# Patient Record
Sex: MALE | Race: WHITE | HISPANIC OR LATINO | Employment: FULL TIME | URBAN - METROPOLITAN AREA
[De-identification: names, ages, dates, MRNs, and addresses within clinical notes are randomized per-mention and may not be internally consistent; named-entity substitution may affect disease eponyms.]

---

## 2021-01-01 ENCOUNTER — APPOINTMENT (OUTPATIENT)
Dept: RADIOLOGY | Facility: MEDICAL CENTER | Age: 61
DRG: 207 | End: 2021-01-01
Attending: INTERNAL MEDICINE
Payer: OTHER GOVERNMENT

## 2021-01-01 ENCOUNTER — APPOINTMENT (OUTPATIENT)
Dept: RADIOLOGY | Facility: MEDICAL CENTER | Age: 61
DRG: 207 | End: 2021-01-01
Attending: STUDENT IN AN ORGANIZED HEALTH CARE EDUCATION/TRAINING PROGRAM
Payer: OTHER GOVERNMENT

## 2021-01-01 ENCOUNTER — HOSPITAL ENCOUNTER (OUTPATIENT)
Dept: RADIOLOGY | Facility: MEDICAL CENTER | Age: 61
End: 2021-12-11
Attending: INTERNAL MEDICINE
Payer: OTHER GOVERNMENT

## 2021-01-01 ENCOUNTER — HOSPITAL ENCOUNTER (OUTPATIENT)
Dept: RADIOLOGY | Facility: MEDICAL CENTER | Age: 61
End: 2021-12-12
Attending: INTERNAL MEDICINE
Payer: OTHER GOVERNMENT

## 2021-01-01 ENCOUNTER — APPOINTMENT (OUTPATIENT)
Dept: RADIOLOGY | Facility: MEDICAL CENTER | Age: 61
DRG: 207 | End: 2021-01-01
Attending: NURSE PRACTITIONER
Payer: OTHER GOVERNMENT

## 2021-01-01 ENCOUNTER — HOSPITAL ENCOUNTER (INPATIENT)
Facility: MEDICAL CENTER | Age: 61
LOS: 41 days | DRG: 207 | End: 2021-12-16
Attending: EMERGENCY MEDICINE | Admitting: HOSPITALIST
Payer: OTHER GOVERNMENT

## 2021-01-01 ENCOUNTER — HOSPITAL ENCOUNTER (OUTPATIENT)
Dept: RADIOLOGY | Facility: MEDICAL CENTER | Age: 61
End: 2021-12-13
Attending: INTERNAL MEDICINE
Payer: OTHER GOVERNMENT

## 2021-01-01 ENCOUNTER — APPOINTMENT (OUTPATIENT)
Dept: RADIOLOGY | Facility: MEDICAL CENTER | Age: 61
DRG: 207 | End: 2021-01-01
Attending: EMERGENCY MEDICINE
Payer: OTHER GOVERNMENT

## 2021-01-01 VITALS
DIASTOLIC BLOOD PRESSURE: 41 MMHG | OXYGEN SATURATION: 45 % | SYSTOLIC BLOOD PRESSURE: 76 MMHG | TEMPERATURE: 101.8 F | HEART RATE: 45 BPM | WEIGHT: 185.41 LBS | HEIGHT: 66 IN | RESPIRATION RATE: 3 BRPM | BODY MASS INDEX: 29.8 KG/M2

## 2021-01-01 DIAGNOSIS — R65.21 SEPTIC SHOCK (HCC): ICD-10-CM

## 2021-01-01 DIAGNOSIS — U07.1 PNEUMONIA DUE TO COVID-19 VIRUS: ICD-10-CM

## 2021-01-01 DIAGNOSIS — U07.1 ACUTE HYPOXEMIC RESPIRATORY FAILURE DUE TO COVID-19 (HCC): ICD-10-CM

## 2021-01-01 DIAGNOSIS — J96.01 ACUTE HYPOXEMIC RESPIRATORY FAILURE DUE TO COVID-19 (HCC): ICD-10-CM

## 2021-01-01 DIAGNOSIS — A41.9 SEPTIC SHOCK (HCC): ICD-10-CM

## 2021-01-01 DIAGNOSIS — J12.82 PNEUMONIA DUE TO COVID-19 VIRUS: ICD-10-CM

## 2021-01-01 LAB
1 3 BETA D GLUCAN INTERP Q4483: ABNORMAL
1,3 BETA GLUCAN SER-MCNC: 64 PG/ML
ABO + RH BLD: NORMAL
ABO GROUP BLD: NORMAL
ALBUMIN SERPL BCP-MCNC: 1.7 G/DL (ref 3.2–4.9)
ALBUMIN SERPL BCP-MCNC: 1.9 G/DL (ref 3.2–4.9)
ALBUMIN SERPL BCP-MCNC: 2 G/DL (ref 3.2–4.9)
ALBUMIN SERPL BCP-MCNC: 2 G/DL (ref 3.2–4.9)
ALBUMIN SERPL BCP-MCNC: 2.1 G/DL (ref 3.2–4.9)
ALBUMIN SERPL BCP-MCNC: 2.2 G/DL (ref 3.2–4.9)
ALBUMIN SERPL BCP-MCNC: 2.8 G/DL (ref 3.2–4.9)
ALBUMIN SERPL BCP-MCNC: 2.8 G/DL (ref 3.2–4.9)
ALBUMIN SERPL BCP-MCNC: 2.9 G/DL (ref 3.2–4.9)
ALBUMIN SERPL BCP-MCNC: 3 G/DL (ref 3.2–4.9)
ALBUMIN SERPL BCP-MCNC: 3.1 G/DL (ref 3.2–4.9)
ALBUMIN SERPL BCP-MCNC: 3.2 G/DL (ref 3.2–4.9)
ALBUMIN SERPL BCP-MCNC: 3.3 G/DL (ref 3.2–4.9)
ALBUMIN SERPL BCP-MCNC: 3.4 G/DL (ref 3.2–4.9)
ALBUMIN SERPL BCP-MCNC: 3.6 G/DL (ref 3.2–4.9)
ALBUMIN SERPL BCP-MCNC: 3.7 G/DL (ref 3.2–4.9)
ALBUMIN/GLOB SERPL: 0.4 G/DL
ALBUMIN/GLOB SERPL: 0.4 G/DL
ALBUMIN/GLOB SERPL: 0.5 G/DL
ALBUMIN/GLOB SERPL: 0.6 G/DL
ALBUMIN/GLOB SERPL: 0.7 G/DL
ALBUMIN/GLOB SERPL: 0.8 G/DL
ALBUMIN/GLOB SERPL: 0.9 G/DL
ALBUMIN/GLOB SERPL: 0.9 G/DL
ALP SERPL-CCNC: 106 U/L (ref 30–99)
ALP SERPL-CCNC: 53 U/L (ref 30–99)
ALP SERPL-CCNC: 58 U/L (ref 30–99)
ALP SERPL-CCNC: 58 U/L (ref 30–99)
ALP SERPL-CCNC: 59 U/L (ref 30–99)
ALP SERPL-CCNC: 61 U/L (ref 30–99)
ALP SERPL-CCNC: 62 U/L (ref 30–99)
ALP SERPL-CCNC: 64 U/L (ref 30–99)
ALP SERPL-CCNC: 65 U/L (ref 30–99)
ALP SERPL-CCNC: 66 U/L (ref 30–99)
ALP SERPL-CCNC: 67 U/L (ref 30–99)
ALP SERPL-CCNC: 68 U/L (ref 30–99)
ALP SERPL-CCNC: 69 U/L (ref 30–99)
ALP SERPL-CCNC: 69 U/L (ref 30–99)
ALP SERPL-CCNC: 70 U/L (ref 30–99)
ALP SERPL-CCNC: 71 U/L (ref 30–99)
ALP SERPL-CCNC: 72 U/L (ref 30–99)
ALP SERPL-CCNC: 72 U/L (ref 30–99)
ALP SERPL-CCNC: 74 U/L (ref 30–99)
ALP SERPL-CCNC: 75 U/L (ref 30–99)
ALP SERPL-CCNC: 75 U/L (ref 30–99)
ALP SERPL-CCNC: 78 U/L (ref 30–99)
ALP SERPL-CCNC: 80 U/L (ref 30–99)
ALP SERPL-CCNC: 81 U/L (ref 30–99)
ALP SERPL-CCNC: 82 U/L (ref 30–99)
ALP SERPL-CCNC: 83 U/L (ref 30–99)
ALP SERPL-CCNC: 83 U/L (ref 30–99)
ALP SERPL-CCNC: 85 U/L (ref 30–99)
ALP SERPL-CCNC: 96 U/L (ref 30–99)
ALT SERPL-CCNC: 106 U/L (ref 2–50)
ALT SERPL-CCNC: 17 U/L (ref 2–50)
ALT SERPL-CCNC: 18 U/L (ref 2–50)
ALT SERPL-CCNC: 20 U/L (ref 2–50)
ALT SERPL-CCNC: 20 U/L (ref 2–50)
ALT SERPL-CCNC: 21 U/L (ref 2–50)
ALT SERPL-CCNC: 21 U/L (ref 2–50)
ALT SERPL-CCNC: 22 U/L (ref 2–50)
ALT SERPL-CCNC: 23 U/L (ref 2–50)
ALT SERPL-CCNC: 23 U/L (ref 2–50)
ALT SERPL-CCNC: 25 U/L (ref 2–50)
ALT SERPL-CCNC: 27 U/L (ref 2–50)
ALT SERPL-CCNC: 30 U/L (ref 2–50)
ALT SERPL-CCNC: 31 U/L (ref 2–50)
ALT SERPL-CCNC: 31 U/L (ref 2–50)
ALT SERPL-CCNC: 32 U/L (ref 2–50)
ALT SERPL-CCNC: 32 U/L (ref 2–50)
ALT SERPL-CCNC: 37 U/L (ref 2–50)
ALT SERPL-CCNC: 41 U/L (ref 2–50)
ALT SERPL-CCNC: 52 U/L (ref 2–50)
ALT SERPL-CCNC: 63 U/L (ref 2–50)
ALT SERPL-CCNC: 87 U/L (ref 2–50)
ALT SERPL-CCNC: 90 U/L (ref 2–50)
ANION GAP SERPL CALC-SCNC: 0 MMOL/L (ref 7–16)
ANION GAP SERPL CALC-SCNC: 0 MMOL/L (ref 7–16)
ANION GAP SERPL CALC-SCNC: 1 MMOL/L (ref 7–16)
ANION GAP SERPL CALC-SCNC: 11 MMOL/L (ref 7–16)
ANION GAP SERPL CALC-SCNC: 12 MMOL/L (ref 7–16)
ANION GAP SERPL CALC-SCNC: 13 MMOL/L (ref 7–16)
ANION GAP SERPL CALC-SCNC: 14 MMOL/L (ref 7–16)
ANION GAP SERPL CALC-SCNC: 14 MMOL/L (ref 7–16)
ANION GAP SERPL CALC-SCNC: 15 MMOL/L (ref 7–16)
ANION GAP SERPL CALC-SCNC: 2 MMOL/L (ref 7–16)
ANION GAP SERPL CALC-SCNC: 21 MMOL/L (ref 7–16)
ANION GAP SERPL CALC-SCNC: 3 MMOL/L (ref 7–16)
ANION GAP SERPL CALC-SCNC: 5 MMOL/L (ref 7–16)
ANION GAP SERPL CALC-SCNC: 6 MMOL/L (ref 7–16)
ANION GAP SERPL CALC-SCNC: 8 MMOL/L (ref 7–16)
ANION GAP SERPL CALC-SCNC: 9 MMOL/L (ref 7–16)
ANION GAP SERPL CALC-SCNC: 9 MMOL/L (ref 7–16)
ANISOCYTOSIS BLD QL SMEAR: ABNORMAL
ANISOCYTOSIS BLD QL SMEAR: ABNORMAL
APPEARANCE UR: CLEAR
AST SERPL-CCNC: 14 U/L (ref 12–45)
AST SERPL-CCNC: 16 U/L (ref 12–45)
AST SERPL-CCNC: 19 U/L (ref 12–45)
AST SERPL-CCNC: 20 U/L (ref 12–45)
AST SERPL-CCNC: 22 U/L (ref 12–45)
AST SERPL-CCNC: 23 U/L (ref 12–45)
AST SERPL-CCNC: 24 U/L (ref 12–45)
AST SERPL-CCNC: 26 U/L (ref 12–45)
AST SERPL-CCNC: 27 U/L (ref 12–45)
AST SERPL-CCNC: 28 U/L (ref 12–45)
AST SERPL-CCNC: 28 U/L (ref 12–45)
AST SERPL-CCNC: 30 U/L (ref 12–45)
AST SERPL-CCNC: 31 U/L (ref 12–45)
AST SERPL-CCNC: 35 U/L (ref 12–45)
AST SERPL-CCNC: 37 U/L (ref 12–45)
AST SERPL-CCNC: 38 U/L (ref 12–45)
AST SERPL-CCNC: 39 U/L (ref 12–45)
AST SERPL-CCNC: 43 U/L (ref 12–45)
AST SERPL-CCNC: 48 U/L (ref 12–45)
AST SERPL-CCNC: 48 U/L (ref 12–45)
AST SERPL-CCNC: 54 U/L (ref 12–45)
AST SERPL-CCNC: 56 U/L (ref 12–45)
AST SERPL-CCNC: 59 U/L (ref 12–45)
AST SERPL-CCNC: 63 U/L (ref 12–45)
AST SERPL-CCNC: 69 U/L (ref 12–45)
AST SERPL-CCNC: 72 U/L (ref 12–45)
AST SERPL-CCNC: 75 U/L (ref 12–45)
AST SERPL-CCNC: 76 U/L (ref 12–45)
AST SERPL-CCNC: 85 U/L (ref 12–45)
BACTERIA #/AREA URNS HPF: ABNORMAL /HPF
BACTERIA BLD CULT: NORMAL
BACTERIA SPEC RESP CULT: ABNORMAL
BASE EXCESS BLDA CALC-SCNC: 0 MMOL/L (ref -4–3)
BASE EXCESS BLDA CALC-SCNC: 11 MMOL/L (ref -4–3)
BASE EXCESS BLDA CALC-SCNC: 11 MMOL/L (ref -4–3)
BASE EXCESS BLDA CALC-SCNC: 12 MMOL/L (ref -4–3)
BASE EXCESS BLDA CALC-SCNC: 13 MMOL/L (ref -4–3)
BASE EXCESS BLDA CALC-SCNC: 14 MMOL/L (ref -4–3)
BASE EXCESS BLDA CALC-SCNC: 14 MMOL/L (ref -4–3)
BASE EXCESS BLDA CALC-SCNC: 15 MMOL/L (ref -4–3)
BASE EXCESS BLDA CALC-SCNC: 21 MMOL/L (ref -4–3)
BASE EXCESS BLDA CALC-SCNC: 22 MMOL/L (ref -4–3)
BASE EXCESS BLDA CALC-SCNC: 23 MMOL/L (ref -4–3)
BASE EXCESS BLDA CALC-SCNC: 26 MMOL/L (ref -4–3)
BASE EXCESS BLDA CALC-SCNC: 3 MMOL/L (ref -4–3)
BASE EXCESS BLDA CALC-SCNC: 3 MMOL/L (ref -4–3)
BASE EXCESS BLDA CALC-SCNC: 5 MMOL/L (ref -4–3)
BASE EXCESS BLDA CALC-SCNC: 6 MMOL/L (ref -4–3)
BASE EXCESS BLDA CALC-SCNC: 6 MMOL/L (ref -4–3)
BASE EXCESS BLDA CALC-SCNC: 8 MMOL/L (ref -4–3)
BASE EXCESS BLDA CALC-SCNC: 9 MMOL/L (ref -4–3)
BASE EXCESS BLDA CALC-SCNC: 9 MMOL/L (ref -4–3)
BASE EXCESS BLDA CALC-SCNC: >30 MMOL/L (ref -4–3)
BASE EXCESS BLDA CALC-SCNC: ABNORMAL MMOL/L (ref -4–3)
BASE EXCESS BLDA CALC-SCNC: ABNORMAL MMOL/L (ref -4–3)
BASO STIPL BLD QL SMEAR: NORMAL
BASOPHILS # BLD AUTO: 0 % (ref 0–1.8)
BASOPHILS # BLD AUTO: 0.1 % (ref 0–1.8)
BASOPHILS # BLD AUTO: 0.2 % (ref 0–1.8)
BASOPHILS # BLD AUTO: 0.3 % (ref 0–1.8)
BASOPHILS # BLD AUTO: 0.4 % (ref 0–1.8)
BASOPHILS # BLD AUTO: 0.5 % (ref 0–1.8)
BASOPHILS # BLD AUTO: 0.6 % (ref 0–1.8)
BASOPHILS # BLD AUTO: 0.7 % (ref 0–1.8)
BASOPHILS # BLD AUTO: 0.7 % (ref 0–1.8)
BASOPHILS # BLD AUTO: 0.8 % (ref 0–1.8)
BASOPHILS # BLD: 0 K/UL (ref 0–0.12)
BASOPHILS # BLD: 0.01 K/UL (ref 0–0.12)
BASOPHILS # BLD: 0.02 K/UL (ref 0–0.12)
BASOPHILS # BLD: 0.04 K/UL (ref 0–0.12)
BASOPHILS # BLD: 0.05 K/UL (ref 0–0.12)
BASOPHILS # BLD: 0.05 K/UL (ref 0–0.12)
BASOPHILS # BLD: 0.06 K/UL (ref 0–0.12)
BASOPHILS # BLD: 0.08 K/UL (ref 0–0.12)
BASOPHILS # BLD: 0.09 K/UL (ref 0–0.12)
BASOPHILS # BLD: 0.11 K/UL (ref 0–0.12)
BASOPHILS # BLD: 0.15 K/UL (ref 0–0.12)
BASOPHILS # BLD: 0.15 K/UL (ref 0–0.12)
BILIRUB SERPL-MCNC: 0.2 MG/DL (ref 0.1–1.5)
BILIRUB SERPL-MCNC: 0.3 MG/DL (ref 0.1–1.5)
BILIRUB SERPL-MCNC: 0.3 MG/DL (ref 0.1–1.5)
BILIRUB SERPL-MCNC: 0.4 MG/DL (ref 0.1–1.5)
BILIRUB SERPL-MCNC: 0.5 MG/DL (ref 0.1–1.5)
BILIRUB SERPL-MCNC: 0.6 MG/DL (ref 0.1–1.5)
BILIRUB SERPL-MCNC: 0.7 MG/DL (ref 0.1–1.5)
BILIRUB SERPL-MCNC: 0.8 MG/DL (ref 0.1–1.5)
BILIRUB SERPL-MCNC: 0.9 MG/DL (ref 0.1–1.5)
BILIRUB SERPL-MCNC: 0.9 MG/DL (ref 0.1–1.5)
BILIRUB SERPL-MCNC: <0.2 MG/DL (ref 0.1–1.5)
BILIRUB UR QL STRIP.AUTO: NEGATIVE
BLD GP AB SCN SERPL QL: NORMAL
BODY TEMPERATURE: ABNORMAL DEGREES
BODY TEMPERATURE: NORMAL CENTIGRADE
BREATHS SETTING VENT: 26
BREATHS SETTING VENT: 30
BREATHS SETTING VENT: 32
BREATHS SETTING VENT: 34
BREATHS SETTING VENT: 36
BREATHS SETTING VENT: 40
BUN SERPL-MCNC: 116 MG/DL (ref 8–22)
BUN SERPL-MCNC: 17 MG/DL (ref 8–22)
BUN SERPL-MCNC: 20 MG/DL (ref 8–22)
BUN SERPL-MCNC: 25 MG/DL (ref 8–22)
BUN SERPL-MCNC: 25 MG/DL (ref 8–22)
BUN SERPL-MCNC: 27 MG/DL (ref 8–22)
BUN SERPL-MCNC: 29 MG/DL (ref 8–22)
BUN SERPL-MCNC: 30 MG/DL (ref 8–22)
BUN SERPL-MCNC: 30 MG/DL (ref 8–22)
BUN SERPL-MCNC: 32 MG/DL (ref 8–22)
BUN SERPL-MCNC: 33 MG/DL (ref 8–22)
BUN SERPL-MCNC: 34 MG/DL (ref 8–22)
BUN SERPL-MCNC: 35 MG/DL (ref 8–22)
BUN SERPL-MCNC: 37 MG/DL (ref 8–22)
BUN SERPL-MCNC: 38 MG/DL (ref 8–22)
BUN SERPL-MCNC: 39 MG/DL (ref 8–22)
BUN SERPL-MCNC: 40 MG/DL (ref 8–22)
BUN SERPL-MCNC: 41 MG/DL (ref 8–22)
BUN SERPL-MCNC: 42 MG/DL (ref 8–22)
BUN SERPL-MCNC: 44 MG/DL (ref 8–22)
BUN SERPL-MCNC: 44 MG/DL (ref 8–22)
BUN SERPL-MCNC: 51 MG/DL (ref 8–22)
BUN SERPL-MCNC: 55 MG/DL (ref 8–22)
BUN SERPL-MCNC: 55 MG/DL (ref 8–22)
BUN SERPL-MCNC: 56 MG/DL (ref 8–22)
BUN SERPL-MCNC: 58 MG/DL (ref 8–22)
BUN SERPL-MCNC: 60 MG/DL (ref 8–22)
BUN SERPL-MCNC: 63 MG/DL (ref 8–22)
BUN SERPL-MCNC: 65 MG/DL (ref 8–22)
BUN SERPL-MCNC: 65 MG/DL (ref 8–22)
BUN SERPL-MCNC: 66 MG/DL (ref 8–22)
BUN SERPL-MCNC: 75 MG/DL (ref 8–22)
CALCIUM SERPL-MCNC: 10 MG/DL (ref 8.5–10.5)
CALCIUM SERPL-MCNC: 10 MG/DL (ref 8.5–10.5)
CALCIUM SERPL-MCNC: 11.1 MG/DL (ref 8.5–10.5)
CALCIUM SERPL-MCNC: 7.9 MG/DL (ref 8.5–10.5)
CALCIUM SERPL-MCNC: 8 MG/DL (ref 8.5–10.5)
CALCIUM SERPL-MCNC: 8.1 MG/DL (ref 8.5–10.5)
CALCIUM SERPL-MCNC: 8.2 MG/DL (ref 8.5–10.5)
CALCIUM SERPL-MCNC: 8.3 MG/DL (ref 8.5–10.5)
CALCIUM SERPL-MCNC: 8.6 MG/DL (ref 8.5–10.5)
CALCIUM SERPL-MCNC: 8.7 MG/DL (ref 8.5–10.5)
CALCIUM SERPL-MCNC: 8.8 MG/DL (ref 8.5–10.5)
CALCIUM SERPL-MCNC: 8.9 MG/DL (ref 8.5–10.5)
CALCIUM SERPL-MCNC: 9 MG/DL (ref 8.5–10.5)
CALCIUM SERPL-MCNC: 9.1 MG/DL (ref 8.5–10.5)
CALCIUM SERPL-MCNC: 9.3 MG/DL (ref 8.5–10.5)
CALCIUM SERPL-MCNC: 9.4 MG/DL (ref 8.5–10.5)
CALCIUM SERPL-MCNC: 9.5 MG/DL (ref 8.5–10.5)
CALCIUM SERPL-MCNC: 9.5 MG/DL (ref 8.5–10.5)
CALCIUM SERPL-MCNC: 9.6 MG/DL (ref 8.5–10.5)
CALCIUM SERPL-MCNC: 9.6 MG/DL (ref 8.5–10.5)
CALCIUM SERPL-MCNC: 9.7 MG/DL (ref 8.5–10.5)
CALCIUM SERPL-MCNC: 9.8 MG/DL (ref 8.5–10.5)
CHLORIDE SERPL-SCNC: 100 MMOL/L (ref 96–112)
CHLORIDE SERPL-SCNC: 101 MMOL/L (ref 96–112)
CHLORIDE SERPL-SCNC: 101 MMOL/L (ref 96–112)
CHLORIDE SERPL-SCNC: 102 MMOL/L (ref 96–112)
CHLORIDE SERPL-SCNC: 103 MMOL/L (ref 96–112)
CHLORIDE SERPL-SCNC: 104 MMOL/L (ref 96–112)
CHLORIDE SERPL-SCNC: 105 MMOL/L (ref 96–112)
CHLORIDE SERPL-SCNC: 106 MMOL/L (ref 96–112)
CHLORIDE SERPL-SCNC: 107 MMOL/L (ref 96–112)
CHLORIDE SERPL-SCNC: 109 MMOL/L (ref 96–112)
CHLORIDE SERPL-SCNC: 115 MMOL/L (ref 96–112)
CHLORIDE SERPL-SCNC: 115 MMOL/L (ref 96–112)
CHLORIDE SERPL-SCNC: 117 MMOL/L (ref 96–112)
CHLORIDE SERPL-SCNC: 91 MMOL/L (ref 96–112)
CHLORIDE SERPL-SCNC: 92 MMOL/L (ref 96–112)
CHLORIDE SERPL-SCNC: 94 MMOL/L (ref 96–112)
CHLORIDE SERPL-SCNC: 95 MMOL/L (ref 96–112)
CHLORIDE SERPL-SCNC: 96 MMOL/L (ref 96–112)
CHLORIDE SERPL-SCNC: 97 MMOL/L (ref 96–112)
CHLORIDE SERPL-SCNC: 97 MMOL/L (ref 96–112)
CHLORIDE SERPL-SCNC: 98 MMOL/L (ref 96–112)
CO2 BLDA-SCNC: 32 MMOL/L (ref 20–33)
CO2 BLDA-SCNC: 32 MMOL/L (ref 20–33)
CO2 BLDA-SCNC: 33 MMOL/L (ref 20–33)
CO2 BLDA-SCNC: 33 MMOL/L (ref 20–33)
CO2 BLDA-SCNC: 35 MMOL/L (ref 20–33)
CO2 BLDA-SCNC: 36 MMOL/L (ref 20–33)
CO2 BLDA-SCNC: 37 MMOL/L (ref 20–33)
CO2 BLDA-SCNC: 37 MMOL/L (ref 20–33)
CO2 BLDA-SCNC: 38 MMOL/L (ref 20–33)
CO2 BLDA-SCNC: 38 MMOL/L (ref 20–33)
CO2 BLDA-SCNC: 39 MMOL/L (ref 20–33)
CO2 BLDA-SCNC: 39 MMOL/L (ref 20–33)
CO2 BLDA-SCNC: 40 MMOL/L (ref 20–33)
CO2 BLDA-SCNC: 42 MMOL/L (ref 20–33)
CO2 BLDA-SCNC: 43 MMOL/L (ref 20–33)
CO2 BLDA-SCNC: 44 MMOL/L (ref 20–33)
CO2 BLDA-SCNC: 44 MMOL/L (ref 20–33)
CO2 BLDA-SCNC: 47 MMOL/L (ref 20–33)
CO2 BLDA-SCNC: >50 MMOL/L (ref 20–33)
CO2 BLDA-SCNC: ABNORMAL MMOL/L (ref 20–33)
CO2 BLDA-SCNC: ABNORMAL MMOL/L (ref 20–33)
CO2 SERPL-SCNC: 16 MMOL/L (ref 20–33)
CO2 SERPL-SCNC: 18 MMOL/L (ref 20–33)
CO2 SERPL-SCNC: 20 MMOL/L (ref 20–33)
CO2 SERPL-SCNC: 21 MMOL/L (ref 20–33)
CO2 SERPL-SCNC: 22 MMOL/L (ref 20–33)
CO2 SERPL-SCNC: 23 MMOL/L (ref 20–33)
CO2 SERPL-SCNC: 24 MMOL/L (ref 20–33)
CO2 SERPL-SCNC: 24 MMOL/L (ref 20–33)
CO2 SERPL-SCNC: 25 MMOL/L (ref 20–33)
CO2 SERPL-SCNC: 26 MMOL/L (ref 20–33)
CO2 SERPL-SCNC: 29 MMOL/L (ref 20–33)
CO2 SERPL-SCNC: 29 MMOL/L (ref 20–33)
CO2 SERPL-SCNC: 30 MMOL/L (ref 20–33)
CO2 SERPL-SCNC: 30 MMOL/L (ref 20–33)
CO2 SERPL-SCNC: 32 MMOL/L (ref 20–33)
CO2 SERPL-SCNC: 32 MMOL/L (ref 20–33)
CO2 SERPL-SCNC: 33 MMOL/L (ref 20–33)
CO2 SERPL-SCNC: 33 MMOL/L (ref 20–33)
CO2 SERPL-SCNC: 35 MMOL/L (ref 20–33)
CO2 SERPL-SCNC: 36 MMOL/L (ref 20–33)
CO2 SERPL-SCNC: 36 MMOL/L (ref 20–33)
CO2 SERPL-SCNC: 37 MMOL/L (ref 20–33)
CO2 SERPL-SCNC: 37 MMOL/L (ref 20–33)
CO2 SERPL-SCNC: 40 MMOL/L (ref 20–33)
CO2 SERPL-SCNC: 40 MMOL/L (ref 20–33)
CO2 SERPL-SCNC: 42 MMOL/L (ref 20–33)
CO2 SERPL-SCNC: 44 MMOL/L (ref 20–33)
CO2 SERPL-SCNC: 44 MMOL/L (ref 20–33)
CO2 SERPL-SCNC: 45 MMOL/L (ref 20–33)
CO2 SERPL-SCNC: 47 MMOL/L (ref 20–33)
CO2 SERPL-SCNC: 48 MMOL/L (ref 20–33)
CO2 SERPL-SCNC: 49 MMOL/L (ref 20–33)
CO2 SERPL-SCNC: >50 MMOL/L (ref 20–33)
COLOR UR: YELLOW
CREAT SERPL-MCNC: 0.33 MG/DL (ref 0.5–1.4)
CREAT SERPL-MCNC: 0.36 MG/DL (ref 0.5–1.4)
CREAT SERPL-MCNC: 0.36 MG/DL (ref 0.5–1.4)
CREAT SERPL-MCNC: 0.37 MG/DL (ref 0.5–1.4)
CREAT SERPL-MCNC: 0.37 MG/DL (ref 0.5–1.4)
CREAT SERPL-MCNC: 0.38 MG/DL (ref 0.5–1.4)
CREAT SERPL-MCNC: 0.4 MG/DL (ref 0.5–1.4)
CREAT SERPL-MCNC: 0.42 MG/DL (ref 0.5–1.4)
CREAT SERPL-MCNC: 0.42 MG/DL (ref 0.5–1.4)
CREAT SERPL-MCNC: 0.43 MG/DL (ref 0.5–1.4)
CREAT SERPL-MCNC: 0.46 MG/DL (ref 0.5–1.4)
CREAT SERPL-MCNC: 0.53 MG/DL (ref 0.5–1.4)
CREAT SERPL-MCNC: 0.55 MG/DL (ref 0.5–1.4)
CREAT SERPL-MCNC: 0.56 MG/DL (ref 0.5–1.4)
CREAT SERPL-MCNC: 0.63 MG/DL (ref 0.5–1.4)
CREAT SERPL-MCNC: 0.68 MG/DL (ref 0.5–1.4)
CREAT SERPL-MCNC: 0.73 MG/DL (ref 0.5–1.4)
CREAT SERPL-MCNC: 0.74 MG/DL (ref 0.5–1.4)
CREAT SERPL-MCNC: 0.76 MG/DL (ref 0.5–1.4)
CREAT SERPL-MCNC: 0.78 MG/DL (ref 0.5–1.4)
CREAT SERPL-MCNC: 0.78 MG/DL (ref 0.5–1.4)
CREAT SERPL-MCNC: 0.79 MG/DL (ref 0.5–1.4)
CREAT SERPL-MCNC: 0.8 MG/DL (ref 0.5–1.4)
CREAT SERPL-MCNC: 0.83 MG/DL (ref 0.5–1.4)
CREAT SERPL-MCNC: 0.84 MG/DL (ref 0.5–1.4)
CREAT SERPL-MCNC: 0.86 MG/DL (ref 0.5–1.4)
CREAT SERPL-MCNC: 0.87 MG/DL (ref 0.5–1.4)
CREAT SERPL-MCNC: 0.88 MG/DL (ref 0.5–1.4)
CREAT SERPL-MCNC: 0.9 MG/DL (ref 0.5–1.4)
CREAT SERPL-MCNC: 0.9 MG/DL (ref 0.5–1.4)
CREAT SERPL-MCNC: 0.93 MG/DL (ref 0.5–1.4)
CREAT SERPL-MCNC: 0.97 MG/DL (ref 0.5–1.4)
CREAT SERPL-MCNC: 0.99 MG/DL (ref 0.5–1.4)
CREAT SERPL-MCNC: 0.99 MG/DL (ref 0.5–1.4)
CREAT SERPL-MCNC: 1 MG/DL (ref 0.5–1.4)
CREAT SERPL-MCNC: 1.03 MG/DL (ref 0.5–1.4)
CREAT SERPL-MCNC: 1.05 MG/DL (ref 0.5–1.4)
CREAT SERPL-MCNC: 1.06 MG/DL (ref 0.5–1.4)
CREAT SERPL-MCNC: 1.23 MG/DL (ref 0.5–1.4)
CREAT SERPL-MCNC: 1.29 MG/DL (ref 0.5–1.4)
CREAT SERPL-MCNC: 1.39 MG/DL (ref 0.5–1.4)
CREAT SERPL-MCNC: 1.4 MG/DL (ref 0.5–1.4)
CREAT SERPL-MCNC: 1.72 MG/DL (ref 0.5–1.4)
CRP SERPL HS-MCNC: 1.56 MG/DL (ref 0–0.75)
CRP SERPL HS-MCNC: 11.18 MG/DL (ref 0–0.75)
CRP SERPL HS-MCNC: 13.39 MG/DL (ref 0–0.75)
CRP SERPL HS-MCNC: 13.71 MG/DL (ref 0–0.75)
CRP SERPL HS-MCNC: 17.4 MG/DL (ref 0–0.75)
CRP SERPL HS-MCNC: 17.78 MG/DL (ref 0–0.75)
CRP SERPL HS-MCNC: 2.31 MG/DL (ref 0–0.75)
CRP SERPL HS-MCNC: 2.45 MG/DL (ref 0–0.75)
CRP SERPL HS-MCNC: 2.88 MG/DL (ref 0–0.75)
CRP SERPL HS-MCNC: 25.5 MG/DL (ref 0–0.75)
CRP SERPL HS-MCNC: 3.23 MG/DL (ref 0–0.75)
D DIMER PPP IA.FEU-MCNC: 1.08 UG/ML (FEU) (ref 0–0.5)
D DIMER PPP IA.FEU-MCNC: 1.28 UG/ML (FEU) (ref 0–0.5)
D DIMER PPP IA.FEU-MCNC: 10.25 UG/ML (FEU) (ref 0–0.5)
D DIMER PPP IA.FEU-MCNC: 2.06 UG/ML (FEU) (ref 0–0.5)
D DIMER PPP IA.FEU-MCNC: 2.83 UG/ML (FEU) (ref 0–0.5)
D DIMER PPP IA.FEU-MCNC: 3.06 UG/ML (FEU) (ref 0–0.5)
D DIMER PPP IA.FEU-MCNC: 3.34 UG/ML (FEU) (ref 0–0.5)
D DIMER PPP IA.FEU-MCNC: 5.26 UG/ML (FEU) (ref 0–0.5)
D DIMER PPP IA.FEU-MCNC: 6.38 UG/ML (FEU) (ref 0–0.5)
D DIMER PPP IA.FEU-MCNC: 6.56 UG/ML (FEU) (ref 0–0.5)
DELSYS IDSYS: ABNORMAL
EKG IMPRESSION: NORMAL
END TIDAL CARBON DIOXIDE IECO2: 1 MMHG
END TIDAL CARBON DIOXIDE IECO2: 37 MMHG
END TIDAL CARBON DIOXIDE IECO2: 38 MMHG
END TIDAL CARBON DIOXIDE IECO2: 44 MMHG
END TIDAL CARBON DIOXIDE IECO2: 46 MMHG
END TIDAL CARBON DIOXIDE IECO2: 46 MMHG
END TIDAL CARBON DIOXIDE IECO2: 47 MMHG
END TIDAL CARBON DIOXIDE IECO2: 48 MMHG
END TIDAL CARBON DIOXIDE IECO2: 49 MMHG
END TIDAL CARBON DIOXIDE IECO2: 51 MMHG
END TIDAL CARBON DIOXIDE IECO2: 53 MMHG
END TIDAL CARBON DIOXIDE IECO2: 56 MMHG
END TIDAL CARBON DIOXIDE IECO2: 65 MMHG
END TIDAL CARBON DIOXIDE IECO2: 67 MMHG
END TIDAL CARBON DIOXIDE IECO2: 69 MMHG
END TIDAL CARBON DIOXIDE IECO2: 69 MMHG
END TIDAL CARBON DIOXIDE IECO2: 74 MMHG
END TIDAL CARBON DIOXIDE IECO2: 82 MMHG
EOSINOPHIL # BLD AUTO: 0 K/UL (ref 0–0.51)
EOSINOPHIL # BLD AUTO: 0.01 K/UL (ref 0–0.51)
EOSINOPHIL # BLD AUTO: 0.01 K/UL (ref 0–0.51)
EOSINOPHIL # BLD AUTO: 0.02 K/UL (ref 0–0.51)
EOSINOPHIL # BLD AUTO: 0.02 K/UL (ref 0–0.51)
EOSINOPHIL # BLD AUTO: 0.03 K/UL (ref 0–0.51)
EOSINOPHIL # BLD AUTO: 0.09 K/UL (ref 0–0.51)
EOSINOPHIL # BLD AUTO: 0.09 K/UL (ref 0–0.51)
EOSINOPHIL # BLD AUTO: 0.11 K/UL (ref 0–0.51)
EOSINOPHIL # BLD AUTO: 0.14 K/UL (ref 0–0.51)
EOSINOPHIL # BLD AUTO: 0.21 K/UL (ref 0–0.51)
EOSINOPHIL # BLD AUTO: 0.22 K/UL (ref 0–0.51)
EOSINOPHIL # BLD AUTO: 0.24 K/UL (ref 0–0.51)
EOSINOPHIL # BLD AUTO: 0.28 K/UL (ref 0–0.51)
EOSINOPHIL # BLD AUTO: 0.28 K/UL (ref 0–0.51)
EOSINOPHIL # BLD AUTO: 0.32 K/UL (ref 0–0.51)
EOSINOPHIL # BLD AUTO: 0.32 K/UL (ref 0–0.51)
EOSINOPHIL # BLD AUTO: 0.35 K/UL (ref 0–0.51)
EOSINOPHIL # BLD AUTO: 0.39 K/UL (ref 0–0.51)
EOSINOPHIL # BLD AUTO: 0.54 K/UL (ref 0–0.51)
EOSINOPHIL # BLD AUTO: 0.81 K/UL (ref 0–0.51)
EOSINOPHIL # BLD AUTO: 1.07 K/UL (ref 0–0.51)
EOSINOPHIL NFR BLD: 0 % (ref 0–6.9)
EOSINOPHIL NFR BLD: 0.1 % (ref 0–6.9)
EOSINOPHIL NFR BLD: 0.2 % (ref 0–6.9)
EOSINOPHIL NFR BLD: 0.5 % (ref 0–6.9)
EOSINOPHIL NFR BLD: 0.6 % (ref 0–6.9)
EOSINOPHIL NFR BLD: 0.6 % (ref 0–6.9)
EOSINOPHIL NFR BLD: 0.8 % (ref 0–6.9)
EOSINOPHIL NFR BLD: 1.1 % (ref 0–6.9)
EOSINOPHIL NFR BLD: 1.3 % (ref 0–6.9)
EOSINOPHIL NFR BLD: 1.7 % (ref 0–6.9)
EOSINOPHIL NFR BLD: 2.9 % (ref 0–6.9)
EOSINOPHIL NFR BLD: 3 % (ref 0–6.9)
EOSINOPHIL NFR BLD: 4.6 % (ref 0–6.9)
EOSINOPHIL NFR BLD: 4.6 % (ref 0–6.9)
EOSINOPHIL NFR BLD: 6.2 % (ref 0–6.9)
EPI CELLS #/AREA URNS HPF: NEGATIVE /HPF
ERYTHROCYTE [DISTWIDTH] IN BLOOD BY AUTOMATED COUNT: 39.5 FL (ref 35.9–50)
ERYTHROCYTE [DISTWIDTH] IN BLOOD BY AUTOMATED COUNT: 41 FL (ref 35.9–50)
ERYTHROCYTE [DISTWIDTH] IN BLOOD BY AUTOMATED COUNT: 42.5 FL (ref 35.9–50)
ERYTHROCYTE [DISTWIDTH] IN BLOOD BY AUTOMATED COUNT: 42.8 FL (ref 35.9–50)
ERYTHROCYTE [DISTWIDTH] IN BLOOD BY AUTOMATED COUNT: 42.8 FL (ref 35.9–50)
ERYTHROCYTE [DISTWIDTH] IN BLOOD BY AUTOMATED COUNT: 43.2 FL (ref 35.9–50)
ERYTHROCYTE [DISTWIDTH] IN BLOOD BY AUTOMATED COUNT: 43.6 FL (ref 35.9–50)
ERYTHROCYTE [DISTWIDTH] IN BLOOD BY AUTOMATED COUNT: 44.3 FL (ref 35.9–50)
ERYTHROCYTE [DISTWIDTH] IN BLOOD BY AUTOMATED COUNT: 45.1 FL (ref 35.9–50)
ERYTHROCYTE [DISTWIDTH] IN BLOOD BY AUTOMATED COUNT: 47.2 FL (ref 35.9–50)
ERYTHROCYTE [DISTWIDTH] IN BLOOD BY AUTOMATED COUNT: 47.3 FL (ref 35.9–50)
ERYTHROCYTE [DISTWIDTH] IN BLOOD BY AUTOMATED COUNT: 48 FL (ref 35.9–50)
ERYTHROCYTE [DISTWIDTH] IN BLOOD BY AUTOMATED COUNT: 48.1 FL (ref 35.9–50)
ERYTHROCYTE [DISTWIDTH] IN BLOOD BY AUTOMATED COUNT: 48.1 FL (ref 35.9–50)
ERYTHROCYTE [DISTWIDTH] IN BLOOD BY AUTOMATED COUNT: 48.7 FL (ref 35.9–50)
ERYTHROCYTE [DISTWIDTH] IN BLOOD BY AUTOMATED COUNT: 49.5 FL (ref 35.9–50)
ERYTHROCYTE [DISTWIDTH] IN BLOOD BY AUTOMATED COUNT: 49.6 FL (ref 35.9–50)
ERYTHROCYTE [DISTWIDTH] IN BLOOD BY AUTOMATED COUNT: 50 FL (ref 35.9–50)
ERYTHROCYTE [DISTWIDTH] IN BLOOD BY AUTOMATED COUNT: 51.1 FL (ref 35.9–50)
ERYTHROCYTE [DISTWIDTH] IN BLOOD BY AUTOMATED COUNT: 51.3 FL (ref 35.9–50)
ERYTHROCYTE [DISTWIDTH] IN BLOOD BY AUTOMATED COUNT: 51.5 FL (ref 35.9–50)
ERYTHROCYTE [DISTWIDTH] IN BLOOD BY AUTOMATED COUNT: 51.7 FL (ref 35.9–50)
ERYTHROCYTE [DISTWIDTH] IN BLOOD BY AUTOMATED COUNT: 52 FL (ref 35.9–50)
ERYTHROCYTE [DISTWIDTH] IN BLOOD BY AUTOMATED COUNT: 52.8 FL (ref 35.9–50)
ERYTHROCYTE [DISTWIDTH] IN BLOOD BY AUTOMATED COUNT: 54.3 FL (ref 35.9–50)
ERYTHROCYTE [DISTWIDTH] IN BLOOD BY AUTOMATED COUNT: 56.2 FL (ref 35.9–50)
ERYTHROCYTE [DISTWIDTH] IN BLOOD BY AUTOMATED COUNT: 57.3 FL (ref 35.9–50)
ERYTHROCYTE [DISTWIDTH] IN BLOOD BY AUTOMATED COUNT: 58.6 FL (ref 35.9–50)
ERYTHROCYTE [DISTWIDTH] IN BLOOD BY AUTOMATED COUNT: 59.8 FL (ref 35.9–50)
ERYTHROCYTE [DISTWIDTH] IN BLOOD BY AUTOMATED COUNT: 61.9 FL (ref 35.9–50)
ERYTHROCYTE [DISTWIDTH] IN BLOOD BY AUTOMATED COUNT: 62.1 FL (ref 35.9–50)
ERYTHROCYTE [DISTWIDTH] IN BLOOD BY AUTOMATED COUNT: 63.1 FL (ref 35.9–50)
ERYTHROCYTE [DISTWIDTH] IN BLOOD BY AUTOMATED COUNT: 63.3 FL (ref 35.9–50)
ERYTHROCYTE [DISTWIDTH] IN BLOOD BY AUTOMATED COUNT: 63.3 FL (ref 35.9–50)
ERYTHROCYTE [DISTWIDTH] IN BLOOD BY AUTOMATED COUNT: 63.7 FL (ref 35.9–50)
ERYTHROCYTE [DISTWIDTH] IN BLOOD BY AUTOMATED COUNT: 65 FL (ref 35.9–50)
ERYTHROCYTE [DISTWIDTH] IN BLOOD BY AUTOMATED COUNT: 67.7 FL (ref 35.9–50)
FLUAV RNA SPEC QL NAA+PROBE: NEGATIVE
FLUBV RNA SPEC QL NAA+PROBE: NEGATIVE
FUNGUS SPEC FUNGUS STN: NORMAL
GAMMA INTERFERON BACKGROUND BLD IA-ACNC: 0.07 IU/ML
GAMMA INTERFERON BACKGROUND BLD IA-ACNC: 0.13 IU/ML
GLOBULIN SER CALC-MCNC: 3 G/DL (ref 1.9–3.5)
GLOBULIN SER CALC-MCNC: 3.5 G/DL (ref 1.9–3.5)
GLOBULIN SER CALC-MCNC: 3.6 G/DL (ref 1.9–3.5)
GLOBULIN SER CALC-MCNC: 3.8 G/DL (ref 1.9–3.5)
GLOBULIN SER CALC-MCNC: 3.8 G/DL (ref 1.9–3.5)
GLOBULIN SER CALC-MCNC: 3.9 G/DL (ref 1.9–3.5)
GLOBULIN SER CALC-MCNC: 4 G/DL (ref 1.9–3.5)
GLOBULIN SER CALC-MCNC: 4.1 G/DL (ref 1.9–3.5)
GLOBULIN SER CALC-MCNC: 4.2 G/DL (ref 1.9–3.5)
GLOBULIN SER CALC-MCNC: 4.3 G/DL (ref 1.9–3.5)
GLOBULIN SER CALC-MCNC: 4.5 G/DL (ref 1.9–3.5)
GLOBULIN SER CALC-MCNC: 4.6 G/DL (ref 1.9–3.5)
GLOBULIN SER CALC-MCNC: 4.7 G/DL (ref 1.9–3.5)
GLOBULIN SER CALC-MCNC: 4.8 G/DL (ref 1.9–3.5)
GLOBULIN SER CALC-MCNC: 4.9 G/DL (ref 1.9–3.5)
GLOBULIN SER CALC-MCNC: 5.3 G/DL (ref 1.9–3.5)
GLOBULIN SER CALC-MCNC: 5.3 G/DL (ref 1.9–3.5)
GLUCOSE BLD-MCNC: 106 MG/DL (ref 65–99)
GLUCOSE BLD-MCNC: 107 MG/DL (ref 65–99)
GLUCOSE BLD-MCNC: 109 MG/DL (ref 65–99)
GLUCOSE BLD-MCNC: 113 MG/DL (ref 65–99)
GLUCOSE BLD-MCNC: 115 MG/DL (ref 65–99)
GLUCOSE BLD-MCNC: 116 MG/DL (ref 65–99)
GLUCOSE BLD-MCNC: 118 MG/DL (ref 65–99)
GLUCOSE BLD-MCNC: 118 MG/DL (ref 65–99)
GLUCOSE BLD-MCNC: 120 MG/DL (ref 65–99)
GLUCOSE BLD-MCNC: 120 MG/DL (ref 65–99)
GLUCOSE BLD-MCNC: 121 MG/DL (ref 65–99)
GLUCOSE BLD-MCNC: 122 MG/DL (ref 65–99)
GLUCOSE BLD-MCNC: 123 MG/DL (ref 65–99)
GLUCOSE BLD-MCNC: 124 MG/DL (ref 65–99)
GLUCOSE BLD-MCNC: 124 MG/DL (ref 65–99)
GLUCOSE BLD-MCNC: 125 MG/DL (ref 65–99)
GLUCOSE BLD-MCNC: 126 MG/DL (ref 65–99)
GLUCOSE BLD-MCNC: 128 MG/DL (ref 65–99)
GLUCOSE BLD-MCNC: 129 MG/DL (ref 65–99)
GLUCOSE BLD-MCNC: 131 MG/DL (ref 65–99)
GLUCOSE BLD-MCNC: 134 MG/DL (ref 65–99)
GLUCOSE BLD-MCNC: 134 MG/DL (ref 65–99)
GLUCOSE BLD-MCNC: 135 MG/DL (ref 65–99)
GLUCOSE BLD-MCNC: 136 MG/DL (ref 65–99)
GLUCOSE BLD-MCNC: 138 MG/DL (ref 65–99)
GLUCOSE BLD-MCNC: 138 MG/DL (ref 65–99)
GLUCOSE BLD-MCNC: 141 MG/DL (ref 65–99)
GLUCOSE BLD-MCNC: 142 MG/DL (ref 65–99)
GLUCOSE BLD-MCNC: 143 MG/DL (ref 65–99)
GLUCOSE BLD-MCNC: 144 MG/DL (ref 65–99)
GLUCOSE BLD-MCNC: 145 MG/DL (ref 65–99)
GLUCOSE BLD-MCNC: 146 MG/DL (ref 65–99)
GLUCOSE BLD-MCNC: 146 MG/DL (ref 65–99)
GLUCOSE BLD-MCNC: 147 MG/DL (ref 65–99)
GLUCOSE BLD-MCNC: 148 MG/DL (ref 65–99)
GLUCOSE BLD-MCNC: 148 MG/DL (ref 65–99)
GLUCOSE BLD-MCNC: 151 MG/DL (ref 65–99)
GLUCOSE BLD-MCNC: 151 MG/DL (ref 65–99)
GLUCOSE BLD-MCNC: 153 MG/DL (ref 65–99)
GLUCOSE BLD-MCNC: 154 MG/DL (ref 65–99)
GLUCOSE BLD-MCNC: 155 MG/DL (ref 65–99)
GLUCOSE BLD-MCNC: 157 MG/DL (ref 65–99)
GLUCOSE BLD-MCNC: 159 MG/DL (ref 65–99)
GLUCOSE BLD-MCNC: 160 MG/DL (ref 65–99)
GLUCOSE BLD-MCNC: 160 MG/DL (ref 65–99)
GLUCOSE BLD-MCNC: 161 MG/DL (ref 65–99)
GLUCOSE BLD-MCNC: 162 MG/DL (ref 65–99)
GLUCOSE BLD-MCNC: 163 MG/DL (ref 65–99)
GLUCOSE BLD-MCNC: 164 MG/DL (ref 65–99)
GLUCOSE BLD-MCNC: 165 MG/DL (ref 65–99)
GLUCOSE BLD-MCNC: 165 MG/DL (ref 65–99)
GLUCOSE BLD-MCNC: 166 MG/DL (ref 65–99)
GLUCOSE BLD-MCNC: 167 MG/DL (ref 65–99)
GLUCOSE BLD-MCNC: 168 MG/DL (ref 65–99)
GLUCOSE BLD-MCNC: 170 MG/DL (ref 65–99)
GLUCOSE BLD-MCNC: 170 MG/DL (ref 65–99)
GLUCOSE BLD-MCNC: 173 MG/DL (ref 65–99)
GLUCOSE BLD-MCNC: 176 MG/DL (ref 65–99)
GLUCOSE BLD-MCNC: 176 MG/DL (ref 65–99)
GLUCOSE BLD-MCNC: 177 MG/DL (ref 65–99)
GLUCOSE BLD-MCNC: 178 MG/DL (ref 65–99)
GLUCOSE BLD-MCNC: 178 MG/DL (ref 65–99)
GLUCOSE BLD-MCNC: 185 MG/DL (ref 65–99)
GLUCOSE BLD-MCNC: 186 MG/DL (ref 65–99)
GLUCOSE BLD-MCNC: 189 MG/DL (ref 65–99)
GLUCOSE BLD-MCNC: 194 MG/DL (ref 65–99)
GLUCOSE BLD-MCNC: 195 MG/DL (ref 65–99)
GLUCOSE BLD-MCNC: 198 MG/DL (ref 65–99)
GLUCOSE BLD-MCNC: 201 MG/DL (ref 65–99)
GLUCOSE BLD-MCNC: 206 MG/DL (ref 65–99)
GLUCOSE BLD-MCNC: 219 MG/DL (ref 65–99)
GLUCOSE BLD-MCNC: 228 MG/DL (ref 65–99)
GLUCOSE BLD-MCNC: 303 MG/DL (ref 65–99)
GLUCOSE BLD-MCNC: 67 MG/DL (ref 65–99)
GLUCOSE BLD-MCNC: 85 MG/DL (ref 65–99)
GLUCOSE BLD-MCNC: 87 MG/DL (ref 65–99)
GLUCOSE BLD-MCNC: 90 MG/DL (ref 65–99)
GLUCOSE BLD-MCNC: 92 MG/DL (ref 65–99)
GLUCOSE BLD-MCNC: 97 MG/DL (ref 65–99)
GLUCOSE SERPL-MCNC: 101 MG/DL (ref 65–99)
GLUCOSE SERPL-MCNC: 104 MG/DL (ref 65–99)
GLUCOSE SERPL-MCNC: 105 MG/DL (ref 65–99)
GLUCOSE SERPL-MCNC: 106 MG/DL (ref 65–99)
GLUCOSE SERPL-MCNC: 106 MG/DL (ref 65–99)
GLUCOSE SERPL-MCNC: 111 MG/DL (ref 65–99)
GLUCOSE SERPL-MCNC: 112 MG/DL (ref 65–99)
GLUCOSE SERPL-MCNC: 112 MG/DL (ref 65–99)
GLUCOSE SERPL-MCNC: 115 MG/DL (ref 65–99)
GLUCOSE SERPL-MCNC: 117 MG/DL (ref 65–99)
GLUCOSE SERPL-MCNC: 120 MG/DL (ref 65–99)
GLUCOSE SERPL-MCNC: 122 MG/DL (ref 65–99)
GLUCOSE SERPL-MCNC: 123 MG/DL (ref 65–99)
GLUCOSE SERPL-MCNC: 124 MG/DL (ref 65–99)
GLUCOSE SERPL-MCNC: 127 MG/DL (ref 65–99)
GLUCOSE SERPL-MCNC: 130 MG/DL (ref 65–99)
GLUCOSE SERPL-MCNC: 131 MG/DL (ref 65–99)
GLUCOSE SERPL-MCNC: 134 MG/DL (ref 65–99)
GLUCOSE SERPL-MCNC: 142 MG/DL (ref 65–99)
GLUCOSE SERPL-MCNC: 147 MG/DL (ref 65–99)
GLUCOSE SERPL-MCNC: 152 MG/DL (ref 65–99)
GLUCOSE SERPL-MCNC: 152 MG/DL (ref 65–99)
GLUCOSE SERPL-MCNC: 154 MG/DL (ref 65–99)
GLUCOSE SERPL-MCNC: 154 MG/DL (ref 65–99)
GLUCOSE SERPL-MCNC: 156 MG/DL (ref 65–99)
GLUCOSE SERPL-MCNC: 157 MG/DL (ref 65–99)
GLUCOSE SERPL-MCNC: 161 MG/DL (ref 65–99)
GLUCOSE SERPL-MCNC: 162 MG/DL (ref 65–99)
GLUCOSE SERPL-MCNC: 166 MG/DL (ref 65–99)
GLUCOSE SERPL-MCNC: 169 MG/DL (ref 65–99)
GLUCOSE SERPL-MCNC: 171 MG/DL (ref 65–99)
GLUCOSE SERPL-MCNC: 178 MG/DL (ref 65–99)
GLUCOSE SERPL-MCNC: 178 MG/DL (ref 65–99)
GLUCOSE SERPL-MCNC: 179 MG/DL (ref 65–99)
GLUCOSE SERPL-MCNC: 179 MG/DL (ref 65–99)
GLUCOSE SERPL-MCNC: 180 MG/DL (ref 65–99)
GLUCOSE SERPL-MCNC: 181 MG/DL (ref 65–99)
GLUCOSE SERPL-MCNC: 185 MG/DL (ref 65–99)
GLUCOSE SERPL-MCNC: 199 MG/DL (ref 65–99)
GLUCOSE SERPL-MCNC: 83 MG/DL (ref 65–99)
GLUCOSE SERPL-MCNC: 93 MG/DL (ref 65–99)
GLUCOSE UR STRIP.AUTO-MCNC: NEGATIVE MG/DL
GRAM STN SPEC: ABNORMAL
GRAM STN SPEC: ABNORMAL
GRAM STN SPEC: NORMAL
GRAM STN SPEC: NORMAL
HAV IGM SERPL QL IA: NORMAL
HBV CORE IGM SER QL: NORMAL
HBV SURFACE AG SER QL: NORMAL
HCO3 BLDA-SCNC: 22 MMOL/L (ref 17–25)
HCO3 BLDA-SCNC: 30.5 MMOL/L (ref 17–25)
HCO3 BLDA-SCNC: 30.6 MMOL/L (ref 17–25)
HCO3 BLDA-SCNC: 31 MMOL/L (ref 17–25)
HCO3 BLDA-SCNC: 31.6 MMOL/L (ref 17–25)
HCO3 BLDA-SCNC: 32.8 MMOL/L (ref 17–25)
HCO3 BLDA-SCNC: 33.2 MMOL/L (ref 17–25)
HCO3 BLDA-SCNC: 33.7 MMOL/L (ref 17–25)
HCO3 BLDA-SCNC: 34.3 MMOL/L (ref 17–25)
HCO3 BLDA-SCNC: 35.1 MMOL/L (ref 17–25)
HCO3 BLDA-SCNC: 35.2 MMOL/L (ref 17–25)
HCO3 BLDA-SCNC: 36 MMOL/L (ref 17–25)
HCO3 BLDA-SCNC: 36.7 MMOL/L (ref 17–25)
HCO3 BLDA-SCNC: 37.1 MMOL/L (ref 17–25)
HCO3 BLDA-SCNC: 37.2 MMOL/L (ref 17–25)
HCO3 BLDA-SCNC: 37.8 MMOL/L (ref 17–25)
HCO3 BLDA-SCNC: 40.5 MMOL/L (ref 17–25)
HCO3 BLDA-SCNC: 40.5 MMOL/L (ref 17–25)
HCO3 BLDA-SCNC: 41.6 MMOL/L (ref 17–25)
HCO3 BLDA-SCNC: 42.5 MMOL/L (ref 17–25)
HCO3 BLDA-SCNC: 44 MMOL/L (ref 17–25)
HCO3 BLDA-SCNC: 50.9 MMOL/L (ref 17–25)
HCO3 BLDA-SCNC: 52.3 MMOL/L (ref 17–25)
HCO3 BLDA-SCNC: 52.5 MMOL/L (ref 17–25)
HCO3 BLDA-SCNC: 53.9 MMOL/L (ref 17–25)
HCO3 BLDA-SCNC: 54.1 MMOL/L (ref 17–25)
HCO3 BLDA-SCNC: 55.4 MMOL/L (ref 17–25)
HCO3 BLDA-SCNC: 59 MMOL/L (ref 17–25)
HCO3 BLDA-SCNC: 60.3 MMOL/L (ref 17–25)
HCO3 BLDA-SCNC: 62.1 MMOL/L (ref 17–25)
HCO3 BLDA-SCNC: ABNORMAL MMOL/L (ref 17–25)
HCO3 BLDA-SCNC: ABNORMAL MMOL/L (ref 17–25)
HCT VFR BLD AUTO: 23.1 % (ref 42–52)
HCT VFR BLD AUTO: 26.6 % (ref 42–52)
HCT VFR BLD AUTO: 27.4 % (ref 42–52)
HCT VFR BLD AUTO: 27.7 % (ref 42–52)
HCT VFR BLD AUTO: 29.5 % (ref 42–52)
HCT VFR BLD AUTO: 31.7 % (ref 42–52)
HCT VFR BLD AUTO: 33.6 % (ref 42–52)
HCT VFR BLD AUTO: 34.2 % (ref 42–52)
HCT VFR BLD AUTO: 34.2 % (ref 42–52)
HCT VFR BLD AUTO: 34.6 % (ref 42–52)
HCT VFR BLD AUTO: 35.5 % (ref 42–52)
HCT VFR BLD AUTO: 35.6 % (ref 42–52)
HCT VFR BLD AUTO: 35.8 % (ref 42–52)
HCT VFR BLD AUTO: 36 % (ref 42–52)
HCT VFR BLD AUTO: 36.3 % (ref 42–52)
HCT VFR BLD AUTO: 36.4 % (ref 42–52)
HCT VFR BLD AUTO: 37.7 % (ref 42–52)
HCT VFR BLD AUTO: 38.9 % (ref 42–52)
HCT VFR BLD AUTO: 39.4 % (ref 42–52)
HCT VFR BLD AUTO: 40.4 % (ref 42–52)
HCT VFR BLD AUTO: 41.3 % (ref 42–52)
HCT VFR BLD AUTO: 41.6 % (ref 42–52)
HCT VFR BLD AUTO: 43.1 % (ref 42–52)
HCT VFR BLD AUTO: 43.3 % (ref 42–52)
HCT VFR BLD AUTO: 43.3 % (ref 42–52)
HCT VFR BLD AUTO: 44 % (ref 42–52)
HCT VFR BLD AUTO: 45.2 % (ref 42–52)
HCT VFR BLD AUTO: 46.6 % (ref 42–52)
HCT VFR BLD AUTO: 47.3 % (ref 42–52)
HCT VFR BLD AUTO: 47.4 % (ref 42–52)
HCT VFR BLD AUTO: 47.7 % (ref 42–52)
HCT VFR BLD AUTO: 48.3 % (ref 42–52)
HCT VFR BLD AUTO: 48.3 % (ref 42–52)
HCT VFR BLD AUTO: 48.5 % (ref 42–52)
HCT VFR BLD AUTO: 52.2 % (ref 42–52)
HCT VFR BLD AUTO: 53.2 % (ref 42–52)
HCT VFR BLD AUTO: 53.2 % (ref 42–52)
HCV AB SER QL: NORMAL
HGB BLD-MCNC: 10.2 G/DL (ref 14–18)
HGB BLD-MCNC: 10.4 G/DL (ref 14–18)
HGB BLD-MCNC: 10.7 G/DL (ref 14–18)
HGB BLD-MCNC: 11.1 G/DL (ref 14–18)
HGB BLD-MCNC: 11.6 G/DL (ref 14–18)
HGB BLD-MCNC: 11.8 G/DL (ref 14–18)
HGB BLD-MCNC: 12 G/DL (ref 14–18)
HGB BLD-MCNC: 12.2 G/DL (ref 14–18)
HGB BLD-MCNC: 12.4 G/DL (ref 14–18)
HGB BLD-MCNC: 12.7 G/DL (ref 14–18)
HGB BLD-MCNC: 13.1 G/DL (ref 14–18)
HGB BLD-MCNC: 13.6 G/DL (ref 14–18)
HGB BLD-MCNC: 14.3 G/DL (ref 14–18)
HGB BLD-MCNC: 14.4 G/DL (ref 14–18)
HGB BLD-MCNC: 14.6 G/DL (ref 14–18)
HGB BLD-MCNC: 15.7 G/DL (ref 14–18)
HGB BLD-MCNC: 15.9 G/DL (ref 14–18)
HGB BLD-MCNC: 16 G/DL (ref 14–18)
HGB BLD-MCNC: 16 G/DL (ref 14–18)
HGB BLD-MCNC: 16.2 G/DL (ref 14–18)
HGB BLD-MCNC: 16.4 G/DL (ref 14–18)
HGB BLD-MCNC: 17.4 G/DL (ref 14–18)
HGB BLD-MCNC: 17.9 G/DL (ref 14–18)
HGB BLD-MCNC: 18 G/DL (ref 14–18)
HGB BLD-MCNC: 6.4 G/DL (ref 14–18)
HGB BLD-MCNC: 7.5 G/DL (ref 14–18)
HGB BLD-MCNC: 7.7 G/DL (ref 14–18)
HGB BLD-MCNC: 7.9 G/DL (ref 14–18)
HGB BLD-MCNC: 8.6 G/DL (ref 14–18)
HGB BLD-MCNC: 9.3 G/DL (ref 14–18)
HGB BLD-MCNC: 9.3 G/DL (ref 14–18)
HGB BLD-MCNC: 9.7 G/DL (ref 14–18)
HGB BLD-MCNC: 9.9 G/DL (ref 14–18)
HIV 1+2 AB+HIV1 P24 AG SERPL QL IA: NORMAL
HOROWITZ INDEX BLDA+IHG-RTO: 102 MM[HG]
HOROWITZ INDEX BLDA+IHG-RTO: 115 MM[HG]
HOROWITZ INDEX BLDA+IHG-RTO: 118 MM[HG]
HOROWITZ INDEX BLDA+IHG-RTO: 118 MM[HG]
HOROWITZ INDEX BLDA+IHG-RTO: 122 MM[HG]
HOROWITZ INDEX BLDA+IHG-RTO: 122 MM[HG]
HOROWITZ INDEX BLDA+IHG-RTO: 138 MM[HG]
HOROWITZ INDEX BLDA+IHG-RTO: 166 MM[HG]
HOROWITZ INDEX BLDA+IHG-RTO: 201 MM[HG]
HOROWITZ INDEX BLDA+IHG-RTO: 214 MM[HG]
HOROWITZ INDEX BLDA+IHG-RTO: 38 MM[HG]
HOROWITZ INDEX BLDA+IHG-RTO: 44 MM[HG]
HOROWITZ INDEX BLDA+IHG-RTO: 50 MM[HG]
HOROWITZ INDEX BLDA+IHG-RTO: 52 MM[HG]
HOROWITZ INDEX BLDA+IHG-RTO: 58 MM[HG]
HOROWITZ INDEX BLDA+IHG-RTO: 67 MM[HG]
HOROWITZ INDEX BLDA+IHG-RTO: 67 MM[HG]
HOROWITZ INDEX BLDA+IHG-RTO: 68 MM[HG]
HOROWITZ INDEX BLDA+IHG-RTO: 69 MM[HG]
HOROWITZ INDEX BLDA+IHG-RTO: 70 MM[HG]
HOROWITZ INDEX BLDA+IHG-RTO: 75 MM[HG]
HOROWITZ INDEX BLDA+IHG-RTO: 75 MM[HG]
HOROWITZ INDEX BLDA+IHG-RTO: 76 MM[HG]
HOROWITZ INDEX BLDA+IHG-RTO: 77 MM[HG]
HOROWITZ INDEX BLDA+IHG-RTO: 79 MM[HG]
HOROWITZ INDEX BLDA+IHG-RTO: 80 MM[HG]
HOROWITZ INDEX BLDA+IHG-RTO: 82 MM[HG]
HOROWITZ INDEX BLDA+IHG-RTO: 84 MM[HG]
HOROWITZ INDEX BLDA+IHG-RTO: 93 MM[HG]
HOROWITZ INDEX BLDA+IHG-RTO: 99 MM[HG]
HYALINE CASTS #/AREA URNS LPF: ABNORMAL /LPF
IMM GRANULOCYTES # BLD AUTO: 0.08 K/UL (ref 0–0.11)
IMM GRANULOCYTES # BLD AUTO: 0.08 K/UL (ref 0–0.11)
IMM GRANULOCYTES # BLD AUTO: 0.15 K/UL (ref 0–0.11)
IMM GRANULOCYTES # BLD AUTO: 0.31 K/UL (ref 0–0.11)
IMM GRANULOCYTES # BLD AUTO: 0.35 K/UL (ref 0–0.11)
IMM GRANULOCYTES # BLD AUTO: 0.36 K/UL (ref 0–0.11)
IMM GRANULOCYTES # BLD AUTO: 0.37 K/UL (ref 0–0.11)
IMM GRANULOCYTES # BLD AUTO: 0.37 K/UL (ref 0–0.11)
IMM GRANULOCYTES # BLD AUTO: 0.4 K/UL (ref 0–0.11)
IMM GRANULOCYTES # BLD AUTO: 0.41 K/UL (ref 0–0.11)
IMM GRANULOCYTES # BLD AUTO: 0.44 K/UL (ref 0–0.11)
IMM GRANULOCYTES # BLD AUTO: 0.48 K/UL (ref 0–0.11)
IMM GRANULOCYTES # BLD AUTO: 0.52 K/UL (ref 0–0.11)
IMM GRANULOCYTES # BLD AUTO: 0.55 K/UL (ref 0–0.11)
IMM GRANULOCYTES # BLD AUTO: 0.57 K/UL (ref 0–0.11)
IMM GRANULOCYTES # BLD AUTO: 0.6 K/UL (ref 0–0.11)
IMM GRANULOCYTES # BLD AUTO: 0.71 K/UL (ref 0–0.11)
IMM GRANULOCYTES # BLD AUTO: 0.71 K/UL (ref 0–0.11)
IMM GRANULOCYTES # BLD AUTO: 0.74 K/UL (ref 0–0.11)
IMM GRANULOCYTES # BLD AUTO: 0.75 K/UL (ref 0–0.11)
IMM GRANULOCYTES # BLD AUTO: 0.86 K/UL (ref 0–0.11)
IMM GRANULOCYTES # BLD AUTO: 0.98 K/UL (ref 0–0.11)
IMM GRANULOCYTES # BLD AUTO: 1.01 K/UL (ref 0–0.11)
IMM GRANULOCYTES NFR BLD AUTO: 0.7 % (ref 0–0.9)
IMM GRANULOCYTES NFR BLD AUTO: 1 % (ref 0–0.9)
IMM GRANULOCYTES NFR BLD AUTO: 1.4 % (ref 0–0.9)
IMM GRANULOCYTES NFR BLD AUTO: 1.9 % (ref 0–0.9)
IMM GRANULOCYTES NFR BLD AUTO: 2 % (ref 0–0.9)
IMM GRANULOCYTES NFR BLD AUTO: 2.1 % (ref 0–0.9)
IMM GRANULOCYTES NFR BLD AUTO: 2.2 % (ref 0–0.9)
IMM GRANULOCYTES NFR BLD AUTO: 2.3 % (ref 0–0.9)
IMM GRANULOCYTES NFR BLD AUTO: 2.5 % (ref 0–0.9)
IMM GRANULOCYTES NFR BLD AUTO: 2.7 % (ref 0–0.9)
IMM GRANULOCYTES NFR BLD AUTO: 2.8 % (ref 0–0.9)
IMM GRANULOCYTES NFR BLD AUTO: 2.9 % (ref 0–0.9)
IMM GRANULOCYTES NFR BLD AUTO: 3.3 % (ref 0–0.9)
IMM GRANULOCYTES NFR BLD AUTO: 3.3 % (ref 0–0.9)
IMM GRANULOCYTES NFR BLD AUTO: 3.8 % (ref 0–0.9)
IMM GRANULOCYTES NFR BLD AUTO: 4 % (ref 0–0.9)
IMM GRANULOCYTES NFR BLD AUTO: 4.3 % (ref 0–0.9)
IMM GRANULOCYTES NFR BLD AUTO: 4.4 % (ref 0–0.9)
IMM GRANULOCYTES NFR BLD AUTO: 4.5 % (ref 0–0.9)
IMM GRANULOCYTES NFR BLD AUTO: 4.7 % (ref 0–0.9)
IMM GRANULOCYTES NFR BLD AUTO: 4.9 % (ref 0–0.9)
KETONES UR STRIP.AUTO-MCNC: NEGATIVE MG/DL
LACTATE BLD-SCNC: 0.8 MMOL/L (ref 0.5–2)
LACTATE BLD-SCNC: 0.9 MMOL/L (ref 0.5–2)
LACTATE BLD-SCNC: 1 MMOL/L (ref 0.5–2)
LACTATE BLD-SCNC: 2.5 MMOL/L (ref 0.5–2)
LACTATE BLD-SCNC: 3 MMOL/L (ref 0.5–2)
LACTATE BLD-SCNC: 3.7 MMOL/L (ref 0.5–2)
LACTATE BLD-SCNC: 5.3 MMOL/L (ref 0.5–2)
LEUKOCYTE ESTERASE UR QL STRIP.AUTO: ABNORMAL
LYMPHOCYTES # BLD AUTO: 0.47 K/UL (ref 1–4.8)
LYMPHOCYTES # BLD AUTO: 0.84 K/UL (ref 1–4.8)
LYMPHOCYTES # BLD AUTO: 0.87 K/UL (ref 1–4.8)
LYMPHOCYTES # BLD AUTO: 0.94 K/UL (ref 1–4.8)
LYMPHOCYTES # BLD AUTO: 1.05 K/UL (ref 1–4.8)
LYMPHOCYTES # BLD AUTO: 1.05 K/UL (ref 1–4.8)
LYMPHOCYTES # BLD AUTO: 1.07 K/UL (ref 1–4.8)
LYMPHOCYTES # BLD AUTO: 1.08 K/UL (ref 1–4.8)
LYMPHOCYTES # BLD AUTO: 1.1 K/UL (ref 1–4.8)
LYMPHOCYTES # BLD AUTO: 1.12 K/UL (ref 1–4.8)
LYMPHOCYTES # BLD AUTO: 1.16 K/UL (ref 1–4.8)
LYMPHOCYTES # BLD AUTO: 1.16 K/UL (ref 1–4.8)
LYMPHOCYTES # BLD AUTO: 1.19 K/UL (ref 1–4.8)
LYMPHOCYTES # BLD AUTO: 1.19 K/UL (ref 1–4.8)
LYMPHOCYTES # BLD AUTO: 1.25 K/UL (ref 1–4.8)
LYMPHOCYTES # BLD AUTO: 1.27 K/UL (ref 1–4.8)
LYMPHOCYTES # BLD AUTO: 1.47 K/UL (ref 1–4.8)
LYMPHOCYTES # BLD AUTO: 1.5 K/UL (ref 1–4.8)
LYMPHOCYTES # BLD AUTO: 1.6 K/UL (ref 1–4.8)
LYMPHOCYTES # BLD AUTO: 1.67 K/UL (ref 1–4.8)
LYMPHOCYTES # BLD AUTO: 1.71 K/UL (ref 1–4.8)
LYMPHOCYTES # BLD AUTO: 1.75 K/UL (ref 1–4.8)
LYMPHOCYTES # BLD AUTO: 1.77 K/UL (ref 1–4.8)
LYMPHOCYTES # BLD AUTO: 1.86 K/UL (ref 1–4.8)
LYMPHOCYTES # BLD AUTO: 1.91 K/UL (ref 1–4.8)
LYMPHOCYTES # BLD AUTO: 2.07 K/UL (ref 1–4.8)
LYMPHOCYTES # BLD AUTO: 2.11 K/UL (ref 1–4.8)
LYMPHOCYTES # BLD AUTO: 2.12 K/UL (ref 1–4.8)
LYMPHOCYTES # BLD AUTO: 2.22 K/UL (ref 1–4.8)
LYMPHOCYTES # BLD AUTO: 2.84 K/UL (ref 1–4.8)
LYMPHOCYTES # BLD AUTO: 3.59 K/UL (ref 1–4.8)
LYMPHOCYTES NFR BLD: 10 % (ref 22–41)
LYMPHOCYTES NFR BLD: 10.3 % (ref 22–41)
LYMPHOCYTES NFR BLD: 10.5 % (ref 22–41)
LYMPHOCYTES NFR BLD: 11.2 % (ref 22–41)
LYMPHOCYTES NFR BLD: 11.7 % (ref 22–41)
LYMPHOCYTES NFR BLD: 14 % (ref 22–41)
LYMPHOCYTES NFR BLD: 14.5 % (ref 22–41)
LYMPHOCYTES NFR BLD: 14.9 % (ref 22–41)
LYMPHOCYTES NFR BLD: 15.9 % (ref 22–41)
LYMPHOCYTES NFR BLD: 17.3 % (ref 22–41)
LYMPHOCYTES NFR BLD: 18.8 % (ref 22–41)
LYMPHOCYTES NFR BLD: 19.4 % (ref 22–41)
LYMPHOCYTES NFR BLD: 2.7 % (ref 22–41)
LYMPHOCYTES NFR BLD: 3.5 % (ref 22–41)
LYMPHOCYTES NFR BLD: 4.4 % (ref 22–41)
LYMPHOCYTES NFR BLD: 5.2 % (ref 22–41)
LYMPHOCYTES NFR BLD: 5.3 % (ref 22–41)
LYMPHOCYTES NFR BLD: 5.3 % (ref 22–41)
LYMPHOCYTES NFR BLD: 5.4 % (ref 22–41)
LYMPHOCYTES NFR BLD: 5.4 % (ref 22–41)
LYMPHOCYTES NFR BLD: 5.9 % (ref 22–41)
LYMPHOCYTES NFR BLD: 6.9 % (ref 22–41)
LYMPHOCYTES NFR BLD: 7.5 % (ref 22–41)
LYMPHOCYTES NFR BLD: 7.7 % (ref 22–41)
LYMPHOCYTES NFR BLD: 8.4 % (ref 22–41)
LYMPHOCYTES NFR BLD: 8.6 % (ref 22–41)
LYMPHOCYTES NFR BLD: 8.8 % (ref 22–41)
LYMPHOCYTES NFR BLD: 9.2 % (ref 22–41)
LYMPHOCYTES NFR BLD: 9.3 % (ref 22–41)
LYMPHOCYTES NFR BLD: 9.4 % (ref 22–41)
LYMPHOCYTES NFR BLD: 9.7 % (ref 22–41)
M TB IFN-G BLD-IMP: NEGATIVE
M TB IFN-G BLD-IMP: NEGATIVE
M TB IFN-G CD4+ BCKGRND COR BLD-ACNC: -0.01 IU/ML
M TB IFN-G CD4+ BCKGRND COR BLD-ACNC: 0.02 IU/ML
MACROCYTES BLD QL SMEAR: ABNORMAL
MAGNESIUM SERPL-MCNC: 1.9 MG/DL (ref 1.5–2.5)
MAGNESIUM SERPL-MCNC: 1.9 MG/DL (ref 1.5–2.5)
MAGNESIUM SERPL-MCNC: 2 MG/DL (ref 1.5–2.5)
MAGNESIUM SERPL-MCNC: 2.1 MG/DL (ref 1.5–2.5)
MAGNESIUM SERPL-MCNC: 2.2 MG/DL (ref 1.5–2.5)
MAGNESIUM SERPL-MCNC: 2.2 MG/DL (ref 1.5–2.5)
MAGNESIUM SERPL-MCNC: 2.3 MG/DL (ref 1.5–2.5)
MAGNESIUM SERPL-MCNC: 2.4 MG/DL (ref 1.5–2.5)
MAGNESIUM SERPL-MCNC: 2.6 MG/DL (ref 1.5–2.5)
MAGNESIUM SERPL-MCNC: 2.7 MG/DL (ref 1.5–2.5)
MANUAL DIFF BLD: NORMAL
MCH RBC QN AUTO: 29.9 PG (ref 27–33)
MCH RBC QN AUTO: 30.1 PG (ref 27–33)
MCH RBC QN AUTO: 30.2 PG (ref 27–33)
MCH RBC QN AUTO: 30.3 PG (ref 27–33)
MCH RBC QN AUTO: 30.4 PG (ref 27–33)
MCH RBC QN AUTO: 30.5 PG (ref 27–33)
MCH RBC QN AUTO: 30.6 PG (ref 27–33)
MCH RBC QN AUTO: 30.6 PG (ref 27–33)
MCH RBC QN AUTO: 30.7 PG (ref 27–33)
MCH RBC QN AUTO: 30.8 PG (ref 27–33)
MCH RBC QN AUTO: 30.9 PG (ref 27–33)
MCH RBC QN AUTO: 31 PG (ref 27–33)
MCH RBC QN AUTO: 31.1 PG (ref 27–33)
MCH RBC QN AUTO: 31.2 PG (ref 27–33)
MCH RBC QN AUTO: 31.3 PG (ref 27–33)
MCH RBC QN AUTO: 31.4 PG (ref 27–33)
MCH RBC QN AUTO: 31.4 PG (ref 27–33)
MCH RBC QN AUTO: 31.6 PG (ref 27–33)
MCHC RBC AUTO-ENTMCNC: 26.9 G/DL (ref 33.7–35.3)
MCHC RBC AUTO-ENTMCNC: 27.7 G/DL (ref 33.7–35.3)
MCHC RBC AUTO-ENTMCNC: 27.8 G/DL (ref 33.7–35.3)
MCHC RBC AUTO-ENTMCNC: 28.2 G/DL (ref 33.7–35.3)
MCHC RBC AUTO-ENTMCNC: 28.4 G/DL (ref 33.7–35.3)
MCHC RBC AUTO-ENTMCNC: 28.7 G/DL (ref 33.7–35.3)
MCHC RBC AUTO-ENTMCNC: 28.8 G/DL (ref 33.7–35.3)
MCHC RBC AUTO-ENTMCNC: 28.9 G/DL (ref 33.7–35.3)
MCHC RBC AUTO-ENTMCNC: 29.1 G/DL (ref 33.7–35.3)
MCHC RBC AUTO-ENTMCNC: 29.2 G/DL (ref 33.7–35.3)
MCHC RBC AUTO-ENTMCNC: 29.3 G/DL (ref 33.7–35.3)
MCHC RBC AUTO-ENTMCNC: 29.4 G/DL (ref 33.7–35.3)
MCHC RBC AUTO-ENTMCNC: 29.5 G/DL (ref 33.7–35.3)
MCHC RBC AUTO-ENTMCNC: 30.2 G/DL (ref 33.7–35.3)
MCHC RBC AUTO-ENTMCNC: 30.4 G/DL (ref 33.7–35.3)
MCHC RBC AUTO-ENTMCNC: 30.8 G/DL (ref 33.7–35.3)
MCHC RBC AUTO-ENTMCNC: 30.8 G/DL (ref 33.7–35.3)
MCHC RBC AUTO-ENTMCNC: 31.3 G/DL (ref 33.7–35.3)
MCHC RBC AUTO-ENTMCNC: 31.3 G/DL (ref 33.7–35.3)
MCHC RBC AUTO-ENTMCNC: 31.4 G/DL (ref 33.7–35.3)
MCHC RBC AUTO-ENTMCNC: 31.5 G/DL (ref 33.7–35.3)
MCHC RBC AUTO-ENTMCNC: 31.5 G/DL (ref 33.7–35.3)
MCHC RBC AUTO-ENTMCNC: 31.6 G/DL (ref 33.7–35.3)
MCHC RBC AUTO-ENTMCNC: 32 G/DL (ref 33.7–35.3)
MCHC RBC AUTO-ENTMCNC: 33.1 G/DL (ref 33.7–35.3)
MCHC RBC AUTO-ENTMCNC: 33.3 G/DL (ref 33.7–35.3)
MCHC RBC AUTO-ENTMCNC: 33.3 G/DL (ref 33.7–35.3)
MCHC RBC AUTO-ENTMCNC: 33.4 G/DL (ref 33.7–35.3)
MCHC RBC AUTO-ENTMCNC: 33.5 G/DL (ref 33.7–35.3)
MCHC RBC AUTO-ENTMCNC: 33.5 G/DL (ref 33.7–35.3)
MCHC RBC AUTO-ENTMCNC: 33.6 G/DL (ref 33.7–35.3)
MCHC RBC AUTO-ENTMCNC: 33.8 G/DL (ref 33.7–35.3)
MCHC RBC AUTO-ENTMCNC: 33.9 G/DL (ref 33.7–35.3)
MCHC RBC AUTO-ENTMCNC: 34.1 G/DL (ref 33.7–35.3)
MCHC RBC AUTO-ENTMCNC: 34.2 G/DL (ref 33.7–35.3)
MCHC RBC AUTO-ENTMCNC: 34.7 G/DL (ref 33.7–35.3)
MCHC RBC AUTO-ENTMCNC: 35.7 G/DL (ref 33.7–35.3)
MCV RBC AUTO: 100.5 FL (ref 81.4–97.8)
MCV RBC AUTO: 101.5 FL (ref 81.4–97.8)
MCV RBC AUTO: 102.9 FL (ref 81.4–97.8)
MCV RBC AUTO: 104.3 FL (ref 81.4–97.8)
MCV RBC AUTO: 105.3 FL (ref 81.4–97.8)
MCV RBC AUTO: 106.4 FL (ref 81.4–97.8)
MCV RBC AUTO: 107.9 FL (ref 81.4–97.8)
MCV RBC AUTO: 108.2 FL (ref 81.4–97.8)
MCV RBC AUTO: 108.5 FL (ref 81.4–97.8)
MCV RBC AUTO: 109.5 FL (ref 81.4–97.8)
MCV RBC AUTO: 110 FL (ref 81.4–97.8)
MCV RBC AUTO: 111.1 FL (ref 81.4–97.8)
MCV RBC AUTO: 112.3 FL (ref 81.4–97.8)
MCV RBC AUTO: 87.3 FL (ref 81.4–97.8)
MCV RBC AUTO: 88.4 FL (ref 81.4–97.8)
MCV RBC AUTO: 89.2 FL (ref 81.4–97.8)
MCV RBC AUTO: 90 FL (ref 81.4–97.8)
MCV RBC AUTO: 90.1 FL (ref 81.4–97.8)
MCV RBC AUTO: 90.3 FL (ref 81.4–97.8)
MCV RBC AUTO: 90.8 FL (ref 81.4–97.8)
MCV RBC AUTO: 90.9 FL (ref 81.4–97.8)
MCV RBC AUTO: 91.3 FL (ref 81.4–97.8)
MCV RBC AUTO: 91.5 FL (ref 81.4–97.8)
MCV RBC AUTO: 91.5 FL (ref 81.4–97.8)
MCV RBC AUTO: 92.7 FL (ref 81.4–97.8)
MCV RBC AUTO: 93.5 FL (ref 81.4–97.8)
MCV RBC AUTO: 96 FL (ref 81.4–97.8)
MCV RBC AUTO: 96.3 FL (ref 81.4–97.8)
MCV RBC AUTO: 96.8 FL (ref 81.4–97.8)
MCV RBC AUTO: 98 FL (ref 81.4–97.8)
MCV RBC AUTO: 98.6 FL (ref 81.4–97.8)
MCV RBC AUTO: 98.7 FL (ref 81.4–97.8)
MCV RBC AUTO: 99 FL (ref 81.4–97.8)
MCV RBC AUTO: 99.7 FL (ref 81.4–97.8)
MCV RBC AUTO: 99.8 FL (ref 81.4–97.8)
METAMYELOCYTES NFR BLD MANUAL: 0.8 %
METAMYELOCYTES NFR BLD MANUAL: 0.9 %
METAMYELOCYTES NFR BLD MANUAL: 2.6 %
MICRO URNS: ABNORMAL
MITOGEN IGNF BCKGRD COR BLD-ACNC: >10 IU/ML
MITOGEN IGNF BCKGRD COR BLD-ACNC: >10 IU/ML
MODE IMODE: ABNORMAL
MONOCYTES # BLD AUTO: 0 K/UL (ref 0–0.85)
MONOCYTES # BLD AUTO: 0.15 K/UL (ref 0–0.85)
MONOCYTES # BLD AUTO: 0.2 K/UL (ref 0–0.85)
MONOCYTES # BLD AUTO: 0.2 K/UL (ref 0–0.85)
MONOCYTES # BLD AUTO: 0.22 K/UL (ref 0–0.85)
MONOCYTES # BLD AUTO: 0.22 K/UL (ref 0–0.85)
MONOCYTES # BLD AUTO: 0.23 K/UL (ref 0–0.85)
MONOCYTES # BLD AUTO: 0.31 K/UL (ref 0–0.85)
MONOCYTES # BLD AUTO: 0.35 K/UL (ref 0–0.85)
MONOCYTES # BLD AUTO: 0.36 K/UL (ref 0–0.85)
MONOCYTES # BLD AUTO: 0.38 K/UL (ref 0–0.85)
MONOCYTES # BLD AUTO: 0.41 K/UL (ref 0–0.85)
MONOCYTES # BLD AUTO: 0.46 K/UL (ref 0–0.85)
MONOCYTES # BLD AUTO: 0.46 K/UL (ref 0–0.85)
MONOCYTES # BLD AUTO: 0.47 K/UL (ref 0–0.85)
MONOCYTES # BLD AUTO: 0.47 K/UL (ref 0–0.85)
MONOCYTES # BLD AUTO: 0.48 K/UL (ref 0–0.85)
MONOCYTES # BLD AUTO: 0.48 K/UL (ref 0–0.85)
MONOCYTES # BLD AUTO: 0.54 K/UL (ref 0–0.85)
MONOCYTES # BLD AUTO: 0.58 K/UL (ref 0–0.85)
MONOCYTES # BLD AUTO: 0.63 K/UL (ref 0–0.85)
MONOCYTES # BLD AUTO: 0.64 K/UL (ref 0–0.85)
MONOCYTES # BLD AUTO: 0.64 K/UL (ref 0–0.85)
MONOCYTES # BLD AUTO: 0.69 K/UL (ref 0–0.85)
MONOCYTES # BLD AUTO: 0.72 K/UL (ref 0–0.85)
MONOCYTES # BLD AUTO: 0.9 K/UL (ref 0–0.85)
MONOCYTES # BLD AUTO: 0.93 K/UL (ref 0–0.85)
MONOCYTES # BLD AUTO: 0.99 K/UL (ref 0–0.85)
MONOCYTES # BLD AUTO: 1.2 K/UL (ref 0–0.85)
MONOCYTES # BLD AUTO: 1.41 K/UL (ref 0–0.85)
MONOCYTES # BLD AUTO: 1.58 K/UL (ref 0–0.85)
MONOCYTES NFR BLD AUTO: 0 % (ref 0–13.4)
MONOCYTES NFR BLD AUTO: 0.9 % (ref 0–13.4)
MONOCYTES NFR BLD AUTO: 0.9 % (ref 0–13.4)
MONOCYTES NFR BLD AUTO: 1.7 % (ref 0–13.4)
MONOCYTES NFR BLD AUTO: 1.8 % (ref 0–13.4)
MONOCYTES NFR BLD AUTO: 1.9 % (ref 0–13.4)
MONOCYTES NFR BLD AUTO: 2 % (ref 0–13.4)
MONOCYTES NFR BLD AUTO: 2 % (ref 0–13.4)
MONOCYTES NFR BLD AUTO: 2.3 % (ref 0–13.4)
MONOCYTES NFR BLD AUTO: 2.4 % (ref 0–13.4)
MONOCYTES NFR BLD AUTO: 2.5 % (ref 0–13.4)
MONOCYTES NFR BLD AUTO: 2.6 % (ref 0–13.4)
MONOCYTES NFR BLD AUTO: 2.7 % (ref 0–13.4)
MONOCYTES NFR BLD AUTO: 2.9 % (ref 0–13.4)
MONOCYTES NFR BLD AUTO: 3.5 % (ref 0–13.4)
MONOCYTES NFR BLD AUTO: 3.5 % (ref 0–13.4)
MONOCYTES NFR BLD AUTO: 3.6 % (ref 0–13.4)
MONOCYTES NFR BLD AUTO: 3.9 % (ref 0–13.4)
MONOCYTES NFR BLD AUTO: 4.1 % (ref 0–13.4)
MONOCYTES NFR BLD AUTO: 4.3 % (ref 0–13.4)
MONOCYTES NFR BLD AUTO: 5.2 % (ref 0–13.4)
MONOCYTES NFR BLD AUTO: 5.3 % (ref 0–13.4)
MONOCYTES NFR BLD AUTO: 5.5 % (ref 0–13.4)
MONOCYTES NFR BLD AUTO: 5.8 % (ref 0–13.4)
MONOCYTES NFR BLD AUTO: 6.4 % (ref 0–13.4)
MONOCYTES NFR BLD AUTO: 6.5 % (ref 0–13.4)
MONOCYTES NFR BLD AUTO: 7.7 % (ref 0–13.4)
MORPHOLOGY BLD-IMP: NORMAL
MYELOCYTES NFR BLD MANUAL: 0.9 %
MYELOCYTES NFR BLD MANUAL: 1.7 %
MYELOCYTES NFR BLD MANUAL: 3.5 %
NEUTROPHILS # BLD AUTO: 10.11 K/UL (ref 1.82–7.42)
NEUTROPHILS # BLD AUTO: 10.89 K/UL (ref 1.82–7.42)
NEUTROPHILS # BLD AUTO: 11.62 K/UL (ref 1.82–7.42)
NEUTROPHILS # BLD AUTO: 12.62 K/UL (ref 1.82–7.42)
NEUTROPHILS # BLD AUTO: 12.84 K/UL (ref 1.82–7.42)
NEUTROPHILS # BLD AUTO: 13.01 K/UL (ref 1.82–7.42)
NEUTROPHILS # BLD AUTO: 13.55 K/UL (ref 1.82–7.42)
NEUTROPHILS # BLD AUTO: 14.31 K/UL (ref 1.82–7.42)
NEUTROPHILS # BLD AUTO: 14.48 K/UL (ref 1.82–7.42)
NEUTROPHILS # BLD AUTO: 15.3 K/UL (ref 1.82–7.42)
NEUTROPHILS # BLD AUTO: 15.31 K/UL (ref 1.82–7.42)
NEUTROPHILS # BLD AUTO: 15.9 K/UL (ref 1.82–7.42)
NEUTROPHILS # BLD AUTO: 15.95 K/UL (ref 1.82–7.42)
NEUTROPHILS # BLD AUTO: 16.19 K/UL (ref 1.82–7.42)
NEUTROPHILS # BLD AUTO: 16.33 K/UL (ref 1.82–7.42)
NEUTROPHILS # BLD AUTO: 16.77 K/UL (ref 1.82–7.42)
NEUTROPHILS # BLD AUTO: 17.45 K/UL (ref 1.82–7.42)
NEUTROPHILS # BLD AUTO: 17.78 K/UL (ref 1.82–7.42)
NEUTROPHILS # BLD AUTO: 17.85 K/UL (ref 1.82–7.42)
NEUTROPHILS # BLD AUTO: 18.15 K/UL (ref 1.82–7.42)
NEUTROPHILS # BLD AUTO: 18.16 K/UL (ref 1.82–7.42)
NEUTROPHILS # BLD AUTO: 19.22 K/UL (ref 1.82–7.42)
NEUTROPHILS # BLD AUTO: 19.48 K/UL (ref 1.82–7.42)
NEUTROPHILS # BLD AUTO: 23.04 K/UL (ref 1.82–7.42)
NEUTROPHILS # BLD AUTO: 7.17 K/UL (ref 1.82–7.42)
NEUTROPHILS # BLD AUTO: 8.12 K/UL (ref 1.82–7.42)
NEUTROPHILS # BLD AUTO: 8.27 K/UL (ref 1.82–7.42)
NEUTROPHILS # BLD AUTO: 8.72 K/UL (ref 1.82–7.42)
NEUTROPHILS # BLD AUTO: 9.39 K/UL (ref 1.82–7.42)
NEUTROPHILS # BLD AUTO: 9.43 K/UL (ref 1.82–7.42)
NEUTROPHILS # BLD AUTO: 9.98 K/UL (ref 1.82–7.42)
NEUTROPHILS NFR BLD: 69.3 % (ref 44–72)
NEUTROPHILS NFR BLD: 70.7 % (ref 44–72)
NEUTROPHILS NFR BLD: 74 % (ref 44–72)
NEUTROPHILS NFR BLD: 75 % (ref 44–72)
NEUTROPHILS NFR BLD: 75.2 % (ref 44–72)
NEUTROPHILS NFR BLD: 77.2 % (ref 44–72)
NEUTROPHILS NFR BLD: 77.2 % (ref 44–72)
NEUTROPHILS NFR BLD: 77.8 % (ref 44–72)
NEUTROPHILS NFR BLD: 78.8 % (ref 44–72)
NEUTROPHILS NFR BLD: 78.9 % (ref 44–72)
NEUTROPHILS NFR BLD: 79.9 % (ref 44–72)
NEUTROPHILS NFR BLD: 80.5 % (ref 44–72)
NEUTROPHILS NFR BLD: 80.6 % (ref 44–72)
NEUTROPHILS NFR BLD: 81 % (ref 44–72)
NEUTROPHILS NFR BLD: 82.1 % (ref 44–72)
NEUTROPHILS NFR BLD: 83.2 % (ref 44–72)
NEUTROPHILS NFR BLD: 84.1 % (ref 44–72)
NEUTROPHILS NFR BLD: 85.3 % (ref 44–72)
NEUTROPHILS NFR BLD: 85.5 % (ref 44–72)
NEUTROPHILS NFR BLD: 85.6 % (ref 44–72)
NEUTROPHILS NFR BLD: 85.6 % (ref 44–72)
NEUTROPHILS NFR BLD: 86.8 % (ref 44–72)
NEUTROPHILS NFR BLD: 86.8 % (ref 44–72)
NEUTROPHILS NFR BLD: 87.5 % (ref 44–72)
NEUTROPHILS NFR BLD: 88.7 % (ref 44–72)
NEUTROPHILS NFR BLD: 88.9 % (ref 44–72)
NEUTROPHILS NFR BLD: 89.6 % (ref 44–72)
NEUTROPHILS NFR BLD: 89.8 % (ref 44–72)
NEUTROPHILS NFR BLD: 90.8 % (ref 44–72)
NEUTROPHILS NFR BLD: 95.6 % (ref 44–72)
NEUTROPHILS NFR BLD: 96.4 % (ref 44–72)
NEUTS BAND NFR BLD MANUAL: 5.3 % (ref 0–10)
NITRITE UR QL STRIP.AUTO: POSITIVE
NRBC # BLD AUTO: 0 K/UL
NRBC # BLD AUTO: 0.02 K/UL
NRBC # BLD AUTO: 0.03 K/UL
NRBC # BLD AUTO: 0.04 K/UL
NRBC # BLD AUTO: 0.05 K/UL
NRBC # BLD AUTO: 0.06 K/UL
NRBC # BLD AUTO: 0.07 K/UL
NRBC # BLD AUTO: 0.07 K/UL
NRBC # BLD AUTO: 0.12 K/UL
NRBC BLD-RTO: 0 /100 WBC
NRBC BLD-RTO: 0.1 /100 WBC
NRBC BLD-RTO: 0.2 /100 WBC
NRBC BLD-RTO: 0.3 /100 WBC
NRBC BLD-RTO: 0.4 /100 WBC
NRBC BLD-RTO: 0.5 /100 WBC
NRBC BLD-RTO: 0.5 /100 WBC
NRBC BLD-RTO: 0.7 /100 WBC
NT-PROBNP SERPL IA-MCNC: 525 PG/ML (ref 0–125)
O2/TOTAL GAS SETTING VFR VENT: 100 %
O2/TOTAL GAS SETTING VFR VENT: 45 %
O2/TOTAL GAS SETTING VFR VENT: 50 %
O2/TOTAL GAS SETTING VFR VENT: 50 %
O2/TOTAL GAS SETTING VFR VENT: 55 %
O2/TOTAL GAS SETTING VFR VENT: 60 %
O2/TOTAL GAS SETTING VFR VENT: 70 %
O2/TOTAL GAS SETTING VFR VENT: 80 %
O2/TOTAL GAS SETTING VFR VENT: 90 %
O2/TOTAL GAS SETTING VFR VENT: 90 %
P JIROVECII AG SPEC QL IF: NEGATIVE
P JIROVECII AG SPEC QL IF: NEGATIVE
P JIROVECII DNA SPEC QL NAA+PROBE: NOT DETECTED
P JIROVECII DNA SPEC QL NAA+PROBE: NOT DETECTED
PCO2 BLDA: 32.1 MMHG (ref 26–37)
PCO2 BLDA: 46.2 MMHG (ref 26–37)
PCO2 BLDA: 48.8 MMHG (ref 26–37)
PCO2 BLDA: 49 MMHG (ref 26–37)
PCO2 BLDA: 49.7 MMHG (ref 26–37)
PCO2 BLDA: 51.8 MMHG (ref 26–37)
PCO2 BLDA: 53.2 MMHG (ref 26–37)
PCO2 BLDA: 55 MMHG (ref 26–37)
PCO2 BLDA: 57.2 MMHG (ref 26–37)
PCO2 BLDA: 57.3 MMHG (ref 26–37)
PCO2 BLDA: 58 MMHG (ref 26–37)
PCO2 BLDA: 59.2 MMHG (ref 26–37)
PCO2 BLDA: 62.4 MMHG (ref 26–37)
PCO2 BLDA: 63 MMHG (ref 26–37)
PCO2 BLDA: 64.1 MMHG (ref 26–37)
PCO2 BLDA: 65 MMHG (ref 26–37)
PCO2 BLDA: 65.6 MMHG (ref 26–37)
PCO2 BLDA: 72.1 MMHG (ref 26–37)
PCO2 BLDA: 73.7 MMHG (ref 26–37)
PCO2 BLDA: 74.4 MMHG (ref 26–37)
PCO2 BLDA: 81 MMHG (ref 26–37)
PCO2 BLDA: 87.5 MMHG (ref 26–37)
PCO2 BLDA: 90.4 MMHG (ref 26–37)
PCO2 BLDA: 93.9 MMHG (ref 26–37)
PCO2 BLDA: >100 MMHG (ref 26–37)
PCO2 TEMP ADJ BLDA: 46.6 MMHG (ref 26–37)
PCO2 TEMP ADJ BLDA: 49.2 MMHG (ref 26–37)
PCO2 TEMP ADJ BLDA: 50.4 MMHG (ref 26–37)
PCO2 TEMP ADJ BLDA: 50.8 MMHG (ref 26–37)
PCO2 TEMP ADJ BLDA: 51.8 MMHG (ref 26–37)
PCO2 TEMP ADJ BLDA: 53.7 MMHG (ref 26–37)
PCO2 TEMP ADJ BLDA: 55.3 MMHG (ref 26–37)
PCO2 TEMP ADJ BLDA: 58.2 MMHG (ref 26–37)
PCO2 TEMP ADJ BLDA: 59.1 MMHG (ref 26–37)
PCO2 TEMP ADJ BLDA: 59.5 MMHG (ref 26–37)
PCO2 TEMP ADJ BLDA: 59.8 MMHG (ref 26–37)
PCO2 TEMP ADJ BLDA: 62.6 MMHG (ref 26–37)
PCO2 TEMP ADJ BLDA: 63.8 MMHG (ref 26–37)
PCO2 TEMP ADJ BLDA: 63.9 MMHG (ref 26–37)
PCO2 TEMP ADJ BLDA: 65.5 MMHG (ref 26–37)
PCO2 TEMP ADJ BLDA: 65.6 MMHG (ref 26–37)
PCO2 TEMP ADJ BLDA: 71.2 MMHG (ref 26–37)
PCO2 TEMP ADJ BLDA: 73 MMHG (ref 26–37)
PCO2 TEMP ADJ BLDA: 82.4 MMHG (ref 26–37)
PCO2 TEMP ADJ BLDA: 88.7 MMHG (ref 26–37)
PCO2 TEMP ADJ BLDA: 93.6 MMHG (ref 26–37)
PCO2 TEMP ADJ BLDA: >100 MMHG (ref 26–37)
PCO2 TEMP ADJ BLDA: ABNORMAL MMHG (ref 26–37)
PCO2 TEMP ADJ BLDA: ABNORMAL MMHG (ref 26–37)
PEEP END EXPIRATORY PRESSURE IPEEP: 10 CMH20
PEEP END EXPIRATORY PRESSURE IPEEP: 12 CMH20
PEEP END EXPIRATORY PRESSURE IPEEP: 14 CMH20
PEEP END EXPIRATORY PRESSURE IPEEP: 8 CMH20
PERCENT MINUTE VOLUME IPMV: 170
PERCENT MINUTE VOLUME IPMV: 180
PH BLDA: 7.24 [PH] (ref 7.4–7.5)
PH BLDA: 7.25 [PH] (ref 7.4–7.5)
PH BLDA: 7.26 [PH] (ref 7.4–7.5)
PH BLDA: 7.26 [PH] (ref 7.4–7.5)
PH BLDA: 7.27 [PH] (ref 7.4–7.5)
PH BLDA: 7.28 [PH] (ref 7.4–7.5)
PH BLDA: 7.28 [PH] (ref 7.4–7.5)
PH BLDA: 7.29 [PH] (ref 7.4–7.5)
PH BLDA: 7.29 [PH] (ref 7.4–7.5)
PH BLDA: 7.3 [PH] (ref 7.4–7.5)
PH BLDA: 7.31 [PH] (ref 7.4–7.5)
PH BLDA: 7.36 [PH] (ref 7.4–7.5)
PH BLDA: 7.37 [PH] (ref 7.4–7.5)
PH BLDA: 7.39 [PH] (ref 7.4–7.5)
PH BLDA: 7.4 [PH] (ref 7.4–7.5)
PH BLDA: 7.41 [PH] (ref 7.4–7.5)
PH BLDA: 7.41 [PH] (ref 7.4–7.5)
PH BLDA: 7.43 [PH] (ref 7.4–7.5)
PH BLDA: 7.43 [PH] (ref 7.4–7.5)
PH BLDA: 7.44 [PH] (ref 7.4–7.5)
PH BLDA: 7.45 [PH] (ref 7.4–7.5)
PH BLDA: 7.45 [PH] (ref 7.4–7.5)
PH BLDA: 7.46 [PH] (ref 7.4–7.5)
PH BLDA: 7.51 [PH] (ref 7.4–7.5)
PH BLDA: 7.69 [PH] (ref 7.4–7.5)
PH TEMP ADJ BLDA: 7.24 [PH] (ref 7.4–7.5)
PH TEMP ADJ BLDA: 7.24 [PH] (ref 7.4–7.5)
PH TEMP ADJ BLDA: 7.25 [PH] (ref 7.4–7.5)
PH TEMP ADJ BLDA: 7.25 [PH] (ref 7.4–7.5)
PH TEMP ADJ BLDA: 7.26 [PH] (ref 7.4–7.5)
PH TEMP ADJ BLDA: 7.29 [PH] (ref 7.4–7.5)
PH TEMP ADJ BLDA: 7.29 [PH] (ref 7.4–7.5)
PH TEMP ADJ BLDA: 7.3 [PH] (ref 7.4–7.5)
PH TEMP ADJ BLDA: 7.3 [PH] (ref 7.4–7.5)
PH TEMP ADJ BLDA: 7.35 [PH] (ref 7.4–7.5)
PH TEMP ADJ BLDA: 7.35 [PH] (ref 7.4–7.5)
PH TEMP ADJ BLDA: 7.36 [PH] (ref 7.4–7.5)
PH TEMP ADJ BLDA: 7.36 [PH] (ref 7.4–7.5)
PH TEMP ADJ BLDA: 7.37 [PH] (ref 7.4–7.5)
PH TEMP ADJ BLDA: 7.39 [PH] (ref 7.4–7.5)
PH TEMP ADJ BLDA: 7.4 [PH] (ref 7.4–7.5)
PH TEMP ADJ BLDA: 7.41 [PH] (ref 7.4–7.5)
PH TEMP ADJ BLDA: 7.42 [PH] (ref 7.4–7.5)
PH TEMP ADJ BLDA: 7.42 [PH] (ref 7.4–7.5)
PH TEMP ADJ BLDA: 7.43 [PH] (ref 7.4–7.5)
PH TEMP ADJ BLDA: 7.43 [PH] (ref 7.4–7.5)
PH TEMP ADJ BLDA: 7.44 [PH] (ref 7.4–7.5)
PH TEMP ADJ BLDA: 7.46 [PH] (ref 7.4–7.5)
PH TEMP ADJ BLDA: 7.5 [PH] (ref 7.4–7.5)
PH TEMP ADJ BLDA: 7.67 [PH] (ref 7.4–7.5)
PH UR STRIP.AUTO: 6.5 [PH] (ref 5–8)
PHOSPHATE SERPL-MCNC: 1.3 MG/DL (ref 2.5–4.5)
PHOSPHATE SERPL-MCNC: 1.7 MG/DL (ref 2.5–4.5)
PHOSPHATE SERPL-MCNC: 2.3 MG/DL (ref 2.5–4.5)
PHOSPHATE SERPL-MCNC: 2.4 MG/DL (ref 2.5–4.5)
PHOSPHATE SERPL-MCNC: 2.7 MG/DL (ref 2.5–4.5)
PHOSPHATE SERPL-MCNC: 2.8 MG/DL (ref 2.5–4.5)
PHOSPHATE SERPL-MCNC: 3 MG/DL (ref 2.5–4.5)
PHOSPHATE SERPL-MCNC: 3.5 MG/DL (ref 2.5–4.5)
PHOSPHATE SERPL-MCNC: 3.6 MG/DL (ref 2.5–4.5)
PHOSPHATE SERPL-MCNC: 4.2 MG/DL (ref 2.5–4.5)
PHOSPHATE SERPL-MCNC: 4.7 MG/DL (ref 2.5–4.5)
PHOSPHATE SERPL-MCNC: 5.1 MG/DL (ref 2.5–4.5)
PLATELET # BLD AUTO: 234 K/UL (ref 164–446)
PLATELET # BLD AUTO: 243 K/UL (ref 164–446)
PLATELET # BLD AUTO: 249 K/UL (ref 164–446)
PLATELET # BLD AUTO: 253 K/UL (ref 164–446)
PLATELET # BLD AUTO: 258 K/UL (ref 164–446)
PLATELET # BLD AUTO: 259 K/UL (ref 164–446)
PLATELET # BLD AUTO: 263 K/UL (ref 164–446)
PLATELET # BLD AUTO: 266 K/UL (ref 164–446)
PLATELET # BLD AUTO: 270 K/UL (ref 164–446)
PLATELET # BLD AUTO: 282 K/UL (ref 164–446)
PLATELET # BLD AUTO: 284 K/UL (ref 164–446)
PLATELET # BLD AUTO: 305 K/UL (ref 164–446)
PLATELET # BLD AUTO: 319 K/UL (ref 164–446)
PLATELET # BLD AUTO: 337 K/UL (ref 164–446)
PLATELET # BLD AUTO: 356 K/UL (ref 164–446)
PLATELET # BLD AUTO: 370 K/UL (ref 164–446)
PLATELET # BLD AUTO: 373 K/UL (ref 164–446)
PLATELET # BLD AUTO: 383 K/UL (ref 164–446)
PLATELET # BLD AUTO: 384 K/UL (ref 164–446)
PLATELET # BLD AUTO: 391 K/UL (ref 164–446)
PLATELET # BLD AUTO: 407 K/UL (ref 164–446)
PLATELET # BLD AUTO: 413 K/UL (ref 164–446)
PLATELET # BLD AUTO: 422 K/UL (ref 164–446)
PLATELET # BLD AUTO: 424 K/UL (ref 164–446)
PLATELET # BLD AUTO: 445 K/UL (ref 164–446)
PLATELET # BLD AUTO: 455 K/UL (ref 164–446)
PLATELET # BLD AUTO: 462 K/UL (ref 164–446)
PLATELET # BLD AUTO: 482 K/UL (ref 164–446)
PLATELET # BLD AUTO: 499 K/UL (ref 164–446)
PLATELET # BLD AUTO: 501 K/UL (ref 164–446)
PLATELET # BLD AUTO: 514 K/UL (ref 164–446)
PLATELET # BLD AUTO: 517 K/UL (ref 164–446)
PLATELET # BLD AUTO: 729 K/UL (ref 164–446)
PLATELET # BLD AUTO: 769 K/UL (ref 164–446)
PLATELET # BLD AUTO: 826 K/UL (ref 164–446)
PLATELET # BLD AUTO: 842 K/UL (ref 164–446)
PLATELET # BLD AUTO: 897 K/UL (ref 164–446)
PLATELET BLD QL SMEAR: NORMAL
PMV BLD AUTO: 10 FL (ref 9–12.9)
PMV BLD AUTO: 10.1 FL (ref 9–12.9)
PMV BLD AUTO: 10.1 FL (ref 9–12.9)
PMV BLD AUTO: 10.2 FL (ref 9–12.9)
PMV BLD AUTO: 10.3 FL (ref 9–12.9)
PMV BLD AUTO: 10.4 FL (ref 9–12.9)
PMV BLD AUTO: 10.5 FL (ref 9–12.9)
PMV BLD AUTO: 10.6 FL (ref 9–12.9)
PMV BLD AUTO: 10.7 FL (ref 9–12.9)
PMV BLD AUTO: 10.8 FL (ref 9–12.9)
PMV BLD AUTO: 10.9 FL (ref 9–12.9)
PMV BLD AUTO: 11 FL (ref 9–12.9)
PMV BLD AUTO: 11.2 FL (ref 9–12.9)
PMV BLD AUTO: 11.3 FL (ref 9–12.9)
PMV BLD AUTO: 11.5 FL (ref 9–12.9)
PMV BLD AUTO: 12.6 FL (ref 9–12.9)
PMV BLD AUTO: 9.7 FL (ref 9–12.9)
PMV BLD AUTO: 9.8 FL (ref 9–12.9)
PO2 BLDA: 166 MMHG (ref 64–87)
PO2 BLDA: 181 MMHG (ref 64–87)
PO2 BLDA: 214 MMHG (ref 64–87)
PO2 BLDA: 38 MMHG (ref 64–87)
PO2 BLDA: 44 MMHG (ref 64–87)
PO2 BLDA: 48 MMHG (ref 64–87)
PO2 BLDA: 49 MMHG (ref 64–87)
PO2 BLDA: 49 MMHG (ref 64–87)
PO2 BLDA: 50 MMHG (ref 64–87)
PO2 BLDA: 51 MMHG (ref 64–87)
PO2 BLDA: 52 MMHG (ref 64–87)
PO2 BLDA: 53 MMHG (ref 64–87)
PO2 BLDA: 55 MMHG (ref 64–87)
PO2 BLDA: 58 MMHG (ref 64–87)
PO2 BLDA: 59 MMHG (ref 64–87)
PO2 BLDA: 60 MMHG (ref 64–87)
PO2 BLDA: 60 MMHG (ref 64–87)
PO2 BLDA: 61 MMHG (ref 64–87)
PO2 BLDA: 63 MMHG (ref 64–87)
PO2 BLDA: 65 MMHG (ref 64–87)
PO2 BLDA: 65 MMHG (ref 64–87)
PO2 BLDA: 67 MMHG (ref 64–87)
PO2 BLDA: 67 MMHG (ref 64–87)
PO2 BLDA: 68 MMHG (ref 64–87)
PO2 BLDA: 69 MMHG (ref 64–87)
PO2 BLDA: 69 MMHG (ref 64–87)
PO2 BLDA: 71 MMHG (ref 64–87)
PO2 BLDA: 71.5 MMHG (ref 64–87)
PO2 BLDA: 72 MMHG (ref 64–87)
PO2 BLDA: 79 MMHG (ref 64–87)
PO2 BLDA: 80 MMHG (ref 64–87)
PO2 BLDA: 83 MMHG (ref 64–87)
PO2 TEMP ADJ BLDA: 182 MMHG (ref 64–87)
PO2 TEMP ADJ BLDA: 215 MMHG (ref 64–87)
PO2 TEMP ADJ BLDA: 43 MMHG (ref 64–87)
PO2 TEMP ADJ BLDA: 44 MMHG (ref 64–87)
PO2 TEMP ADJ BLDA: 47 MMHG (ref 64–87)
PO2 TEMP ADJ BLDA: 50 MMHG (ref 64–87)
PO2 TEMP ADJ BLDA: 51 MMHG (ref 64–87)
PO2 TEMP ADJ BLDA: 52 MMHG (ref 64–87)
PO2 TEMP ADJ BLDA: 53 MMHG (ref 64–87)
PO2 TEMP ADJ BLDA: 54 MMHG (ref 64–87)
PO2 TEMP ADJ BLDA: 54 MMHG (ref 64–87)
PO2 TEMP ADJ BLDA: 56 MMHG (ref 64–87)
PO2 TEMP ADJ BLDA: 60 MMHG (ref 64–87)
PO2 TEMP ADJ BLDA: 60 MMHG (ref 64–87)
PO2 TEMP ADJ BLDA: 63 MMHG (ref 64–87)
PO2 TEMP ADJ BLDA: 63 MMHG (ref 64–87)
PO2 TEMP ADJ BLDA: 65 MMHG (ref 64–87)
PO2 TEMP ADJ BLDA: 65 MMHG (ref 64–87)
PO2 TEMP ADJ BLDA: 66 MMHG (ref 64–87)
PO2 TEMP ADJ BLDA: 70 MMHG (ref 64–87)
PO2 TEMP ADJ BLDA: 73 MMHG (ref 64–87)
PO2 TEMP ADJ BLDA: 73 MMHG (ref 64–87)
PO2 TEMP ADJ BLDA: 74 MMHG (ref 64–87)
PO2 TEMP ADJ BLDA: 75 MMHG (ref 64–87)
PO2 TEMP ADJ BLDA: 81 MMHG (ref 64–87)
PO2 TEMP ADJ BLDA: 81 MMHG (ref 64–87)
PO2 TEMP ADJ BLDA: 86 MMHG (ref 64–87)
POLYCHROMASIA BLD QL SMEAR: NORMAL
POLYCHROMASIA BLD QL SMEAR: NORMAL
POTASSIUM SERPL-SCNC: 3 MMOL/L (ref 3.6–5.5)
POTASSIUM SERPL-SCNC: 3.5 MMOL/L (ref 3.6–5.5)
POTASSIUM SERPL-SCNC: 3.5 MMOL/L (ref 3.6–5.5)
POTASSIUM SERPL-SCNC: 3.6 MMOL/L (ref 3.6–5.5)
POTASSIUM SERPL-SCNC: 3.7 MMOL/L (ref 3.6–5.5)
POTASSIUM SERPL-SCNC: 3.8 MMOL/L (ref 3.6–5.5)
POTASSIUM SERPL-SCNC: 3.9 MMOL/L (ref 3.6–5.5)
POTASSIUM SERPL-SCNC: 4.1 MMOL/L (ref 3.6–5.5)
POTASSIUM SERPL-SCNC: 4.2 MMOL/L (ref 3.6–5.5)
POTASSIUM SERPL-SCNC: 4.4 MMOL/L (ref 3.6–5.5)
POTASSIUM SERPL-SCNC: 4.5 MMOL/L (ref 3.6–5.5)
POTASSIUM SERPL-SCNC: 4.5 MMOL/L (ref 3.6–5.5)
POTASSIUM SERPL-SCNC: 4.6 MMOL/L (ref 3.6–5.5)
POTASSIUM SERPL-SCNC: 4.7 MMOL/L (ref 3.6–5.5)
POTASSIUM SERPL-SCNC: 4.9 MMOL/L (ref 3.6–5.5)
POTASSIUM SERPL-SCNC: 5 MMOL/L (ref 3.6–5.5)
POTASSIUM SERPL-SCNC: 5 MMOL/L (ref 3.6–5.5)
POTASSIUM SERPL-SCNC: 5.3 MMOL/L (ref 3.6–5.5)
POTASSIUM SERPL-SCNC: 5.4 MMOL/L (ref 3.6–5.5)
POTASSIUM SERPL-SCNC: 5.6 MMOL/L (ref 3.6–5.5)
POTASSIUM SERPL-SCNC: 6.2 MMOL/L (ref 3.6–5.5)
PREALB SERPL-MCNC: 10.1 MG/DL (ref 18–38)
PREALB SERPL-MCNC: 11.1 MG/DL (ref 18–38)
PREALB SERPL-MCNC: 23.2 MG/DL (ref 18–38)
PREALB SERPL-MCNC: 23.5 MG/DL (ref 18–38)
PREALB SERPL-MCNC: 8.1 MG/DL (ref 18–38)
PROCALCITONIN SERPL-MCNC: 0.1 NG/ML
PROCALCITONIN SERPL-MCNC: 0.14 NG/ML
PROCALCITONIN SERPL-MCNC: 0.19 NG/ML
PROCALCITONIN SERPL-MCNC: 0.46 NG/ML
PROCALCITONIN SERPL-MCNC: 0.68 NG/ML
PROCALCITONIN SERPL-MCNC: 0.95 NG/ML
PROCALCITONIN SERPL-MCNC: <0.05 NG/ML
PROCALCITONIN SERPL-MCNC: <0.05 NG/ML
PROT SERPL-MCNC: 5.2 G/DL (ref 6–8.2)
PROT SERPL-MCNC: 5.5 G/DL (ref 6–8.2)
PROT SERPL-MCNC: 5.6 G/DL (ref 6–8.2)
PROT SERPL-MCNC: 5.8 G/DL (ref 6–8.2)
PROT SERPL-MCNC: 5.9 G/DL (ref 6–8.2)
PROT SERPL-MCNC: 6.2 G/DL (ref 6–8.2)
PROT SERPL-MCNC: 6.4 G/DL (ref 6–8.2)
PROT SERPL-MCNC: 6.7 G/DL (ref 6–8.2)
PROT SERPL-MCNC: 7.1 G/DL (ref 6–8.2)
PROT SERPL-MCNC: 7.2 G/DL (ref 6–8.2)
PROT SERPL-MCNC: 7.2 G/DL (ref 6–8.2)
PROT SERPL-MCNC: 7.3 G/DL (ref 6–8.2)
PROT SERPL-MCNC: 7.4 G/DL (ref 6–8.2)
PROT SERPL-MCNC: 7.4 G/DL (ref 6–8.2)
PROT SERPL-MCNC: 7.6 G/DL (ref 6–8.2)
PROT SERPL-MCNC: 7.6 G/DL (ref 6–8.2)
PROT SERPL-MCNC: 7.7 G/DL (ref 6–8.2)
PROT SERPL-MCNC: 7.7 G/DL (ref 6–8.2)
PROT SERPL-MCNC: 7.8 G/DL (ref 6–8.2)
PROT SERPL-MCNC: 7.9 G/DL (ref 6–8.2)
PROT SERPL-MCNC: 8.1 G/DL (ref 6–8.2)
PROT SERPL-MCNC: 8.2 G/DL (ref 6–8.2)
PROT SERPL-MCNC: 8.4 G/DL (ref 6–8.2)
PROT SERPL-MCNC: 8.5 G/DL (ref 6–8.2)
PROT UR QL STRIP: 30 MG/DL
QFT TB2 - NIL TBQ2: 0 IU/ML
QFT TB2 - NIL TBQ2: 0.03 IU/ML
RBC # BLD AUTO: 2.08 M/UL (ref 4.7–6.1)
RBC # BLD AUTO: 2.43 M/UL (ref 4.7–6.1)
RBC # BLD AUTO: 2.54 M/UL (ref 4.7–6.1)
RBC # BLD AUTO: 2.56 M/UL (ref 4.7–6.1)
RBC # BLD AUTO: 2.72 M/UL (ref 4.7–6.1)
RBC # BLD AUTO: 3.08 M/UL (ref 4.7–6.1)
RBC # BLD AUTO: 3.08 M/UL (ref 4.7–6.1)
RBC # BLD AUTO: 3.11 M/UL (ref 4.7–6.1)
RBC # BLD AUTO: 3.22 M/UL (ref 4.7–6.1)
RBC # BLD AUTO: 3.29 M/UL (ref 4.7–6.1)
RBC # BLD AUTO: 3.31 M/UL (ref 4.7–6.1)
RBC # BLD AUTO: 3.37 M/UL (ref 4.7–6.1)
RBC # BLD AUTO: 3.42 M/UL (ref 4.7–6.1)
RBC # BLD AUTO: 3.42 M/UL (ref 4.7–6.1)
RBC # BLD AUTO: 3.56 M/UL (ref 4.7–6.1)
RBC # BLD AUTO: 3.78 M/UL (ref 4.7–6.1)
RBC # BLD AUTO: 3.82 M/UL (ref 4.7–6.1)
RBC # BLD AUTO: 3.97 M/UL (ref 4.7–6.1)
RBC # BLD AUTO: 4.02 M/UL (ref 4.7–6.1)
RBC # BLD AUTO: 4.09 M/UL (ref 4.7–6.1)
RBC # BLD AUTO: 4.17 M/UL (ref 4.7–6.1)
RBC # BLD AUTO: 4.17 M/UL (ref 4.7–6.1)
RBC # BLD AUTO: 4.39 M/UL (ref 4.7–6.1)
RBC # BLD AUTO: 4.67 M/UL (ref 4.7–6.1)
RBC # BLD AUTO: 4.67 M/UL (ref 4.7–6.1)
RBC # BLD AUTO: 4.71 M/UL (ref 4.7–6.1)
RBC # BLD AUTO: 5.04 M/UL (ref 4.7–6.1)
RBC # BLD AUTO: 5.18 M/UL (ref 4.7–6.1)
RBC # BLD AUTO: 5.26 M/UL (ref 4.7–6.1)
RBC # BLD AUTO: 5.28 M/UL (ref 4.7–6.1)
RBC # BLD AUTO: 5.34 M/UL (ref 4.7–6.1)
RBC # BLD AUTO: 5.35 M/UL (ref 4.7–6.1)
RBC # BLD AUTO: 5.58 M/UL (ref 4.7–6.1)
RBC # BLD AUTO: 5.83 M/UL (ref 4.7–6.1)
RBC # BLD AUTO: 5.85 M/UL (ref 4.7–6.1)
RBC # URNS HPF: ABNORMAL /HPF
RBC BLD AUTO: NORMAL
RBC BLD AUTO: PRESENT
RBC UR QL AUTO: ABNORMAL
RH BLD: NORMAL
RSV RNA SPEC QL NAA+PROBE: NEGATIVE
SAO2 % BLDA: 100 % (ref 93–99)
SAO2 % BLDA: 100 % (ref 93–99)
SAO2 % BLDA: 57 % (ref 93–99)
SAO2 % BLDA: 65 % (ref 93–99)
SAO2 % BLDA: 79 % (ref 93–99)
SAO2 % BLDA: 80 % (ref 93–99)
SAO2 % BLDA: 83 % (ref 93–99)
SAO2 % BLDA: 83 % (ref 93–99)
SAO2 % BLDA: 84 % (ref 93–99)
SAO2 % BLDA: 84 % (ref 93–99)
SAO2 % BLDA: 85 % (ref 93–99)
SAO2 % BLDA: 85 % (ref 93–99)
SAO2 % BLDA: 87 % (ref 93–99)
SAO2 % BLDA: 88 % (ref 93–99)
SAO2 % BLDA: 89 % (ref 93–99)
SAO2 % BLDA: 90 % (ref 93–99)
SAO2 % BLDA: 90 % (ref 93–99)
SAO2 % BLDA: 91 % (ref 93–99)
SAO2 % BLDA: 92 % (ref 93–99)
SAO2 % BLDA: 93 % (ref 93–99)
SAO2 % BLDA: 94 % (ref 93–99)
SAO2 % BLDA: 94.3 % (ref 93–99)
SAO2 % BLDA: 96 % (ref 93–99)
SAO2 % BLDA: 99 % (ref 93–99)
SAO2 % BLDA: ABNORMAL % (ref 93–99)
SAO2 % BLDA: ABNORMAL % (ref 93–99)
SARS-COV-2 RNA RESP QL NAA+PROBE: DETECTED
SIGNIFICANT IND 70042: ABNORMAL
SIGNIFICANT IND 70042: ABNORMAL
SIGNIFICANT IND 70042: NORMAL
SITE SITE: ABNORMAL
SITE SITE: ABNORMAL
SITE SITE: NORMAL
SODIUM SERPL-SCNC: 129 MMOL/L (ref 135–145)
SODIUM SERPL-SCNC: 132 MMOL/L (ref 135–145)
SODIUM SERPL-SCNC: 133 MMOL/L (ref 135–145)
SODIUM SERPL-SCNC: 134 MMOL/L (ref 135–145)
SODIUM SERPL-SCNC: 134 MMOL/L (ref 135–145)
SODIUM SERPL-SCNC: 135 MMOL/L (ref 135–145)
SODIUM SERPL-SCNC: 136 MMOL/L (ref 135–145)
SODIUM SERPL-SCNC: 138 MMOL/L (ref 135–145)
SODIUM SERPL-SCNC: 139 MMOL/L (ref 135–145)
SODIUM SERPL-SCNC: 140 MMOL/L (ref 135–145)
SODIUM SERPL-SCNC: 140 MMOL/L (ref 135–145)
SODIUM SERPL-SCNC: 141 MMOL/L (ref 135–145)
SODIUM SERPL-SCNC: 142 MMOL/L (ref 135–145)
SODIUM SERPL-SCNC: 143 MMOL/L (ref 135–145)
SODIUM SERPL-SCNC: 145 MMOL/L (ref 135–145)
SODIUM SERPL-SCNC: 146 MMOL/L (ref 135–145)
SODIUM SERPL-SCNC: 147 MMOL/L (ref 135–145)
SODIUM SERPL-SCNC: 149 MMOL/L (ref 135–145)
SODIUM SERPL-SCNC: 150 MMOL/L (ref 135–145)
SODIUM SERPL-SCNC: 151 MMOL/L (ref 135–145)
SODIUM SERPL-SCNC: 151 MMOL/L (ref 135–145)
SODIUM SERPL-SCNC: 152 MMOL/L (ref 135–145)
SODIUM SERPL-SCNC: 154 MMOL/L (ref 135–145)
SODIUM SERPL-SCNC: 155 MMOL/L (ref 135–145)
SODIUM SERPL-SCNC: 159 MMOL/L (ref 135–145)
SODIUM SERPL-SCNC: 159 MMOL/L (ref 135–145)
SOURCE SOURCE: ABNORMAL
SOURCE SOURCE: ABNORMAL
SOURCE SOURCE: NORMAL
SP GR UR STRIP.AUTO: 1.02
SPECIMEN DRAWN FROM PATIENT: ABNORMAL
SPECIMEN SOURCE: ABNORMAL
SPECIMEN SOURCE: NORMAL
TIDAL VOLUME IVT: 290 ML
TIDAL VOLUME IVT: 350 ML
TIDAL VOLUME IVT: 380 ML
TIDAL VOLUME IVT: 380 ML
TRIGL SERPL-MCNC: 165 MG/DL (ref 0–149)
TRIGL SERPL-MCNC: 190 MG/DL (ref 0–149)
TROPONIN T SERPL-MCNC: 30 NG/L (ref 6–19)
TROPONIN T SERPL-MCNC: 55 NG/L (ref 6–19)
UROBILINOGEN UR STRIP.AUTO-MCNC: 2 MG/DL
WBC # BLD AUTO: 10.5 K/UL (ref 4.8–10.8)
WBC # BLD AUTO: 11 K/UL (ref 4.8–10.8)
WBC # BLD AUTO: 11 K/UL (ref 4.8–10.8)
WBC # BLD AUTO: 11.8 K/UL (ref 4.8–10.8)
WBC # BLD AUTO: 11.9 K/UL (ref 4.8–10.8)
WBC # BLD AUTO: 12 K/UL (ref 4.8–10.8)
WBC # BLD AUTO: 13.2 K/UL (ref 4.8–10.8)
WBC # BLD AUTO: 13.3 K/UL (ref 4.8–10.8)
WBC # BLD AUTO: 13.6 K/UL (ref 4.8–10.8)
WBC # BLD AUTO: 13.7 K/UL (ref 4.8–10.8)
WBC # BLD AUTO: 14.2 K/UL (ref 4.8–10.8)
WBC # BLD AUTO: 14.5 K/UL (ref 4.8–10.8)
WBC # BLD AUTO: 16.3 K/UL (ref 4.8–10.8)
WBC # BLD AUTO: 16.4 K/UL (ref 4.8–10.8)
WBC # BLD AUTO: 16.6 K/UL (ref 4.8–10.8)
WBC # BLD AUTO: 16.7 K/UL (ref 4.8–10.8)
WBC # BLD AUTO: 17.2 K/UL (ref 4.8–10.8)
WBC # BLD AUTO: 17.4 K/UL (ref 4.8–10.8)
WBC # BLD AUTO: 17.5 K/UL (ref 4.8–10.8)
WBC # BLD AUTO: 18.4 K/UL (ref 4.8–10.8)
WBC # BLD AUTO: 18.4 K/UL (ref 4.8–10.8)
WBC # BLD AUTO: 18.5 K/UL (ref 4.8–10.8)
WBC # BLD AUTO: 18.6 K/UL (ref 4.8–10.8)
WBC # BLD AUTO: 19 K/UL (ref 4.8–10.8)
WBC # BLD AUTO: 19.3 K/UL (ref 4.8–10.8)
WBC # BLD AUTO: 19.6 K/UL (ref 4.8–10.8)
WBC # BLD AUTO: 19.9 K/UL (ref 4.8–10.8)
WBC # BLD AUTO: 20 K/UL (ref 4.8–10.8)
WBC # BLD AUTO: 20 K/UL (ref 4.8–10.8)
WBC # BLD AUTO: 20.2 K/UL (ref 4.8–10.8)
WBC # BLD AUTO: 20.5 K/UL (ref 4.8–10.8)
WBC # BLD AUTO: 21.4 K/UL (ref 4.8–10.8)
WBC # BLD AUTO: 22.2 K/UL (ref 4.8–10.8)
WBC # BLD AUTO: 22.3 K/UL (ref 4.8–10.8)
WBC # BLD AUTO: 22.5 K/UL (ref 4.8–10.8)
WBC # BLD AUTO: 24.1 K/UL (ref 4.8–10.8)
WBC # BLD AUTO: 8.4 K/UL (ref 4.8–10.8)
WBC #/AREA URNS HPF: ABNORMAL /HPF

## 2021-01-01 PROCEDURE — 94799 UNLISTED PULMONARY SVC/PX: CPT

## 2021-01-01 PROCEDURE — 85379 FIBRIN DEGRADATION QUANT: CPT

## 2021-01-01 PROCEDURE — 82962 GLUCOSE BLOOD TEST: CPT | Mod: 91

## 2021-01-01 PROCEDURE — 99291 CRITICAL CARE FIRST HOUR: CPT | Mod: 25 | Performed by: STUDENT IN AN ORGANIZED HEALTH CARE EDUCATION/TRAINING PROGRAM

## 2021-01-01 PROCEDURE — 700105 HCHG RX REV CODE 258: Performed by: NURSE PRACTITIONER

## 2021-01-01 PROCEDURE — 700111 HCHG RX REV CODE 636 W/ 250 OVERRIDE (IP): Performed by: INTERNAL MEDICINE

## 2021-01-01 PROCEDURE — 700105 HCHG RX REV CODE 258: Performed by: INTERNAL MEDICINE

## 2021-01-01 PROCEDURE — 31645 BRNCHSC W/THER ASPIR 1ST: CPT | Performed by: STUDENT IN AN ORGANIZED HEALTH CARE EDUCATION/TRAINING PROGRAM

## 2021-01-01 PROCEDURE — A9270 NON-COVERED ITEM OR SERVICE: HCPCS | Performed by: INTERNAL MEDICINE

## 2021-01-01 PROCEDURE — 80053 COMPREHEN METABOLIC PANEL: CPT

## 2021-01-01 PROCEDURE — 700111 HCHG RX REV CODE 636 W/ 250 OVERRIDE (IP): Performed by: HOSPITALIST

## 2021-01-01 PROCEDURE — 36415 COLL VENOUS BLD VENIPUNCTURE: CPT

## 2021-01-01 PROCEDURE — A9270 NON-COVERED ITEM OR SERVICE: HCPCS | Performed by: STUDENT IN AN ORGANIZED HEALTH CARE EDUCATION/TRAINING PROGRAM

## 2021-01-01 PROCEDURE — 770022 HCHG ROOM/CARE - ICU (200)

## 2021-01-01 PROCEDURE — 83735 ASSAY OF MAGNESIUM: CPT

## 2021-01-01 PROCEDURE — 700102 HCHG RX REV CODE 250 W/ 637 OVERRIDE(OP): Performed by: STUDENT IN AN ORGANIZED HEALTH CARE EDUCATION/TRAINING PROGRAM

## 2021-01-01 PROCEDURE — 71045 X-RAY EXAM CHEST 1 VIEW: CPT

## 2021-01-01 PROCEDURE — 700102 HCHG RX REV CODE 250 W/ 637 OVERRIDE(OP): Performed by: INTERNAL MEDICINE

## 2021-01-01 PROCEDURE — 36600 WITHDRAWAL OF ARTERIAL BLOOD: CPT

## 2021-01-01 PROCEDURE — 94760 N-INVAS EAR/PLS OXIMETRY 1: CPT

## 2021-01-01 PROCEDURE — 93005 ELECTROCARDIOGRAM TRACING: CPT | Performed by: NURSE PRACTITIONER

## 2021-01-01 PROCEDURE — 0B9M8ZZ DRAINAGE OF BILATERAL LUNGS, VIA NATURAL OR ARTIFICIAL OPENING ENDOSCOPIC: ICD-10-PCS | Performed by: STUDENT IN AN ORGANIZED HEALTH CARE EDUCATION/TRAINING PROGRAM

## 2021-01-01 PROCEDURE — 700111 HCHG RX REV CODE 636 W/ 250 OVERRIDE (IP): Performed by: EMERGENCY MEDICINE

## 2021-01-01 PROCEDURE — 87070 CULTURE OTHR SPECIMN AEROBIC: CPT

## 2021-01-01 PROCEDURE — 82803 BLOOD GASES ANY COMBINATION: CPT | Mod: 91

## 2021-01-01 PROCEDURE — A9270 NON-COVERED ITEM OR SERVICE: HCPCS | Performed by: HOSPITALIST

## 2021-01-01 PROCEDURE — 86140 C-REACTIVE PROTEIN: CPT

## 2021-01-01 PROCEDURE — 700101 HCHG RX REV CODE 250: Performed by: INTERNAL MEDICINE

## 2021-01-01 PROCEDURE — 82803 BLOOD GASES ANY COMBINATION: CPT

## 2021-01-01 PROCEDURE — 700101 HCHG RX REV CODE 250: Performed by: STUDENT IN AN ORGANIZED HEALTH CARE EDUCATION/TRAINING PROGRAM

## 2021-01-01 PROCEDURE — 85007 BL SMEAR W/DIFF WBC COUNT: CPT

## 2021-01-01 PROCEDURE — A9270 NON-COVERED ITEM OR SERVICE: HCPCS | Performed by: NURSE PRACTITIONER

## 2021-01-01 PROCEDURE — 700102 HCHG RX REV CODE 250 W/ 637 OVERRIDE(OP): Performed by: NURSE PRACTITIONER

## 2021-01-01 PROCEDURE — 99291 CRITICAL CARE FIRST HOUR: CPT | Performed by: INTERNAL MEDICINE

## 2021-01-01 PROCEDURE — 84145 PROCALCITONIN (PCT): CPT

## 2021-01-01 PROCEDURE — 94002 VENT MGMT INPAT INIT DAY: CPT

## 2021-01-01 PROCEDURE — 0B968ZZ DRAINAGE OF RIGHT LOWER LOBE BRONCHUS, VIA NATURAL OR ARTIFICIAL OPENING ENDOSCOPIC: ICD-10-PCS | Performed by: INTERNAL MEDICINE

## 2021-01-01 PROCEDURE — 94003 VENT MGMT INPAT SUBQ DAY: CPT

## 2021-01-01 PROCEDURE — 0BH18EZ INSERTION OF ENDOTRACHEAL AIRWAY INTO TRACHEA, VIA NATURAL OR ARTIFICIAL OPENING ENDOSCOPIC: ICD-10-PCS | Performed by: STUDENT IN AN ORGANIZED HEALTH CARE EDUCATION/TRAINING PROGRAM

## 2021-01-01 PROCEDURE — 86480 TB TEST CELL IMMUN MEASURE: CPT

## 2021-01-01 PROCEDURE — 85025 COMPLETE CBC W/AUTO DIFF WBC: CPT

## 2021-01-01 PROCEDURE — 84100 ASSAY OF PHOSPHORUS: CPT

## 2021-01-01 PROCEDURE — 99291 CRITICAL CARE FIRST HOUR: CPT | Mod: 25 | Performed by: INTERNAL MEDICINE

## 2021-01-01 PROCEDURE — 700111 HCHG RX REV CODE 636 W/ 250 OVERRIDE (IP): Performed by: STUDENT IN AN ORGANIZED HEALTH CARE EDUCATION/TRAINING PROGRAM

## 2021-01-01 PROCEDURE — 302977 HCHG BRONCHOSCOPY PROC-THERAPEUTIC

## 2021-01-01 PROCEDURE — 87281 PNEUMOCYSTIS CARINII AG IF: CPT

## 2021-01-01 PROCEDURE — 80048 BASIC METABOLIC PNL TOTAL CA: CPT

## 2021-01-01 PROCEDURE — 02HV33Z INSERTION OF INFUSION DEVICE INTO SUPERIOR VENA CAVA, PERCUTANEOUS APPROACH: ICD-10-PCS | Performed by: INTERNAL MEDICINE

## 2021-01-01 PROCEDURE — 770001 HCHG ROOM/CARE - MED/SURG/GYN PRIV*

## 2021-01-01 PROCEDURE — 31624 DX BRONCHOSCOPE/LAVAGE: CPT | Performed by: INTERNAL MEDICINE

## 2021-01-01 PROCEDURE — 700111 HCHG RX REV CODE 636 W/ 250 OVERRIDE (IP): Performed by: NURSE PRACTITIONER

## 2021-01-01 PROCEDURE — 03HY32Z INSERTION OF MONITORING DEVICE INTO UPPER ARTERY, PERCUTANEOUS APPROACH: ICD-10-PCS | Performed by: STUDENT IN AN ORGANIZED HEALTH CARE EDUCATION/TRAINING PROGRAM

## 2021-01-01 PROCEDURE — 99291 CRITICAL CARE FIRST HOUR: CPT | Performed by: HOSPITALIST

## 2021-01-01 PROCEDURE — 700111 HCHG RX REV CODE 636 W/ 250 OVERRIDE (IP)

## 2021-01-01 PROCEDURE — 93005 ELECTROCARDIOGRAM TRACING: CPT | Performed by: INTERNAL MEDICINE

## 2021-01-01 PROCEDURE — 85027 COMPLETE CBC AUTOMATED: CPT

## 2021-01-01 PROCEDURE — 700102 HCHG RX REV CODE 250 W/ 637 OVERRIDE(OP): Performed by: EMERGENCY MEDICINE

## 2021-01-01 PROCEDURE — 83605 ASSAY OF LACTIC ACID: CPT

## 2021-01-01 PROCEDURE — 700102 HCHG RX REV CODE 250 W/ 637 OVERRIDE(OP): Performed by: HOSPITALIST

## 2021-01-01 PROCEDURE — 700101 HCHG RX REV CODE 250: Performed by: EMERGENCY MEDICINE

## 2021-01-01 PROCEDURE — 86901 BLOOD TYPING SEROLOGIC RH(D): CPT

## 2021-01-01 PROCEDURE — 31500 INSERT EMERGENCY AIRWAY: CPT

## 2021-01-01 PROCEDURE — 93010 ELECTROCARDIOGRAM REPORT: CPT | Performed by: INTERNAL MEDICINE

## 2021-01-01 PROCEDURE — 87015 SPECIMEN INFECT AGNT CONCNTJ: CPT

## 2021-01-01 PROCEDURE — 99291 CRITICAL CARE FIRST HOUR: CPT | Performed by: STUDENT IN AN ORGANIZED HEALTH CARE EDUCATION/TRAINING PROGRAM

## 2021-01-01 PROCEDURE — 86900 BLOOD TYPING SEROLOGIC ABO: CPT

## 2021-01-01 PROCEDURE — 36556 INSERT NON-TUNNEL CV CATH: CPT

## 2021-01-01 PROCEDURE — A9270 NON-COVERED ITEM OR SERVICE: HCPCS | Performed by: EMERGENCY MEDICINE

## 2021-01-01 PROCEDURE — 99152 MOD SED SAME PHYS/QHP 5/>YRS: CPT

## 2021-01-01 PROCEDURE — 94640 AIRWAY INHALATION TREATMENT: CPT

## 2021-01-01 PROCEDURE — 96372 THER/PROPH/DIAG INJ SC/IM: CPT

## 2021-01-01 PROCEDURE — 31645 BRNCHSC W/THER ASPIR 1ST: CPT | Performed by: INTERNAL MEDICINE

## 2021-01-01 PROCEDURE — C9803 HOPD COVID-19 SPEC COLLECT: HCPCS | Performed by: EMERGENCY MEDICINE

## 2021-01-01 PROCEDURE — C1751 CATH, INF, PER/CENT/MIDLINE: HCPCS

## 2021-01-01 PROCEDURE — 87186 SC STD MICRODIL/AGAR DIL: CPT

## 2021-01-01 PROCEDURE — 99233 SBSQ HOSP IP/OBS HIGH 50: CPT | Performed by: INTERNAL MEDICINE

## 2021-01-01 PROCEDURE — 0B9B8ZZ DRAINAGE OF LEFT LOWER LOBE BRONCHUS, VIA NATURAL OR ARTIFICIAL OPENING ENDOSCOPIC: ICD-10-PCS | Performed by: INTERNAL MEDICINE

## 2021-01-01 PROCEDURE — 84134 ASSAY OF PREALBUMIN: CPT | Mod: 91

## 2021-01-01 PROCEDURE — 8E0ZXY6 ISOLATION: ICD-10-PCS | Performed by: EMERGENCY MEDICINE

## 2021-01-01 PROCEDURE — 99291 CRITICAL CARE FIRST HOUR: CPT | Mod: 25 | Performed by: EMERGENCY MEDICINE

## 2021-01-01 PROCEDURE — 87798 DETECT AGENT NOS DNA AMP: CPT

## 2021-01-01 PROCEDURE — 99291 CRITICAL CARE FIRST HOUR: CPT | Performed by: EMERGENCY MEDICINE

## 2021-01-01 PROCEDURE — 700105 HCHG RX REV CODE 258: Performed by: EMERGENCY MEDICINE

## 2021-01-01 PROCEDURE — 94618 PULMONARY STRESS TESTING: CPT

## 2021-01-01 PROCEDURE — 84484 ASSAY OF TROPONIN QUANT: CPT

## 2021-01-01 PROCEDURE — 80074 ACUTE HEPATITIS PANEL: CPT

## 2021-01-01 PROCEDURE — 99233 SBSQ HOSP IP/OBS HIGH 50: CPT | Performed by: STUDENT IN AN ORGANIZED HEALTH CARE EDUCATION/TRAINING PROGRAM

## 2021-01-01 PROCEDURE — 84134 ASSAY OF PREALBUMIN: CPT

## 2021-01-01 PROCEDURE — 37799 UNLISTED PX VASCULAR SURGERY: CPT

## 2021-01-01 PROCEDURE — 84478 ASSAY OF TRIGLYCERIDES: CPT

## 2021-01-01 PROCEDURE — 99292 CRITICAL CARE ADDL 30 MIN: CPT | Performed by: INTERNAL MEDICINE

## 2021-01-01 PROCEDURE — 87449 NOS EACH ORGANISM AG IA: CPT

## 2021-01-01 PROCEDURE — 87077 CULTURE AEROBIC IDENTIFY: CPT

## 2021-01-01 PROCEDURE — 700105 HCHG RX REV CODE 258: Performed by: STUDENT IN AN ORGANIZED HEALTH CARE EDUCATION/TRAINING PROGRAM

## 2021-01-01 PROCEDURE — 05H433Z INSERTION OF INFUSION DEVICE INTO LEFT INNOMINATE VEIN, PERCUTANEOUS APPROACH: ICD-10-PCS | Performed by: STUDENT IN AN ORGANIZED HEALTH CARE EDUCATION/TRAINING PROGRAM

## 2021-01-01 PROCEDURE — 82962 GLUCOSE BLOOD TEST: CPT

## 2021-01-01 PROCEDURE — 36620 INSERTION CATHETER ARTERY: CPT

## 2021-01-01 PROCEDURE — 94660 CPAP INITIATION&MGMT: CPT

## 2021-01-01 PROCEDURE — 81001 URINALYSIS AUTO W/SCOPE: CPT

## 2021-01-01 PROCEDURE — 700101 HCHG RX REV CODE 250

## 2021-01-01 PROCEDURE — 99292 CRITICAL CARE ADDL 30 MIN: CPT | Performed by: EMERGENCY MEDICINE

## 2021-01-01 PROCEDURE — 0241U HCHG SARS-COV-2 COVID-19 NFCT DS RESP RNA 4 TRGT MIC: CPT

## 2021-01-01 PROCEDURE — 74176 CT ABD & PELVIS W/O CONTRAST: CPT

## 2021-01-01 PROCEDURE — 86140 C-REACTIVE PROTEIN: CPT | Mod: 91

## 2021-01-01 PROCEDURE — 87040 BLOOD CULTURE FOR BACTERIA: CPT | Mod: 91

## 2021-01-01 PROCEDURE — 93970 EXTREMITY STUDY: CPT

## 2021-01-01 PROCEDURE — 87205 SMEAR GRAM STAIN: CPT

## 2021-01-01 PROCEDURE — 84132 ASSAY OF SERUM POTASSIUM: CPT

## 2021-01-01 PROCEDURE — 31500 INSERT EMERGENCY AIRWAY: CPT | Performed by: STUDENT IN AN ORGANIZED HEALTH CARE EDUCATION/TRAINING PROGRAM

## 2021-01-01 PROCEDURE — 87040 BLOOD CULTURE FOR BACTERIA: CPT

## 2021-01-01 PROCEDURE — 36620 INSERTION CATHETER ARTERY: CPT | Performed by: STUDENT IN AN ORGANIZED HEALTH CARE EDUCATION/TRAINING PROGRAM

## 2021-01-01 PROCEDURE — 99285 EMERGENCY DEPT VISIT HI MDM: CPT

## 2021-01-01 PROCEDURE — 5A1955Z RESPIRATORY VENTILATION, GREATER THAN 96 CONSECUTIVE HOURS: ICD-10-PCS | Performed by: STUDENT IN AN ORGANIZED HEALTH CARE EDUCATION/TRAINING PROGRAM

## 2021-01-01 PROCEDURE — 36556 INSERT NON-TUNNEL CV CATH: CPT | Mod: LT | Performed by: STUDENT IN AN ORGANIZED HEALTH CARE EDUCATION/TRAINING PROGRAM

## 2021-01-01 PROCEDURE — 92950 HEART/LUNG RESUSCITATION CPR: CPT

## 2021-01-01 PROCEDURE — 84295 ASSAY OF SERUM SODIUM: CPT

## 2021-01-01 PROCEDURE — 86850 RBC ANTIBODY SCREEN: CPT

## 2021-01-01 PROCEDURE — 307059 PAD,EAR PROTECTOR: Performed by: INTERNAL MEDICINE

## 2021-01-01 PROCEDURE — 302151 K-PAD 14X20: Performed by: NURSE PRACTITIONER

## 2021-01-01 PROCEDURE — 36556 INSERT NON-TUNNEL CV CATH: CPT | Mod: RT | Performed by: NURSE PRACTITIONER

## 2021-01-01 PROCEDURE — 87389 HIV-1 AG W/HIV-1&-2 AB AG IA: CPT

## 2021-01-01 PROCEDURE — 93005 ELECTROCARDIOGRAM TRACING: CPT | Performed by: STUDENT IN AN ORGANIZED HEALTH CARE EDUCATION/TRAINING PROGRAM

## 2021-01-01 PROCEDURE — 0B9J8ZX DRAINAGE OF LEFT LOWER LUNG LOBE, VIA NATURAL OR ARTIFICIAL OPENING ENDOSCOPIC, DIAGNOSTIC: ICD-10-PCS | Performed by: INTERNAL MEDICINE

## 2021-01-01 PROCEDURE — 97602 WOUND(S) CARE NON-SELECTIVE: CPT

## 2021-01-01 PROCEDURE — 99292 CRITICAL CARE ADDL 30 MIN: CPT | Mod: 25 | Performed by: INTERNAL MEDICINE

## 2021-01-01 PROCEDURE — 87102 FUNGUS ISOLATION CULTURE: CPT

## 2021-01-01 PROCEDURE — 83880 ASSAY OF NATRIURETIC PEPTIDE: CPT

## 2021-01-01 RX ORDER — HYDROXYZINE HYDROCHLORIDE 25 MG/1
25 TABLET, FILM COATED ORAL 3 TIMES DAILY PRN
Status: DISCONTINUED | OUTPATIENT
Start: 2021-01-01 | End: 2021-01-01

## 2021-01-01 RX ORDER — OXYCODONE HYDROCHLORIDE 5 MG/1
5-10 TABLET ORAL EVERY 4 HOURS PRN
Status: DISCONTINUED | OUTPATIENT
Start: 2021-01-01 | End: 2021-01-01

## 2021-01-01 RX ORDER — ACETAMINOPHEN 500 MG
1000 TABLET ORAL 3 TIMES DAILY
Status: COMPLETED | OUTPATIENT
Start: 2021-01-01 | End: 2021-01-01

## 2021-01-01 RX ORDER — FUROSEMIDE 10 MG/ML
40 INJECTION INTRAMUSCULAR; INTRAVENOUS
Status: DISCONTINUED | OUTPATIENT
Start: 2021-01-01 | End: 2021-01-01

## 2021-01-01 RX ORDER — FUROSEMIDE 10 MG/ML
20 INJECTION INTRAMUSCULAR; INTRAVENOUS
Status: DISCONTINUED | OUTPATIENT
Start: 2021-01-01 | End: 2021-01-01

## 2021-01-01 RX ORDER — DEXTROSE MONOHYDRATE 25 G/50ML
50 INJECTION, SOLUTION INTRAVENOUS
Status: DISCONTINUED | OUTPATIENT
Start: 2021-01-01 | End: 2021-01-01

## 2021-01-01 RX ORDER — HYDROMORPHONE HYDROCHLORIDE 1 MG/ML
0.5 INJECTION, SOLUTION INTRAMUSCULAR; INTRAVENOUS; SUBCUTANEOUS
Status: DISCONTINUED | OUTPATIENT
Start: 2021-01-01 | End: 2021-01-01 | Stop reason: HOSPADM

## 2021-01-01 RX ORDER — SODIUM CHLORIDE, SODIUM LACTATE, POTASSIUM CHLORIDE, AND CALCIUM CHLORIDE .6; .31; .03; .02 G/100ML; G/100ML; G/100ML; G/100ML
500 INJECTION, SOLUTION INTRAVENOUS ONCE
Status: COMPLETED | OUTPATIENT
Start: 2021-01-01 | End: 2021-01-01

## 2021-01-01 RX ORDER — CHOLECALCIFEROL (VITAMIN D3) 125 MCG
5 CAPSULE ORAL NIGHTLY
Status: DISCONTINUED | OUTPATIENT
Start: 2021-01-01 | End: 2021-01-01

## 2021-01-01 RX ORDER — MAGNESIUM SULFATE HEPTAHYDRATE 40 MG/ML
2 INJECTION, SOLUTION INTRAVENOUS ONCE
Status: COMPLETED | OUTPATIENT
Start: 2021-01-01 | End: 2021-01-01

## 2021-01-01 RX ORDER — AZITHROMYCIN 250 MG/1
500 TABLET, FILM COATED ORAL DAILY
Status: COMPLETED | OUTPATIENT
Start: 2021-01-01 | End: 2021-01-01

## 2021-01-01 RX ORDER — DEXTROSE MONOHYDRATE 25 G/50ML
50 INJECTION, SOLUTION INTRAVENOUS ONCE
Status: COMPLETED | OUTPATIENT
Start: 2021-01-01 | End: 2021-01-01

## 2021-01-01 RX ORDER — LORAZEPAM 0.5 MG/1
0.5 TABLET ORAL EVERY 8 HOURS PRN
Status: DISCONTINUED | OUTPATIENT
Start: 2021-01-01 | End: 2021-01-01

## 2021-01-01 RX ORDER — MIDAZOLAM HYDROCHLORIDE 1 MG/ML
INJECTION INTRAMUSCULAR; INTRAVENOUS
Status: COMPLETED
Start: 2021-01-01 | End: 2021-01-01

## 2021-01-01 RX ORDER — MIDAZOLAM HYDROCHLORIDE 1 MG/ML
INJECTION INTRAMUSCULAR; INTRAVENOUS
Status: ACTIVE
Start: 2021-01-01 | End: 2021-01-01

## 2021-01-01 RX ORDER — ONDANSETRON 4 MG/1
4 TABLET, ORALLY DISINTEGRATING ORAL EVERY 6 HOURS PRN
Status: DISCONTINUED | OUTPATIENT
Start: 2021-01-01 | End: 2021-01-01

## 2021-01-01 RX ORDER — ONDANSETRON 2 MG/ML
4 INJECTION INTRAMUSCULAR; INTRAVENOUS EVERY 6 HOURS PRN
Status: DISCONTINUED | OUTPATIENT
Start: 2021-01-01 | End: 2021-01-01

## 2021-01-01 RX ORDER — ROCURONIUM BROMIDE 10 MG/ML
0.6 INJECTION, SOLUTION INTRAVENOUS ONCE
Status: COMPLETED | OUTPATIENT
Start: 2021-01-01 | End: 2021-01-01

## 2021-01-01 RX ORDER — ACETAMINOPHEN 325 MG/1
650 TABLET ORAL EVERY 6 HOURS PRN
Status: DISCONTINUED | OUTPATIENT
Start: 2021-01-01 | End: 2021-01-01

## 2021-01-01 RX ORDER — NOREPINEPHRINE BITARTRATE 0.03 MG/ML
0-30 INJECTION, SOLUTION INTRAVENOUS CONTINUOUS
Status: DISCONTINUED | OUTPATIENT
Start: 2021-01-01 | End: 2021-01-01 | Stop reason: HOSPADM

## 2021-01-01 RX ORDER — CALCIUM GLUCONATE 20 MG/ML
2 INJECTION, SOLUTION INTRAVENOUS ONCE
Status: COMPLETED | OUTPATIENT
Start: 2021-01-01 | End: 2021-01-01

## 2021-01-01 RX ORDER — AMOXICILLIN AND CLAVULANATE POTASSIUM 875; 125 MG/1; MG/1
1 TABLET, FILM COATED ORAL EVERY 12 HOURS
Status: DISCONTINUED | OUTPATIENT
Start: 2021-01-01 | End: 2021-01-01

## 2021-01-01 RX ORDER — VORICONAZOLE 40 MG/ML
400 POWDER, FOR SUSPENSION ORAL EVERY 12 HOURS
Status: COMPLETED | OUTPATIENT
Start: 2021-01-01 | End: 2021-01-01

## 2021-01-01 RX ORDER — NALOXONE HYDROCHLORIDE 1 MG/ML
4 INJECTION PARENTERAL EVERY 8 HOURS
Status: DISCONTINUED | OUTPATIENT
Start: 2021-01-01 | End: 2021-01-01

## 2021-01-01 RX ORDER — MIDAZOLAM HYDROCHLORIDE 1 MG/ML
5 INJECTION INTRAMUSCULAR; INTRAVENOUS
Status: COMPLETED | OUTPATIENT
Start: 2021-01-01 | End: 2021-01-01

## 2021-01-01 RX ORDER — PROMETHAZINE HYDROCHLORIDE 25 MG/1
12.5-25 TABLET ORAL EVERY 4 HOURS PRN
Status: DISCONTINUED | OUTPATIENT
Start: 2021-01-01 | End: 2021-01-01

## 2021-01-01 RX ORDER — MIDAZOLAM HYDROCHLORIDE 1 MG/ML
5 INJECTION INTRAMUSCULAR; INTRAVENOUS
Status: DISCONTINUED | OUTPATIENT
Start: 2021-01-01 | End: 2021-01-01

## 2021-01-01 RX ORDER — ONDANSETRON 4 MG/1
4 TABLET, ORALLY DISINTEGRATING ORAL EVERY 4 HOURS PRN
Status: DISCONTINUED | OUTPATIENT
Start: 2021-01-01 | End: 2021-01-01

## 2021-01-01 RX ORDER — FAMOTIDINE 20 MG/1
20 TABLET, FILM COATED ORAL 2 TIMES DAILY
Status: DISCONTINUED | OUTPATIENT
Start: 2021-01-01 | End: 2021-01-01 | Stop reason: HOSPADM

## 2021-01-01 RX ORDER — ROCURONIUM BROMIDE 10 MG/ML
50 INJECTION, SOLUTION INTRAVENOUS
Status: DISCONTINUED | OUTPATIENT
Start: 2021-01-01 | End: 2021-01-01

## 2021-01-01 RX ORDER — DEXAMETHASONE 6 MG/1
6 TABLET ORAL DAILY
Status: COMPLETED | OUTPATIENT
Start: 2021-01-01 | End: 2021-01-01

## 2021-01-01 RX ORDER — ONDANSETRON 2 MG/ML
4 INJECTION INTRAMUSCULAR; INTRAVENOUS EVERY 4 HOURS PRN
Status: DISCONTINUED | OUTPATIENT
Start: 2021-01-01 | End: 2021-01-01

## 2021-01-01 RX ORDER — PROMETHAZINE HYDROCHLORIDE 25 MG/1
12.5-25 SUPPOSITORY RECTAL EVERY 4 HOURS PRN
Status: DISCONTINUED | OUTPATIENT
Start: 2021-01-01 | End: 2021-01-01

## 2021-01-01 RX ORDER — DEXMEDETOMIDINE HYDROCHLORIDE 4 UG/ML
.1-1.5 INJECTION, SOLUTION INTRAVENOUS CONTINUOUS
Status: DISCONTINUED | OUTPATIENT
Start: 2021-01-01 | End: 2021-01-01

## 2021-01-01 RX ORDER — AMOXICILLIN 250 MG
2 CAPSULE ORAL 2 TIMES DAILY
Status: DISCONTINUED | OUTPATIENT
Start: 2021-01-01 | End: 2021-01-01

## 2021-01-01 RX ORDER — MIDAZOLAM HYDROCHLORIDE 1 MG/ML
5 INJECTION INTRAMUSCULAR; INTRAVENOUS ONCE
Status: COMPLETED | OUTPATIENT
Start: 2021-01-01 | End: 2021-01-01

## 2021-01-01 RX ORDER — HYDROMORPHONE HYDROCHLORIDE 1 MG/ML
1-2 INJECTION, SOLUTION INTRAMUSCULAR; INTRAVENOUS; SUBCUTANEOUS
Status: DISCONTINUED | OUTPATIENT
Start: 2021-01-01 | End: 2021-01-01

## 2021-01-01 RX ORDER — ETOMIDATE 2 MG/ML
20 INJECTION INTRAVENOUS ONCE
Status: COMPLETED | OUTPATIENT
Start: 2021-01-01 | End: 2021-01-01

## 2021-01-01 RX ORDER — MIDAZOLAM HYDROCHLORIDE 1 MG/ML
1 INJECTION INTRAMUSCULAR; INTRAVENOUS ONCE
Status: ACTIVE | OUTPATIENT
Start: 2021-01-01 | End: 2021-01-01

## 2021-01-01 RX ORDER — MIDAZOLAM HYDROCHLORIDE 1 MG/ML
3 INJECTION INTRAMUSCULAR; INTRAVENOUS ONCE
Status: COMPLETED | OUTPATIENT
Start: 2021-01-01 | End: 2021-01-01

## 2021-01-01 RX ORDER — VITAMIN B COMPLEX
2000 TABLET ORAL DAILY
Status: DISCONTINUED | OUTPATIENT
Start: 2021-01-01 | End: 2021-01-01

## 2021-01-01 RX ORDER — POLYETHYLENE GLYCOL 3350 17 G/17G
1 POWDER, FOR SOLUTION ORAL
Status: DISCONTINUED | OUTPATIENT
Start: 2021-01-01 | End: 2021-01-01

## 2021-01-01 RX ORDER — VECURONIUM BROMIDE 1 MG/ML
10 INJECTION, POWDER, LYOPHILIZED, FOR SOLUTION INTRAVENOUS ONCE
Status: COMPLETED | OUTPATIENT
Start: 2021-01-01 | End: 2021-01-01

## 2021-01-01 RX ORDER — BISACODYL 10 MG
10 SUPPOSITORY, RECTAL RECTAL
Status: DISCONTINUED | OUTPATIENT
Start: 2021-01-01 | End: 2021-01-01

## 2021-01-01 RX ORDER — FAMOTIDINE 20 MG/1
20 TABLET, FILM COATED ORAL 2 TIMES DAILY
Status: DISCONTINUED | OUTPATIENT
Start: 2021-01-01 | End: 2021-01-01

## 2021-01-01 RX ORDER — ROCURONIUM BROMIDE 10 MG/ML
0.6 INJECTION, SOLUTION INTRAVENOUS
Status: DISCONTINUED | OUTPATIENT
Start: 2021-01-01 | End: 2021-01-01

## 2021-01-01 RX ORDER — MIDAZOLAM HYDROCHLORIDE 1 MG/ML
1-5 INJECTION INTRAMUSCULAR; INTRAVENOUS
Status: DISCONTINUED | OUTPATIENT
Start: 2021-01-01 | End: 2021-01-01

## 2021-01-01 RX ORDER — AMOXICILLIN 250 MG
2 CAPSULE ORAL 2 TIMES DAILY
Status: DISCONTINUED | OUTPATIENT
Start: 2021-01-01 | End: 2021-01-01 | Stop reason: HOSPADM

## 2021-01-01 RX ORDER — POTASSIUM CHLORIDE 20 MEQ/1
40 TABLET, EXTENDED RELEASE ORAL ONCE
Status: COMPLETED | OUTPATIENT
Start: 2021-01-01 | End: 2021-01-01

## 2021-01-01 RX ORDER — LORAZEPAM 2 MG/ML
.5-1 INJECTION INTRAMUSCULAR
Status: DISCONTINUED | OUTPATIENT
Start: 2021-01-01 | End: 2021-01-01

## 2021-01-01 RX ORDER — NOREPINEPHRINE BITARTRATE 0.03 MG/ML
0-30 INJECTION, SOLUTION INTRAVENOUS CONTINUOUS
Status: DISCONTINUED | OUTPATIENT
Start: 2021-01-01 | End: 2021-01-01

## 2021-01-01 RX ORDER — ASPIRIN 325 MG
650 TABLET ORAL EVERY 6 HOURS PRN
COMMUNITY

## 2021-01-01 RX ORDER — ACETAMINOPHEN 500 MG
750 TABLET ORAL EVERY 6 HOURS PRN
COMMUNITY

## 2021-01-01 RX ORDER — ASCORBIC ACID 500 MG
500 TABLET ORAL 2 TIMES DAILY
Status: DISCONTINUED | OUTPATIENT
Start: 2021-01-01 | End: 2021-01-01

## 2021-01-01 RX ORDER — POTASSIUM CHLORIDE 29.8 MG/ML
40 INJECTION INTRAVENOUS ONCE
Status: COMPLETED | OUTPATIENT
Start: 2021-01-01 | End: 2021-01-01

## 2021-01-01 RX ORDER — GUAIFENESIN 600 MG/1
600 TABLET, EXTENDED RELEASE ORAL EVERY 12 HOURS
Status: DISCONTINUED | OUTPATIENT
Start: 2021-01-01 | End: 2021-01-01

## 2021-01-01 RX ORDER — SODIUM CHLORIDE, SODIUM LACTATE, POTASSIUM CHLORIDE, AND CALCIUM CHLORIDE .6; .31; .03; .02 G/100ML; G/100ML; G/100ML; G/100ML
250 INJECTION, SOLUTION INTRAVENOUS ONCE
Status: COMPLETED | OUTPATIENT
Start: 2021-01-01 | End: 2021-01-01

## 2021-01-01 RX ORDER — ROCURONIUM BROMIDE 10 MG/ML
50 INJECTION, SOLUTION INTRAVENOUS ONCE
Status: COMPLETED | OUTPATIENT
Start: 2021-01-01 | End: 2021-01-01

## 2021-01-01 RX ORDER — BISACODYL 10 MG
10 SUPPOSITORY, RECTAL RECTAL
Status: DISCONTINUED | OUTPATIENT
Start: 2021-01-01 | End: 2021-01-01 | Stop reason: HOSPADM

## 2021-01-01 RX ORDER — PROCHLORPERAZINE EDISYLATE 5 MG/ML
5-10 INJECTION INTRAMUSCULAR; INTRAVENOUS EVERY 4 HOURS PRN
Status: DISCONTINUED | OUTPATIENT
Start: 2021-01-01 | End: 2021-01-01

## 2021-01-01 RX ORDER — MIDAZOLAM HYDROCHLORIDE 1 MG/ML
1 INJECTION INTRAMUSCULAR; INTRAVENOUS ONCE
Status: COMPLETED | OUTPATIENT
Start: 2021-01-01 | End: 2021-01-01

## 2021-01-01 RX ORDER — POLYETHYLENE GLYCOL 3350 17 G/17G
1 POWDER, FOR SOLUTION ORAL 2 TIMES DAILY
Status: DISCONTINUED | OUTPATIENT
Start: 2021-01-01 | End: 2021-01-01 | Stop reason: HOSPADM

## 2021-01-01 RX ORDER — BUDESONIDE AND FORMOTEROL FUMARATE DIHYDRATE 160; 4.5 UG/1; UG/1
2 AEROSOL RESPIRATORY (INHALATION)
Status: DISCONTINUED | OUTPATIENT
Start: 2021-01-01 | End: 2021-01-01

## 2021-01-01 RX ORDER — ASCORBIC ACID 500 MG
1000 TABLET ORAL EVERY MORNING
COMMUNITY

## 2021-01-01 RX ORDER — VECURONIUM BROMIDE 1 MG/ML
INJECTION, POWDER, LYOPHILIZED, FOR SOLUTION INTRAVENOUS
Status: COMPLETED
Start: 2021-01-01 | End: 2021-01-01

## 2021-01-01 RX ORDER — SODIUM CHLORIDE 450 MG/100ML
INJECTION, SOLUTION INTRAVENOUS ONCE
Status: COMPLETED | OUTPATIENT
Start: 2021-01-01 | End: 2021-01-01

## 2021-01-01 RX ORDER — MIDAZOLAM HYDROCHLORIDE 1 MG/ML
4 INJECTION INTRAMUSCULAR; INTRAVENOUS ONCE
Status: COMPLETED | OUTPATIENT
Start: 2021-01-01 | End: 2021-01-01

## 2021-01-01 RX ORDER — ROCURONIUM BROMIDE 10 MG/ML
100 INJECTION, SOLUTION INTRAVENOUS ONCE
Status: COMPLETED | OUTPATIENT
Start: 2021-01-01 | End: 2021-01-01

## 2021-01-01 RX ORDER — FAMOTIDINE 20 MG/1
20 TABLET, FILM COATED ORAL EVERY 12 HOURS
Status: DISCONTINUED | OUTPATIENT
Start: 2021-01-01 | End: 2021-01-01

## 2021-01-01 RX ORDER — SULFAMETHOXAZOLE AND TRIMETHOPRIM 800; 160 MG/1; MG/1
2 TABLET ORAL EVERY 6 HOURS
Status: DISCONTINUED | OUTPATIENT
Start: 2021-01-01 | End: 2021-01-01

## 2021-01-01 RX ORDER — DEXTROSE MONOHYDRATE 50 MG/ML
INJECTION, SOLUTION INTRAVENOUS CONTINUOUS
Status: DISCONTINUED | OUTPATIENT
Start: 2021-01-01 | End: 2021-01-01

## 2021-01-01 RX ORDER — VORICONAZOLE 40 MG/ML
200 POWDER, FOR SUSPENSION ORAL EVERY 12 HOURS
Status: DISCONTINUED | OUTPATIENT
Start: 2021-01-01 | End: 2021-01-01 | Stop reason: HOSPADM

## 2021-01-01 RX ORDER — POLYETHYLENE GLYCOL 3350 17 G/17G
1 POWDER, FOR SOLUTION ORAL
Status: DISCONTINUED | OUTPATIENT
Start: 2021-01-01 | End: 2021-01-01 | Stop reason: HOSPADM

## 2021-01-01 RX ORDER — DEXTROSE MONOHYDRATE 25 G/50ML
50 INJECTION, SOLUTION INTRAVENOUS
Status: DISCONTINUED | OUTPATIENT
Start: 2021-01-01 | End: 2021-01-01 | Stop reason: HOSPADM

## 2021-01-01 RX ORDER — FUROSEMIDE 10 MG/ML
40 INJECTION INTRAMUSCULAR; INTRAVENOUS ONCE
Status: COMPLETED | OUTPATIENT
Start: 2021-01-01 | End: 2021-01-01

## 2021-01-01 RX ORDER — POTASSIUM CHLORIDE 20 MEQ/1
40 TABLET, EXTENDED RELEASE ORAL 2 TIMES DAILY
Status: DISCONTINUED | OUTPATIENT
Start: 2021-01-01 | End: 2021-01-01

## 2021-01-01 RX ORDER — ROCURONIUM BROMIDE 10 MG/ML
INJECTION, SOLUTION INTRAVENOUS
Status: COMPLETED
Start: 2021-01-01 | End: 2021-01-01

## 2021-01-01 RX ORDER — MIDAZOLAM HYDROCHLORIDE 1 MG/ML
INJECTION INTRAMUSCULAR; INTRAVENOUS
Status: DISCONTINUED
Start: 2021-01-01 | End: 2021-01-01 | Stop reason: HOSPADM

## 2021-01-01 RX ORDER — ACETAMINOPHEN 325 MG/1
650 TABLET ORAL EVERY 6 HOURS PRN
Status: DISCONTINUED | OUTPATIENT
Start: 2021-01-01 | End: 2021-01-01 | Stop reason: HOSPADM

## 2021-01-01 RX ADMIN — FUROSEMIDE 40 MG: 10 INJECTION INTRAMUSCULAR; INTRAVENOUS at 16:19

## 2021-01-01 RX ADMIN — FAMOTIDINE 20 MG: 20 TABLET ORAL at 18:30

## 2021-01-01 RX ADMIN — OXYCODONE HYDROCHLORIDE AND ACETAMINOPHEN 500 MG: 500 TABLET ORAL at 05:03

## 2021-01-01 RX ADMIN — Medication 2000 UNITS: at 05:03

## 2021-01-01 RX ADMIN — Medication 2000 UNITS: at 05:19

## 2021-01-01 RX ADMIN — MINERAL OIL, PETROLATUM 1 APPLICATION: 425; 573 OINTMENT OPHTHALMIC at 13:08

## 2021-01-01 RX ADMIN — MIDAZOLAM HYDROCHLORIDE 3 MG: 1 INJECTION, SOLUTION INTRAMUSCULAR; INTRAVENOUS at 08:53

## 2021-01-01 RX ADMIN — ROCURONIUM BROMIDE 50 MG: 10 INJECTION, SOLUTION INTRAVENOUS at 23:04

## 2021-01-01 RX ADMIN — MINERAL OIL, PETROLATUM 1 APPLICATION: 425; 573 OINTMENT OPHTHALMIC at 14:05

## 2021-01-01 RX ADMIN — FAMOTIDINE 20 MG: 20 TABLET ORAL at 05:26

## 2021-01-01 RX ADMIN — ENOXAPARIN SODIUM 40 MG: 40 INJECTION SUBCUTANEOUS at 05:42

## 2021-01-01 RX ADMIN — BARICITINIB 4 MG: 2 TABLET, FILM COATED ORAL at 16:18

## 2021-01-01 RX ADMIN — POLYETHYLENE GLYCOL 3350 1 PACKET: 17 POWDER, FOR SOLUTION ORAL at 17:04

## 2021-01-01 RX ADMIN — FUROSEMIDE 40 MG: 10 INJECTION INTRAMUSCULAR; INTRAVENOUS at 05:42

## 2021-01-01 RX ADMIN — Medication 2000 UNITS: at 05:30

## 2021-01-01 RX ADMIN — SULFAMETHOXAZOLE AND TRIMETHOPRIM 2 TABLET: 800; 160 TABLET ORAL at 23:39

## 2021-01-01 RX ADMIN — Medication 2000 UNITS: at 05:11

## 2021-01-01 RX ADMIN — BUDESONIDE AND FORMOTEROL FUMARATE DIHYDRATE 2 PUFF: 160; 4.5 AEROSOL RESPIRATORY (INHALATION) at 20:26

## 2021-01-01 RX ADMIN — ROCURONIUM BROMIDE 50 MG: 10 INJECTION, SOLUTION INTRAVENOUS at 23:59

## 2021-01-01 RX ADMIN — POLYETHYLENE GLYCOL 3350 1 PACKET: 17 POWDER, FOR SOLUTION ORAL at 05:14

## 2021-01-01 RX ADMIN — Medication 2000 UNITS: at 04:59

## 2021-01-01 RX ADMIN — HYDROXYZINE HYDROCHLORIDE 25 MG: 25 TABLET, FILM COATED ORAL at 10:25

## 2021-01-01 RX ADMIN — FAMOTIDINE 20 MG: 20 TABLET ORAL at 05:33

## 2021-01-01 RX ADMIN — Medication 1.5 MG: at 11:10

## 2021-01-01 RX ADMIN — Medication 2000 UNITS: at 05:08

## 2021-01-01 RX ADMIN — FENTANYL CITRATE 100 MCG: 50 INJECTION INTRAMUSCULAR; INTRAVENOUS at 04:58

## 2021-01-01 RX ADMIN — ACETAMINOPHEN 650 MG: 325 TABLET, FILM COATED ORAL at 17:53

## 2021-01-01 RX ADMIN — GUAIFENESIN 600 MG: 600 TABLET, EXTENDED RELEASE ORAL at 17:53

## 2021-01-01 RX ADMIN — PROPOFOL 35 MCG/KG/MIN: 10 INJECTION, EMULSION INTRAVENOUS at 10:18

## 2021-01-01 RX ADMIN — DOCUSATE SODIUM 50 MG AND SENNOSIDES 8.6 MG 2 TABLET: 8.6; 5 TABLET, FILM COATED ORAL at 05:33

## 2021-01-01 RX ADMIN — MINERAL OIL, PETROLATUM 1 APPLICATION: 425; 573 OINTMENT OPHTHALMIC at 23:23

## 2021-01-01 RX ADMIN — Medication 2000 UNITS: at 05:42

## 2021-01-01 RX ADMIN — DEXAMETHASONE 6 MG: 6 TABLET ORAL at 05:11

## 2021-01-01 RX ADMIN — ACETAMINOPHEN 650 MG: 325 TABLET, FILM COATED ORAL at 03:48

## 2021-01-01 RX ADMIN — OXYCODONE HYDROCHLORIDE AND ACETAMINOPHEN 500 MG: 500 TABLET ORAL at 17:19

## 2021-01-01 RX ADMIN — INSULIN HUMAN 3 UNITS: 100 INJECTION, SOLUTION PARENTERAL at 12:55

## 2021-01-01 RX ADMIN — ENOXAPARIN SODIUM 40 MG: 40 INJECTION SUBCUTANEOUS at 05:20

## 2021-01-01 RX ADMIN — DOCUSATE SODIUM 50 MG AND SENNOSIDES 8.6 MG 2 TABLET: 8.6; 5 TABLET, FILM COATED ORAL at 06:33

## 2021-01-01 RX ADMIN — NOREPINEPHRINE BITARTRATE 10 MCG/MIN: 1 INJECTION, SOLUTION, CONCENTRATE INTRAVENOUS at 03:36

## 2021-01-01 RX ADMIN — FAMOTIDINE 20 MG: 20 TABLET ORAL at 17:49

## 2021-01-01 RX ADMIN — MINERAL OIL, PETROLATUM 1 APPLICATION: 425; 573 OINTMENT OPHTHALMIC at 22:28

## 2021-01-01 RX ADMIN — PROPOFOL 50 MCG/KG/MIN: 10 INJECTION, EMULSION INTRAVENOUS at 15:56

## 2021-01-01 RX ADMIN — HYDROMORPHONE HYDROCHLORIDE 4 MG/HR: 10 INJECTION INTRAMUSCULAR; INTRAVENOUS; SUBCUTANEOUS at 04:07

## 2021-01-01 RX ADMIN — METHYLNALTREXONE BROMIDE 12 MG: 12 INJECTION, SOLUTION SUBCUTANEOUS at 10:23

## 2021-01-01 RX ADMIN — FENTANYL CITRATE 200 MCG/HR: 50 INJECTION, SOLUTION INTRAMUSCULAR; INTRAVENOUS at 04:57

## 2021-01-01 RX ADMIN — INSULIN HUMAN 3 UNITS: 100 INJECTION, SOLUTION PARENTERAL at 12:39

## 2021-01-01 RX ADMIN — GUAIFENESIN 600 MG: 600 TABLET, EXTENDED RELEASE ORAL at 16:18

## 2021-01-01 RX ADMIN — ENOXAPARIN SODIUM 40 MG: 40 INJECTION SUBCUTANEOUS at 05:00

## 2021-01-01 RX ADMIN — DOCUSATE SODIUM 50 MG AND SENNOSIDES 8.6 MG 2 TABLET: 8.6; 5 TABLET, FILM COATED ORAL at 17:08

## 2021-01-01 RX ADMIN — INSULIN HUMAN 3 UNITS: 100 INJECTION, SOLUTION PARENTERAL at 23:58

## 2021-01-01 RX ADMIN — MIDAZOLAM 15 MG/HR: 5 INJECTION INTRAMUSCULAR; INTRAVENOUS at 11:54

## 2021-01-01 RX ADMIN — Medication 2000 UNITS: at 04:52

## 2021-01-01 RX ADMIN — OXYCODONE 10 MG: 5 TABLET ORAL at 17:54

## 2021-01-01 RX ADMIN — FAMOTIDINE 20 MG: 20 TABLET ORAL at 05:06

## 2021-01-01 RX ADMIN — DOCUSATE SODIUM 50 MG AND SENNOSIDES 8.6 MG 2 TABLET: 8.6; 5 TABLET, FILM COATED ORAL at 05:14

## 2021-01-01 RX ADMIN — FENTANYL CITRATE 100 MCG: 50 INJECTION, SOLUTION INTRAMUSCULAR; INTRAVENOUS at 16:48

## 2021-01-01 RX ADMIN — ENOXAPARIN SODIUM 40 MG: 40 INJECTION SUBCUTANEOUS at 16:14

## 2021-01-01 RX ADMIN — ACETAMINOPHEN 650 MG: 325 TABLET, FILM COATED ORAL at 05:34

## 2021-01-01 RX ADMIN — ACETAMINOPHEN 1000 MG: 500 TABLET ORAL at 05:20

## 2021-01-01 RX ADMIN — NALOXONE HYDROCHLORIDE 4 MG: 1 INJECTION PARENTERAL at 14:05

## 2021-01-01 RX ADMIN — FENTANYL CITRATE 100 MCG: 50 INJECTION INTRAMUSCULAR; INTRAVENOUS at 02:23

## 2021-01-01 RX ADMIN — OXYCODONE HYDROCHLORIDE AND ACETAMINOPHEN 500 MG: 500 TABLET ORAL at 17:06

## 2021-01-01 RX ADMIN — FENTANYL CITRATE 100 MCG: 50 INJECTION INTRAMUSCULAR; INTRAVENOUS at 19:37

## 2021-01-01 RX ADMIN — POTASSIUM CHLORIDE 40 MEQ: 29.8 INJECTION, SOLUTION INTRAVENOUS at 05:56

## 2021-01-01 RX ADMIN — FAMOTIDINE 20 MG: 20 TABLET ORAL at 04:24

## 2021-01-01 RX ADMIN — GUAIFENESIN 600 MG: 600 TABLET, EXTENDED RELEASE ORAL at 05:42

## 2021-01-01 RX ADMIN — MINERAL OIL, PETROLATUM 1 APPLICATION: 425; 573 OINTMENT OPHTHALMIC at 05:12

## 2021-01-01 RX ADMIN — DOCUSATE SODIUM 50 MG AND SENNOSIDES 8.6 MG 2 TABLET: 8.6; 5 TABLET, FILM COATED ORAL at 17:54

## 2021-01-01 RX ADMIN — INSULIN HUMAN 3 UNITS: 100 INJECTION, SOLUTION PARENTERAL at 06:00

## 2021-01-01 RX ADMIN — MAGNESIUM HYDROXIDE 30 ML: 400 SUSPENSION ORAL at 06:05

## 2021-01-01 RX ADMIN — MIDAZOLAM HYDROCHLORIDE 5 MG: 1 INJECTION, SOLUTION INTRAMUSCULAR; INTRAVENOUS at 00:38

## 2021-01-01 RX ADMIN — POLYETHYLENE GLYCOL 3350 1 PACKET: 17 POWDER, FOR SOLUTION ORAL at 07:38

## 2021-01-01 RX ADMIN — BARICITINIB 4 MG: 2 TABLET, FILM COATED ORAL at 17:11

## 2021-01-01 RX ADMIN — Medication 250 MCG: at 19:22

## 2021-01-01 RX ADMIN — PROPOFOL 40 MCG/KG/MIN: 10 INJECTION, EMULSION INTRAVENOUS at 12:25

## 2021-01-01 RX ADMIN — MINERAL OIL, PETROLATUM 1 APPLICATION: 425; 573 OINTMENT OPHTHALMIC at 23:05

## 2021-01-01 RX ADMIN — MIDAZOLAM HYDROCHLORIDE 2 MG: 1 INJECTION, SOLUTION INTRAMUSCULAR; INTRAVENOUS at 03:29

## 2021-01-01 RX ADMIN — GUAIFENESIN 600 MG: 600 TABLET, EXTENDED RELEASE ORAL at 18:26

## 2021-01-01 RX ADMIN — CEFEPIME 2 G: 2 INJECTION, POWDER, FOR SOLUTION INTRAVENOUS at 14:05

## 2021-01-01 RX ADMIN — MINERAL OIL, PETROLATUM 1 APPLICATION: 425; 573 OINTMENT OPHTHALMIC at 14:33

## 2021-01-01 RX ADMIN — HYDROXYZINE HYDROCHLORIDE 25 MG: 25 TABLET, FILM COATED ORAL at 20:40

## 2021-01-01 RX ADMIN — DIBASIC SODIUM PHOSPHATE, MONOBASIC POTASSIUM PHOSPHATE AND MONOBASIC SODIUM PHOSPHATE 500 MG: 852; 155; 130 TABLET ORAL at 12:07

## 2021-01-01 RX ADMIN — ACETAMINOPHEN 1000 MG: 500 TABLET ORAL at 17:19

## 2021-01-01 RX ADMIN — ETOMIDATE 20 MG: 2 INJECTION INTRAVENOUS at 19:15

## 2021-01-01 RX ADMIN — FENTANYL CITRATE 100 MCG: 50 INJECTION, SOLUTION INTRAMUSCULAR; INTRAVENOUS at 07:22

## 2021-01-01 RX ADMIN — BARICITINIB 4 MG: 2 TABLET, FILM COATED ORAL at 16:42

## 2021-01-01 RX ADMIN — MIDAZOLAM HYDROCHLORIDE 5 MG: 1 INJECTION, SOLUTION INTRAMUSCULAR; INTRAVENOUS at 23:18

## 2021-01-01 RX ADMIN — FENTANYL CITRATE 100 MCG: 50 INJECTION, SOLUTION INTRAMUSCULAR; INTRAVENOUS at 13:25

## 2021-01-01 RX ADMIN — Medication 2000 UNITS: at 05:15

## 2021-01-01 RX ADMIN — DOCUSATE SODIUM 50 MG AND SENNOSIDES 8.6 MG 2 TABLET: 8.6; 5 TABLET, FILM COATED ORAL at 04:32

## 2021-01-01 RX ADMIN — DOCUSATE SODIUM 50 MG AND SENNOSIDES 8.6 MG 2 TABLET: 8.6; 5 TABLET, FILM COATED ORAL at 17:34

## 2021-01-01 RX ADMIN — FAMOTIDINE 20 MG: 10 INJECTION INTRAVENOUS at 05:42

## 2021-01-01 RX ADMIN — MIDAZOLAM HYDROCHLORIDE 5 MG: 1 INJECTION, SOLUTION INTRAMUSCULAR; INTRAVENOUS at 07:37

## 2021-01-01 RX ADMIN — ROCURONIUM BROMIDE 50 MG: 10 INJECTION, SOLUTION INTRAVENOUS at 21:52

## 2021-01-01 RX ADMIN — ROCURONIUM BROMIDE 48.7 MG: 10 INJECTION, SOLUTION INTRAVENOUS at 15:36

## 2021-01-01 RX ADMIN — Medication 5 MG: at 21:54

## 2021-01-01 RX ADMIN — BUDESONIDE AND FORMOTEROL FUMARATE DIHYDRATE 2 PUFF: 160; 4.5 AEROSOL RESPIRATORY (INHALATION) at 22:29

## 2021-01-01 RX ADMIN — ROCURONIUM BROMIDE 4 MCG/KG/MIN: 10 INJECTION, SOLUTION INTRAVENOUS at 23:53

## 2021-01-01 RX ADMIN — HYDROCORTISONE SODIUM SUCCINATE 50 MG: 100 INJECTION, POWDER, FOR SOLUTION INTRAMUSCULAR; INTRAVENOUS at 22:57

## 2021-01-01 RX ADMIN — ENOXAPARIN SODIUM 40 MG: 40 INJECTION SUBCUTANEOUS at 05:04

## 2021-01-01 RX ADMIN — ACETAMINOPHEN 650 MG: 325 TABLET, FILM COATED ORAL at 18:41

## 2021-01-01 RX ADMIN — SODIUM CHLORIDE 3 G: 900 INJECTION INTRAVENOUS at 14:45

## 2021-01-01 RX ADMIN — ACETAMINOPHEN 650 MG: 325 TABLET, FILM COATED ORAL at 17:33

## 2021-01-01 RX ADMIN — DEXMEDETOMIDINE 1.4 MCG/KG/HR: 200 INJECTION, SOLUTION INTRAVENOUS at 20:43

## 2021-01-01 RX ADMIN — VORICONAZOLE 200 MG: 40 POWDER, FOR SUSPENSION ORAL at 05:29

## 2021-01-01 RX ADMIN — INSULIN HUMAN 4 UNITS: 100 INJECTION, SOLUTION PARENTERAL at 13:16

## 2021-01-01 RX ADMIN — AMIODARONE HYDROCHLORIDE 0.5 MG/MIN: 1.8 INJECTION, SOLUTION INTRAVENOUS at 06:01

## 2021-01-01 RX ADMIN — ROCURONIUM BROMIDE 50 MG: 10 INJECTION, SOLUTION INTRAVENOUS at 04:29

## 2021-01-01 RX ADMIN — DOCUSATE SODIUM 50 MG AND SENNOSIDES 8.6 MG 2 TABLET: 8.6; 5 TABLET, FILM COATED ORAL at 17:06

## 2021-01-01 RX ADMIN — FENTANYL CITRATE 200 MCG/HR: 50 INJECTION, SOLUTION INTRAMUSCULAR; INTRAVENOUS at 09:34

## 2021-01-01 RX ADMIN — INSULIN HUMAN 3 UNITS: 100 INJECTION, SOLUTION PARENTERAL at 18:07

## 2021-01-01 RX ADMIN — Medication 2000 UNITS: at 05:20

## 2021-01-01 RX ADMIN — DEXTROSE MONOHYDRATE 50 ML: 25 INJECTION, SOLUTION INTRAVENOUS at 04:43

## 2021-01-01 RX ADMIN — GUAIFENESIN 600 MG: 600 TABLET, EXTENDED RELEASE ORAL at 17:11

## 2021-01-01 RX ADMIN — Medication 2000 UNITS: at 05:18

## 2021-01-01 RX ADMIN — FENTANYL CITRATE 100 MCG: 50 INJECTION INTRAMUSCULAR; INTRAVENOUS at 08:19

## 2021-01-01 RX ADMIN — MINERAL OIL, PETROLATUM 1 APPLICATION: 425; 573 OINTMENT OPHTHALMIC at 07:38

## 2021-01-01 RX ADMIN — DEXMEDETOMIDINE 0.5 MCG/KG/HR: 200 INJECTION, SOLUTION INTRAVENOUS at 23:13

## 2021-01-01 RX ADMIN — GUAIFENESIN 600 MG: 600 TABLET, EXTENDED RELEASE ORAL at 04:59

## 2021-01-01 RX ADMIN — SODIUM CHLORIDE, POTASSIUM CHLORIDE, SODIUM LACTATE AND CALCIUM CHLORIDE 500 ML: 600; 310; 30; 20 INJECTION, SOLUTION INTRAVENOUS at 08:47

## 2021-01-01 RX ADMIN — CEFEPIME 2 G: 2 INJECTION, POWDER, FOR SOLUTION INTRAVENOUS at 15:28

## 2021-01-01 RX ADMIN — INSULIN HUMAN 3 UNITS: 100 INJECTION, SOLUTION PARENTERAL at 05:04

## 2021-01-01 RX ADMIN — ROCURONIUM BROMIDE 50 MG: 10 INJECTION, SOLUTION INTRAVENOUS at 03:16

## 2021-01-01 RX ADMIN — HYDROCORTISONE SODIUM SUCCINATE 50 MG: 100 INJECTION, POWDER, FOR SOLUTION INTRAMUSCULAR; INTRAVENOUS at 18:31

## 2021-01-01 RX ADMIN — MINERAL OIL, PETROLATUM 1 APPLICATION: 425; 573 OINTMENT OPHTHALMIC at 04:36

## 2021-01-01 RX ADMIN — FENTANYL CITRATE 100 MCG: 50 INJECTION, SOLUTION INTRAMUSCULAR; INTRAVENOUS at 14:17

## 2021-01-01 RX ADMIN — NALOXONE HYDROCHLORIDE 4 MG: 1 INJECTION PARENTERAL at 06:00

## 2021-01-01 RX ADMIN — GUAIFENESIN 600 MG: 600 TABLET, EXTENDED RELEASE ORAL at 05:19

## 2021-01-01 RX ADMIN — MIDAZOLAM 8 MG/HR: 5 INJECTION INTRAMUSCULAR; INTRAVENOUS at 05:33

## 2021-01-01 RX ADMIN — POLYETHYLENE GLYCOL 3350 1 PACKET: 17 POWDER, FOR SOLUTION ORAL at 04:44

## 2021-01-01 RX ADMIN — BUDESONIDE AND FORMOTEROL FUMARATE DIHYDRATE 2 PUFF: 160; 4.5 AEROSOL RESPIRATORY (INHALATION) at 07:19

## 2021-01-01 RX ADMIN — FAMOTIDINE 20 MG: 20 TABLET ORAL at 05:56

## 2021-01-01 RX ADMIN — Medication 250 MCG/HR: at 11:05

## 2021-01-01 RX ADMIN — FAMOTIDINE 20 MG: 20 TABLET ORAL at 17:53

## 2021-01-01 RX ADMIN — ACETAMINOPHEN 1000 MG: 500 TABLET ORAL at 11:10

## 2021-01-01 RX ADMIN — FAMOTIDINE 20 MG: 20 TABLET ORAL at 17:00

## 2021-01-01 RX ADMIN — SODIUM CHLORIDE, POTASSIUM CHLORIDE, SODIUM LACTATE AND CALCIUM CHLORIDE 500 ML: 600; 310; 30; 20 INJECTION, SOLUTION INTRAVENOUS at 10:30

## 2021-01-01 RX ADMIN — DIBASIC SODIUM PHOSPHATE, MONOBASIC POTASSIUM PHOSPHATE AND MONOBASIC SODIUM PHOSPHATE 500 MG: 852; 155; 130 TABLET ORAL at 10:22

## 2021-01-01 RX ADMIN — MIDAZOLAM HYDROCHLORIDE 5 MG: 1 INJECTION, SOLUTION INTRAMUSCULAR; INTRAVENOUS at 22:39

## 2021-01-01 RX ADMIN — FAMOTIDINE 20 MG: 10 INJECTION INTRAVENOUS at 05:04

## 2021-01-01 RX ADMIN — MINERAL OIL, PETROLATUM 1 APPLICATION: 425; 573 OINTMENT OPHTHALMIC at 13:50

## 2021-01-01 RX ADMIN — DOCUSATE SODIUM 50 MG AND SENNOSIDES 8.6 MG 2 TABLET: 8.6; 5 TABLET, FILM COATED ORAL at 17:03

## 2021-01-01 RX ADMIN — ENOXAPARIN SODIUM 40 MG: 40 INJECTION SUBCUTANEOUS at 05:12

## 2021-01-01 RX ADMIN — MIDAZOLAM HYDROCHLORIDE 5 MG: 1 INJECTION, SOLUTION INTRAMUSCULAR; INTRAVENOUS at 03:13

## 2021-01-01 RX ADMIN — MIDAZOLAM 10 MG/HR: 5 INJECTION INTRAMUSCULAR; INTRAVENOUS at 04:00

## 2021-01-01 RX ADMIN — GUAIFENESIN 600 MG: 600 TABLET, EXTENDED RELEASE ORAL at 17:40

## 2021-01-01 RX ADMIN — DOCUSATE SODIUM 50 MG AND SENNOSIDES 8.6 MG 2 TABLET: 8.6; 5 TABLET, FILM COATED ORAL at 16:19

## 2021-01-01 RX ADMIN — HYDROCORTISONE SODIUM SUCCINATE 50 MG: 100 INJECTION, POWDER, FOR SOLUTION INTRAMUSCULAR; INTRAVENOUS at 23:00

## 2021-01-01 RX ADMIN — ROCURONIUM BROMIDE 50 MG: 10 INJECTION, SOLUTION INTRAVENOUS at 19:55

## 2021-01-01 RX ADMIN — CEFEPIME 2 G: 2 INJECTION, POWDER, FOR SOLUTION INTRAVENOUS at 12:33

## 2021-01-01 RX ADMIN — OXYCODONE HYDROCHLORIDE AND ACETAMINOPHEN 500 MG: 500 TABLET ORAL at 06:04

## 2021-01-01 RX ADMIN — SODIUM CHLORIDE 3 G: 900 INJECTION INTRAVENOUS at 00:27

## 2021-01-01 RX ADMIN — MINERAL OIL, PETROLATUM 1 APPLICATION: 425; 573 OINTMENT OPHTHALMIC at 05:57

## 2021-01-01 RX ADMIN — CEFEPIME 2 G: 2 INJECTION, POWDER, FOR SOLUTION INTRAVENOUS at 23:05

## 2021-01-01 RX ADMIN — HYDROCORTISONE SODIUM SUCCINATE 50 MG: 100 INJECTION, POWDER, FOR SOLUTION INTRAMUSCULAR; INTRAVENOUS at 13:34

## 2021-01-01 RX ADMIN — SULFAMETHOXAZOLE AND TRIMETHOPRIM 2 TABLET: 800; 160 TABLET ORAL at 05:07

## 2021-01-01 RX ADMIN — INSULIN HUMAN 3 UNITS: 100 INJECTION, SOLUTION PARENTERAL at 11:49

## 2021-01-01 RX ADMIN — SODIUM BICARBONATE 50 MEQ: 84 INJECTION, SOLUTION INTRAVENOUS at 04:43

## 2021-01-01 RX ADMIN — HYDROCORTISONE SODIUM SUCCINATE 50 MG: 100 INJECTION, POWDER, FOR SOLUTION INTRAMUSCULAR; INTRAVENOUS at 05:06

## 2021-01-01 RX ADMIN — DEXAMETHASONE 6 MG: 4 TABLET ORAL at 05:16

## 2021-01-01 RX ADMIN — Medication 2000 UNITS: at 06:01

## 2021-01-01 RX ADMIN — Medication 250 MCG/HR: at 21:41

## 2021-01-01 RX ADMIN — LORAZEPAM 0.5 MG: 1 TABLET ORAL at 22:55

## 2021-01-01 RX ADMIN — Medication 250 MCG: at 06:22

## 2021-01-01 RX ADMIN — FENTANYL CITRATE 100 MCG: 50 INJECTION, SOLUTION INTRAMUSCULAR; INTRAVENOUS at 16:05

## 2021-01-01 RX ADMIN — ACETAMINOPHEN 1000 MG: 500 TABLET ORAL at 05:10

## 2021-01-01 RX ADMIN — FENTANYL CITRATE 100 MCG: 50 INJECTION, SOLUTION INTRAMUSCULAR; INTRAVENOUS at 10:47

## 2021-01-01 RX ADMIN — MIDAZOLAM 15 MG/HR: 5 INJECTION INTRAMUSCULAR; INTRAVENOUS at 19:58

## 2021-01-01 RX ADMIN — SODIUM CHLORIDE, POTASSIUM CHLORIDE, SODIUM LACTATE AND CALCIUM CHLORIDE 250 ML: 600; 310; 30; 20 INJECTION, SOLUTION INTRAVENOUS at 05:24

## 2021-01-01 RX ADMIN — Medication 1.5 MG/HR: at 21:09

## 2021-01-01 RX ADMIN — SULFAMETHOXAZOLE AND TRIMETHOPRIM 2 TABLET: 800; 160 TABLET ORAL at 11:19

## 2021-01-01 RX ADMIN — MIDAZOLAM HYDROCHLORIDE 5 MG: 1 INJECTION, SOLUTION INTRAMUSCULAR; INTRAVENOUS at 03:47

## 2021-01-01 RX ADMIN — HYDROMORPHONE HYDROCHLORIDE 0.5 MG: 1 INJECTION, SOLUTION INTRAMUSCULAR; INTRAVENOUS; SUBCUTANEOUS at 22:28

## 2021-01-01 RX ADMIN — DOCUSATE SODIUM 50 MG AND SENNOSIDES 8.6 MG 2 TABLET: 8.6; 5 TABLET, FILM COATED ORAL at 17:29

## 2021-01-01 RX ADMIN — Medication 250 MCG/HR: at 12:14

## 2021-01-01 RX ADMIN — MINERAL OIL, PETROLATUM 1 APPLICATION: 425; 573 OINTMENT OPHTHALMIC at 13:22

## 2021-01-01 RX ADMIN — MINERAL OIL, PETROLATUM 1 APPLICATION: 425; 573 OINTMENT OPHTHALMIC at 05:43

## 2021-01-01 RX ADMIN — ACETAMINOPHEN 650 MG: 325 TABLET, FILM COATED ORAL at 18:02

## 2021-01-01 RX ADMIN — BENZOCAINE AND MENTHOL 1 LOZENGE: 15; 3.6 LOZENGE ORAL at 16:19

## 2021-01-01 RX ADMIN — HYDROCORTISONE SODIUM SUCCINATE 50 MG: 100 INJECTION, POWDER, FOR SOLUTION INTRAMUSCULAR; INTRAVENOUS at 14:04

## 2021-01-01 RX ADMIN — INSULIN HUMAN 3 UNITS: 100 INJECTION, SOLUTION PARENTERAL at 12:21

## 2021-01-01 RX ADMIN — Medication 1.5 MG/HR: at 00:49

## 2021-01-01 RX ADMIN — DOCUSATE SODIUM 50 MG AND SENNOSIDES 8.6 MG 2 TABLET: 8.6; 5 TABLET, FILM COATED ORAL at 05:04

## 2021-01-01 RX ADMIN — PROPOFOL 50 MCG/KG/MIN: 10 INJECTION, EMULSION INTRAVENOUS at 07:14

## 2021-01-01 RX ADMIN — CEFEPIME 2 G: 2 INJECTION, POWDER, FOR SOLUTION INTRAVENOUS at 05:13

## 2021-01-01 RX ADMIN — MINERAL OIL, PETROLATUM 1 APPLICATION: 425; 573 OINTMENT OPHTHALMIC at 22:18

## 2021-01-01 RX ADMIN — ACETAMINOPHEN 650 MG: 325 TABLET, FILM COATED ORAL at 18:30

## 2021-01-01 RX ADMIN — HYDROCORTISONE SODIUM SUCCINATE 100 MG: 100 INJECTION, POWDER, FOR SOLUTION INTRAMUSCULAR; INTRAVENOUS at 14:57

## 2021-01-01 RX ADMIN — ENOXAPARIN SODIUM 40 MG: 40 INJECTION SUBCUTANEOUS at 05:15

## 2021-01-01 RX ADMIN — ENOXAPARIN SODIUM 40 MG: 40 INJECTION SUBCUTANEOUS at 04:41

## 2021-01-01 RX ADMIN — ROCURONIUM BROMIDE 6 MCG/KG/MIN: 10 INJECTION, SOLUTION INTRAVENOUS at 08:33

## 2021-01-01 RX ADMIN — HYDROCORTISONE SODIUM SUCCINATE 50 MG: 100 INJECTION, POWDER, FOR SOLUTION INTRAMUSCULAR; INTRAVENOUS at 05:13

## 2021-01-01 RX ADMIN — BISACODYL 10 MG: 10 SUPPOSITORY RECTAL at 11:43

## 2021-01-01 RX ADMIN — ENOXAPARIN SODIUM 40 MG: 40 INJECTION SUBCUTANEOUS at 04:33

## 2021-01-01 RX ADMIN — CEFTRIAXONE SODIUM 1 G: 1 INJECTION, POWDER, FOR SOLUTION INTRAMUSCULAR; INTRAVENOUS at 13:54

## 2021-01-01 RX ADMIN — DIBASIC SODIUM PHOSPHATE, MONOBASIC POTASSIUM PHOSPHATE AND MONOBASIC SODIUM PHOSPHATE 500 MG: 852; 155; 130 TABLET ORAL at 08:41

## 2021-01-01 RX ADMIN — MIDAZOLAM HYDROCHLORIDE 5 MG: 1 INJECTION, SOLUTION INTRAMUSCULAR; INTRAVENOUS at 10:11

## 2021-01-01 RX ADMIN — FUROSEMIDE 40 MG: 10 INJECTION INTRAMUSCULAR; INTRAVENOUS at 23:01

## 2021-01-01 RX ADMIN — HYDROCORTISONE SODIUM SUCCINATE 50 MG: 100 INJECTION, POWDER, FOR SOLUTION INTRAMUSCULAR; INTRAVENOUS at 14:05

## 2021-01-01 RX ADMIN — BISACODYL 10 MG: 10 SUPPOSITORY RECTAL at 13:54

## 2021-01-01 RX ADMIN — Medication 5 MG: at 20:48

## 2021-01-01 RX ADMIN — FAMOTIDINE 20 MG: 20 TABLET ORAL at 05:11

## 2021-01-01 RX ADMIN — Medication 2000 UNITS: at 06:04

## 2021-01-01 RX ADMIN — MIDAZOLAM 15 MG/HR: 5 INJECTION INTRAMUSCULAR; INTRAVENOUS at 17:03

## 2021-01-01 RX ADMIN — MINERAL OIL, PETROLATUM 1 APPLICATION: 425; 573 OINTMENT OPHTHALMIC at 16:14

## 2021-01-01 RX ADMIN — NALOXONE HYDROCHLORIDE 4 MG: 1 INJECTION PARENTERAL at 05:36

## 2021-01-01 RX ADMIN — FAMOTIDINE 20 MG: 20 TABLET ORAL at 17:17

## 2021-01-01 RX ADMIN — ROCURONIUM BROMIDE 50 MG: 10 INJECTION, SOLUTION INTRAVENOUS at 01:24

## 2021-01-01 RX ADMIN — NALOXONE HYDROCHLORIDE 4 MG: 1 INJECTION PARENTERAL at 21:05

## 2021-01-01 RX ADMIN — MIDAZOLAM HYDROCHLORIDE 5 MG: 1 INJECTION, SOLUTION INTRAMUSCULAR; INTRAVENOUS at 19:06

## 2021-01-01 RX ADMIN — Medication 10 MG: at 18:11

## 2021-01-01 RX ADMIN — FAMOTIDINE 20 MG: 20 TABLET ORAL at 17:23

## 2021-01-01 RX ADMIN — AZITHROMYCIN MONOHYDRATE 500 MG: 250 TABLET ORAL at 06:33

## 2021-01-01 RX ADMIN — ACETAMINOPHEN 650 MG: 325 TABLET, FILM COATED ORAL at 16:18

## 2021-01-01 RX ADMIN — AZITHROMYCIN MONOHYDRATE 500 MG: 250 TABLET ORAL at 14:45

## 2021-01-01 RX ADMIN — HYDROCORTISONE SODIUM SUCCINATE 50 MG: 100 INJECTION, POWDER, FOR SOLUTION INTRAMUSCULAR; INTRAVENOUS at 04:32

## 2021-01-01 RX ADMIN — HYDROCORTISONE SODIUM SUCCINATE 100 MG: 100 INJECTION, POWDER, FOR SOLUTION INTRAMUSCULAR; INTRAVENOUS at 11:17

## 2021-01-01 RX ADMIN — FENTANYL CITRATE 200 MCG/HR: 50 INJECTION, SOLUTION INTRAMUSCULAR; INTRAVENOUS at 10:35

## 2021-01-01 RX ADMIN — GUAIFENESIN 600 MG: 600 TABLET, EXTENDED RELEASE ORAL at 04:57

## 2021-01-01 RX ADMIN — SULFAMETHOXAZOLE AND TRIMETHOPRIM 2 TABLET: 800; 160 TABLET ORAL at 15:30

## 2021-01-01 RX ADMIN — DOCUSATE SODIUM 50 MG AND SENNOSIDES 8.6 MG 2 TABLET: 8.6; 5 TABLET, FILM COATED ORAL at 04:41

## 2021-01-01 RX ADMIN — MAGNESIUM SULFATE HEPTAHYDRATE 2 G: 2 INJECTION, SOLUTION INTRAVENOUS at 10:40

## 2021-01-01 RX ADMIN — DOCUSATE SODIUM 50 MG AND SENNOSIDES 8.6 MG 2 TABLET: 8.6; 5 TABLET, FILM COATED ORAL at 04:56

## 2021-01-01 RX ADMIN — ACETAMINOPHEN 650 MG: 325 TABLET, FILM COATED ORAL at 03:43

## 2021-01-01 RX ADMIN — MIDAZOLAM 15 MG/HR: 5 INJECTION INTRAMUSCULAR; INTRAVENOUS at 05:07

## 2021-01-01 RX ADMIN — DOCUSATE SODIUM 50 MG AND SENNOSIDES 8.6 MG 2 TABLET: 8.6; 5 TABLET, FILM COATED ORAL at 04:25

## 2021-01-01 RX ADMIN — FENTANYL CITRATE 100 MCG: 50 INJECTION, SOLUTION INTRAMUSCULAR; INTRAVENOUS at 13:37

## 2021-01-01 RX ADMIN — SULFAMETHOXAZOLE AND TRIMETHOPRIM 2 TABLET: 800; 160 TABLET ORAL at 05:03

## 2021-01-01 RX ADMIN — HYDROXYZINE HYDROCHLORIDE 25 MG: 25 TABLET, FILM COATED ORAL at 02:47

## 2021-01-01 RX ADMIN — CEFEPIME 2 G: 2 INJECTION, POWDER, FOR SOLUTION INTRAVENOUS at 05:07

## 2021-01-01 RX ADMIN — FUROSEMIDE 40 MG: 10 INJECTION INTRAMUSCULAR; INTRAVENOUS at 06:00

## 2021-01-01 RX ADMIN — CEFTRIAXONE SODIUM 1 G: 1 INJECTION, POWDER, FOR SOLUTION INTRAMUSCULAR; INTRAVENOUS at 12:12

## 2021-01-01 RX ADMIN — AMIODARONE HYDROCHLORIDE 0.5 MG/MIN: 1.8 INJECTION, SOLUTION INTRAVENOUS at 17:13

## 2021-01-01 RX ADMIN — FUROSEMIDE 40 MG: 10 INJECTION INTRAMUSCULAR; INTRAVENOUS at 16:24

## 2021-01-01 RX ADMIN — MINERAL OIL, PETROLATUM 1 APPLICATION: 425; 573 OINTMENT OPHTHALMIC at 13:45

## 2021-01-01 RX ADMIN — MINERAL OIL, PETROLATUM 1 APPLICATION: 425; 573 OINTMENT OPHTHALMIC at 04:43

## 2021-01-01 RX ADMIN — ROCURONIUM BROMIDE 48.7 MG: 10 INJECTION, SOLUTION INTRAVENOUS at 23:01

## 2021-01-01 RX ADMIN — HYDROCORTISONE SODIUM SUCCINATE 50 MG: 100 INJECTION, POWDER, FOR SOLUTION INTRAMUSCULAR; INTRAVENOUS at 05:25

## 2021-01-01 RX ADMIN — DIBASIC SODIUM PHOSPHATE, MONOBASIC POTASSIUM PHOSPHATE AND MONOBASIC SODIUM PHOSPHATE 500 MG: 852; 155; 130 TABLET ORAL at 17:05

## 2021-01-01 RX ADMIN — DOCUSATE SODIUM 50 MG AND SENNOSIDES 8.6 MG 2 TABLET: 8.6; 5 TABLET, FILM COATED ORAL at 05:30

## 2021-01-01 RX ADMIN — BARICITINIB 4 MG: 2 TABLET, FILM COATED ORAL at 17:06

## 2021-01-01 RX ADMIN — MIDAZOLAM 10 MG/HR: 5 INJECTION INTRAMUSCULAR; INTRAVENOUS at 03:09

## 2021-01-01 RX ADMIN — MIDAZOLAM 3 MG/HR: 5 INJECTION INTRAMUSCULAR; INTRAVENOUS at 11:51

## 2021-01-01 RX ADMIN — FENTANYL CITRATE 100 MCG: 50 INJECTION INTRAMUSCULAR; INTRAVENOUS at 21:41

## 2021-01-01 RX ADMIN — INSULIN HUMAN 4 UNITS: 100 INJECTION, SOLUTION PARENTERAL at 09:04

## 2021-01-01 RX ADMIN — BUDESONIDE AND FORMOTEROL FUMARATE DIHYDRATE 2 PUFF: 160; 4.5 AEROSOL RESPIRATORY (INHALATION) at 21:05

## 2021-01-01 RX ADMIN — LORAZEPAM 0.5 MG: 1 TABLET ORAL at 03:22

## 2021-01-01 RX ADMIN — FAMOTIDINE 20 MG: 20 TABLET ORAL at 04:32

## 2021-01-01 RX ADMIN — POTASSIUM CHLORIDE 40 MEQ: 1500 TABLET, EXTENDED RELEASE ORAL at 05:52

## 2021-01-01 RX ADMIN — MIDAZOLAM HYDROCHLORIDE 5 MG: 1 INJECTION, SOLUTION INTRAMUSCULAR; INTRAVENOUS at 20:06

## 2021-01-01 RX ADMIN — Medication 10 MG: at 04:38

## 2021-01-01 RX ADMIN — CEFEPIME 2 G: 2 INJECTION, POWDER, FOR SOLUTION INTRAVENOUS at 05:05

## 2021-01-01 RX ADMIN — ACETAMINOPHEN 650 MG: 325 TABLET, FILM COATED ORAL at 17:39

## 2021-01-01 RX ADMIN — GUAIFENESIN 600 MG: 600 TABLET, EXTENDED RELEASE ORAL at 05:30

## 2021-01-01 RX ADMIN — ENOXAPARIN SODIUM 40 MG: 40 INJECTION SUBCUTANEOUS at 05:11

## 2021-01-01 RX ADMIN — FAMOTIDINE 20 MG: 20 TABLET ORAL at 05:40

## 2021-01-01 RX ADMIN — POTASSIUM CHLORIDE 40 MEQ: 1500 TABLET, EXTENDED RELEASE ORAL at 10:19

## 2021-01-01 RX ADMIN — MIDAZOLAM HYDROCHLORIDE 3 MG: 1 INJECTION, SOLUTION INTRAMUSCULAR; INTRAVENOUS at 22:55

## 2021-01-01 RX ADMIN — CALCIUM GLUCONATE 2 G: 20 INJECTION, SOLUTION INTRAVENOUS at 04:43

## 2021-01-01 RX ADMIN — HYDROMORPHONE HYDROCHLORIDE 2 MG: 1 INJECTION, SOLUTION INTRAMUSCULAR; INTRAVENOUS; SUBCUTANEOUS at 00:47

## 2021-01-01 RX ADMIN — GUAIFENESIN 600 MG: 600 TABLET, EXTENDED RELEASE ORAL at 17:06

## 2021-01-01 RX ADMIN — FENTANYL CITRATE 100 MCG: 50 INJECTION, SOLUTION INTRAMUSCULAR; INTRAVENOUS at 01:33

## 2021-01-01 RX ADMIN — NOREPINEPHRINE BITARTRATE 9 MCG/MIN: 1 INJECTION, SOLUTION, CONCENTRATE INTRAVENOUS at 17:30

## 2021-01-01 RX ADMIN — Medication 250 MCG/HR: at 00:59

## 2021-01-01 RX ADMIN — DOCUSATE SODIUM 50 MG AND SENNOSIDES 8.6 MG 2 TABLET: 8.6; 5 TABLET, FILM COATED ORAL at 17:40

## 2021-01-01 RX ADMIN — DEXAMETHASONE 6 MG: 4 TABLET ORAL at 05:10

## 2021-01-01 RX ADMIN — MINERAL OIL, PETROLATUM 1 APPLICATION: 425; 573 OINTMENT OPHTHALMIC at 04:24

## 2021-01-01 RX ADMIN — FAMOTIDINE 20 MG: 20 TABLET ORAL at 17:04

## 2021-01-01 RX ADMIN — HYDROCORTISONE SODIUM SUCCINATE 50 MG: 100 INJECTION, POWDER, FOR SOLUTION INTRAMUSCULAR; INTRAVENOUS at 12:32

## 2021-01-01 RX ADMIN — MINERAL OIL, PETROLATUM 1 APPLICATION: 425; 573 OINTMENT OPHTHALMIC at 22:58

## 2021-01-01 RX ADMIN — BUDESONIDE AND FORMOTEROL FUMARATE DIHYDRATE 2 PUFF: 160; 4.5 AEROSOL RESPIRATORY (INHALATION) at 07:53

## 2021-01-01 RX ADMIN — GUAIFENESIN 600 MG: 600 TABLET, EXTENDED RELEASE ORAL at 17:05

## 2021-01-01 RX ADMIN — Medication 5 MG: at 20:11

## 2021-01-01 RX ADMIN — FAMOTIDINE 20 MG: 20 TABLET ORAL at 17:05

## 2021-01-01 RX ADMIN — GUAIFENESIN 600 MG: 600 TABLET, EXTENDED RELEASE ORAL at 05:20

## 2021-01-01 RX ADMIN — FUROSEMIDE 40 MG: 10 INJECTION INTRAMUSCULAR; INTRAVENOUS at 13:23

## 2021-01-01 RX ADMIN — DOCUSATE SODIUM 50 MG AND SENNOSIDES 8.6 MG 2 TABLET: 8.6; 5 TABLET, FILM COATED ORAL at 17:19

## 2021-01-01 RX ADMIN — HYDROMORPHONE HYDROCHLORIDE: 10 INJECTION INTRAMUSCULAR; INTRAVENOUS; SUBCUTANEOUS at 20:36

## 2021-01-01 RX ADMIN — BUDESONIDE AND FORMOTEROL FUMARATE DIHYDRATE 2 PUFF: 160; 4.5 AEROSOL RESPIRATORY (INHALATION) at 22:34

## 2021-01-01 RX ADMIN — DIBASIC SODIUM PHOSPHATE, MONOBASIC POTASSIUM PHOSPHATE AND MONOBASIC SODIUM PHOSPHATE 500 MG: 852; 155; 130 TABLET ORAL at 22:18

## 2021-01-01 RX ADMIN — HYDROCORTISONE SODIUM SUCCINATE 100 MG: 100 INJECTION, POWDER, FOR SOLUTION INTRAMUSCULAR; INTRAVENOUS at 05:15

## 2021-01-01 RX ADMIN — DOCUSATE SODIUM 50 MG AND SENNOSIDES 8.6 MG 2 TABLET: 8.6; 5 TABLET, FILM COATED ORAL at 18:02

## 2021-01-01 RX ADMIN — MIDAZOLAM HYDROCHLORIDE 5 MG: 1 INJECTION, SOLUTION INTRAMUSCULAR; INTRAVENOUS at 04:50

## 2021-01-01 RX ADMIN — NOREPINEPHRINE BITARTRATE 16 MCG/MIN: 1 INJECTION, SOLUTION, CONCENTRATE INTRAVENOUS at 20:52

## 2021-01-01 RX ADMIN — MIDAZOLAM HYDROCHLORIDE 2 MG/HR: 5 INJECTION, SOLUTION INTRAMUSCULAR; INTRAVENOUS at 14:20

## 2021-01-01 RX ADMIN — ENOXAPARIN SODIUM 40 MG: 40 INJECTION SUBCUTANEOUS at 06:34

## 2021-01-01 RX ADMIN — HYDROCORTISONE SODIUM SUCCINATE 100 MG: 100 INJECTION, POWDER, FOR SOLUTION INTRAMUSCULAR; INTRAVENOUS at 22:54

## 2021-01-01 RX ADMIN — Medication 250 MCG/HR: at 03:52

## 2021-01-01 RX ADMIN — Medication 1.5 MG: at 00:59

## 2021-01-01 RX ADMIN — ENOXAPARIN SODIUM 40 MG: 40 INJECTION SUBCUTANEOUS at 05:19

## 2021-01-01 RX ADMIN — DOCUSATE SODIUM 50 MG AND SENNOSIDES 8.6 MG 2 TABLET: 8.6; 5 TABLET, FILM COATED ORAL at 05:03

## 2021-01-01 RX ADMIN — MIDAZOLAM HYDROCHLORIDE 5 MG: 1 INJECTION, SOLUTION INTRAMUSCULAR; INTRAVENOUS at 17:47

## 2021-01-01 RX ADMIN — NOREPINEPHRINE BITARTRATE 30 MCG/MIN: 1 INJECTION, SOLUTION, CONCENTRATE INTRAVENOUS at 02:37

## 2021-01-01 RX ADMIN — AMOXICILLIN AND CLAVULANATE POTASSIUM 1 TABLET: 875; 125 TABLET, FILM COATED ORAL at 13:18

## 2021-01-01 RX ADMIN — GUAIFENESIN 600 MG: 600 TABLET, EXTENDED RELEASE ORAL at 16:24

## 2021-01-01 RX ADMIN — MINERAL OIL, PETROLATUM 1 APPLICATION: 425; 573 OINTMENT OPHTHALMIC at 15:55

## 2021-01-01 RX ADMIN — ACETAMINOPHEN 650 MG: 325 TABLET, FILM COATED ORAL at 02:31

## 2021-01-01 RX ADMIN — MAGNESIUM SULFATE HEPTAHYDRATE 2 G: 2 INJECTION, SOLUTION INTRAVENOUS at 16:26

## 2021-01-01 RX ADMIN — HYDROCORTISONE SODIUM SUCCINATE 50 MG: 100 INJECTION, POWDER, FOR SOLUTION INTRAMUSCULAR; INTRAVENOUS at 11:22

## 2021-01-01 RX ADMIN — FUROSEMIDE 40 MG: 10 INJECTION INTRAMUSCULAR; INTRAVENOUS at 17:00

## 2021-01-01 RX ADMIN — FENTANYL CITRATE 100 MCG: 50 INJECTION, SOLUTION INTRAMUSCULAR; INTRAVENOUS at 19:38

## 2021-01-01 RX ADMIN — DEXAMETHASONE 6 MG: 4 TABLET ORAL at 06:33

## 2021-01-01 RX ADMIN — SODIUM CHLORIDE 3 G: 900 INJECTION INTRAVENOUS at 17:28

## 2021-01-01 RX ADMIN — MINERAL OIL, PETROLATUM 1 APPLICATION: 425; 573 OINTMENT OPHTHALMIC at 13:40

## 2021-01-01 RX ADMIN — PROPOFOL 45 MCG/KG/MIN: 10 INJECTION, EMULSION INTRAVENOUS at 04:51

## 2021-01-01 RX ADMIN — FENTANYL CITRATE 2500 MCG: 50 INJECTION, SOLUTION INTRAMUSCULAR; INTRAVENOUS at 05:53

## 2021-01-01 RX ADMIN — PROPOFOL 45 MCG/KG/MIN: 10 INJECTION, EMULSION INTRAVENOUS at 20:47

## 2021-01-01 RX ADMIN — ROCURONIUM BROMIDE 45.7 MG: 10 INJECTION, SOLUTION INTRAVENOUS at 18:30

## 2021-01-01 RX ADMIN — NALOXONE HYDROCHLORIDE 4 MG: 1 INJECTION PARENTERAL at 21:41

## 2021-01-01 RX ADMIN — MINERAL OIL, PETROLATUM 1 APPLICATION: 425; 573 OINTMENT OPHTHALMIC at 22:59

## 2021-01-01 RX ADMIN — Medication 2000 UNITS: at 04:33

## 2021-01-01 RX ADMIN — ACETAMINOPHEN 650 MG: 325 TABLET, FILM COATED ORAL at 06:06

## 2021-01-01 RX ADMIN — NOREPINEPHRINE BITARTRATE 16 MCG/MIN: 1 INJECTION, SOLUTION, CONCENTRATE INTRAVENOUS at 12:51

## 2021-01-01 RX ADMIN — NALOXONE HYDROCHLORIDE 4 MG: 1 INJECTION PARENTERAL at 22:22

## 2021-01-01 RX ADMIN — Medication 2000 UNITS: at 04:42

## 2021-01-01 RX ADMIN — FENTANYL CITRATE 200 MCG/HR: 50 INJECTION, SOLUTION INTRAMUSCULAR; INTRAVENOUS at 23:04

## 2021-01-01 RX ADMIN — HYDROMORPHONE HYDROCHLORIDE 4 MG/HR: 10 INJECTION INTRAMUSCULAR; INTRAVENOUS; SUBCUTANEOUS at 19:06

## 2021-01-01 RX ADMIN — VORICONAZOLE 200 MG: 40 POWDER, FOR SUSPENSION ORAL at 07:34

## 2021-01-01 RX ADMIN — HYDROXYZINE HYDROCHLORIDE 25 MG: 25 TABLET, FILM COATED ORAL at 05:56

## 2021-01-01 RX ADMIN — NOREPINEPHRINE BITARTRATE 2 MCG/MIN: 1 INJECTION, SOLUTION, CONCENTRATE INTRAVENOUS at 05:21

## 2021-01-01 RX ADMIN — MIDAZOLAM 10 MG/HR: 5 INJECTION INTRAMUSCULAR; INTRAVENOUS at 03:28

## 2021-01-01 RX ADMIN — DOCUSATE SODIUM 50 MG AND SENNOSIDES 8.6 MG 2 TABLET: 8.6; 5 TABLET, FILM COATED ORAL at 17:17

## 2021-01-01 RX ADMIN — HYDROCORTISONE SODIUM SUCCINATE 50 MG: 100 INJECTION, POWDER, FOR SOLUTION INTRAMUSCULAR; INTRAVENOUS at 11:54

## 2021-01-01 RX ADMIN — ROCURONIUM BROMIDE 50 MG: 10 INJECTION, SOLUTION INTRAVENOUS at 04:50

## 2021-01-01 RX ADMIN — AMIODARONE HYDROCHLORIDE 1 MG/MIN: 1.8 INJECTION, SOLUTION INTRAVENOUS at 16:07

## 2021-01-01 RX ADMIN — BUDESONIDE AND FORMOTEROL FUMARATE DIHYDRATE 2 PUFF: 160; 4.5 AEROSOL RESPIRATORY (INHALATION) at 20:59

## 2021-01-01 RX ADMIN — MINERAL OIL, PETROLATUM 1 APPLICATION: 425; 573 OINTMENT OPHTHALMIC at 13:23

## 2021-01-01 RX ADMIN — POLYETHYLENE GLYCOL 3350 1 PACKET: 17 POWDER, FOR SOLUTION ORAL at 10:19

## 2021-01-01 RX ADMIN — HYDROCORTISONE SODIUM SUCCINATE 50 MG: 100 INJECTION, POWDER, FOR SOLUTION INTRAMUSCULAR; INTRAVENOUS at 17:29

## 2021-01-01 RX ADMIN — HYDROCORTISONE SODIUM SUCCINATE 50 MG: 100 INJECTION, POWDER, FOR SOLUTION INTRAMUSCULAR; INTRAVENOUS at 00:18

## 2021-01-01 RX ADMIN — NALOXONE HYDROCHLORIDE 4 MG: 1 INJECTION PARENTERAL at 13:23

## 2021-01-01 RX ADMIN — FUROSEMIDE 40 MG: 10 INJECTION INTRAMUSCULAR; INTRAVENOUS at 05:08

## 2021-01-01 RX ADMIN — MINERAL OIL, PETROLATUM 1 APPLICATION: 425; 573 OINTMENT OPHTHALMIC at 06:00

## 2021-01-01 RX ADMIN — HYDROMORPHONE HYDROCHLORIDE: 10 INJECTION INTRAMUSCULAR; INTRAVENOUS; SUBCUTANEOUS at 18:23

## 2021-01-01 RX ADMIN — ENOXAPARIN SODIUM 40 MG: 40 INJECTION SUBCUTANEOUS at 05:17

## 2021-01-01 RX ADMIN — DEXAMETHASONE 6 MG: 4 TABLET ORAL at 05:20

## 2021-01-01 RX ADMIN — HYDROCORTISONE SODIUM SUCCINATE 50 MG: 100 INJECTION, POWDER, FOR SOLUTION INTRAMUSCULAR; INTRAVENOUS at 06:01

## 2021-01-01 RX ADMIN — ACETAMINOPHEN 650 MG: 325 TABLET, FILM COATED ORAL at 00:51

## 2021-01-01 RX ADMIN — AMIODARONE HYDROCHLORIDE 1 MG/MIN: 1.8 INJECTION, SOLUTION INTRAVENOUS at 21:43

## 2021-01-01 RX ADMIN — FENTANYL CITRATE 100 MCG: 50 INJECTION, SOLUTION INTRAMUSCULAR; INTRAVENOUS at 09:47

## 2021-01-01 RX ADMIN — ENOXAPARIN SODIUM 40 MG: 40 INJECTION SUBCUTANEOUS at 05:29

## 2021-01-01 RX ADMIN — POTASSIUM CHLORIDE 40 MEQ: 1500 TABLET, EXTENDED RELEASE ORAL at 05:29

## 2021-01-01 RX ADMIN — DEXMEDETOMIDINE 1.1 MCG/KG/HR: 200 INJECTION, SOLUTION INTRAVENOUS at 11:02

## 2021-01-01 RX ADMIN — MIDAZOLAM 13 MG/HR: 5 INJECTION INTRAMUSCULAR; INTRAVENOUS at 19:02

## 2021-01-01 RX ADMIN — VECURONIUM BROMIDE 10 MG: 1 INJECTION, POWDER, LYOPHILIZED, FOR SOLUTION INTRAVENOUS at 22:56

## 2021-01-01 RX ADMIN — FAMOTIDINE 20 MG: 20 TABLET ORAL at 17:29

## 2021-01-01 RX ADMIN — HYDROMORPHONE HYDROCHLORIDE 4 MG/HR: 10 INJECTION INTRAMUSCULAR; INTRAVENOUS; SUBCUTANEOUS at 10:25

## 2021-01-01 RX ADMIN — INSULIN HUMAN 3 UNITS: 100 INJECTION, SOLUTION PARENTERAL at 00:17

## 2021-01-01 RX ADMIN — FUROSEMIDE 20 MG: 10 INJECTION, SOLUTION INTRAVENOUS at 06:00

## 2021-01-01 RX ADMIN — MIDAZOLAM 10 MG/HR: 5 INJECTION INTRAMUSCULAR; INTRAVENOUS at 02:53

## 2021-01-01 RX ADMIN — NALOXONE HYDROCHLORIDE 4 MG: 1 INJECTION PARENTERAL at 04:33

## 2021-01-01 RX ADMIN — GUAIFENESIN 600 MG: 600 TABLET, EXTENDED RELEASE ORAL at 16:41

## 2021-01-01 RX ADMIN — HYDROMORPHONE HYDROCHLORIDE 2 MG: 1 INJECTION, SOLUTION INTRAMUSCULAR; INTRAVENOUS; SUBCUTANEOUS at 03:59

## 2021-01-01 RX ADMIN — MIDAZOLAM HYDROCHLORIDE 5 MG: 1 INJECTION, SOLUTION INTRAMUSCULAR; INTRAVENOUS at 13:43

## 2021-01-01 RX ADMIN — DOCUSATE SODIUM 50 MG AND SENNOSIDES 8.6 MG 2 TABLET: 8.6; 5 TABLET, FILM COATED ORAL at 05:13

## 2021-01-01 RX ADMIN — FAMOTIDINE 20 MG: 20 TABLET ORAL at 18:16

## 2021-01-01 RX ADMIN — LORAZEPAM 0.5 MG: 1 TABLET ORAL at 21:01

## 2021-01-01 RX ADMIN — SODIUM CHLORIDE 3 G: 900 INJECTION INTRAVENOUS at 06:32

## 2021-01-01 RX ADMIN — SULFAMETHOXAZOLE AND TRIMETHOPRIM 2 TABLET: 800; 160 TABLET ORAL at 23:05

## 2021-01-01 RX ADMIN — DOCUSATE SODIUM 50 MG AND SENNOSIDES 8.6 MG 2 TABLET: 8.6; 5 TABLET, FILM COATED ORAL at 17:11

## 2021-01-01 RX ADMIN — VORICONAZOLE 200 MG: 40 POWDER, FOR SUSPENSION ORAL at 17:06

## 2021-01-01 RX ADMIN — GUAIFENESIN 600 MG: 600 TABLET, EXTENDED RELEASE ORAL at 06:00

## 2021-01-01 RX ADMIN — HYDROMORPHONE HYDROCHLORIDE 0.5 MG: 1 INJECTION, SOLUTION INTRAMUSCULAR; INTRAVENOUS; SUBCUTANEOUS at 04:13

## 2021-01-01 RX ADMIN — DOCUSATE SODIUM 50 MG AND SENNOSIDES 8.6 MG 2 TABLET: 8.6; 5 TABLET, FILM COATED ORAL at 18:00

## 2021-01-01 RX ADMIN — VORICONAZOLE 200 MG: 40 POWDER, FOR SUSPENSION ORAL at 17:18

## 2021-01-01 RX ADMIN — MINERAL OIL, PETROLATUM 1 APPLICATION: 425; 573 OINTMENT OPHTHALMIC at 22:16

## 2021-01-01 RX ADMIN — MIDAZOLAM 10 MG/HR: 5 INJECTION INTRAMUSCULAR; INTRAVENOUS at 13:50

## 2021-01-01 RX ADMIN — MIDAZOLAM 8 MG/HR: 5 INJECTION INTRAMUSCULAR; INTRAVENOUS at 19:14

## 2021-01-01 RX ADMIN — MIDAZOLAM 10 MG/HR: 5 INJECTION INTRAMUSCULAR; INTRAVENOUS at 16:25

## 2021-01-01 RX ADMIN — MINERAL OIL, PETROLATUM 1 APPLICATION: 425; 573 OINTMENT OPHTHALMIC at 05:15

## 2021-01-01 RX ADMIN — PIPERACILLIN AND TAZOBACTAM 4.5 G: 4; .5 INJECTION, POWDER, LYOPHILIZED, FOR SOLUTION INTRAVENOUS; PARENTERAL at 12:54

## 2021-01-01 RX ADMIN — HYDROCORTISONE SODIUM SUCCINATE 50 MG: 100 INJECTION, POWDER, FOR SOLUTION INTRAMUSCULAR; INTRAVENOUS at 12:54

## 2021-01-01 RX ADMIN — INSULIN HUMAN 3 UNITS: 100 INJECTION, SOLUTION PARENTERAL at 17:03

## 2021-01-01 RX ADMIN — BARICITINIB 4 MG: 2 TABLET, FILM COATED ORAL at 16:24

## 2021-01-01 RX ADMIN — FUROSEMIDE 40 MG: 10 INJECTION INTRAMUSCULAR; INTRAVENOUS at 17:07

## 2021-01-01 RX ADMIN — MINERAL OIL, PETROLATUM 1 APPLICATION: 425; 573 OINTMENT OPHTHALMIC at 22:00

## 2021-01-01 RX ADMIN — ENOXAPARIN SODIUM 40 MG: 40 INJECTION SUBCUTANEOUS at 05:25

## 2021-01-01 RX ADMIN — FENTANYL CITRATE 100 MCG: 50 INJECTION INTRAMUSCULAR; INTRAVENOUS at 23:08

## 2021-01-01 RX ADMIN — PROPOFOL 50 MCG/KG/MIN: 10 INJECTION, EMULSION INTRAVENOUS at 21:41

## 2021-01-01 RX ADMIN — HYDROCORTISONE SODIUM SUCCINATE 50 MG: 100 INJECTION, POWDER, FOR SOLUTION INTRAMUSCULAR; INTRAVENOUS at 23:35

## 2021-01-01 RX ADMIN — MINERAL OIL, PETROLATUM 1 APPLICATION: 425; 573 OINTMENT OPHTHALMIC at 14:45

## 2021-01-01 RX ADMIN — FENTANYL CITRATE 200 MCG/HR: 50 INJECTION, SOLUTION INTRAMUSCULAR; INTRAVENOUS at 18:56

## 2021-01-01 RX ADMIN — ENOXAPARIN SODIUM 40 MG: 40 INJECTION SUBCUTANEOUS at 04:57

## 2021-01-01 RX ADMIN — ROCURONIUM BROMIDE 6 MCG/KG/MIN: 10 INJECTION, SOLUTION INTRAVENOUS at 10:50

## 2021-01-01 RX ADMIN — FAMOTIDINE 20 MG: 20 TABLET ORAL at 17:19

## 2021-01-01 RX ADMIN — DIBASIC SODIUM PHOSPHATE, MONOBASIC POTASSIUM PHOSPHATE AND MONOBASIC SODIUM PHOSPHATE 500 MG: 852; 155; 130 TABLET ORAL at 13:21

## 2021-01-01 RX ADMIN — ACETAMINOPHEN 1000 MG: 500 TABLET ORAL at 05:04

## 2021-01-01 RX ADMIN — DOCUSATE SODIUM 50 MG AND SENNOSIDES 8.6 MG 2 TABLET: 8.6; 5 TABLET, FILM COATED ORAL at 04:52

## 2021-01-01 RX ADMIN — FENTANYL CITRATE 200 MCG/HR: 50 INJECTION, SOLUTION INTRAMUSCULAR; INTRAVENOUS at 23:31

## 2021-01-01 RX ADMIN — INSULIN HUMAN 4 UNITS: 100 INJECTION, SOLUTION PARENTERAL at 04:50

## 2021-01-01 RX ADMIN — VASOPRESSIN 0.03 UNITS/MIN: 20 INJECTION INTRAVENOUS at 22:50

## 2021-01-01 RX ADMIN — FAMOTIDINE 20 MG: 20 TABLET ORAL at 17:34

## 2021-01-01 RX ADMIN — FUROSEMIDE 40 MG: 10 INJECTION INTRAMUSCULAR; INTRAVENOUS at 05:21

## 2021-01-01 RX ADMIN — MIDAZOLAM HYDROCHLORIDE 2 MG: 1 INJECTION INTRAMUSCULAR; INTRAVENOUS at 03:29

## 2021-01-01 RX ADMIN — MINERAL OIL, PETROLATUM 1 APPLICATION: 425; 573 OINTMENT OPHTHALMIC at 05:40

## 2021-01-01 RX ADMIN — PROPOFOL 45 MCG/KG/MIN: 10 INJECTION, EMULSION INTRAVENOUS at 16:19

## 2021-01-01 RX ADMIN — Medication 1.5 MG/HR: at 06:31

## 2021-01-01 RX ADMIN — NALOXONE HYDROCHLORIDE 4 MG: 1 INJECTION PARENTERAL at 13:44

## 2021-01-01 RX ADMIN — PIPERACILLIN AND TAZOBACTAM 4.5 G: 4; .5 INJECTION, POWDER, LYOPHILIZED, FOR SOLUTION INTRAVENOUS; PARENTERAL at 13:05

## 2021-01-01 RX ADMIN — HYDROXYZINE HYDROCHLORIDE 25 MG: 25 TABLET, FILM COATED ORAL at 12:55

## 2021-01-01 RX ADMIN — HYDROCORTISONE SODIUM SUCCINATE 50 MG: 100 INJECTION, POWDER, FOR SOLUTION INTRAMUSCULAR; INTRAVENOUS at 17:53

## 2021-01-01 RX ADMIN — POLYETHYLENE GLYCOL 3350 1 PACKET: 17 POWDER, FOR SOLUTION ORAL at 17:05

## 2021-01-01 RX ADMIN — POTASSIUM BICARBONATE 25 MEQ: 978 TABLET, EFFERVESCENT ORAL at 08:15

## 2021-01-01 RX ADMIN — Medication 2000 UNITS: at 05:07

## 2021-01-01 RX ADMIN — DOCUSATE SODIUM 50 MG AND SENNOSIDES 8.6 MG 2 TABLET: 8.6; 5 TABLET, FILM COATED ORAL at 05:56

## 2021-01-01 RX ADMIN — HYDROMORPHONE HYDROCHLORIDE 2 MG: 1 INJECTION, SOLUTION INTRAMUSCULAR; INTRAVENOUS; SUBCUTANEOUS at 17:10

## 2021-01-01 RX ADMIN — ROCURONIUM BROMIDE 2 MCG/KG/MIN: 10 INJECTION, SOLUTION INTRAVENOUS at 12:46

## 2021-01-01 RX ADMIN — MINERAL OIL, PETROLATUM 1 APPLICATION: 425; 573 OINTMENT OPHTHALMIC at 22:54

## 2021-01-01 RX ADMIN — Medication 1.5 MG: at 13:34

## 2021-01-01 RX ADMIN — DEXAMETHASONE 6 MG: 6 TABLET ORAL at 05:08

## 2021-01-01 RX ADMIN — MINERAL OIL, PETROLATUM 1 APPLICATION: 425; 573 OINTMENT OPHTHALMIC at 05:06

## 2021-01-01 RX ADMIN — DEXMEDETOMIDINE 0.2 MCG/KG/HR: 200 INJECTION, SOLUTION INTRAVENOUS at 10:36

## 2021-01-01 RX ADMIN — FENTANYL CITRATE 100 MCG: 50 INJECTION, SOLUTION INTRAMUSCULAR; INTRAVENOUS at 06:33

## 2021-01-01 RX ADMIN — GUAIFENESIN 600 MG: 600 TABLET, EXTENDED RELEASE ORAL at 17:19

## 2021-01-01 RX ADMIN — ENOXAPARIN SODIUM 40 MG: 40 INJECTION SUBCUTANEOUS at 05:08

## 2021-01-01 RX ADMIN — FENTANYL CITRATE 100 MCG: 50 INJECTION, SOLUTION INTRAMUSCULAR; INTRAVENOUS at 12:11

## 2021-01-01 RX ADMIN — FENTANYL CITRATE 100 MCG: 50 INJECTION, SOLUTION INTRAMUSCULAR; INTRAVENOUS at 13:12

## 2021-01-01 RX ADMIN — MAGNESIUM HYDROXIDE 30 ML: 400 SUSPENSION ORAL at 10:48

## 2021-01-01 RX ADMIN — ACETAMINOPHEN 1000 MG: 500 TABLET ORAL at 16:41

## 2021-01-01 RX ADMIN — POLYETHYLENE GLYCOL 3350 1 PACKET: 17 POWDER, FOR SOLUTION ORAL at 17:53

## 2021-01-01 RX ADMIN — MINERAL OIL, PETROLATUM 1 APPLICATION: 425; 573 OINTMENT OPHTHALMIC at 23:34

## 2021-01-01 RX ADMIN — LORAZEPAM 0.5 MG: 1 TABLET ORAL at 20:11

## 2021-01-01 RX ADMIN — DEXAMETHASONE 6 MG: 6 TABLET ORAL at 04:59

## 2021-01-01 RX ADMIN — FAMOTIDINE 20 MG: 20 TABLET ORAL at 18:00

## 2021-01-01 RX ADMIN — MIDAZOLAM HYDROCHLORIDE 3 MG: 1 INJECTION, SOLUTION INTRAMUSCULAR; INTRAVENOUS at 14:21

## 2021-01-01 RX ADMIN — HYDROMORPHONE HYDROCHLORIDE 3 MG/HR: 10 INJECTION INTRAMUSCULAR; INTRAVENOUS; SUBCUTANEOUS at 23:58

## 2021-01-01 RX ADMIN — GUAIFENESIN 600 MG: 600 TABLET, EXTENDED RELEASE ORAL at 06:33

## 2021-01-01 RX ADMIN — OXYCODONE HYDROCHLORIDE AND ACETAMINOPHEN 500 MG: 500 TABLET ORAL at 16:42

## 2021-01-01 RX ADMIN — NOREPINEPHRINE BITARTRATE 8 MCG/MIN: 1 INJECTION, SOLUTION, CONCENTRATE INTRAVENOUS at 11:18

## 2021-01-01 RX ADMIN — ROCURONIUM BROMIDE 50 MG: 10 INJECTION, SOLUTION INTRAVENOUS at 04:00

## 2021-01-01 RX ADMIN — ROCURONIUM BROMIDE 50 MG: 10 INJECTION, SOLUTION INTRAVENOUS at 03:39

## 2021-01-01 RX ADMIN — ROCURONIUM BROMIDE 50 MG: 10 INJECTION, SOLUTION INTRAVENOUS at 15:02

## 2021-01-01 RX ADMIN — MIDAZOLAM HYDROCHLORIDE 5 MG: 1 INJECTION, SOLUTION INTRAMUSCULAR; INTRAVENOUS at 16:20

## 2021-01-01 RX ADMIN — DOCUSATE SODIUM 50 MG AND SENNOSIDES 8.6 MG 2 TABLET: 8.6; 5 TABLET, FILM COATED ORAL at 17:15

## 2021-01-01 RX ADMIN — FENTANYL CITRATE 400 MCG/HR: 50 INJECTION, SOLUTION INTRAMUSCULAR; INTRAVENOUS at 02:42

## 2021-01-01 RX ADMIN — CEFTRIAXONE SODIUM 1 G: 1 INJECTION, POWDER, FOR SOLUTION INTRAMUSCULAR; INTRAVENOUS at 14:04

## 2021-01-01 RX ADMIN — AMIODARONE HYDROCHLORIDE 0.5 MG/MIN: 1.8 INJECTION, SOLUTION INTRAVENOUS at 07:43

## 2021-01-01 RX ADMIN — FUROSEMIDE 20 MG: 10 INJECTION, SOLUTION INTRAVENOUS at 12:30

## 2021-01-01 RX ADMIN — MINERAL OIL, PETROLATUM 1 APPLICATION: 425; 573 OINTMENT OPHTHALMIC at 13:57

## 2021-01-01 RX ADMIN — DOCUSATE SODIUM 50 MG AND SENNOSIDES 8.6 MG 2 TABLET: 8.6; 5 TABLET, FILM COATED ORAL at 05:42

## 2021-01-01 RX ADMIN — OXYCODONE HYDROCHLORIDE AND ACETAMINOPHEN 500 MG: 500 TABLET ORAL at 04:59

## 2021-01-01 RX ADMIN — FAMOTIDINE 20 MG: 20 TABLET ORAL at 04:33

## 2021-01-01 RX ADMIN — Medication 1.5 MG: at 16:12

## 2021-01-01 RX ADMIN — AMIODARONE HYDROCHLORIDE 0.5 MG/MIN: 1.8 INJECTION, SOLUTION INTRAVENOUS at 18:24

## 2021-01-01 RX ADMIN — ACETAMINOPHEN 650 MG: 325 TABLET, FILM COATED ORAL at 23:39

## 2021-01-01 RX ADMIN — FUROSEMIDE 40 MG: 10 INJECTION INTRAMUSCULAR; INTRAVENOUS at 17:05

## 2021-01-01 RX ADMIN — FAMOTIDINE 20 MG: 10 INJECTION INTRAVENOUS at 05:20

## 2021-01-01 RX ADMIN — BENZOCAINE AND MENTHOL 1 LOZENGE: 15; 3.6 LOZENGE ORAL at 08:10

## 2021-01-01 RX ADMIN — Medication 1.5 MG: at 07:53

## 2021-01-01 RX ADMIN — VORICONAZOLE 200 MG: 40 POWDER, FOR SUSPENSION ORAL at 18:13

## 2021-01-01 RX ADMIN — MINERAL OIL, PETROLATUM 1 APPLICATION: 425; 573 OINTMENT OPHTHALMIC at 22:52

## 2021-01-01 RX ADMIN — MIDAZOLAM 15 MG/HR: 5 INJECTION INTRAMUSCULAR; INTRAVENOUS at 23:31

## 2021-01-01 RX ADMIN — MAGNESIUM HYDROXIDE 30 ML: 400 SUSPENSION ORAL at 09:09

## 2021-01-01 RX ADMIN — ENOXAPARIN SODIUM 40 MG: 40 INJECTION SUBCUTANEOUS at 06:00

## 2021-01-01 RX ADMIN — VORICONAZOLE 200 MG: 40 POWDER, FOR SUSPENSION ORAL at 05:42

## 2021-01-01 RX ADMIN — FUROSEMIDE 40 MG: 10 INJECTION INTRAMUSCULAR; INTRAVENOUS at 06:33

## 2021-01-01 RX ADMIN — POTASSIUM BICARBONATE 25 MEQ: 978 TABLET, EFFERVESCENT ORAL at 12:36

## 2021-01-01 RX ADMIN — FENTANYL CITRATE 200 MCG/HR: 50 INJECTION, SOLUTION INTRAMUSCULAR; INTRAVENOUS at 03:07

## 2021-01-01 RX ADMIN — CEFEPIME 2 G: 2 INJECTION, POWDER, FOR SOLUTION INTRAVENOUS at 13:23

## 2021-01-01 RX ADMIN — DOCUSATE SODIUM 50 MG AND SENNOSIDES 8.6 MG 2 TABLET: 8.6; 5 TABLET, FILM COATED ORAL at 05:08

## 2021-01-01 RX ADMIN — AMIODARONE HYDROCHLORIDE 0.5 MG/MIN: 1.8 INJECTION, SOLUTION INTRAVENOUS at 06:22

## 2021-01-01 RX ADMIN — SODIUM CHLORIDE 3 G: 900 INJECTION INTRAVENOUS at 17:15

## 2021-01-01 RX ADMIN — PROPOFOL 50 MCG/KG/MIN: 10 INJECTION, EMULSION INTRAVENOUS at 23:35

## 2021-01-01 RX ADMIN — FAMOTIDINE 20 MG: 20 TABLET ORAL at 05:58

## 2021-01-01 RX ADMIN — ROCURONIUM BROMIDE 50 MG: 10 INJECTION, SOLUTION INTRAVENOUS at 13:50

## 2021-01-01 RX ADMIN — AMIODARONE HYDROCHLORIDE 0.5 MG/MIN: 1.8 INJECTION, SOLUTION INTRAVENOUS at 05:35

## 2021-01-01 RX ADMIN — ENOXAPARIN SODIUM 40 MG: 40 INJECTION SUBCUTANEOUS at 05:50

## 2021-01-01 RX ADMIN — ROCURONIUM BROMIDE 50 MG: 10 INJECTION, SOLUTION INTRAVENOUS at 15:26

## 2021-01-01 RX ADMIN — ROCURONIUM BROMIDE 10 MCG/KG/MIN: 10 INJECTION, SOLUTION INTRAVENOUS at 18:33

## 2021-01-01 RX ADMIN — FAMOTIDINE 20 MG: 20 TABLET ORAL at 17:11

## 2021-01-01 RX ADMIN — PROPOFOL 45 MCG/KG/MIN: 10 INJECTION, EMULSION INTRAVENOUS at 00:58

## 2021-01-01 RX ADMIN — LORAZEPAM 1 MG: 2 INJECTION INTRAMUSCULAR; INTRAVENOUS at 22:22

## 2021-01-01 RX ADMIN — GUAIFENESIN 600 MG: 600 TABLET, EXTENDED RELEASE ORAL at 17:15

## 2021-01-01 RX ADMIN — NOREPINEPHRINE BITARTRATE 4 MCG/MIN: 1 INJECTION, SOLUTION, CONCENTRATE INTRAVENOUS at 04:51

## 2021-01-01 RX ADMIN — VORICONAZOLE 200 MG: 40 POWDER, FOR SUSPENSION ORAL at 17:49

## 2021-01-01 RX ADMIN — DOCUSATE SODIUM 50 MG AND SENNOSIDES 8.6 MG 2 TABLET: 8.6; 5 TABLET, FILM COATED ORAL at 17:05

## 2021-01-01 RX ADMIN — PIPERACILLIN AND TAZOBACTAM 4.5 G: 4; .5 INJECTION, POWDER, LYOPHILIZED, FOR SOLUTION INTRAVENOUS; PARENTERAL at 04:31

## 2021-01-01 RX ADMIN — MINERAL OIL, PETROLATUM 1 APPLICATION: 425; 573 OINTMENT OPHTHALMIC at 05:20

## 2021-01-01 RX ADMIN — MINERAL OIL, PETROLATUM 1 APPLICATION: 425; 573 OINTMENT OPHTHALMIC at 13:16

## 2021-01-01 RX ADMIN — MIDAZOLAM 10 MG/HR: 5 INJECTION INTRAMUSCULAR; INTRAVENOUS at 04:04

## 2021-01-01 RX ADMIN — FENTANYL CITRATE 200 MCG/HR: 50 INJECTION, SOLUTION INTRAMUSCULAR; INTRAVENOUS at 22:50

## 2021-01-01 RX ADMIN — FAMOTIDINE 20 MG: 20 TABLET ORAL at 17:08

## 2021-01-01 RX ADMIN — VORICONAZOLE 200 MG: 40 POWDER, FOR SUSPENSION ORAL at 05:59

## 2021-01-01 RX ADMIN — DOCUSATE SODIUM 50 MG AND SENNOSIDES 8.6 MG 2 TABLET: 8.6; 5 TABLET, FILM COATED ORAL at 17:49

## 2021-01-01 RX ADMIN — Medication 10 MG: at 18:43

## 2021-01-01 RX ADMIN — DOCUSATE SODIUM 50 MG AND SENNOSIDES 8.6 MG 2 TABLET: 8.6; 5 TABLET, FILM COATED ORAL at 17:00

## 2021-01-01 RX ADMIN — ACETAMINOPHEN 650 MG: 325 TABLET, FILM COATED ORAL at 17:29

## 2021-01-01 RX ADMIN — DOCUSATE SODIUM 50 MG AND SENNOSIDES 8.6 MG 2 TABLET: 8.6; 5 TABLET, FILM COATED ORAL at 18:30

## 2021-01-01 RX ADMIN — POLYETHYLENE GLYCOL 3350 1 PACKET: 17 POWDER, FOR SOLUTION ORAL at 17:17

## 2021-01-01 RX ADMIN — MINERAL OIL, PETROLATUM 1 APPLICATION: 425; 573 OINTMENT OPHTHALMIC at 18:17

## 2021-01-01 RX ADMIN — MINERAL OIL, PETROLATUM 1 APPLICATION: 425; 573 OINTMENT OPHTHALMIC at 23:46

## 2021-01-01 RX ADMIN — DOCUSATE SODIUM 50 MG AND SENNOSIDES 8.6 MG 2 TABLET: 8.6; 5 TABLET, FILM COATED ORAL at 18:13

## 2021-01-01 RX ADMIN — POLYETHYLENE GLYCOL 3350 1 PACKET: 17 POWDER, FOR SOLUTION ORAL at 10:34

## 2021-01-01 RX ADMIN — VORICONAZOLE 400 MG: 40 POWDER, FOR SUSPENSION ORAL at 17:06

## 2021-01-01 RX ADMIN — GUAIFENESIN 600 MG: 600 TABLET, EXTENDED RELEASE ORAL at 05:16

## 2021-01-01 RX ADMIN — MINERAL OIL 1 EACH: 1000 LIQUID ORAL at 12:33

## 2021-01-01 RX ADMIN — ACETAMINOPHEN 650 MG: 325 TABLET, FILM COATED ORAL at 14:05

## 2021-01-01 RX ADMIN — BARICITINIB 4 MG: 2 TABLET, FILM COATED ORAL at 17:34

## 2021-01-01 RX ADMIN — Medication 1.5 MG: at 04:05

## 2021-01-01 RX ADMIN — ACETAMINOPHEN 650 MG: 325 TABLET, FILM COATED ORAL at 12:17

## 2021-01-01 RX ADMIN — FAMOTIDINE 20 MG: 20 TABLET ORAL at 04:52

## 2021-01-01 RX ADMIN — MINERAL OIL, PETROLATUM 1 APPLICATION: 425; 573 OINTMENT OPHTHALMIC at 05:26

## 2021-01-01 RX ADMIN — DEXTROSE MONOHYDRATE: 50 INJECTION, SOLUTION INTRAVENOUS at 10:04

## 2021-01-01 RX ADMIN — MINERAL OIL, PETROLATUM 1 APPLICATION: 425; 573 OINTMENT OPHTHALMIC at 15:28

## 2021-01-01 RX ADMIN — DEXTROSE MONOHYDRATE 50 ML: 25 INJECTION, SOLUTION INTRAVENOUS at 08:48

## 2021-01-01 RX ADMIN — GUAIFENESIN 600 MG: 600 TABLET, EXTENDED RELEASE ORAL at 17:07

## 2021-01-01 RX ADMIN — DOCUSATE SODIUM 50 MG AND SENNOSIDES 8.6 MG 2 TABLET: 8.6; 5 TABLET, FILM COATED ORAL at 17:53

## 2021-01-01 RX ADMIN — FAMOTIDINE 20 MG: 20 TABLET ORAL at 17:06

## 2021-01-01 RX ADMIN — ROCURONIUM BROMIDE 6 MCG/KG/MIN: 10 INJECTION, SOLUTION INTRAVENOUS at 04:04

## 2021-01-01 RX ADMIN — OXYCODONE 10 MG: 5 TABLET ORAL at 13:47

## 2021-01-01 RX ADMIN — MINERAL OIL, PETROLATUM 1 APPLICATION: 425; 573 OINTMENT OPHTHALMIC at 13:54

## 2021-01-01 RX ADMIN — DIBASIC SODIUM PHOSPHATE, MONOBASIC POTASSIUM PHOSPHATE AND MONOBASIC SODIUM PHOSPHATE 500 MG: 852; 155; 130 TABLET ORAL at 17:22

## 2021-01-01 RX ADMIN — MINERAL OIL, PETROLATUM 1 APPLICATION: 425; 573 OINTMENT OPHTHALMIC at 22:56

## 2021-01-01 RX ADMIN — DEXMEDETOMIDINE 0.2 MCG/KG/HR: 200 INJECTION, SOLUTION INTRAVENOUS at 11:17

## 2021-01-01 RX ADMIN — AMIODARONE HYDROCHLORIDE 150 MG: 1.5 INJECTION, SOLUTION INTRAVENOUS at 15:55

## 2021-01-01 RX ADMIN — HYDROMORPHONE HYDROCHLORIDE 4 MG/HR: 10 INJECTION INTRAMUSCULAR; INTRAVENOUS; SUBCUTANEOUS at 12:34

## 2021-01-01 RX ADMIN — DIBASIC SODIUM PHOSPHATE, MONOBASIC POTASSIUM PHOSPHATE AND MONOBASIC SODIUM PHOSPHATE 500 MG: 852; 155; 130 TABLET ORAL at 17:32

## 2021-01-01 RX ADMIN — FAMOTIDINE 20 MG: 20 TABLET ORAL at 17:15

## 2021-01-01 RX ADMIN — MIDAZOLAM HYDROCHLORIDE 5 MG: 1 INJECTION, SOLUTION INTRAMUSCULAR; INTRAVENOUS at 18:26

## 2021-01-01 RX ADMIN — BARICITINIB 4 MG: 2 TABLET, FILM COATED ORAL at 17:07

## 2021-01-01 RX ADMIN — FUROSEMIDE 40 MG: 10 INJECTION INTRAMUSCULAR; INTRAVENOUS at 05:00

## 2021-01-01 RX ADMIN — ROCURONIUM BROMIDE 48.7 MG: 10 INJECTION, SOLUTION INTRAVENOUS at 01:57

## 2021-01-01 RX ADMIN — ENOXAPARIN SODIUM 40 MG: 40 INJECTION SUBCUTANEOUS at 05:40

## 2021-01-01 RX ADMIN — POTASSIUM CHLORIDE 40 MEQ: 1500 TABLET, EXTENDED RELEASE ORAL at 17:53

## 2021-01-01 RX ADMIN — HYDROMORPHONE HYDROCHLORIDE 2 MG: 1 INJECTION, SOLUTION INTRAMUSCULAR; INTRAVENOUS; SUBCUTANEOUS at 13:43

## 2021-01-01 RX ADMIN — INSULIN HUMAN 4 UNITS: 100 INJECTION, SOLUTION PARENTERAL at 11:37

## 2021-01-01 RX ADMIN — FENTANYL CITRATE 100 MCG: 50 INJECTION, SOLUTION INTRAMUSCULAR; INTRAVENOUS at 17:16

## 2021-01-01 RX ADMIN — FUROSEMIDE 40 MG: 10 INJECTION INTRAMUSCULAR; INTRAVENOUS at 17:11

## 2021-01-01 RX ADMIN — DOCUSATE SODIUM 50 MG AND SENNOSIDES 8.6 MG 2 TABLET: 8.6; 5 TABLET, FILM COATED ORAL at 06:01

## 2021-01-01 RX ADMIN — Medication 2000 UNITS: at 05:04

## 2021-01-01 RX ADMIN — POTASSIUM CHLORIDE 40 MEQ: 1500 TABLET, EXTENDED RELEASE ORAL at 04:56

## 2021-01-01 RX ADMIN — POTASSIUM CHLORIDE 40 MEQ: 1500 TABLET, EXTENDED RELEASE ORAL at 14:31

## 2021-01-01 RX ADMIN — FAMOTIDINE 20 MG: 20 TABLET ORAL at 05:07

## 2021-01-01 RX ADMIN — MINERAL OIL, PETROLATUM 1 APPLICATION: 425; 573 OINTMENT OPHTHALMIC at 05:58

## 2021-01-01 RX ADMIN — ACETAMINOPHEN 650 MG: 325 TABLET, FILM COATED ORAL at 08:10

## 2021-01-01 RX ADMIN — SODIUM CHLORIDE 3 G: 900 INJECTION INTRAVENOUS at 00:00

## 2021-01-01 RX ADMIN — NOREPINEPHRINE BITARTRATE 12 MCG/MIN: 1 INJECTION, SOLUTION, CONCENTRATE INTRAVENOUS at 04:32

## 2021-01-01 RX ADMIN — FAMOTIDINE 20 MG: 20 TABLET ORAL at 05:19

## 2021-01-01 RX ADMIN — FUROSEMIDE 40 MG: 10 INJECTION INTRAMUSCULAR; INTRAVENOUS at 05:16

## 2021-01-01 RX ADMIN — OXYCODONE HYDROCHLORIDE AND ACETAMINOPHEN 500 MG: 500 TABLET ORAL at 17:00

## 2021-01-01 RX ADMIN — LORAZEPAM 1 MG: 2 INJECTION INTRAMUSCULAR; INTRAVENOUS at 11:18

## 2021-01-01 RX ADMIN — GUAIFENESIN 600 MG: 600 TABLET, EXTENDED RELEASE ORAL at 17:00

## 2021-01-01 RX ADMIN — MINERAL OIL, PETROLATUM 1 APPLICATION: 425; 573 OINTMENT OPHTHALMIC at 21:10

## 2021-01-01 RX ADMIN — FENTANYL CITRATE 200 MCG/HR: 50 INJECTION, SOLUTION INTRAMUSCULAR; INTRAVENOUS at 15:59

## 2021-01-01 RX ADMIN — Medication 5 MG: at 20:26

## 2021-01-01 RX ADMIN — SULFAMETHOXAZOLE AND TRIMETHOPRIM 2 TABLET: 800; 160 TABLET ORAL at 17:06

## 2021-01-01 RX ADMIN — VORICONAZOLE 200 MG: 40 POWDER, FOR SUSPENSION ORAL at 17:53

## 2021-01-01 RX ADMIN — CEFEPIME 2 G: 2 INJECTION, POWDER, FOR SOLUTION INTRAVENOUS at 22:59

## 2021-01-01 RX ADMIN — POTASSIUM PHOSPHATE, MONOBASIC AND POTASSIUM PHOSPHATE, DIBASIC 15 MMOL: 224; 236 INJECTION, SOLUTION, CONCENTRATE INTRAVENOUS at 08:18

## 2021-01-01 RX ADMIN — FENTANYL CITRATE 100 MCG: 50 INJECTION, SOLUTION INTRAMUSCULAR; INTRAVENOUS at 17:53

## 2021-01-01 RX ADMIN — DEXMEDETOMIDINE 1.1 MCG/KG/HR: 200 INJECTION, SOLUTION INTRAVENOUS at 15:34

## 2021-01-01 RX ADMIN — POLYETHYLENE GLYCOL 3350 1 PACKET: 17 POWDER, FOR SOLUTION ORAL at 05:07

## 2021-01-01 RX ADMIN — DOCUSATE SODIUM 50 MG AND SENNOSIDES 8.6 MG 2 TABLET: 8.6; 5 TABLET, FILM COATED ORAL at 05:11

## 2021-01-01 RX ADMIN — FENTANYL CITRATE 100 MCG: 50 INJECTION, SOLUTION INTRAMUSCULAR; INTRAVENOUS at 22:46

## 2021-01-01 RX ADMIN — ACETAMINOPHEN 650 MG: 325 TABLET, FILM COATED ORAL at 11:54

## 2021-01-01 RX ADMIN — MINERAL OIL, PETROLATUM 1 APPLICATION: 425; 573 OINTMENT OPHTHALMIC at 23:40

## 2021-01-01 RX ADMIN — VORICONAZOLE 200 MG: 40 POWDER, FOR SUSPENSION ORAL at 05:57

## 2021-01-01 RX ADMIN — MIDAZOLAM HYDROCHLORIDE 3 MG: 1 INJECTION, SOLUTION INTRAMUSCULAR; INTRAVENOUS at 14:30

## 2021-01-01 RX ADMIN — POLYETHYLENE GLYCOL 3350 1 PACKET: 17 POWDER, FOR SOLUTION ORAL at 04:42

## 2021-01-01 RX ADMIN — PIPERACILLIN AND TAZOBACTAM 4.5 G: 4; .5 INJECTION, POWDER, LYOPHILIZED, FOR SOLUTION INTRAVENOUS; PARENTERAL at 10:47

## 2021-01-01 RX ADMIN — GUAIFENESIN 600 MG: 600 TABLET, EXTENDED RELEASE ORAL at 05:10

## 2021-01-01 RX ADMIN — BUDESONIDE AND FORMOTEROL FUMARATE DIHYDRATE 2 PUFF: 160; 4.5 AEROSOL RESPIRATORY (INHALATION) at 07:05

## 2021-01-01 RX ADMIN — BARICITINIB 4 MG: 2 TABLET, FILM COATED ORAL at 17:19

## 2021-01-01 RX ADMIN — INSULIN HUMAN 3 UNITS: 100 INJECTION, SOLUTION PARENTERAL at 05:37

## 2021-01-01 RX ADMIN — HYDROCORTISONE SODIUM SUCCINATE 50 MG: 100 INJECTION, POWDER, FOR SOLUTION INTRAMUSCULAR; INTRAVENOUS at 23:58

## 2021-01-01 RX ADMIN — Medication 1 MG/HR: at 01:32

## 2021-01-01 RX ADMIN — SULFAMETHOXAZOLE AND TRIMETHOPRIM 2 TABLET: 800; 160 TABLET ORAL at 11:45

## 2021-01-01 RX ADMIN — DEXMEDETOMIDINE HYDROCHLORIDE 0.2 MCG/KG/HR: 100 INJECTION, SOLUTION INTRAVENOUS at 01:28

## 2021-01-01 RX ADMIN — NALOXONE HYDROCHLORIDE 4 MG: 1 INJECTION PARENTERAL at 05:12

## 2021-01-01 RX ADMIN — PIPERACILLIN AND TAZOBACTAM 4.5 G: 4; .5 INJECTION, POWDER, LYOPHILIZED, FOR SOLUTION INTRAVENOUS; PARENTERAL at 21:52

## 2021-01-01 RX ADMIN — HYDROCORTISONE SODIUM SUCCINATE 50 MG: 100 INJECTION, POWDER, FOR SOLUTION INTRAMUSCULAR; INTRAVENOUS at 17:49

## 2021-01-01 RX ADMIN — ACETAMINOPHEN 650 MG: 325 TABLET, FILM COATED ORAL at 15:54

## 2021-01-01 RX ADMIN — ENOXAPARIN SODIUM 40 MG: 40 INJECTION SUBCUTANEOUS at 05:03

## 2021-01-01 RX ADMIN — FAMOTIDINE 20 MG: 20 TABLET ORAL at 06:00

## 2021-01-01 RX ADMIN — MIDAZOLAM HYDROCHLORIDE 5 MG: 1 INJECTION, SOLUTION INTRAMUSCULAR; INTRAVENOUS at 17:10

## 2021-01-01 RX ADMIN — BARICITINIB 4 MG: 2 TABLET, FILM COATED ORAL at 17:05

## 2021-01-01 RX ADMIN — CEFEPIME 2 G: 2 INJECTION, POWDER, FOR SOLUTION INTRAVENOUS at 22:58

## 2021-01-01 RX ADMIN — MIDAZOLAM 15 MG/HR: 5 INJECTION INTRAMUSCULAR; INTRAVENOUS at 07:39

## 2021-01-01 RX ADMIN — BISACODYL 10 MG: 10 SUPPOSITORY RECTAL at 14:37

## 2021-01-01 RX ADMIN — GUAIFENESIN 600 MG: 600 TABLET, EXTENDED RELEASE ORAL at 05:03

## 2021-01-01 RX ADMIN — MINERAL OIL, PETROLATUM 1 APPLICATION: 425; 573 OINTMENT OPHTHALMIC at 05:03

## 2021-01-01 RX ADMIN — FUROSEMIDE 40 MG: 10 INJECTION INTRAMUSCULAR; INTRAVENOUS at 17:54

## 2021-01-01 RX ADMIN — SODIUM BICARBONATE 50 MEQ: 84 INJECTION, SOLUTION INTRAVENOUS at 08:48

## 2021-01-01 RX ADMIN — SULFAMETHOXAZOLE AND TRIMETHOPRIM 2 TABLET: 800; 160 TABLET ORAL at 22:58

## 2021-01-01 RX ADMIN — NALOXONE HYDROCHLORIDE 4 MG: 1 INJECTION PARENTERAL at 22:59

## 2021-01-01 RX ADMIN — MAGNESIUM HYDROXIDE 30 ML: 400 SUSPENSION ORAL at 08:36

## 2021-01-01 RX ADMIN — OXYCODONE HYDROCHLORIDE AND ACETAMINOPHEN 500 MG: 500 TABLET ORAL at 05:10

## 2021-01-01 RX ADMIN — NOREPINEPHRINE BITARTRATE 5 MCG/MIN: 1 INJECTION, SOLUTION, CONCENTRATE INTRAVENOUS at 18:23

## 2021-01-01 RX ADMIN — PROPOFOL 60 MCG/KG/MIN: 10 INJECTION, EMULSION INTRAVENOUS at 03:39

## 2021-01-01 RX ADMIN — DEXMEDETOMIDINE 0.4 MCG/KG/HR: 200 INJECTION, SOLUTION INTRAVENOUS at 12:04

## 2021-01-01 RX ADMIN — ENOXAPARIN SODIUM 40 MG: 40 INJECTION SUBCUTANEOUS at 04:59

## 2021-01-01 RX ADMIN — DEXMEDETOMIDINE 1.5 MCG/KG/HR: 200 INJECTION, SOLUTION INTRAVENOUS at 04:25

## 2021-01-01 RX ADMIN — Medication 5 MG: at 20:59

## 2021-01-01 RX ADMIN — BARICITINIB 4 MG: 2 TABLET, FILM COATED ORAL at 17:40

## 2021-01-01 RX ADMIN — DEXAMETHASONE 6 MG: 4 TABLET ORAL at 06:00

## 2021-01-01 RX ADMIN — OXYCODONE HYDROCHLORIDE AND ACETAMINOPHEN 500 MG: 500 TABLET ORAL at 05:42

## 2021-01-01 RX ADMIN — HYDROMORPHONE HYDROCHLORIDE 0.5 MG: 1 INJECTION, SOLUTION INTRAMUSCULAR; INTRAVENOUS; SUBCUTANEOUS at 01:24

## 2021-01-01 RX ADMIN — FAMOTIDINE 20 MG: 20 TABLET ORAL at 05:31

## 2021-01-01 RX ADMIN — BUDESONIDE AND FORMOTEROL FUMARATE DIHYDRATE 2 PUFF: 160; 4.5 AEROSOL RESPIRATORY (INHALATION) at 07:37

## 2021-01-01 RX ADMIN — HYDROMORPHONE HYDROCHLORIDE 5 MG/HR: 10 INJECTION INTRAMUSCULAR; INTRAVENOUS; SUBCUTANEOUS at 10:44

## 2021-01-01 RX ADMIN — POTASSIUM BICARBONATE 25 MEQ: 978 TABLET, EFFERVESCENT ORAL at 08:25

## 2021-01-01 RX ADMIN — Medication 250 MCG/HR: at 20:58

## 2021-01-01 RX ADMIN — SODIUM CHLORIDE 3 G: 900 INJECTION INTRAVENOUS at 12:54

## 2021-01-01 RX ADMIN — FUROSEMIDE 40 MG: 10 INJECTION INTRAMUSCULAR; INTRAVENOUS at 05:29

## 2021-01-01 RX ADMIN — DOCUSATE SODIUM 50 MG AND SENNOSIDES 8.6 MG 2 TABLET: 8.6; 5 TABLET, FILM COATED ORAL at 17:04

## 2021-01-01 RX ADMIN — HYDROCORTISONE SODIUM SUCCINATE 50 MG: 100 INJECTION, POWDER, FOR SOLUTION INTRAMUSCULAR; INTRAVENOUS at 21:14

## 2021-01-01 RX ADMIN — ROCURONIUM BROMIDE 100 MG: 10 INJECTION, SOLUTION INTRAVENOUS at 13:33

## 2021-01-01 RX ADMIN — Medication 2000 UNITS: at 05:13

## 2021-01-01 RX ADMIN — BARICITINIB 4 MG: 2 TABLET, FILM COATED ORAL at 17:54

## 2021-01-01 RX ADMIN — MINERAL OIL, PETROLATUM 1 APPLICATION: 425; 573 OINTMENT OPHTHALMIC at 05:31

## 2021-01-01 RX ADMIN — CEFEPIME 2 G: 2 INJECTION, POWDER, FOR SOLUTION INTRAVENOUS at 05:35

## 2021-01-01 RX ADMIN — DOCUSATE SODIUM 50 MG AND SENNOSIDES 8.6 MG 2 TABLET: 8.6; 5 TABLET, FILM COATED ORAL at 05:40

## 2021-01-01 RX ADMIN — NALOXONE HYDROCHLORIDE 4 MG: 1 INJECTION PARENTERAL at 05:05

## 2021-01-01 RX ADMIN — ROCURONIUM BROMIDE 6 MCG/KG/MIN: 10 INJECTION, SOLUTION INTRAVENOUS at 18:44

## 2021-01-01 RX ADMIN — HYDROCORTISONE SODIUM SUCCINATE 50 MG: 100 INJECTION, POWDER, FOR SOLUTION INTRAMUSCULAR; INTRAVENOUS at 05:07

## 2021-01-01 RX ADMIN — GUAIFENESIN 600 MG: 600 TABLET, EXTENDED RELEASE ORAL at 17:28

## 2021-01-01 RX ADMIN — ENOXAPARIN SODIUM 40 MG: 40 INJECTION SUBCUTANEOUS at 04:25

## 2021-01-01 RX ADMIN — OXYCODONE 10 MG: 5 TABLET ORAL at 07:57

## 2021-01-01 RX ADMIN — POTASSIUM CHLORIDE 40 MEQ: 1500 TABLET, EXTENDED RELEASE ORAL at 08:24

## 2021-01-01 RX ADMIN — FUROSEMIDE 40 MG: 10 INJECTION INTRAMUSCULAR; INTRAVENOUS at 04:57

## 2021-01-01 RX ADMIN — Medication 2000 UNITS: at 05:06

## 2021-01-01 RX ADMIN — HYDROXYZINE HYDROCHLORIDE 25 MG: 25 TABLET, FILM COATED ORAL at 19:55

## 2021-01-01 RX ADMIN — Medication 5 MG: at 20:40

## 2021-01-01 RX ADMIN — ROCURONIUM BROMIDE 50 MG: 10 INJECTION, SOLUTION INTRAVENOUS at 13:52

## 2021-01-01 RX ADMIN — FAMOTIDINE 20 MG: 20 TABLET ORAL at 05:15

## 2021-01-01 RX ADMIN — MIDAZOLAM HYDROCHLORIDE 2 MG: 1 INJECTION INTRAMUSCULAR; INTRAVENOUS at 03:21

## 2021-01-01 RX ADMIN — HYDROCORTISONE SODIUM SUCCINATE 50 MG: 100 INJECTION, POWDER, FOR SOLUTION INTRAMUSCULAR; INTRAVENOUS at 05:04

## 2021-01-01 RX ADMIN — VORICONAZOLE 400 MG: 40 POWDER, FOR SUSPENSION ORAL at 05:07

## 2021-01-01 RX ADMIN — Medication 10 MG: at 12:56

## 2021-01-01 RX ADMIN — MIDAZOLAM HYDROCHLORIDE 2 MG: 1 INJECTION, SOLUTION INTRAMUSCULAR; INTRAVENOUS at 03:21

## 2021-01-01 RX ADMIN — BARICITINIB 4 MG: 2 TABLET, FILM COATED ORAL at 18:26

## 2021-01-01 RX ADMIN — MINERAL OIL, PETROLATUM 1 APPLICATION: 425; 573 OINTMENT OPHTHALMIC at 22:55

## 2021-01-01 RX ADMIN — AMIODARONE HYDROCHLORIDE 0.5 MG/MIN: 1.8 INJECTION, SOLUTION INTRAVENOUS at 17:32

## 2021-01-01 RX ADMIN — FENTANYL CITRATE 100 MCG: 50 INJECTION, SOLUTION INTRAMUSCULAR; INTRAVENOUS at 15:48

## 2021-01-01 RX ADMIN — FUROSEMIDE 40 MG: 10 INJECTION INTRAMUSCULAR; INTRAVENOUS at 05:49

## 2021-01-01 RX ADMIN — POLYETHYLENE GLYCOL 3350 1 PACKET: 17 POWDER, FOR SOLUTION ORAL at 17:03

## 2021-01-01 RX ADMIN — Medication 2000 UNITS: at 05:34

## 2021-01-01 RX ADMIN — CEFEPIME 2 G: 2 INJECTION, POWDER, FOR SOLUTION INTRAVENOUS at 22:52

## 2021-01-01 RX ADMIN — BARICITINIB 4 MG: 2 TABLET, FILM COATED ORAL at 17:00

## 2021-01-01 RX ADMIN — POTASSIUM CHLORIDE 40 MEQ: 1500 TABLET, EXTENDED RELEASE ORAL at 17:05

## 2021-01-01 RX ADMIN — DOCUSATE SODIUM 50 MG AND SENNOSIDES 8.6 MG 2 TABLET: 8.6; 5 TABLET, FILM COATED ORAL at 04:59

## 2021-01-01 RX ADMIN — MIDAZOLAM 8 MG/HR: 5 INJECTION INTRAMUSCULAR; INTRAVENOUS at 20:11

## 2021-01-01 RX ADMIN — FUROSEMIDE 40 MG: 10 INJECTION INTRAMUSCULAR; INTRAVENOUS at 16:29

## 2021-01-01 RX ADMIN — DOCUSATE SODIUM 50 MG AND SENNOSIDES 8.6 MG 2 TABLET: 8.6; 5 TABLET, FILM COATED ORAL at 17:55

## 2021-01-01 RX ADMIN — FUROSEMIDE 40 MG: 10 INJECTION INTRAMUSCULAR; INTRAVENOUS at 17:19

## 2021-01-01 RX ADMIN — BUDESONIDE AND FORMOTEROL FUMARATE DIHYDRATE 2 PUFF: 160; 4.5 AEROSOL RESPIRATORY (INHALATION) at 20:11

## 2021-01-01 RX ADMIN — HYDROCORTISONE SODIUM SUCCINATE 50 MG: 100 INJECTION, POWDER, FOR SOLUTION INTRAMUSCULAR; INTRAVENOUS at 17:56

## 2021-01-01 RX ADMIN — FUROSEMIDE 40 MG: 10 INJECTION INTRAMUSCULAR; INTRAVENOUS at 14:46

## 2021-01-01 RX ADMIN — MINERAL OIL, PETROLATUM 1 APPLICATION: 425; 573 OINTMENT OPHTHALMIC at 05:14

## 2021-01-01 RX ADMIN — PIPERACILLIN AND TAZOBACTAM 4.5 G: 4; .5 INJECTION, POWDER, LYOPHILIZED, FOR SOLUTION INTRAVENOUS; PARENTERAL at 05:12

## 2021-01-01 RX ADMIN — MIDAZOLAM 2 MG/HR: 5 INJECTION INTRAMUSCULAR; INTRAVENOUS at 18:42

## 2021-01-01 RX ADMIN — NALOXONE HYDROCHLORIDE 4 MG: 1 INJECTION PARENTERAL at 13:05

## 2021-01-01 RX ADMIN — BUDESONIDE AND FORMOTEROL FUMARATE DIHYDRATE 2 PUFF: 160; 4.5 AEROSOL RESPIRATORY (INHALATION) at 20:17

## 2021-01-01 RX ADMIN — OXYCODONE 10 MG: 5 TABLET ORAL at 20:22

## 2021-01-01 RX ADMIN — HYDROCORTISONE SODIUM SUCCINATE 50 MG: 100 INJECTION, POWDER, FOR SOLUTION INTRAMUSCULAR; INTRAVENOUS at 11:40

## 2021-01-01 RX ADMIN — MINERAL OIL, PETROLATUM 1 APPLICATION: 425; 573 OINTMENT OPHTHALMIC at 14:00

## 2021-01-01 RX ADMIN — AMIODARONE HYDROCHLORIDE 0.5 MG/MIN: 1.8 INJECTION, SOLUTION INTRAVENOUS at 19:30

## 2021-01-01 RX ADMIN — FUROSEMIDE 40 MG: 10 INJECTION INTRAMUSCULAR; INTRAVENOUS at 05:19

## 2021-01-01 RX ADMIN — DEXAMETHASONE 6 MG: 6 TABLET ORAL at 04:32

## 2021-01-01 RX ADMIN — NALOXONE HYDROCHLORIDE 4 MG: 1 INJECTION PARENTERAL at 13:54

## 2021-01-01 RX ADMIN — MIDAZOLAM 5 MG/HR: 5 INJECTION INTRAMUSCULAR; INTRAVENOUS at 06:03

## 2021-01-01 RX ADMIN — HYDROXYZINE HYDROCHLORIDE 25 MG: 25 TABLET, FILM COATED ORAL at 05:55

## 2021-01-01 RX ADMIN — HYDROXYZINE HYDROCHLORIDE 25 MG: 25 TABLET, FILM COATED ORAL at 21:54

## 2021-01-01 RX ADMIN — ACETAMINOPHEN 650 MG: 325 TABLET, FILM COATED ORAL at 21:10

## 2021-01-01 RX ADMIN — DEXAMETHASONE 6 MG: 6 TABLET ORAL at 05:04

## 2021-01-01 RX ADMIN — HYDROCORTISONE SODIUM SUCCINATE 50 MG: 100 INJECTION, POWDER, FOR SOLUTION INTRAMUSCULAR; INTRAVENOUS at 05:36

## 2021-01-01 RX ADMIN — VORICONAZOLE 200 MG: 40 POWDER, FOR SUSPENSION ORAL at 19:03

## 2021-01-01 RX ADMIN — NOREPINEPHRINE BITARTRATE 2 MCG/MIN: 1 INJECTION, SOLUTION, CONCENTRATE INTRAVENOUS at 11:30

## 2021-01-01 RX ADMIN — DEXAMETHASONE 6 MG: 4 TABLET ORAL at 16:14

## 2021-01-01 RX ADMIN — MINERAL OIL, PETROLATUM 1 APPLICATION: 425; 573 OINTMENT OPHTHALMIC at 15:13

## 2021-01-01 RX ADMIN — SULFAMETHOXAZOLE AND TRIMETHOPRIM 2 TABLET: 800; 160 TABLET ORAL at 04:42

## 2021-01-01 RX ADMIN — ENOXAPARIN SODIUM 40 MG: 40 INJECTION SUBCUTANEOUS at 05:56

## 2021-01-01 RX ADMIN — HYDROMORPHONE HYDROCHLORIDE 2 MG: 1 INJECTION, SOLUTION INTRAMUSCULAR; INTRAVENOUS; SUBCUTANEOUS at 03:44

## 2021-01-01 RX ADMIN — CEFEPIME 2 G: 2 INJECTION, POWDER, FOR SOLUTION INTRAVENOUS at 13:49

## 2021-01-01 RX ADMIN — MINERAL OIL, PETROLATUM 1 APPLICATION: 425; 573 OINTMENT OPHTHALMIC at 05:05

## 2021-01-01 RX ADMIN — BUDESONIDE AND FORMOTEROL FUMARATE DIHYDRATE 2 PUFF: 160; 4.5 AEROSOL RESPIRATORY (INHALATION) at 07:58

## 2021-01-01 RX ADMIN — DEXAMETHASONE 6 MG: 6 TABLET ORAL at 06:00

## 2021-01-01 RX ADMIN — DEXAMETHASONE 6 MG: 4 TABLET ORAL at 04:59

## 2021-01-01 RX ADMIN — ROCURONIUM BROMIDE 50 MG: 10 INJECTION, SOLUTION INTRAVENOUS at 09:11

## 2021-01-01 RX ADMIN — METHYLNALTREXONE BROMIDE 12 MG: 12 INJECTION, SOLUTION SUBCUTANEOUS at 09:48

## 2021-01-01 RX ADMIN — INSULIN HUMAN 3 UNITS: 100 INJECTION, SOLUTION PARENTERAL at 17:59

## 2021-01-01 RX ADMIN — FUROSEMIDE 40 MG: 10 INJECTION INTRAMUSCULAR; INTRAVENOUS at 17:06

## 2021-01-01 RX ADMIN — DOCUSATE SODIUM 50 MG AND SENNOSIDES 8.6 MG 2 TABLET: 8.6; 5 TABLET, FILM COATED ORAL at 17:27

## 2021-01-01 RX ADMIN — ENOXAPARIN SODIUM 40 MG: 40 INJECTION SUBCUTANEOUS at 05:05

## 2021-01-01 RX ADMIN — DEXMEDETOMIDINE 1.4 MCG/KG/HR: 200 INJECTION, SOLUTION INTRAVENOUS at 00:40

## 2021-01-01 RX ADMIN — CALCIUM GLUCONATE 2 G: 20 INJECTION, SOLUTION INTRAVENOUS at 08:48

## 2021-01-01 RX ADMIN — ROCURONIUM BROMIDE 8 MCG/KG/MIN: 10 INJECTION, SOLUTION INTRAVENOUS at 00:59

## 2021-01-01 RX ADMIN — Medication 250 MCG/HR: at 10:00

## 2021-01-01 RX ADMIN — NALOXONE HYDROCHLORIDE 4 MG: 1 INJECTION PARENTERAL at 22:52

## 2021-01-01 RX ADMIN — DEXAMETHASONE 6 MG: 6 TABLET ORAL at 04:24

## 2021-01-01 RX ADMIN — DOCUSATE SODIUM 50 MG AND SENNOSIDES 8.6 MG 2 TABLET: 8.6; 5 TABLET, FILM COATED ORAL at 06:00

## 2021-01-01 RX ADMIN — DEXAMETHASONE 6 MG: 6 TABLET ORAL at 05:18

## 2021-01-01 RX ADMIN — ACETAMINOPHEN 650 MG: 325 TABLET, FILM COATED ORAL at 17:48

## 2021-01-01 RX ADMIN — HYDROCORTISONE SODIUM SUCCINATE 100 MG: 100 INJECTION, POWDER, FOR SOLUTION INTRAMUSCULAR; INTRAVENOUS at 22:08

## 2021-01-01 RX ADMIN — ROCURONIUM BROMIDE 50 MG: 10 INJECTION, SOLUTION INTRAVENOUS at 17:14

## 2021-01-01 RX ADMIN — DOCUSATE SODIUM 50 MG AND SENNOSIDES 8.6 MG 2 TABLET: 8.6; 5 TABLET, FILM COATED ORAL at 18:26

## 2021-01-01 RX ADMIN — DEXAMETHASONE 6 MG: 4 TABLET ORAL at 05:04

## 2021-01-01 RX ADMIN — PROPOFOL 40 MCG/KG/MIN: 10 INJECTION, EMULSION INTRAVENOUS at 02:21

## 2021-01-01 RX ADMIN — FENTANYL CITRATE 100 MCG/HR: 50 INJECTION, SOLUTION INTRAMUSCULAR; INTRAVENOUS at 13:09

## 2021-01-01 RX ADMIN — CEFTRIAXONE SODIUM 1 G: 1 INJECTION, POWDER, FOR SOLUTION INTRAMUSCULAR; INTRAVENOUS at 13:39

## 2021-01-01 RX ADMIN — HYDROMORPHONE HYDROCHLORIDE 0.5 MG: 1 INJECTION, SOLUTION INTRAMUSCULAR; INTRAVENOUS; SUBCUTANEOUS at 02:27

## 2021-01-01 RX ADMIN — FAMOTIDINE 20 MG: 20 TABLET ORAL at 05:13

## 2021-01-01 RX ADMIN — ENOXAPARIN SODIUM 40 MG: 40 INJECTION SUBCUTANEOUS at 05:58

## 2021-01-01 RX ADMIN — ENOXAPARIN SODIUM 40 MG: 40 INJECTION SUBCUTANEOUS at 05:35

## 2021-01-01 RX ADMIN — ENOXAPARIN SODIUM 40 MG: 40 INJECTION SUBCUTANEOUS at 05:10

## 2021-01-01 RX ADMIN — DOCUSATE SODIUM 50 MG AND SENNOSIDES 8.6 MG 2 TABLET: 8.6; 5 TABLET, FILM COATED ORAL at 05:19

## 2021-01-01 RX ADMIN — ROCURONIUM BROMIDE 4 MCG/KG/MIN: 10 INJECTION, SOLUTION INTRAVENOUS at 18:17

## 2021-01-01 RX ADMIN — FENTANYL CITRATE 200 MCG/HR: 50 INJECTION, SOLUTION INTRAMUSCULAR; INTRAVENOUS at 15:39

## 2021-01-01 RX ADMIN — AZITHROMYCIN MONOHYDRATE 500 MG: 250 TABLET ORAL at 05:16

## 2021-01-01 RX ADMIN — OXYCODONE HYDROCHLORIDE AND ACETAMINOPHEN 500 MG: 500 TABLET ORAL at 05:08

## 2021-01-01 RX ADMIN — AMOXICILLIN AND CLAVULANATE POTASSIUM 1 TABLET: 875; 125 TABLET, FILM COATED ORAL at 06:00

## 2021-01-01 RX ADMIN — PROPOFOL 60 MCG/KG/MIN: 10 INJECTION, EMULSION INTRAVENOUS at 06:22

## 2021-01-01 RX ADMIN — DOCUSATE SODIUM 50 MG AND SENNOSIDES 8.6 MG 2 TABLET: 8.6; 5 TABLET, FILM COATED ORAL at 05:07

## 2021-01-01 RX ADMIN — VORICONAZOLE 200 MG: 40 POWDER, FOR SUSPENSION ORAL at 04:45

## 2021-01-01 RX ADMIN — POTASSIUM BICARBONATE 25 MEQ: 978 TABLET, EFFERVESCENT ORAL at 10:40

## 2021-01-01 RX ADMIN — VORICONAZOLE 200 MG: 40 POWDER, FOR SUSPENSION ORAL at 05:04

## 2021-01-01 RX ADMIN — INSULIN HUMAN 3 UNITS: 100 INJECTION, SOLUTION PARENTERAL at 05:20

## 2021-01-01 RX ADMIN — OXYCODONE HYDROCHLORIDE AND ACETAMINOPHEN 500 MG: 500 TABLET ORAL at 05:19

## 2021-01-01 RX ADMIN — ENOXAPARIN SODIUM 40 MG: 40 INJECTION SUBCUTANEOUS at 05:31

## 2021-01-01 RX ADMIN — DEXAMETHASONE 6 MG: 6 TABLET ORAL at 05:42

## 2021-01-01 RX ADMIN — ACETAMINOPHEN 650 MG: 325 TABLET, FILM COATED ORAL at 00:08

## 2021-01-01 RX ADMIN — AMIODARONE HYDROCHLORIDE 0.5 MG/MIN: 1.8 INJECTION, SOLUTION INTRAVENOUS at 04:04

## 2021-01-01 RX ADMIN — FENTANYL CITRATE 100 MCG/HR: 50 INJECTION, SOLUTION INTRAMUSCULAR; INTRAVENOUS at 14:19

## 2021-01-01 RX ADMIN — HYDROCORTISONE SODIUM SUCCINATE 50 MG: 100 INJECTION, POWDER, FOR SOLUTION INTRAMUSCULAR; INTRAVENOUS at 12:34

## 2021-01-01 RX ADMIN — PIPERACILLIN AND TAZOBACTAM 4.5 G: 4; .5 INJECTION, POWDER, LYOPHILIZED, FOR SOLUTION INTRAVENOUS; PARENTERAL at 21:05

## 2021-01-01 RX ADMIN — FENTANYL CITRATE 200 MCG: 50 INJECTION, SOLUTION INTRAMUSCULAR; INTRAVENOUS at 13:54

## 2021-01-01 RX ADMIN — MIDAZOLAM 10 MG/HR: 5 INJECTION INTRAMUSCULAR; INTRAVENOUS at 22:14

## 2021-01-01 RX ADMIN — VORICONAZOLE 200 MG: 40 POWDER, FOR SUSPENSION ORAL at 17:23

## 2021-01-01 RX ADMIN — FENTANYL CITRATE: 50 INJECTION, SOLUTION INTRAMUSCULAR; INTRAVENOUS at 18:40

## 2021-01-01 RX ADMIN — MIDAZOLAM 8 MG/HR: 5 INJECTION INTRAMUSCULAR; INTRAVENOUS at 08:32

## 2021-01-01 RX ADMIN — DEXMEDETOMIDINE 1.4 MCG/KG/HR: 200 INJECTION, SOLUTION INTRAVENOUS at 17:09

## 2021-01-01 RX ADMIN — MIDAZOLAM 15 MG/HR: 5 INJECTION INTRAMUSCULAR; INTRAVENOUS at 03:17

## 2021-01-01 RX ADMIN — GUAIFENESIN 600 MG: 600 TABLET, EXTENDED RELEASE ORAL at 05:51

## 2021-01-01 RX ADMIN — FENTANYL CITRATE 100 MCG: 50 INJECTION, SOLUTION INTRAMUSCULAR; INTRAVENOUS at 09:49

## 2021-01-01 RX ADMIN — MINERAL OIL, PETROLATUM 1 APPLICATION: 425; 573 OINTMENT OPHTHALMIC at 23:12

## 2021-01-01 RX ADMIN — INSULIN HUMAN 3 UNITS: 100 INJECTION, SOLUTION PARENTERAL at 12:28

## 2021-01-01 RX ADMIN — ROCURONIUM BROMIDE 50 MG: 10 INJECTION, SOLUTION INTRAVENOUS at 19:06

## 2021-01-01 RX ADMIN — HYDROMORPHONE HYDROCHLORIDE 0.5 MG: 1 INJECTION, SOLUTION INTRAMUSCULAR; INTRAVENOUS; SUBCUTANEOUS at 21:07

## 2021-01-01 RX ADMIN — CEFTRIAXONE SODIUM 1 G: 1 INJECTION, POWDER, FOR SOLUTION INTRAMUSCULAR; INTRAVENOUS at 12:23

## 2021-01-01 RX ADMIN — HYDROCORTISONE SODIUM SUCCINATE 50 MG: 100 INJECTION, POWDER, FOR SOLUTION INTRAMUSCULAR; INTRAVENOUS at 23:23

## 2021-01-01 RX ADMIN — MINERAL OIL, PETROLATUM 1 APPLICATION: 425; 573 OINTMENT OPHTHALMIC at 05:22

## 2021-01-01 RX ADMIN — FUROSEMIDE 40 MG: 10 INJECTION INTRAMUSCULAR; INTRAVENOUS at 16:41

## 2021-01-01 RX ADMIN — ACETAMINOPHEN 1000 MG: 500 TABLET ORAL at 17:06

## 2021-01-01 RX ADMIN — HYDROCORTISONE SODIUM SUCCINATE 50 MG: 100 INJECTION, POWDER, FOR SOLUTION INTRAMUSCULAR; INTRAVENOUS at 01:00

## 2021-01-01 RX ADMIN — INSULIN HUMAN 3 UNITS: 100 INJECTION, SOLUTION PARENTERAL at 23:35

## 2021-01-01 RX ADMIN — MIDAZOLAM 15 MG/HR: 5 INJECTION INTRAMUSCULAR; INTRAVENOUS at 14:04

## 2021-01-01 RX ADMIN — INSULIN HUMAN 3 UNITS: 100 INJECTION, SOLUTION PARENTERAL at 17:38

## 2021-01-01 RX ADMIN — SULFAMETHOXAZOLE AND TRIMETHOPRIM 2 TABLET: 800; 160 TABLET ORAL at 17:17

## 2021-01-01 RX ADMIN — DOCUSATE SODIUM 50 MG AND SENNOSIDES 8.6 MG 2 TABLET: 8.6; 5 TABLET, FILM COATED ORAL at 05:00

## 2021-01-01 RX ADMIN — HYDROCORTISONE SODIUM SUCCINATE 50 MG: 100 INJECTION, POWDER, FOR SOLUTION INTRAMUSCULAR; INTRAVENOUS at 17:04

## 2021-01-01 RX ADMIN — Medication 2000 UNITS: at 05:52

## 2021-01-01 RX ADMIN — ENOXAPARIN SODIUM 40 MG: 40 INJECTION SUBCUTANEOUS at 05:16

## 2021-01-01 RX ADMIN — POLYETHYLENE GLYCOL 3350 1 PACKET: 17 POWDER, FOR SOLUTION ORAL at 05:03

## 2021-01-01 RX ADMIN — ACETAMINOPHEN 1000 MG: 500 TABLET ORAL at 14:31

## 2021-01-01 RX ADMIN — DIBASIC SODIUM PHOSPHATE, MONOBASIC POTASSIUM PHOSPHATE AND MONOBASIC SODIUM PHOSPHATE 500 MG: 852; 155; 130 TABLET ORAL at 22:27

## 2021-01-01 RX ADMIN — FAMOTIDINE 20 MG: 20 TABLET ORAL at 17:55

## 2021-01-01 RX ADMIN — ACETAMINOPHEN 1000 MG: 500 TABLET ORAL at 11:32

## 2021-01-01 RX ADMIN — MINERAL OIL, PETROLATUM 1 APPLICATION: 425; 573 OINTMENT OPHTHALMIC at 21:50

## 2021-01-01 RX ADMIN — FUROSEMIDE 40 MG: 10 INJECTION INTRAMUSCULAR; INTRAVENOUS at 05:03

## 2021-01-01 RX ADMIN — GUAIFENESIN 600 MG: 600 TABLET, EXTENDED RELEASE ORAL at 05:08

## 2021-01-01 RX ADMIN — DOCUSATE SODIUM 50 MG AND SENNOSIDES 8.6 MG 2 TABLET: 8.6; 5 TABLET, FILM COATED ORAL at 18:16

## 2021-01-01 RX ADMIN — ROCURONIUM BROMIDE 1 MCG/KG/MIN: 10 INJECTION, SOLUTION INTRAVENOUS at 21:28

## 2021-01-01 RX ADMIN — MINERAL OIL, PETROLATUM 1 APPLICATION: 425; 573 OINTMENT OPHTHALMIC at 13:34

## 2021-01-01 RX ADMIN — NOREPINEPHRINE BITARTRATE 15 MCG/MIN: 1 INJECTION, SOLUTION, CONCENTRATE INTRAVENOUS at 02:25

## 2021-01-01 RX ADMIN — ETOMIDATE 20 MG: 2 INJECTION INTRAVENOUS at 13:33

## 2021-01-01 RX ADMIN — MIDAZOLAM HYDROCHLORIDE 1 MG: 1 INJECTION, SOLUTION INTRAMUSCULAR; INTRAVENOUS at 23:01

## 2021-01-01 RX ADMIN — SODIUM CHLORIDE 3 G: 900 INJECTION INTRAVENOUS at 05:17

## 2021-01-01 RX ADMIN — FUROSEMIDE 40 MG: 10 INJECTION INTRAMUSCULAR; INTRAVENOUS at 05:11

## 2021-01-01 RX ADMIN — MIDAZOLAM HYDROCHLORIDE 5 MG: 1 INJECTION, SOLUTION INTRAMUSCULAR; INTRAVENOUS at 07:34

## 2021-01-01 RX ADMIN — FAMOTIDINE 20 MG: 20 TABLET ORAL at 05:03

## 2021-01-01 RX ADMIN — DEXAMETHASONE 6 MG: 6 TABLET ORAL at 19:48

## 2021-01-01 RX ADMIN — SODIUM CHLORIDE, POTASSIUM CHLORIDE, SODIUM LACTATE AND CALCIUM CHLORIDE 500 ML: 600; 310; 30; 20 INJECTION, SOLUTION INTRAVENOUS at 11:30

## 2021-01-01 RX ADMIN — BUDESONIDE AND FORMOTEROL FUMARATE DIHYDRATE 2 PUFF: 160; 4.5 AEROSOL RESPIRATORY (INHALATION) at 08:06

## 2021-01-01 RX ADMIN — FUROSEMIDE 20 MG: 10 INJECTION, SOLUTION INTRAVENOUS at 16:42

## 2021-01-01 RX ADMIN — ROCURONIUM BROMIDE 50 MG: 10 INJECTION, SOLUTION INTRAVENOUS at 16:55

## 2021-01-01 RX ADMIN — MIDAZOLAM HYDROCHLORIDE 5 MG: 1 INJECTION, SOLUTION INTRAMUSCULAR; INTRAVENOUS at 11:22

## 2021-01-01 RX ADMIN — FAMOTIDINE 20 MG: 20 TABLET ORAL at 04:46

## 2021-01-01 RX ADMIN — HYDROXYZINE HYDROCHLORIDE 25 MG: 25 TABLET, FILM COATED ORAL at 10:06

## 2021-01-01 RX ADMIN — OXYCODONE HYDROCHLORIDE AND ACETAMINOPHEN 500 MG: 500 TABLET ORAL at 17:11

## 2021-01-01 RX ADMIN — Medication 2000 UNITS: at 04:25

## 2021-01-01 RX ADMIN — MIDAZOLAM HYDROCHLORIDE 5 MG: 1 INJECTION, SOLUTION INTRAMUSCULAR; INTRAVENOUS at 05:41

## 2021-01-01 RX ADMIN — Medication 2000 UNITS: at 04:57

## 2021-01-01 RX ADMIN — FAMOTIDINE 20 MG: 10 INJECTION INTRAVENOUS at 06:01

## 2021-01-01 RX ADMIN — HYDROCORTISONE SODIUM SUCCINATE 50 MG: 100 INJECTION, POWDER, FOR SOLUTION INTRAMUSCULAR; INTRAVENOUS at 17:19

## 2021-01-01 RX ADMIN — BUDESONIDE AND FORMOTEROL FUMARATE DIHYDRATE 2 PUFF: 160; 4.5 AEROSOL RESPIRATORY (INHALATION) at 10:25

## 2021-01-01 RX ADMIN — PROPOFOL 50 MCG/KG/MIN: 10 INJECTION, EMULSION INTRAVENOUS at 20:08

## 2021-01-01 RX ADMIN — MIDAZOLAM HYDROCHLORIDE 5 MG: 1 INJECTION, SOLUTION INTRAMUSCULAR; INTRAVENOUS at 06:55

## 2021-01-01 RX ADMIN — MIDAZOLAM 10 MG/HR: 5 INJECTION INTRAMUSCULAR; INTRAVENOUS at 10:33

## 2021-01-01 RX ADMIN — SODIUM CHLORIDE 500 ML: 4.5 INJECTION, SOLUTION INTRAVENOUS at 18:36

## 2021-01-01 RX ADMIN — HYDROCORTISONE SODIUM SUCCINATE 50 MG: 100 INJECTION, POWDER, FOR SOLUTION INTRAMUSCULAR; INTRAVENOUS at 04:46

## 2021-01-01 RX ADMIN — MIDAZOLAM 8 MG/HR: 5 INJECTION INTRAMUSCULAR; INTRAVENOUS at 13:43

## 2021-01-01 RX ADMIN — HYDROMORPHONE HYDROCHLORIDE 0.5 MG: 1 INJECTION, SOLUTION INTRAMUSCULAR; INTRAVENOUS; SUBCUTANEOUS at 00:47

## 2021-01-01 RX ADMIN — FENTANYL CITRATE 100 MCG: 50 INJECTION, SOLUTION INTRAMUSCULAR; INTRAVENOUS at 15:33

## 2021-01-01 RX ADMIN — ACETAMINOPHEN 650 MG: 325 TABLET, FILM COATED ORAL at 21:05

## 2021-01-01 RX ADMIN — DIBASIC SODIUM PHOSPHATE, MONOBASIC POTASSIUM PHOSPHATE AND MONOBASIC SODIUM PHOSPHATE 500 MG: 852; 155; 130 TABLET ORAL at 12:26

## 2021-01-01 RX ADMIN — FAMOTIDINE 20 MG: 20 TABLET ORAL at 17:03

## 2021-01-01 RX ADMIN — PROPOFOL 5 MCG/KG/MIN: 10 INJECTION, EMULSION INTRAVENOUS at 17:49

## 2021-01-01 RX ADMIN — MIDAZOLAM HYDROCHLORIDE 5 MG: 1 INJECTION, SOLUTION INTRAMUSCULAR; INTRAVENOUS at 15:02

## 2021-01-01 RX ADMIN — POTASSIUM BICARBONATE 25 MEQ: 978 TABLET, EFFERVESCENT ORAL at 08:57

## 2021-01-01 RX ADMIN — ROCURONIUM BROMIDE 50 MG: 10 INJECTION, SOLUTION INTRAVENOUS at 01:33

## 2021-01-01 RX ADMIN — ACETAMINOPHEN 650 MG: 325 TABLET, FILM COATED ORAL at 09:24

## 2021-01-01 RX ADMIN — HYDROCORTISONE SODIUM SUCCINATE 50 MG: 100 INJECTION, POWDER, FOR SOLUTION INTRAMUSCULAR; INTRAVENOUS at 05:11

## 2021-01-01 RX ADMIN — INSULIN HUMAN 3 UNITS: 100 INJECTION, SOLUTION PARENTERAL at 17:10

## 2021-01-01 RX ADMIN — HYDROCORTISONE SODIUM SUCCINATE 100 MG: 100 INJECTION, POWDER, FOR SOLUTION INTRAMUSCULAR; INTRAVENOUS at 05:16

## 2021-01-01 RX ADMIN — ENOXAPARIN SODIUM 40 MG: 40 INJECTION SUBCUTANEOUS at 04:51

## 2021-01-01 RX ADMIN — NALOXONE HYDROCHLORIDE 4 MG: 1 INJECTION PARENTERAL at 15:29

## 2021-01-01 RX ADMIN — ACETAMINOPHEN 650 MG: 325 TABLET, FILM COATED ORAL at 12:51

## 2021-01-01 RX ADMIN — ENOXAPARIN SODIUM 40 MG: 40 INJECTION SUBCUTANEOUS at 05:13

## 2021-01-01 RX ADMIN — ROCURONIUM BROMIDE 50 MG: 10 INJECTION, SOLUTION INTRAVENOUS at 19:15

## 2021-01-01 RX ADMIN — MIDAZOLAM 8 MG/HR: 5 INJECTION INTRAMUSCULAR; INTRAVENOUS at 14:17

## 2021-01-01 RX ADMIN — ACETAMINOPHEN 650 MG: 325 TABLET, FILM COATED ORAL at 08:42

## 2021-01-01 RX ADMIN — Medication 5 MG: at 21:05

## 2021-01-01 RX ADMIN — MIDAZOLAM HYDROCHLORIDE 5 MG: 1 INJECTION, SOLUTION INTRAMUSCULAR; INTRAVENOUS at 23:12

## 2021-01-01 RX ADMIN — Medication 1.5 MG: at 23:02

## 2021-01-01 RX ADMIN — HYDROCORTISONE SODIUM SUCCINATE 50 MG: 100 INJECTION, POWDER, FOR SOLUTION INTRAMUSCULAR; INTRAVENOUS at 23:07

## 2021-01-01 RX ADMIN — DOCUSATE SODIUM 50 MG AND SENNOSIDES 8.6 MG 2 TABLET: 8.6; 5 TABLET, FILM COATED ORAL at 04:33

## 2021-01-01 RX ADMIN — DEXTROSE MONOHYDRATE: 50 INJECTION, SOLUTION INTRAVENOUS at 00:03

## 2021-01-01 RX ADMIN — CEFEPIME 2 G: 2 INJECTION, POWDER, FOR SOLUTION INTRAVENOUS at 21:50

## 2021-01-01 RX ADMIN — ENOXAPARIN SODIUM 40 MG: 40 INJECTION SUBCUTANEOUS at 06:01

## 2021-01-01 RX ADMIN — FENTANYL CITRATE 100 MCG: 50 INJECTION, SOLUTION INTRAMUSCULAR; INTRAVENOUS at 00:11

## 2021-01-01 RX ADMIN — MAGNESIUM SULFATE 2 G: 2 INJECTION INTRAVENOUS at 06:05

## 2021-01-01 ASSESSMENT — PAIN DESCRIPTION - PAIN TYPE
TYPE: ACUTE PAIN
TYPE: ACUTE PAIN;CHRONIC PAIN
TYPE: ACUTE PAIN
TYPE: CHRONIC PAIN;ACUTE PAIN
TYPE: ACUTE PAIN
TYPE: CHRONIC PAIN
TYPE: ACUTE PAIN
TYPE: ACUTE PAIN;CHRONIC PAIN
TYPE: ACUTE PAIN

## 2021-01-01 ASSESSMENT — ENCOUNTER SYMPTOMS
VOMITING: 0
SHORTNESS OF BREATH: 1
ABDOMINAL PAIN: 0
SHORTNESS OF BREATH: 1
COUGH: 1
HEMOPTYSIS: 0
NAUSEA: 0
VOMITING: 0
LOSS OF CONSCIOUSNESS: 0
BACK PAIN: 0
LOSS OF CONSCIOUSNESS: 0
FEVER: 0
COUGH: 1
FALLS: 0
SPUTUM PRODUCTION: 1
VOMITING: 0
ABDOMINAL PAIN: 0
LOSS OF CONSCIOUSNESS: 0
LOSS OF CONSCIOUSNESS: 0
ABDOMINAL PAIN: 0
CHILLS: 0
FOCAL WEAKNESS: 0
CLAUDICATION: 0
WEAKNESS: 1
VOMITING: 0
SPUTUM PRODUCTION: 1
HEADACHES: 0
SPUTUM PRODUCTION: 1
SPUTUM PRODUCTION: 1
DEPRESSION: 0
SHORTNESS OF BREATH: 1
WEAKNESS: 1
SPUTUM PRODUCTION: 1
SHORTNESS OF BREATH: 1
LOSS OF CONSCIOUSNESS: 0
SPUTUM PRODUCTION: 1
WEAKNESS: 1
COUGH: 1
VOMITING: 0
SHORTNESS OF BREATH: 0
FALLS: 0
COUGH: 1
DOUBLE VISION: 0
SPUTUM PRODUCTION: 0
COUGH: 1
SHORTNESS OF BREATH: 1
SHORTNESS OF BREATH: 1
COUGH: 1
SPUTUM PRODUCTION: 1
SHORTNESS OF BREATH: 1
SHORTNESS OF BREATH: 1
SPUTUM PRODUCTION: 1
MYALGIAS: 0
SHORTNESS OF BREATH: 1
ABDOMINAL PAIN: 0
EYE DISCHARGE: 0
SHORTNESS OF BREATH: 1
NAUSEA: 0
SORE THROAT: 0
ABDOMINAL PAIN: 0
COUGH: 1
COUGH: 1
NAUSEA: 0
ABDOMINAL PAIN: 0
COUGH: 1
COUGH: 1
SHORTNESS OF BREATH: 1
NAUSEA: 0
WEAKNESS: 0
SPUTUM PRODUCTION: 1
WEAKNESS: 1
SEIZURES: 0
SORE THROAT: 0
COUGH: 1
FALLS: 0
COUGH: 1
FEVER: 0
SORE THROAT: 0
COUGH: 1
SPUTUM PRODUCTION: 1
NAUSEA: 0
SORE THROAT: 0
SHORTNESS OF BREATH: 1
VOMITING: 0
LOSS OF CONSCIOUSNESS: 0
CHILLS: 1
COUGH: 1
SPUTUM PRODUCTION: 1
SORE THROAT: 0
VOMITING: 0
WEIGHT LOSS: 0
SHORTNESS OF BREATH: 1
ABDOMINAL PAIN: 0
SORE THROAT: 0
WEAKNESS: 1
SHORTNESS OF BREATH: 1
DIARRHEA: 0
SHORTNESS OF BREATH: 1
WEAKNESS: 1
FALLS: 0
FALLS: 0
SORE THROAT: 0
NAUSEA: 0
COUGH: 1
COUGH: 1
COUGH: 0
COUGH: 1
NAUSEA: 0
WEAKNESS: 1
FALLS: 0
WEAKNESS: 1

## 2021-01-01 ASSESSMENT — PULMONARY FUNCTION TESTS
EPAP_CMH2O: 10

## 2021-01-01 ASSESSMENT — PATIENT HEALTH QUESTIONNAIRE - PHQ9
SUM OF ALL RESPONSES TO PHQ9 QUESTIONS 1 AND 2: 0
1. LITTLE INTEREST OR PLEASURE IN DOING THINGS: NOT AT ALL
1. LITTLE INTEREST OR PLEASURE IN DOING THINGS: NOT AT ALL
2. FEELING DOWN, DEPRESSED, IRRITABLE, OR HOPELESS: NOT AT ALL
SUM OF ALL RESPONSES TO PHQ9 QUESTIONS 1 AND 2: 0
2. FEELING DOWN, DEPRESSED, IRRITABLE, OR HOPELESS: NOT AT ALL

## 2021-01-01 ASSESSMENT — COGNITIVE AND FUNCTIONAL STATUS - GENERAL
STANDING UP FROM CHAIR USING ARMS: TOTAL
MOVING FROM LYING ON BACK TO SITTING ON SIDE OF FLAT BED: UNABLE
MOVING FROM LYING ON BACK TO SITTING ON SIDE OF FLAT BED: A LITTLE
SUGGESTED CMS G CODE MODIFIER MOBILITY: CK
TURNING FROM BACK TO SIDE WHILE IN FLAT BAD: A LITTLE
MOVING TO AND FROM BED TO CHAIR: A LITTLE
DAILY ACTIVITIY SCORE: 6
CLIMB 3 TO 5 STEPS WITH RAILING: A LITTLE
PERSONAL GROOMING: A LITTLE
SUGGESTED CMS G CODE MODIFIER DAILY ACTIVITY: CK
DAILY ACTIVITIY SCORE: 18
STANDING UP FROM CHAIR USING ARMS: A LITTLE
WALKING IN HOSPITAL ROOM: TOTAL
DRESSING REGULAR UPPER BODY CLOTHING: TOTAL
MOBILITY SCORE: 18
PERSONAL GROOMING: TOTAL
DRESSING REGULAR LOWER BODY CLOTHING: A LITTLE
MOBILITY SCORE: 6
TOILETING: A LITTLE
TOILETING: TOTAL
DRESSING REGULAR LOWER BODY CLOTHING: TOTAL
HELP NEEDED FOR BATHING: TOTAL
MOVING TO AND FROM BED TO CHAIR: UNABLE
SUGGESTED CMS G CODE MODIFIER MOBILITY: CN
DRESSING REGULAR UPPER BODY CLOTHING: A LITTLE
HELP NEEDED FOR BATHING: A LITTLE
EATING MEALS: A LITTLE
EATING MEALS: TOTAL
WALKING IN HOSPITAL ROOM: A LITTLE
SUGGESTED CMS G CODE MODIFIER DAILY ACTIVITY: CN
TURNING FROM BACK TO SIDE WHILE IN FLAT BAD: UNABLE
CLIMB 3 TO 5 STEPS WITH RAILING: TOTAL

## 2021-01-01 ASSESSMENT — FIBROSIS 4 INDEX
FIB4 SCORE: 0.82
FIB4 SCORE: 0.55
FIB4 SCORE: 1.27
FIB4 SCORE: 0.83
FIB4 SCORE: 0.96
FIB4 SCORE: 1.16
FIB4 SCORE: 4.07
FIB4 SCORE: 0.94
FIB4 SCORE: 0.85
FIB4 SCORE: 1.28
FIB4 SCORE: 0.74
FIB4 SCORE: 1.16
FIB4 SCORE: 1.25
FIB4 SCORE: 0.89
FIB4 SCORE: 0.96
FIB4 SCORE: 1.38
FIB4 SCORE: 0.85
FIB4 SCORE: 0.85
FIB4 SCORE: 1.25
FIB4 SCORE: 0.77

## 2021-01-01 ASSESSMENT — LIFESTYLE VARIABLES
HOW MANY TIMES IN THE PAST YEAR HAVE YOU HAD 5 OR MORE DRINKS IN A DAY: 0
TOTAL SCORE: 0
EVER FELT BAD OR GUILTY ABOUT YOUR DRINKING: NO
EVER HAD A DRINK FIRST THING IN THE MORNING TO STEADY YOUR NERVES TO GET RID OF A HANGOVER: NO
SUBSTANCE_ABUSE: 0
ON A TYPICAL DAY WHEN YOU DRINK ALCOHOL HOW MANY DRINKS DO YOU HAVE: 0
HAVE PEOPLE ANNOYED YOU BY CRITICIZING YOUR DRINKING: NO
HAVE YOU EVER FELT YOU SHOULD CUT DOWN ON YOUR DRINKING: NO
AVERAGE NUMBER OF DAYS PER WEEK YOU HAVE A DRINK CONTAINING ALCOHOL: 0
ALCOHOL_USE: NO
TOTAL SCORE: 0
DOES PATIENT WANT TO STOP DRINKING: NO
CONSUMPTION TOTAL: NEGATIVE
TOTAL SCORE: 0

## 2021-11-05 PROBLEM — U07.1 PNEUMONIA DUE TO COVID-19 VIRUS: Status: ACTIVE | Noted: 2021-01-01

## 2021-11-05 PROBLEM — J96.01 ACUTE RESPIRATORY FAILURE WITH HYPOXIA (HCC): Status: ACTIVE | Noted: 2021-01-01

## 2021-11-05 PROBLEM — J12.82 PNEUMONIA DUE TO COVID-19 VIRUS: Status: ACTIVE | Noted: 2021-01-01

## 2021-11-05 PROBLEM — E87.20 METABOLIC ACIDOSIS: Status: ACTIVE | Noted: 2021-01-01

## 2021-11-05 PROBLEM — E87.20 LACTIC ACIDOSIS: Status: ACTIVE | Noted: 2021-01-01

## 2021-11-05 NOTE — ASSESSMENT & PLAN NOTE
Secondary to COVID infection  On high flow 60 L 100% FiO2, w/15L NRB, O2 sats 90-95%  Procal 0.46, CRP 17.4, D-dimer 1.08, lactic acid elevated likely sec to hypoxia, proBNP 525  Completed 3 days of antibiotics, will hold further antibiotic therapy and monitor given normal proca;  HFNC with rescue nonrebreather  Decadron completed 10 day course  Baricitinib day 14/14   Lasix BID  Encourage proning  Increased work of breathing with RR 20-30 using accessory muscles. ICU contacted for transfer.

## 2021-11-05 NOTE — ED NOTES
Oxygen saturation found to be low to mid 80's on 50L high flow. RT and admitting Dr. Martinez notified of findings. Pt oxygen increased to 60L.

## 2021-11-05 NOTE — H&P
Hospital Medicine History & Physical Note    Date of Service  11/5/2021    Primary Care Physician  None    Consultants  Dr. Cisneros, infectious disease has reviewed his case and authorized Baricitinib.    Code Status  Full Code    Chief Complaint  Chief Complaint   Patient presents with   • Shortness of Breath     x2d, COVID +       History of Presenting Illness  Blue Wu is a 61 y.o. male who presented 11/5/2021 with shortness of breath.  Mr. Wu is an unvaccinated 61-year-old male that has no previously known medical conditions.  10 days ago he developed a cough with chills, headache, and sore throat.  He had a home over-the-counter COVID-19 test which was +9 days ago.  Since then he has had a progression of his shortness of breath and cough.  His son checked his oxygen saturations yesterday, 11/4/2021 and found them 62% on room air.  Today patient elected to come to the emergency room where his oxygen saturations have been in the 60s percent range on room air.  He was placed on a nonrebreather and now high flow oxygen.  He has been deemed to be a candidate for baricitinib by infectious disease.  Will be placed on steroids and admitted in guarded condition.  I had a long discussion with patient and his son about his guarded condition and apparently his wife does not want him to be on a ventilator but patient does not want make that decision without her here and this will be readdressed when she gets here.      Review of Systems  Review of Systems   Constitutional: Positive for chills and malaise/fatigue. Negative for fever.   Respiratory: Positive for cough, sputum production and shortness of breath. Negative for hemoptysis.    Cardiovascular: Negative for chest pain and leg swelling.   All other systems reviewed and are negative.      Past Medical History  No medical problems to his knowledge     Surgical History  Hernia      Family History  No family history of blood clots    Social History   lives with  his wife, son, and daughter--none are vaccinated  He does not drink nor smoke    Allergies  No Known Allergies    Medications  Prior to Admission Medications   Prescriptions Last Dose Informant Patient Reported? Taking?   acetaminophen (TYLENOL) 500 MG Tab 11/5/2021 at 0800 Family Member Yes Yes   Sig: Take 750 mg by mouth every 6 hours as needed for Mild Pain or Fever. 1.5 tablets = 750 mg.   ascorbic acid (VITAMIN C) 500 MG tablet 11/5/2021 at 0800 Family Member Yes Yes   Sig: Take 1,000 mg by mouth every morning. 2 tablets = 1,000 mg.   aspirin (ASA) 325 MG Tab 11/5/2021 at 0800 Family Member Yes Yes   Sig: Take 650 mg by mouth every 6 hours as needed for Mild Pain or Fever. 2 tablets = 650 mg.      Facility-Administered Medications: None       Physical Exam  Temp:  [36.1 °C (96.9 °F)] 36.1 °C (96.9 °F)  Pulse:  [] 89  Resp:  [20-42] 34  BP: (113-144)/(79-87) 113/79  SpO2:  [62 %-99 %] 92 %  Blood Pressure: 113/79   Temperature: 36.1 °C (96.9 °F)   Pulse: 89   Respiration: (!) 34   Pulse Oximetry: 92 %       Physical Exam  Vitals and nursing note reviewed.   Constitutional:       General: He is in acute distress.      Appearance: He is ill-appearing and toxic-appearing.   HENT:      Head: Normocephalic and atraumatic.      Mouth/Throat:      Mouth: Mucous membranes are dry.      Pharynx: Oropharynx is clear.   Eyes:      General: No scleral icterus.     Conjunctiva/sclera: Conjunctivae normal.   Cardiovascular:      Rate and Rhythm: Normal rate and regular rhythm.      Comments: Distant heart sounds  Pulmonary:      Breath sounds: Rales present.      Comments: Increased work of breathing  Tachypnea  High flow oxygen  Abdominal:      General: There is no distension.      Tenderness: There is no abdominal tenderness.   Musculoskeletal:      Cervical back: Normal range of motion and neck supple.      Right lower leg: No edema.      Left lower leg: No edema.   Skin:     General: Skin is warm and dry.    Neurological:      General: No focal deficit present.      Mental Status: He is alert and oriented to person, place, and time.   Psychiatric:         Mood and Affect: Mood normal.         Behavior: Behavior normal.         Thought Content: Thought content normal.         Judgment: Judgment normal.      Comments: Voice is hoarse         Laboratory:  Recent Labs     11/05/21  1036   WBC 11.0*   RBC 5.83   HEMOGLOBIN 18.0   HEMATOCRIT 53.2*   MCV 91.3   MCH 30.9   MCHC 33.8   RDW 48.1   PLATELETCT 270   MPV 10.4     Recent Labs     11/05/21  1036   SODIUM 139   POTASSIUM 4.4   CHLORIDE 102   CO2 16*   GLUCOSE 130*   BUN 30*   CREATININE 1.39   CALCIUM 8.8     Recent Labs     11/05/21  1036   ALTSGPT 23   ASTSGOT 85*   ALKPHOSPHAT 81   TBILIRUBIN 0.6   GLUCOSE 130*         Recent Labs     11/05/21  1036   NTPROBNP 525*         Recent Labs     11/05/21  1036   TROPONINT 55*       Imaging:  DX-CHEST-PORTABLE (1 VIEW)   Final Result      Ill-defined, predominantly interstitial bilateral pulmonary opacities. Edema is favored, although pneumonia can be considered in the appropriate clinical settings.                 Assessment/Plan:  I anticipate this patient will require at least two midnights for appropriate medical management, necessitating inpatient admission.    * Pneumonia due to COVID-19 virus- (present on admission)  Assessment & Plan  Unvaccinated  With respiratory failure requiring high flow oxygen  Decadron 6 mg daily for 10 days  ID has determined that he is a candidate for Baricitinib. Daily CBCs and CMPs ordered while on Baricitinib.   D dimer and CRP ordered  Lovenox for DVT prophylaxis    Acute respiratory failure with hypoxia (HCC)- (present on admission)  Assessment & Plan  Secondary to COVID infection  62% on room air and now requiring high flow oxygen  He has elected for full code status for now and wants to readdress this status with his wife when she comes in.    Lactic acidosis- (present on  admission)  Assessment & Plan  Lactic acid over 5 on admit and trended down to 3 without fluids      Metabolic acidosis- (present on admission)  Assessment & Plan  If this persists, consider small doses of 250-500 ml IV boluses  Refrain from maintenance fluids      Mr. Wu is critically ill, 35 minutes critical care time were spent admission the patient and in specific management of acute respiratory failure requiring titration of high flow oxygen and initiation of Decadron and baricitinib.  VTE prophylaxis: enoxaparin ppx

## 2021-11-05 NOTE — ED PROVIDER NOTES
"ED Provider Note    CHIEF COMPLAINT  Chief Complaint   Patient presents with   • Shortness of Breath     x2d, COVID +       HPI  Blue Wu is a 61 y.o. male who presents for evaluation of progressive shortness of breath for the past 3 days, had a home test that was positive for COVID-19.  He is unvaccinated.  A home pulse oximeter revealed oxygen saturations in the 50s.  The patient states that he has had some diarrhea.  No chest pain, no vomiting, no fever.    REVIEW OF SYSTEMS  Negative for fever, rash, chest pain, abdominal pain, back pain. All other systems are negative.     PAST MEDICAL HISTORY       SOCIAL HISTORY  Social History     Tobacco Use   • Smoking status: Not on file   Substance and Sexual Activity   • Alcohol use: Not on file   • Drug use: Not on file   • Sexual activity: Not on file       SURGICAL HISTORY  patient denies any surgical history    CURRENT MEDICATIONS  I personally reviewed the medication list in the charting documentation.     ALLERGIES  Not on File    PHYSICAL EXAM  VITAL SIGNS: /87   Pulse (!) 112   Temp 36.1 °C (96.9 °F) (Temporal)   Resp 20   Ht 1.676 m (5' 6\")   SpO2 (!) 62% Comment: 60% room air  Constitutional: Ill-appearing, nonrebreather, respiratory distress   HENT: Normocephalic, no obvious evidence of acute trauma.   Neck: Comfortable movement without any obvious restriction in the range of motion.  Eyes: Conjunctiva normal, Non-icteric.   Chest: Labored respirations with increased respiratory rate, nonrebreather in place  Skin: The exposed portions of skin reveal no obvious rash or other abnormalities.  Musculoskeletal: No obvious restriction in the range of motion in all major joints.   Neurologic: Alert, No obvious focal deficits noted.   Psychiatric: Affect normal for clinical presentation    This patient was cared for during the COVID-19 pandemic.  History and physical exam may be limited/truncated by the inherent challenges of the virus and the need to " decrease personal and staff exposure to novel coronavirus.  Some aspects of disease management may be different to protect staff and help slow the spread of disease. I verified that, if possible, the patient was wearing a mask and I was wearing a mask every time I encountered the patient.       DIAGNOSTIC STUDIES / PROCEDURES    LABS/EKGs  Results for orders placed or performed during the hospital encounter of 11/05/21   CBC WITH DIFFERENTIAL   Result Value Ref Range    WBC 11.0 (H) 4.8 - 10.8 K/uL    RBC 5.83 4.70 - 6.10 M/uL    Hemoglobin 18.0 14.0 - 18.0 g/dL    Hematocrit 53.2 (H) 42.0 - 52.0 %    MCV 91.3 81.4 - 97.8 fL    MCH 30.9 27.0 - 33.0 pg    MCHC 33.8 33.7 - 35.3 g/dL    RDW 48.1 35.9 - 50.0 fL    Platelet Count 270 164 - 446 K/uL    MPV 10.4 9.0 - 12.9 fL    Neutrophils-Polys 85.60 (H) 44.00 - 72.00 %    Lymphocytes 10.50 (L) 22.00 - 41.00 %    Monocytes 2.00 0.00 - 13.40 %    Eosinophils 0.10 0.00 - 6.90 %    Basophils 0.40 0.00 - 1.80 %    Immature Granulocytes 1.40 (H) 0.00 - 0.90 %    Nucleated RBC 0.30 /100 WBC    Neutrophils (Absolute) 9.43 (H) 1.82 - 7.42 K/uL    Lymphs (Absolute) 1.16 1.00 - 4.80 K/uL    Monos (Absolute) 0.22 0.00 - 0.85 K/uL    Eos (Absolute) 0.01 0.00 - 0.51 K/uL    Baso (Absolute) 0.04 0.00 - 0.12 K/uL    Immature Granulocytes (abs) 0.15 (H) 0.00 - 0.11 K/uL    NRBC (Absolute) 0.03 K/uL   COMP METABOLIC PANEL   Result Value Ref Range    Sodium 139 135 - 145 mmol/L    Potassium 4.4 3.6 - 5.5 mmol/L    Chloride 102 96 - 112 mmol/L    Co2 16 (L) 20 - 33 mmol/L    Anion Gap 21.0 (H) 7.0 - 16.0    Glucose 130 (H) 65 - 99 mg/dL    Bun 30 (H) 8 - 22 mg/dL    Creatinine 1.39 0.50 - 1.40 mg/dL    Calcium 8.8 8.5 - 10.5 mg/dL    AST(SGOT) 85 (H) 12 - 45 U/L    ALT(SGPT) 23 2 - 50 U/L    Alkaline Phosphatase 81 30 - 99 U/L    Total Bilirubin 0.6 0.1 - 1.5 mg/dL    Albumin 3.4 3.2 - 4.9 g/dL    Total Protein 8.2 6.0 - 8.2 g/dL    Globulin 4.8 (H) 1.9 - 3.5 g/dL    A-G Ratio 0.7 g/dL    LACTIC ACID   Result Value Ref Range    Lactic Acid 5.3 (HH) 0.5 - 2.0 mmol/L   LACTIC ACID   Result Value Ref Range    Lactic Acid 3.0 (H) 0.5 - 2.0 mmol/L   proBrain Natriuretic Peptide, NT   Result Value Ref Range    NT-proBNP 525 (H) 0 - 125 pg/mL   TROPONIN   Result Value Ref Range    Troponin T 55 (H) 6 - 19 ng/L   COV-2, FLU A/B, AND RSV BY PCR (2-4 HOURS CEPHEID): Collect NP swab in VTM    Specimen: Respirate   Result Value Ref Range    Influenza virus A RNA Negative Negative    Influenza virus B, PCR Negative Negative    RSV, PCR Negative Negative    SARS-CoV-2 by PCR DETECTED (AA)     SARS-CoV-2 Source NP Swab    ESTIMATED GFR   Result Value Ref Range    GFR If African American >60 >60 mL/min/1.73 m 2    GFR If Non African American 52 (A) >60 mL/min/1.73 m 2   D-DIMER   Result Value Ref Range    D-Dimer Screen 1.08 (H) 0.00 - 0.50 ug/mL (FEU)   CRP QUANTITIVE (NON-CARDIAC)   Result Value Ref Range    Stat C-Reactive Protein 17.40 (H) 0.00 - 0.75 mg/dL   HEPATITIS PANEL ACUTE(4 COMPONENTS)   Result Value Ref Range    Hepatitis B Surface Antigen Non-Reactive Non-Reactive    Hepatitis B Cors Ab,IgM Non-Reactive Non-Reactive    Hepatitis A Virus Ab, IgM Non-Reactive Non-Reactive    Hepatitis C Antibody Non-Reactive Non-Reactive        RADIOLOGY  DX-CHEST-PORTABLE (1 VIEW)   Final Result      Ill-defined, predominantly interstitial bilateral pulmonary opacities. Edema is favored, although pneumonia can be considered in the appropriate clinical settings.               COURSE & MEDICAL DECISION MAKING  Pertinent Labs & Imaging studies reviewed. (See chart for details)    Encounter Summary: This is a very pleasant 61 y.o. unvaccinated male who unfortunately required evaluation in the emergency department today with acute hypoxic respiratory failure secondary to COVID-19, symptomatic for 3 days, 2 days ago he tested positive at home.  He had oxygen saturation is in the 50s at home, arrives via private vehicle,  found to be hypoxic in the low 60s on room air at triage and was started on a nonrebreather.  Upon evaluation the patient has increased respiratory effort and rate.  We will watch him closely as the chance for escalating his supplemental oxygen delivery is quite high I have already discussed high flow oxygen as well as the possibility of needing intubation for mechanical ventilation.  Blood work will be obtained under a sepsis protocol, chest x-ray will be obtained, the patient is tachycardic and tachypneic but I will not initiate antibiotic treatment as this is all secondary to his already diagnosed COVID-19.    CRITICAL CARE  The very real possibilty of a deterioration of this patient's condition required the highest level of my preparedness for sudden, emergent intervention.  I provided critical care services, which included medication orders, frequent reevaluations of the patient's condition and response to treatment, ordering and reviewing test results, and discussing the case with various consultants.  The critical care time associated with the care of the patient was 39 minutes. Review chart for interventions. This time is exclusive of any other billable procedures.         DISPOSITION: Admit in critical condition      FINAL IMPRESSION  1. Acute hypoxemic respiratory failure due to COVID-19 (HCC)        This dictation was created using voice recognition software. The accuracy of the dictation is limited to the abilities of the software. I expect there may be some errors of grammar and possibly content. The nursing notes were reviewed and certain aspects of this information were incorporated into this note.    Electronically signed by: Israel Raymond M.D., 11/5/2021 10:42 AM

## 2021-11-05 NOTE — FLOWSHEET NOTE
11/05/21 1321   Oxygen   O2 (LPM) 50   FiO2% 100 %   O2 Delivery Device Heated High Flow Nasal Cannula   Aerosols   $ Aerosol Delivery Device Heated High Flow Nasal Cannula   Aerosol Temperature 31 °C (87.8 °F)

## 2021-11-05 NOTE — PROGRESS NOTES
Brief ID note:    -Baricitinib approval requested by Dr. Martinez  -Chart reviewed.  Patient with progressive COVID-19 pneumonia, now on HFNC  -Patient is being started on dexamethasone, prophylactic Lovenox by hospitalist  -Baricitinib approved per [facility] protocol.  Will initiate a 14-day course of 4 mg p.o. daily  -Please provide the patient EUA fact sheet to patient or caregiver, available on [facility]  -Check baseline QuantiFERON gold, hepatitis B surface antigen, surface antibody, core antibody  -Check CBC with differential daily, CMP daily  -Discontinue baricitinib if:  • eGFR < 15 mL/min/1.73m2  • Hgb < 8 g/dL  • Significant immunosuppression: ALC < 200/uL, ANC < 1000/uL  • VTE in last 12 weeks or recurrent DVT/PE  • AST > 225 or ALT > 250 u/L    • Active serious infection other than COVID-19  -Okay to discontinue if cleared for discharge

## 2021-11-05 NOTE — ED NOTES
Med rec completed per Pt's son at bedside.  Allergies reviewed. No known drug allergies.  Pt is not on any prescription medications.  No oral antibiotics in last 30 days.  Preferred pharmacy: [facility].

## 2021-11-05 NOTE — ED TRIAGE NOTES
Pt to triage w/ family.   Chief Complaint   Patient presents with   • Shortness of Breath     x2d, COVID +     Pt sats 62% on RA on arrival. NRB placed on pt in triage. Sats slowly improved to 93%. Pt to room. Connected to monitor. ERP to BS. IV start successful. Blood to lab.

## 2021-11-06 PROBLEM — Z71.89 GOALS OF CARE, COUNSELING/DISCUSSION: Status: ACTIVE | Noted: 2021-01-01

## 2021-11-06 PROBLEM — R79.89 ELEVATED TROPONIN: Status: ACTIVE | Noted: 2021-01-01

## 2021-11-06 NOTE — CONSULTS
Critical Care Consultation    Date of consult: 11/6/2021    Referring Physician  Tip Ingram M.D.    Reason for Consultation  Covid pneumonia with hypoxia on max HFNC + NRB    History of Presenting Illness  61 y.o. male who presented 11/5/2021 with no PMH, BMI 30 that is unvaccinated that presented 11/5 for 10 days of cough, chills, headache, sore throat with + covid test 9 days prior. He had room air saturation of 62% and was admitted on HFNC. Today I was asked to consult on patient since he is on HFNC + NRB. He is afebrile, HR 77-80's, 's sat 90-92%. He was started on dexamethasone and barcitinib, crp 17, ddimer 1, lactic 2.5. Still undecided about code status and palliative care has been consulted and remains full code. Wife and son at bedside. Patient without complaints of chest pain, shortness or breath cough, n/v/d, leg pain or swelling. Just got up and went to the bathroom and had a formed stool      Code Status  Full Code    Review of Systems  Review of Systems   Constitutional: Negative for chills, fever, malaise/fatigue and weight loss.   HENT: Negative for hearing loss and sore throat.    Eyes: Negative for double vision and discharge.   Respiratory: Negative for cough, sputum production and shortness of breath.    Cardiovascular: Negative for chest pain, claudication and leg swelling.   Gastrointestinal: Negative for abdominal pain, diarrhea, nausea and vomiting.   Genitourinary: Negative for dysuria and hematuria.   Musculoskeletal: Negative for back pain, joint pain and myalgias.   Neurological: Negative for focal weakness, seizures, weakness and headaches.   Psychiatric/Behavioral: Negative for depression and substance abuse.       Past Medical History   has no past medical history on file.    Surgical History   has no past surgical history on file.    Family History  family history is not on file.    Social History       Medications  Home Medications     Reviewed by Ajit Reynolds  (Pharmacy Tech) on 11/05/21 at 1102  Med List Status: Complete   Medication Last Dose Status   acetaminophen (TYLENOL) 500 MG Tab 11/5/2021 Active   ascorbic acid (VITAMIN C) 500 MG tablet 11/5/2021 Active   aspirin (ASA) 325 MG Tab 11/5/2021 Active              Current Facility-Administered Medications   Medication Dose Route Frequency Provider Last Rate Last Admin   • guaiFENesin ER (MUCINEX) tablet 600 mg  600 mg Oral Q12HRS Tip Ingram M.D.       • benzocaine-menthol (CEPACOL) lozenge 1 Lozenge  1 Lozenge Mouth/Throat Q2HRS PRN Tip Ingram M.D.       • ampicillin/sulbactam (UNASYN) 3 g in  mL IVPB  3 g Intravenous Q6HRS Tip Ingram M.D.       • azithromycin (ZITHROMAX) tablet 500 mg  500 mg Oral DAILY Tip Ingram M.D.       • furosemide (LASIX) injection 40 mg  40 mg Intravenous BID DIURETIC Tip Ingram M.D.       • dexamethasone (DECADRON) tablet 6 mg  6 mg Oral DAILY Anurag Martinez M.D.   6 mg at 11/06/21 0510   • ondansetron (ZOFRAN) syringe/vial injection 4 mg  4 mg Intravenous Q6HRS PRN Anurag Martinez M.D.       • ondansetron (ZOFRAN ODT) dispertab 4 mg  4 mg Oral Q6HRS PRN Anurag Martinez M.D.       • senna-docusate (PERICOLACE or SENOKOT S) 8.6-50 MG per tablet 2 Tablet  2 Tablet Oral BID Anurag Martinez M.D.        And   • polyethylene glycol/lytes (MIRALAX) PACKET 1 Packet  1 Packet Oral QDAY PRN Anurag Martinez M.D.        And   • magnesium hydroxide (MILK OF MAGNESIA) suspension 30 mL  30 mL Oral QDAY PRN Anurag Martinez M.D.        And   • bisacodyl (DULCOLAX) suppository 10 mg  10 mg Rectal QDAY PRN Anurag Martinez M.D.       • enoxaparin (LOVENOX) inj 40 mg  40 mg Subcutaneous DAILY Anurag Martinez M.D.   40 mg at 11/06/21 0510   • acetaminophen (Tylenol) tablet 650 mg  650 mg Oral Q6HRS PRN Anurag Martinez M.D.       • promethazine (PHENERGAN) tablet 12.5-25 mg  12.5-25 mg Oral Q4HRS PRN Anurag Martinez M.D.       • promethazine (PHENERGAN) suppository 12.5-25 mg  12.5-25 mg Rectal Q4HRS  PRN Anurag Martinez M.D.       • prochlorperazine (COMPAZINE) injection 5-10 mg  5-10 mg Intravenous Q4HRS PRN Anurag Martinez M.D.       • baricitinib (Olumiant) tablet 2 mg  2 mg Oral DAILY AT 1800 Petey Cisneros M.D.           Allergies  No Known Allergies    Vital Signs last 24 hours  Temp:  [36.9 °C (98.4 °F)] 36.9 °C (98.4 °F)  Pulse:  [77-86] 83  Resp:  [20-42] 22  BP: (119-147)/(77-89) 123/77  SpO2:  [87 %-96 %] 90 %    Physical Exam  Physical Exam  Vitals and nursing note reviewed.   Constitutional:       General: He is not in acute distress.     Appearance: Normal appearance. He is ill-appearing. He is not toxic-appearing or diaphoretic.      Comments: Laying in bed no acute distress with family at bedside   HENT:      Head: Normocephalic.      Mouth/Throat:      Mouth: Mucous membranes are moist.   Eyes:      Pupils: Pupils are equal, round, and reactive to light.   Cardiovascular:      Rate and Rhythm: Normal rate.      Heart sounds: No murmur heard.     Pulmonary:      Effort: No respiratory distress.      Breath sounds: No stridor. No wheezing or rhonchi.      Comments: Mild resting tachypnea, no crackles, no accessory use, breathing through his nose  Abdominal:      General: There is no distension.      Palpations: There is no mass.      Tenderness: There is no abdominal tenderness. There is no guarding or rebound.      Hernia: No hernia is present.   Musculoskeletal:         General: No swelling or tenderness.      Cervical back: Normal range of motion.   Skin:     Coloration: Skin is not jaundiced or pale.   Neurological:      Mental Status: He is alert and oriented to person, place, and time.      Cranial Nerves: No cranial nerve deficit.      Sensory: No sensory deficit.      Motor: No weakness.      Coordination: Coordination normal.   Psychiatric:         Mood and Affect: Mood normal.         Fluids  No intake or output data in the 24 hours ending 11/06/21 1440    Laboratory  Recent Results  (from the past 48 hour(s))   CBC WITH DIFFERENTIAL    Collection Time: 11/05/21 10:36 AM   Result Value Ref Range    WBC 11.0 (H) 4.8 - 10.8 K/uL    RBC 5.83 4.70 - 6.10 M/uL    Hemoglobin 18.0 14.0 - 18.0 g/dL    Hematocrit 53.2 (H) 42.0 - 52.0 %    MCV 91.3 81.4 - 97.8 fL    MCH 30.9 27.0 - 33.0 pg    MCHC 33.8 33.7 - 35.3 g/dL    RDW 48.1 35.9 - 50.0 fL    Platelet Count 270 164 - 446 K/uL    MPV 10.4 9.0 - 12.9 fL    Neutrophils-Polys 85.60 (H) 44.00 - 72.00 %    Lymphocytes 10.50 (L) 22.00 - 41.00 %    Monocytes 2.00 0.00 - 13.40 %    Eosinophils 0.10 0.00 - 6.90 %    Basophils 0.40 0.00 - 1.80 %    Immature Granulocytes 1.40 (H) 0.00 - 0.90 %    Nucleated RBC 0.30 /100 WBC    Neutrophils (Absolute) 9.43 (H) 1.82 - 7.42 K/uL    Lymphs (Absolute) 1.16 1.00 - 4.80 K/uL    Monos (Absolute) 0.22 0.00 - 0.85 K/uL    Eos (Absolute) 0.01 0.00 - 0.51 K/uL    Baso (Absolute) 0.04 0.00 - 0.12 K/uL    Immature Granulocytes (abs) 0.15 (H) 0.00 - 0.11 K/uL    NRBC (Absolute) 0.03 K/uL   COMP METABOLIC PANEL    Collection Time: 11/05/21 10:36 AM   Result Value Ref Range    Sodium 139 135 - 145 mmol/L    Potassium 4.4 3.6 - 5.5 mmol/L    Chloride 102 96 - 112 mmol/L    Co2 16 (L) 20 - 33 mmol/L    Anion Gap 21.0 (H) 7.0 - 16.0    Glucose 130 (H) 65 - 99 mg/dL    Bun 30 (H) 8 - 22 mg/dL    Creatinine 1.39 0.50 - 1.40 mg/dL    Calcium 8.8 8.5 - 10.5 mg/dL    AST(SGOT) 85 (H) 12 - 45 U/L    ALT(SGPT) 23 2 - 50 U/L    Alkaline Phosphatase 81 30 - 99 U/L    Total Bilirubin 0.6 0.1 - 1.5 mg/dL    Albumin 3.4 3.2 - 4.9 g/dL    Total Protein 8.2 6.0 - 8.2 g/dL    Globulin 4.8 (H) 1.9 - 3.5 g/dL    A-G Ratio 0.7 g/dL   LACTIC ACID    Collection Time: 11/05/21 10:36 AM   Result Value Ref Range    Lactic Acid 5.3 (HH) 0.5 - 2.0 mmol/L   proBrain Natriuretic Peptide, NT    Collection Time: 11/05/21 10:36 AM   Result Value Ref Range    NT-proBNP 525 (H) 0 - 125 pg/mL   TROPONIN    Collection Time: 11/05/21 10:36 AM   Result Value Ref  Range    Troponin T 55 (H) 6 - 19 ng/L   ESTIMATED GFR    Collection Time: 11/05/21 10:36 AM   Result Value Ref Range    GFR If African American >60 >60 mL/min/1.73 m 2    GFR If Non African American 52 (A) >60 mL/min/1.73 m 2   D-DIMER    Collection Time: 11/05/21 10:36 AM   Result Value Ref Range    D-Dimer Screen 1.08 (H) 0.00 - 0.50 ug/mL (FEU)   CRP QUANTITIVE (NON-CARDIAC)    Collection Time: 11/05/21 10:36 AM   Result Value Ref Range    Stat C-Reactive Protein 17.40 (H) 0.00 - 0.75 mg/dL   HEPATITIS PANEL ACUTE(4 COMPONENTS)    Collection Time: 11/05/21 10:36 AM   Result Value Ref Range    Hepatitis B Surface Antigen Non-Reactive Non-Reactive    Hepatitis B Cors Ab,IgM Non-Reactive Non-Reactive    Hepatitis A Virus Ab, IgM Non-Reactive Non-Reactive    Hepatitis C Antibody Non-Reactive Non-Reactive   Blood Culture    Collection Time: 11/05/21 10:36 AM    Specimen: Peripheral; Blood   Result Value Ref Range    Significant Indicator NEG     Source BLD     Site PERIPHERAL     Culture Result       No Growth  Note: Blood cultures are incubated for 5 days and  are monitored continuously.Positive blood cultures  are called to the RN and reported as soon as  they are identified.     COV-2, FLU A/B, AND RSV BY PCR (2-4 HOURS CEPHEID): Collect NP swab in VTM    Collection Time: 11/05/21 11:05 AM    Specimen: Respirate   Result Value Ref Range    Influenza virus A RNA Negative Negative    Influenza virus B, PCR Negative Negative    RSV, PCR Negative Negative    SARS-CoV-2 by PCR DETECTED (AA)     SARS-CoV-2 Source NP Swab    LACTIC ACID    Collection Time: 11/05/21 12:17 PM   Result Value Ref Range    Lactic Acid 3.0 (H) 0.5 - 2.0 mmol/L   Blood Culture    Collection Time: 11/05/21 12:17 PM    Specimen: Peripheral; Blood   Result Value Ref Range    Significant Indicator NEG     Source BLD     Site PERIPHERAL     Culture Result       No Growth  Note: Blood cultures are incubated for 5 days and  are monitored  continuously.Positive blood cultures  are called to the RN and reported as soon as  they are identified.     LACTIC ACID    Collection Time: 11/05/21  4:23 PM   Result Value Ref Range    Lactic Acid 3.7 (H) 0.5 - 2.0 mmol/L   CBC WITH DIFFERENTIAL    Collection Time: 11/06/21  1:48 AM   Result Value Ref Range    WBC 8.4 4.8 - 10.8 K/uL    RBC 5.28 4.70 - 6.10 M/uL    Hemoglobin 16.0 14.0 - 18.0 g/dL    Hematocrit 48.3 42.0 - 52.0 %    MCV 91.5 81.4 - 97.8 fL    MCH 30.3 27.0 - 33.0 pg    MCHC 33.1 (L) 33.7 - 35.3 g/dL    RDW 48.1 35.9 - 50.0 fL    Platelet Count 263 164 - 446 K/uL    MPV 10.1 9.0 - 12.9 fL    Neutrophils-Polys 85.30 (H) 44.00 - 72.00 %    Lymphocytes 11.20 (L) 22.00 - 41.00 %    Monocytes 2.40 0.00 - 13.40 %    Eosinophils 0.00 0.00 - 6.90 %    Basophils 0.10 0.00 - 1.80 %    Immature Granulocytes 1.00 (H) 0.00 - 0.90 %    Nucleated RBC 0.00 /100 WBC    Neutrophils (Absolute) 7.17 1.82 - 7.42 K/uL    Lymphs (Absolute) 0.94 (L) 1.00 - 4.80 K/uL    Monos (Absolute) 0.20 0.00 - 0.85 K/uL    Eos (Absolute) 0.00 0.00 - 0.51 K/uL    Baso (Absolute) 0.01 0.00 - 0.12 K/uL    Immature Granulocytes (abs) 0.08 0.00 - 0.11 K/uL    NRBC (Absolute) 0.00 K/uL   Comp Metabolic Panel    Collection Time: 11/06/21  1:48 AM   Result Value Ref Range    Sodium 139 135 - 145 mmol/L    Potassium 5.0 3.6 - 5.5 mmol/L    Chloride 106 96 - 112 mmol/L    Co2 18 (L) 20 - 33 mmol/L    Anion Gap 15.0 7.0 - 16.0    Glucose 166 (H) 65 - 99 mg/dL    Bun 32 (H) 8 - 22 mg/dL    Creatinine 1.00 0.50 - 1.40 mg/dL    Calcium 8.6 8.5 - 10.5 mg/dL    AST(SGOT) 76 (H) 12 - 45 U/L    ALT(SGPT) 20 2 - 50 U/L    Alkaline Phosphatase 75 30 - 99 U/L    Total Bilirubin 0.4 0.1 - 1.5 mg/dL    Albumin 2.8 (L) 3.2 - 4.9 g/dL    Total Protein 7.4 6.0 - 8.2 g/dL    Globulin 4.6 (H) 1.9 - 3.5 g/dL    A-G Ratio 0.6 g/dL   ESTIMATED GFR    Collection Time: 11/06/21  1:48 AM   Result Value Ref Range    GFR If African American >60 >60 mL/min/1.73 m 2     GFR If Non African American >60 >60 mL/min/1.73 m 2   PROCALCITONIN    Collection Time: 21  8:35 AM   Result Value Ref Range    Procalcitonin 0.46 (H) <0.25 ng/mL   LACTIC ACID    Collection Time: 21  8:35 AM   Result Value Ref Range    Lactic Acid 2.5 (H) 0.5 - 2.0 mmol/L   EKG    Collection Time: 21  2:08 PM   Result Value Ref Range    Report       [facility]    Test Date:  2021  Pt Name:    GIOVANY GOLDEN                 Department: [facility]  MRN:        [id number]                      Room:       [facility]  Gender:     Male                         Technician: XAVIER  :        1960                   Requested By:EDITH DE ANDA  Order #:    [id number]                    Reading MD:    Measurements  Intervals                                Axis  Rate:       84                           P:          10  ME:         164                          QRS:        23  QRSD:       96                           T:          21  QT:         384  QTc:        454    Interpretive Statements  SINUS RHYTHM  BORDERLINE LOW VOLTAGE IN FRONTAL LEADS  No previous ECG available for comparison         Imaging  DX-CHEST-PORTABLE (1 VIEW)   Final Result      Ill-defined, predominantly interstitial bilateral pulmonary opacities. Edema is favored, although pneumonia can be considered in the appropriate clinical settings.             Assessment/Plan  * Acute respiratory failure with hypoxia (HCC)- (present on admission)  Assessment & Plan  Related to covid pneumonia, currently happy hypoxic  Agree with dexamethasone and baricitinib therapy  Triple contact precautions  Was able to take off NRB on HFNC at 60l/100% and hold saturation likely was from just going to the restroom and HFNC was not positioned in nose   Goal sat >88%   Continue to watch respiratory pattern  Serial monitor inflammatory markers to see response  Mindful with volumes  Can maintain of floor status  Reach out to us if his status changes  Continue goals of  care discussions      Discussed patient condition and risk of morbidity and/or mortality with Hospitalist and Family.    The patient remains critically ill from Covid PNA with active titration of HFNC.  Critical care time = 37 minutes in directly providing and coordinating critical care and extensive data review.  No time overlap and excludes procedures.

## 2021-11-06 NOTE — ASSESSMENT & PLAN NOTE
Likely sec to covid, denies active chest pain  Ordered EKG: NSR, no acute ST-T changes  On admission, Trop peak 50 downtrended  Likely type 2 in setting of hypoxia

## 2021-11-06 NOTE — PROGRESS NOTES
Hospital Medicine Daily Progress Note    Date of Service  11/6/2021    Chief Complaint  Blue Wu is a 61 y.o. male admitted 11/5/2021 with sob    Hospital Course  61 y.o. male who presented 11/5/2021 with shortness of breath. Unvaccinated and no previously known medical conditions.  10 days ago he developed a cough with chills, headache, and sore throat.  He had a home over-the-counter COVID-19 test which was positive 9 days ago.    In ED, he was on HFNC 60L with NRB.   Procal elevated, start abx  On decadron and start barcitinib  CRP 17.4  D-dimer 1.08    Interval Problem Update  11/6; on high flow and NRB, easily desating with any movements, Patient is proning. Wife and son seen at the bedside. I discussed with wife and son twice at bedside for code status. However they haven't fully decided yet. Full code now. Palliative care consulted. On Decadron, barcitinib and lasix. I consulted ICU.     I have personally seen and examined the patient at bedside. I discussed the plan of care with patient, family and bedside RN.    Consultants/Specialty  critical care  Palliative    Code Status  Full Code    Disposition  Patient is not medically cleared.   Anticipate discharge to TBD.  I have placed the appropriate orders for post-discharge needs.    Review of Systems  Review of Systems   Constitutional: Positive for malaise/fatigue.   Respiratory: Positive for cough, sputum production and shortness of breath.    Neurological: Positive for weakness.   All other systems reviewed and are negative.       Physical Exam  Temp:  [36.9 °C (98.4 °F)] 36.9 °C (98.4 °F)  Pulse:  [77-91] 81  Resp:  [18-42] 28  BP: (119-147)/(77-93) 123/77  SpO2:  [87 %-96 %] 90 %    Physical Exam  Vitals and nursing note reviewed.   Constitutional:       General: He is in acute distress.      Appearance: Normal appearance. He is ill-appearing.   HENT:      Head: Normocephalic and atraumatic.      Mouth/Throat:      Pharynx: Oropharynx is clear.    Eyes:      Pupils: Pupils are equal, round, and reactive to light.   Neck:      Vascular: No carotid bruit.   Cardiovascular:      Rate and Rhythm: Normal rate and regular rhythm.   Pulmonary:      Effort: Respiratory distress present.      Breath sounds: Rales present.      Comments: Diminished breath sounds b/l  Abdominal:      General: Abdomen is flat. Bowel sounds are normal.      Palpations: Abdomen is soft. There is no mass.   Musculoskeletal:         General: No swelling or tenderness. Normal range of motion.      Cervical back: Neck supple.   Skin:     General: Skin is warm and dry.   Neurological:      General: No focal deficit present.      Mental Status: He is alert and oriented to person, place, and time.   Psychiatric:         Mood and Affect: Mood normal.         Behavior: Behavior normal.         Fluids  No intake or output data in the 24 hours ending 11/06/21 1140    Laboratory  Recent Labs     11/05/21  1036 11/06/21  0148   WBC 11.0* 8.4   RBC 5.83 5.28   HEMOGLOBIN 18.0 16.0   HEMATOCRIT 53.2* 48.3   MCV 91.3 91.5   MCH 30.9 30.3   MCHC 33.8 33.1*   RDW 48.1 48.1   PLATELETCT 270 263   MPV 10.4 10.1     Recent Labs     11/05/21  1036 11/06/21  0148   SODIUM 139 139   POTASSIUM 4.4 5.0   CHLORIDE 102 106   CO2 16* 18*   GLUCOSE 130* 166*   BUN 30* 32*   CREATININE 1.39 1.00   CALCIUM 8.8 8.6                   Imaging  DX-CHEST-PORTABLE (1 VIEW)   Final Result      Ill-defined, predominantly interstitial bilateral pulmonary opacities. Edema is favored, although pneumonia can be considered in the appropriate clinical settings.              Assessment/Plan  * Acute respiratory failure with hypoxia (HCC)- (present on admission)  Assessment & Plan  Secondary to COVID infection  On high flow and NRB now  Procal 0.46, CRP 17.4, D-dimer 1.08, lactic acid elevated likely sec to hypoxia, proBNP 525    On empiric abx unasyn and azithromycin   Start dexamethasone 6mg daily for 10 days  Start Baricitinib  11/5-11/18  Lasix  Supportive managements: early mobilization, self prone, RT protocol, judicious fluid, IS  Wean off O2 as tolerated  Consulted ICU for further recommendations        Elevated troponin  Assessment & Plan  Likely sec to covid, denies active chest pain  Ordered EKG and cont trending trop    Goals of care, counseling/discussion  Assessment & Plan  Full Code: I discussed code status with the patient, wife and son at the bedside. Patient has medical decision capacity. Family does not want patient to be transferred to ICU. However I explained to them patient has been using maximal oxygen support here. If he continues to get worsening, he needs to be in ICU for ventilator support. They understood, however were unable to make final decision yet. Keep full code. I consulted palliative care to assist the GOC discussion. Time spent: 27 mins (11/6/2021)       Lactic acidosis- (present on admission)  Assessment & Plan  Likely sec to hypoxia. Lactic acid over 5 on admit and trended down to 2.5 without fluids      Pneumonia due to COVID-19 virus- (present on admission)  Assessment & Plan  See above    Metabolic acidosis- (present on admission)  Assessment & Plan  Improving, AG normal       VTE prophylaxis: enoxaparin ppx     Patient is critically ill.   I have personally seen and evaluated patient in room as internal medicine hospitalist services.  The patient continues to have: acute hypoxic respiratory failure due to COVID-19 pneumonia.  The vital organ system that is affected is the: respiratory  If untreated there is a high chance of deterioration into: cardiac arrest and eventually death.   The critical care that I am providing today is: on non-invasive ventilation including high flow oxygen on 60L and NRB, RR 28breaths/min. Interpretation of imaging and consultation of ICU.  The critical that has been undertaken is medically complex.  There has been no overlap in critical care time.  Critical Care Time not  including procedures: 39 minutes      I have performed a physical exam and reviewed and updated ROS and Plan today (11/6/2021). In review of yesterday's note (11/5/2021), there are no changes except as documented above.

## 2021-11-06 NOTE — PROGRESS NOTES
Patient refusing baricitinib. Patient and family educated on medication importance. Patient continues to refuse despite importance. Dr. Martinez notified.

## 2021-11-06 NOTE — PROGRESS NOTES
4 Eyes Skin Assessment Completed by KY Frausto and KY Nuñez.    Head WDL  Ears WDL  Nose WDL  Mouth WDL  Neck WDL  Breast/Chest WDL  Shoulder Blades WDL  Spine WDL  (R) Arm/Elbow/Hand WDL  (L) Arm/Elbow/Hand WDL  Abdomen WDL  Groin WDL  Scrotum/Coccyx/Buttocks WDL  (R) Leg WDL  (L) Leg WDL  (R) Heel/Foot/Toe WDL  (L) Heel/Foot/Toe WDL          Devices In Places Pulse Ox and Oxy Mask      Interventions In Place NC W/Ear Foams    Possible Skin Injury No    Pictures Uploaded Into Epic N/A  Wound Consult Placed N/A  RN Wound Prevention Protocol Ordered No

## 2021-11-06 NOTE — ASSESSMENT & PLAN NOTE
Full Code: I rediscussed code status with the patient, wife and son at the bedside. Patient has medical decision capacity.  Patient is currently maxed on high flow but oxygen levels have remained stable, it appears that patient and family would want intubation if it did come to that after discussion continue full code.  They would obviously want to be notified if he did do deteriorates a further discussions could be made (17 min 11/9/2021)

## 2021-11-06 NOTE — PROGRESS NOTES
1901: Pt arrived to room S-149 with transport and RT. Pt A&Ox4, VSS, requiring 60L 100% FiO2 high flow with 15L NRB placed on top. O2 saturation 90%. Family at bedside. Updates and POC given to donya Mcarthur at bedside. Pt educated by this RN on use of IS and proning position to help increase O2 sats. Pt placed on continuous pulse Ox. Bed locked and in lowest position. Bed alarm active, call light within reach. Pt denying pain at this time.

## 2021-11-06 NOTE — PROGRESS NOTES
Pt has wife and son at bedside. Leda unit clerk translated for the MD and I. Patient and family are going to discuss code status. Palliative care  Consult started. He is maxed out on high flow and 15 L NRB. Attempted to wean down but unsuccessful.

## 2021-11-06 NOTE — CARE PLAN
Problem: Knowledge Deficit - Standard  Goal: Patient and family/care givers will demonstrate understanding of plan of care, disease process/condition, diagnostic tests and medications  Outcome: Progressing  Pt and family questions, comments and concerns have been address at this time     The patient is Watcher - Medium risk of patient condition declining or worsening    Shift Goals  Clinical Goals: decrease o2 needs, increase proning   Patient Goals: rest  Family Goals: communication and discharge    Progress made toward(s) clinical / shift goals:  progressing    Patient is not progressing towards the following goals:

## 2021-11-06 NOTE — ASSESSMENT & PLAN NOTE
Intubated 11/21  Status post proning protocol (completed 11/25)  Ventilator dependent respiratory failure  Modify ventilator to optimize oxygenation and ventilation  HOB > 30  Chlorhexidine  Perform spontaneous breathing trial when able  Early mobility    Respiratory acidosis w/ severe hypoxia despite maximal ventilator settings (7.24/127/44/55) - decrease RR to 30

## 2021-11-07 NOTE — CARE PLAN
The patient is Watcher - Medium risk of patient condition declining or worsening    Shift Goals  Clinical Goals: proning and titarte oxygen down  Patient Goals: rest  Family Goals: communication, ABX therapy    Progress made toward(s) clinical / shift goals:  yes    Problem: Knowledge Deficit - Standard  Goal: Patient and family/care givers will demonstrate understanding of plan of care, disease process/condition, diagnostic tests and medications  Outcome: Progressing

## 2021-11-07 NOTE — CARE PLAN
The patient is Stable - Low risk of patient condition declining or worsening    Shift Goals  Clinical Goals: decrease O2 needs, proning  Patient Goals: rest  Family Goals: communication, ABX therapy    Progress made toward(s) clinical / shift goals:  Yes    Problem: Knowledge Deficit - Standard  Goal: Patient and family/care givers will demonstrate understanding of plan of care, disease process/condition, diagnostic tests and medications  Outcome: Progressing       Patient is not progressing towards the following goals:

## 2021-11-07 NOTE — PROGRESS NOTES
Hospital Medicine Daily Progress Note    Date of Service  11/7/2021    Chief Complaint  Blue Wu is a 61 y.o. male admitted 11/5/2021 with sob    Hospital Course  61 y.o. male who presented 11/5/2021 with shortness of breath. Unvaccinated and no previously known medical conditions.  10 days ago he developed a cough with chills, headache, and sore throat.  He had a home over-the-counter COVID-19 test which was positive 9 days ago.    In ED, he was on HFNC 60L with NRB.   Procal elevated, start abx  On decadron and start barcitinib  CRP 17.4  D-dimer 1.08    Interval Problem Update  11/6; on high flow and NRB, easily desating with any movements, Patient is proning. Wife and son seen at the bedside. I discussed with wife and son twice at bedside for code status. However they haven't fully decided yet. Full code now. Palliative care consulted. On Decadron, barcitinib and lasix. I consulted ICU.   11/7: no acute overnight events. Per RN, patient does not tolerate high flow. He is currently on 15L oxymask with Sat 88-90%. Discussed case with  yesterday, patient's oxygenation slightly improving, will continue to stay here and weaning off O2.     I have personally seen and examined the patient at bedside. I discussed the plan of care with patient, family and bedside RN.    Consultants/Specialty  critical care  Palliative    Code Status  Full Code    Disposition  Patient is not medically cleared.   Anticipate discharge to D.  I have placed the appropriate orders for post-discharge needs.    Review of Systems  Review of Systems   Constitutional: Positive for malaise/fatigue.   Respiratory: Positive for cough, sputum production and shortness of breath.    Neurological: Positive for weakness.   All other systems reviewed and are negative.       Physical Exam  Temp:  [36.1 °C (97 °F)-36.6 °C (97.9 °F)] 36.2 °C (97.2 °F)  Pulse:  [71-84] 80  Resp:  [16-22] 16  BP: (118-138)/(77-78) 118/78  SpO2:  [89 %-92 %] 91  %    Physical Exam  Vitals and nursing note reviewed.   Constitutional:       General: He is in acute distress.      Appearance: Normal appearance. He is ill-appearing.   HENT:      Head: Normocephalic and atraumatic.      Mouth/Throat:      Pharynx: Oropharynx is clear.   Eyes:      Pupils: Pupils are equal, round, and reactive to light.   Neck:      Vascular: No carotid bruit.   Cardiovascular:      Rate and Rhythm: Normal rate and regular rhythm.   Pulmonary:      Effort: Respiratory distress present.      Breath sounds: Rales present.      Comments: Diminished breath sounds b/l  Abdominal:      General: Abdomen is flat. Bowel sounds are normal.      Palpations: Abdomen is soft. There is no mass.   Musculoskeletal:         General: No swelling or tenderness. Normal range of motion.      Cervical back: Neck supple.   Skin:     General: Skin is warm and dry.   Neurological:      General: No focal deficit present.      Mental Status: He is alert and oriented to person, place, and time.   Psychiatric:         Mood and Affect: Mood normal.         Behavior: Behavior normal.         Fluids  No intake or output data in the 24 hours ending 11/07/21 1110    Laboratory  Recent Labs     11/05/21  1036 11/06/21  0148 11/07/21  0211   WBC 11.0* 8.4 12.0*   RBC 5.83 5.28 5.35   HEMOGLOBIN 18.0 16.0 16.2   HEMATOCRIT 53.2* 48.3 48.3   MCV 91.3 91.5 90.3   MCH 30.9 30.3 30.3   MCHC 33.8 33.1* 33.5*   RDW 48.1 48.1 47.2   PLATELETCT 270 263 384   MPV 10.4 10.1 10.2     Recent Labs     11/05/21  1036 11/06/21  0148 11/07/21  0211   SODIUM 139 139 140   POTASSIUM 4.4 5.0 4.7   CHLORIDE 102 106 105   CO2 16* 18* 20   GLUCOSE 130* 166* 142*   BUN 30* 32* 34*   CREATININE 1.39 1.00 0.88   CALCIUM 8.8 8.6 9.0                   Imaging  DX-CHEST-PORTABLE (1 VIEW)   Final Result      Ill-defined, predominantly interstitial bilateral pulmonary opacities. Edema is favored, although pneumonia can be considered in the appropriate clinical  settings.              Assessment/Plan  * Acute respiratory failure with hypoxia (HCC)- (present on admission)  Assessment & Plan  Secondary to COVID infection  On high flow and NRB now  Procal 0.46, CRP 17.4, D-dimer 1.08, lactic acid elevated likely sec to hypoxia, proBNP 525    On empiric abx unasyn and azithromycin   Start dexamethasone 6mg daily for 10 days  Start Baricitinib 11/5-11/18  Lasix  Supportive managements: early mobilization, self prone, RT protocol, judicious fluid, IS  Wean off O2 as tolerated  Consulted ICU for further recommendations, appreciated input. No need to be transferred to ICU, cont weaning down O2        Elevated troponin  Assessment & Plan  Likely sec to covid, denies active chest pain  Ordered EKG: NSR, no acute ST-T changes  Trending trop, trending down    Goals of care, counseling/discussion  Assessment & Plan  Full Code: I discussed code status with the patient, wife and son at the bedside. Patient has medical decision capacity. Family does not want patient to be transferred to ICU. However I explained to them patient has been using maximal oxygen support here. If he continues to get worsening, he needs to be in ICU for ventilator support. They understood, however were unable to make final decision yet. Keep full code. I consulted palliative care to assist the GOC discussion. Time spent: 27 mins (11/6/2021)       Lactic acidosis- (present on admission)  Assessment & Plan  Likely sec to hypoxia. Lactic acid over 5 on admit and trended down to 2.5 without fluids      Pneumonia due to COVID-19 virus- (present on admission)  Assessment & Plan  See above    Metabolic acidosis- (present on admission)  Assessment & Plan  Improving, AG normal       VTE prophylaxis: enoxaparin ppx       I have performed a physical exam and reviewed and updated ROS and Plan today (11/7/2021). In review of yesterday's note (11/6/2021), there are no changes except as documented above.

## 2021-11-08 NOTE — PROGRESS NOTES
Patient unable to maintain SpO2 better than 85 % with oxymask 15 L while sleeping. Applied non-rebreather mask at 15 L and achieved SpO2 91%.

## 2021-11-08 NOTE — PROGRESS NOTES
Hospital Medicine Daily Progress Note    Date of Service  11/8/2021    Chief Complaint  Blue Wu is a 61 y.o. male admitted 11/5/2021 with sob    Hospital Course  61 y.o. male who presented 11/5/2021 with shortness of breath. Unvaccinated and no previously known medical conditions.  10 days ago he developed a cough with chills, headache, and sore throat.  He had a home over-the-counter COVID-19 test which was positive 9 days ago.    In ED, he was on HFNC 60L with NRB.   Procal elevated, start abx  On decadron and start barcitinib  CRP 17.4  D-dimer 1.08    Interval Problem Update  Patient does not tolerate high flow. On NRB 15L with Sat 89-90%.  Declines chest pain, leg pain, or leg swelling.   Encouraged prone position, use IS when able.   He refused to take Baricitinib, but agreed to take it after education.     I have personally seen and examined the patient at bedside. I discussed the plan of care with patient, family and bedside RN.    Consultants/Specialty  critical care: signed off  Palliative    Code Status  Full Code    Disposition  Patient is not medically cleared.   Anticipate discharge to TBD.  I have placed the appropriate orders for post-discharge needs.    Review of Systems  Review of Systems   Constitutional: Positive for malaise/fatigue.   Respiratory: Positive for cough, sputum production and shortness of breath.    Neurological: Positive for weakness.   All other systems reviewed and are negative.       Physical Exam  Temp:  [36.3 °C (97.3 °F)-36.7 °C (98 °F)] 36.7 °C (98 °F)  Pulse:  [] 100  Resp:  [16-20] 20  BP: (130-146)/() 130/90  SpO2:  [85 %-93 %] 85 %    Physical Exam  Vitals and nursing note reviewed.   Constitutional:       General: He is in acute distress.      Appearance: Normal appearance. He is ill-appearing.   HENT:      Head: Normocephalic and atraumatic.      Mouth/Throat:      Pharynx: Oropharynx is clear.   Eyes:      Pupils: Pupils are equal, round, and reactive  to light.   Neck:      Vascular: No carotid bruit.   Cardiovascular:      Rate and Rhythm: Normal rate and regular rhythm.   Pulmonary:      Effort: Respiratory distress present.      Breath sounds: Rales present.      Comments: Diminished breath sounds b/l  Abdominal:      General: Abdomen is flat. Bowel sounds are normal.      Palpations: Abdomen is soft. There is no mass.   Musculoskeletal:         General: No swelling or tenderness. Normal range of motion.      Cervical back: Neck supple.   Skin:     General: Skin is warm and dry.   Neurological:      General: No focal deficit present.      Mental Status: He is alert and oriented to person, place, and time.   Psychiatric:         Mood and Affect: Mood normal.         Behavior: Behavior normal.         Fluids    Intake/Output Summary (Last 24 hours) at 11/8/2021 1135  Last data filed at 11/8/2021 0101  Gross per 24 hour   Intake 360 ml   Output 750 ml   Net -390 ml       Laboratory  Recent Labs     11/06/21  0148 11/07/21  0211 11/08/21  0452   WBC 8.4 12.0* 11.9*   RBC 5.28 5.35 5.58   HEMOGLOBIN 16.0 16.2 17.4   HEMATOCRIT 48.3 48.3 52.2*   MCV 91.5 90.3 93.5   MCH 30.3 30.3 31.2   MCHC 33.1* 33.5* 33.3*   RDW 48.1 47.2 48.0   PLATELETCT 263 384 462*   MPV 10.1 10.2 9.7     Recent Labs     11/06/21  0148 11/07/21  0211 11/08/21  0452   SODIUM 139 140 139   POTASSIUM 5.0 4.7 4.6   CHLORIDE 106 105 103   CO2 18* 20 23   GLUCOSE 166* 142* 111*   BUN 32* 34* 35*   CREATININE 1.00 0.88 1.05   CALCIUM 8.6 9.0 9.0                   Imaging  DX-CHEST-PORTABLE (1 VIEW)   Final Result      Ill-defined, predominantly interstitial bilateral pulmonary opacities. Edema is favored, although pneumonia can be considered in the appropriate clinical settings.              Assessment/Plan  * Acute respiratory failure with hypoxia (HCC)- (present on admission)  Assessment & Plan  Secondary to COVID infection  On high flow and NRB now  Procal 0.46, CRP 17.4, D-dimer 1.08, lactic  acid elevated likely sec to hypoxia, proBNP 525    On empiric abx unasyn and azithromycin   Start dexamethasone 6mg daily for 10 days  Start Baricitinib 11/5-11/18  Lasix  Supportive managements: early mobilization, self prone, RT protocol, judicious fluid, IS  Wean off O2 as tolerated  Consulted ICU for further recommendations, appreciated input. No need to be transferred to ICU, cont weaning down O2        Elevated troponin  Assessment & Plan  Likely sec to covid, denies active chest pain  Ordered EKG: NSR, no acute ST-T changes  Trending trop, trending down    Goals of care, counseling/discussion  Assessment & Plan  Full Code: I discussed code status with the patient, wife and son at the bedside. Patient has medical decision capacity. Family does not want patient to be transferred to ICU. However I explained to them patient has been using maximal oxygen support here. If he continues to get worsening, he needs to be in ICU for ventilator support. They understood, however were unable to make final decision yet. Keep full code. I consulted palliative care to assist the GOC discussion. Time spent: 27 mins (11/6/2021)       Lactic acidosis- (present on admission)  Assessment & Plan  Likely sec to hypoxia. Lactic acid over 5 on admit and trended down to 2.5 without fluids      Pneumonia due to COVID-19 virus- (present on admission)  Assessment & Plan  See above    Metabolic acidosis- (present on admission)  Assessment & Plan  Improving, AG normal       VTE prophylaxis: enoxaparin ppx       I have performed a physical exam and reviewed and updated ROS and Plan today (11/8/2021). In review of yesterday's note (11/7/2021), there are no changes except as documented above.

## 2021-11-08 NOTE — CARE PLAN
The patient is Stable - Low risk of patient condition declining or worsening    Shift Goals  Clinical Goals: monitor oxygen needs  Patient Goals: rest  Family Goals: communication, ABX therapy    Progress made toward(s) clinical / shift goals:    Problem: Knowledge Deficit - Standard  Goal: Patient and family/care givers will demonstrate understanding of plan of care, disease process/condition, diagnostic tests and medications  Outcome: Progressing     Problem: Respiratory  Goal: Patient will achieve/maintain optimum respiratory ventilation and gas exchange  Outcome: Progressing       Patient is not progressing towards the following goals:

## 2021-11-08 NOTE — PROGRESS NOTES
While family was assisting patient to practice incentive spirometry, patient SpO2 dropped to 59%. Responding to the sensor alarm, this RN reapplied oxygen 15 L oxymask. Patient took about 10 minutes to recover to 92%. Family educated on prioritizing oxygen over IS and given strategy to do only as much as patient can tolerate while maintaining SpO2.     Patient is now comfortably prone with SpO2 at 92%

## 2021-11-08 NOTE — PROGRESS NOTES
Received bedside report from NOC RN. Pt is A&Ox4. Call light & personal belongings within reach, bed in lowest position & locked. Fall precautions in place and education provided on how to use call light, hourly rounding in place. Educated on calling before getting OOB. Pt updated on plan of care for the shift. Pt declines any additional needs at this time.      COVID-19 surge in effect.

## 2021-11-08 NOTE — CARE PLAN
The patient is Watcher - Medium risk of patient condition declining or worsening    Shift Goals  Clinical Goals: proning and titarte oxygen down  Patient Goals: rest  Family Goals:     Progress made toward(s) clinical / shift goals:    Problem: Knowledge Deficit - Standard  Goal: Patient and family/care givers will demonstrate understanding of plan of care, disease process/condition, diagnostic tests and medications  Outcome: Progressing     Problem: Respiratory  Goal: Patient will achieve/maintain optimum respiratory ventilation and gas exchange  Outcome: Progressing     Problem: Mobility  Goal: Patient's capacity to carry out activities will improve  Outcome: Progressing       Patient is not progressing towards the following goals:

## 2021-11-08 NOTE — PROGRESS NOTES
Pt is maxed on oxymask. Pt oxygen dropped to 50s while eating. And high 80s when lying on back.  Educated pt about proning, incentive spirometer. Pt proned this morning with oxygen about 90% with oxymask.

## 2021-11-09 NOTE — PROGRESS NOTES
Hospital Medicine Daily Progress Note    Date of Service  11/9/2021    Chief Complaint  Blue Wu is a 61 y.o. male admitted 11/5/2021 with sob    Hospital Course  61 y.o. male who presented 11/5/2021 with shortness of breath. Unvaccinated and no previously known medical conditions.10 days prior to admission he developed a cough with chills, headache, and sore throat.  He had a home over-the-counter COVID-19 test which was positive 9 days ago.  In ED, he was on HFNC 60L with NRB. Which was titrated down to 15 L but has since been titrated back to 60L. procalcitonin was elevated and he was started on antibiotic therapy. As well as On decadron and. barcitinib  CRP 17.4  D-dimer 1.08    Interval Problem Update  Patient seen and examined at bedside , family is present.  Increasing oxygen needs currently on high flow 60 L, 100% FiO2, saturating 90 to 95%   - Encourage proning, I-S and mobility as able  -Continue treatment with Decadron and Baricitinib  -Pro-Kendrick negative, discontinue antibiotics    Patient family at bedside, patient's wife is very anxious.  Discussed current plan of care and options of care if patient were to deteriorate further.  At this time patient continues to be full code which is reasonable given patient's age and prior health.    I have personally seen and examined the patient at bedside. I discussed the plan of care with patient, family and bedside RN.    Consultants/Specialty  critical care: signed off  Palliative    Code Status  Full Code    Disposition  Patient is not medically cleared.   Anticipate discharge to D.  I have placed the appropriate orders for post-discharge needs.    Review of Systems  Review of Systems   Constitutional: Positive for malaise/fatigue.   Respiratory: Positive for cough, sputum production and shortness of breath.    Neurological: Positive for weakness.   All other systems reviewed and are negative.       Physical Exam  Temp:  [36 °C (96.8 °F)-36.6 °C (97.8 °F)]  36 °C (96.8 °F)  Pulse:  [] 104  Resp:  [18-22] 20  BP: (101-138)/(71-90) 101/88  SpO2:  [88 %-94 %] 92 %    Physical Exam  Vitals and nursing note reviewed.   Constitutional:       General: He is not in acute distress.     Appearance: Normal appearance. He is ill-appearing and diaphoretic. He is not toxic-appearing.   HENT:      Head: Normocephalic and atraumatic.      Mouth/Throat:      Pharynx: Oropharynx is clear.   Eyes:      Extraocular Movements: Extraocular movements intact.      Conjunctiva/sclera: Conjunctivae normal.   Neck:      Vascular: No carotid bruit.   Cardiovascular:      Rate and Rhythm: Normal rate and regular rhythm.   Pulmonary:      Effort: No respiratory distress.      Breath sounds: Rales present.      Comments: Diminished breath sounds b/l  Abdominal:      General: Abdomen is flat. Bowel sounds are normal.      Palpations: Abdomen is soft. There is no mass.   Musculoskeletal:         General: No swelling or tenderness. Normal range of motion.      Cervical back: Neck supple.   Skin:     General: Skin is warm.   Neurological:      General: No focal deficit present.      Mental Status: He is alert and oriented to person, place, and time.   Psychiatric:         Mood and Affect: Mood normal.         Behavior: Behavior normal.         Fluids    Intake/Output Summary (Last 24 hours) at 11/9/2021 1514  Last data filed at 11/9/2021 1200  Gross per 24 hour   Intake 240 ml   Output 1450 ml   Net -1210 ml       Laboratory  Recent Labs     11/07/21 0211 11/08/21 0452 11/09/21  0249   WBC 12.0* 11.9* 11.0*   RBC 5.35 5.58 5.85   HEMOGLOBIN 16.2 17.4 17.9   HEMATOCRIT 48.3 52.2* 53.2*   MCV 90.3 93.5 90.9   MCH 30.3 31.2 30.6   MCHC 33.5* 33.3* 33.6*   RDW 47.2 48.0 45.1   PLATELETCT 384 462* 517*   MPV 10.2 9.7 9.8     Recent Labs     11/07/21  0211 11/08/21  0452 11/09/21  0249   SODIUM 140 139 140   POTASSIUM 4.7 4.6 4.5   CHLORIDE 105 103 102   CO2 20 23 23   GLUCOSE 142* 111* 104*   BUN 34*  35* 40*   CREATININE 0.88 1.05 0.99   CALCIUM 9.0 9.0 8.9                   Imaging  DX-CHEST-PORTABLE (1 VIEW)   Final Result      Ill-defined, predominantly interstitial bilateral pulmonary opacities. Edema is favored, although pneumonia can be considered in the appropriate clinical settings.              Assessment/Plan  * Acute respiratory failure with hypoxia (HCC)- (present on admission)  Assessment & Plan  Secondary to COVID infection  On high flow 60 L 100% FiO2  Procal 0.46, CRP 17.4, D-dimer 1.08, lactic acid elevated likely sec to hypoxia, proBNP 525  Completed 3 days of antibiotics, will hold further antibiotic therapy and monitor  Continue dexamethasone 6mg daily for 10 days  Continue baricitinib 11/5-11/18  Lasix as tolerated  Supportive managements: early mobilization, self prone, RT protocol, judicious fluid, IS  Wean off O2 as tolerated  Continue to wean oxygen as able    Elevated troponin  Assessment & Plan  Likely sec to covid, denies active chest pain  Ordered EKG: NSR, no acute ST-T changes  Trending trop, trending down    Goals of care, counseling/discussion  Assessment & Plan  Full Code: I rediscussed code status with the patient, wife and son at the bedside. Patient has medical decision capacity.  Patient is currently maxed on high flow but oxygen levels have remained stable, it appears that patient and family would want intubation if it did come to that after discussion continue full code.  They would obviously want to be notified if he did do deteriorates a further discussions could be made (17 min 11/9/2021)      Lactic acidosis- (present on admission)  Assessment & Plan  Likely sec to hypoxia. Lactic acid over 5 on admit and trended down to 2.5 without fluids      Pneumonia due to COVID-19 virus- (present on admission)  Assessment & Plan  See above    Metabolic acidosis- (present on admission)  Assessment & Plan  Improving, AG normal       VTE prophylaxis: enoxaparin ppx       I have  performed a physical exam and reviewed and updated ROS and Plan today (11/9/2021). In review of yesterday's note (11/8/2021), there are no changes except as documented above.

## 2021-11-09 NOTE — CONSULTS
Reason for PC Consult: Advance Care Planning    Consulted by: Dr. Ingram    Assessment:  General: Pt is a 62 yo unvaccinated Covid positive pt that presented on 11/5/21 with shorten of breath. Per pt and family he developed a cough with chill, headache and sore throat. In the ER he was HFNC 60L with NRB. He has been given decadron and he started barcitinib. He is on oxymask 15 L currently and tolerating and trying to prone with saturations in the low 90's     Social: Pt has 3 children Tess Koch and Blue Deluca. He is  to his wife Fay and both the pt and Fay need translation in [place].    Consults: Critical care.     Dyspnea: No-    Last BM: 11/06/21-    Pain: No-    Depression: No-    Dementia: No;       Spiritual:  Is Latter-day or spirituality important for coping with this illness? Unable to determine-    Has a  or spiritual provider visit been requested? Unable to determine    Palliative Performance Scale: 50%    Advance Directive: None-    DPOA: No-  Gaciela Bryce  POLST: No-      Code Status: Full-      Outcome:  1130 Introduced self and role of Palliative care. Fay wanted RN to come back when her son Blue was in the room.   1400 returned to room introduce self to pt's son and his wife. Pt was laying on his stomach prone on 15 L of oxygen with oxymask. Pt was struggling laying on stomach. His numbers improved while being prone. When pt was sitting up right he desaturated and heart rate increased. Pt and wife understood that he was admitted for the reasons of shortness of breath. They did not understand the complications of his Covid infection and how that can impact his ability to breath.    Explored pt's values, beliefs, and preferences in order to identify GOC. Education provided on pt's clinical picture and on covid infection and therapies and treatments that are being done. Fay stated that she was told by her family in [facility] to offer plasma and to offer remdesivir.  Explained that there are protocols and infectious disease MD's and the medical team review the what is necessary for her husbands current treatment. Did review the current medications with both son and his wife. Pt was not involved with the conversation b/c he was trying to rest. PC RN educated son and pt's wife about Code status DNR, and also mechanical ventilation. Fay expressed that she does not want him to go to the ICU. Explained the risk with CPR and mechanical ventilation especially with Covid infection. Fay would like to talk more about the discussion with Blue. No decisions made at this time.     Active listening, reflection, reminiscing, validation and normalization and empathic support provided throughout the encounter. All questions answered and PC contact information provided.     Recommendations: I do not recommend an ethics or hospice consult at this time because pt and family would like continue treatment at this time. .    Updated: Team    Plan: Provided contact information and will round back with family and pt about above conversation.     Thank you for allowing Palliative Care to participate in this patient's care. Please feel free to call [phone] with any questions or concerns.

## 2021-11-09 NOTE — CARE PLAN
The patient is Stable - Low risk of patient condition declining or worsening    Shift Goals  Clinical Goals: monitor oxygen needs  Patient Goals: rest  Family Goals: communication, ABX therapy    Progress made toward(s) clinical / shift goals:  Pt whiteboard updated on plan of care. Pt encouraged to call for assistance. Pt encouraged to voice any concerns or questions regarding care plan. Pt updated on plan of care as it develops and changes. Fall precautions in place. Bed in lowest position. Non-skid socks in place. Personal possessions within reach. Mobility sign on door. Bed-alarm on. Call light within reach. Pt educated regarding fall prevention and states understanding. Pt will verbalize pain using 0-10 pain scale, when to ask for pain medications, and medication information.       Problem: Knowledge Deficit - Standard  Goal: Patient and family/care givers will demonstrate understanding of plan of care, disease process/condition, diagnostic tests and medications  Outcome: Progressing     Problem: Respiratory  Goal: Patient will achieve/maintain optimum respiratory ventilation and gas exchange  Outcome: Progressing     Problem: Mobility  Goal: Patient's capacity to carry out activities will improve  Outcome: Progressing

## 2021-11-09 NOTE — PROGRESS NOTES
Received report from day shift RN. Assumed care of pt @ 1900. Pt reports no pain at this time. Updated pt on plan of care. Pt resting comfortably in bed. Fall precautions in place. Educated on use of call light. Hourly rounding and continuous monitoring in place.

## 2021-11-10 PROBLEM — E87.1 HYPONATREMIA: Status: ACTIVE | Noted: 2021-01-01

## 2021-11-10 NOTE — PROGRESS NOTES
Hospital Medicine Daily Progress Note    Date of Service  11/10/2021    Chief Complaint  Blue Wu is a 61 y.o. male admitted 11/5/2021 with sob    Hospital Course  61 y.o. male who presented 11/5/2021 with shortness of breath. Unvaccinated and no previously known medical conditions.10 days prior to admission he developed a cough with chills, headache, and sore throat.  He had a home over-the-counter COVID-19 test which was positive 9 days ago.  In ED, he was on HFNC 60L with NRB. Which was titrated down to 15 L but has since been titrated back to 60L. procalcitonin was elevated and he was started on antibiotic therapy. As well as On decadron and. barcitinib  CRP 17.4  D-dimer 1.08    Interval Problem Update  Patient seen and examined at bedside , wife is present and anxious, patient feels poorly   - currently on max high flow, intermittently requiring 15L NRB on top of to keep O2 sats >90%  - Encourage proning, I-S and mobility as able  -Continue treatment with Decadron and Baricitinib  -Pro-Kendrick negative, discontinue antibiotics  - pt having anxiety, will start atarax TID prn as well as lorazepam prn     Patient family at bedside, patient's wife is very anxious.  Discussed current plan of care and options of care if patient were to deteriorate further.  At this time patient continues to be full code.       I have personally seen and examined the patient at bedside. I discussed the plan of care with patient, family and bedside RN.    Consultants/Specialty  critical care: signed off  Palliative    Code Status  Full Code    Disposition  Patient is not medically cleared.   Anticipate discharge to D.  I have placed the appropriate orders for post-discharge needs.    Review of Systems  Review of Systems   Constitutional: Positive for malaise/fatigue.   Respiratory: Positive for cough, sputum production and shortness of breath.    Neurological: Positive for weakness.   All other systems reviewed and are negative.        Physical Exam  Temp:  [36.1 °C (97 °F)-37.2 °C (99 °F)] 36.3 °C (97.4 °F)  Pulse:  [] 101  Resp:  [18-24] 24  BP: (101-125)/(66-87) 105/66  SpO2:  [87 %-95 %] 93 %    Physical Exam  Vitals and nursing note reviewed.   Constitutional:       General: He is not in acute distress.     Appearance: Normal appearance. He is ill-appearing and diaphoretic. He is not toxic-appearing.   HENT:      Head: Normocephalic and atraumatic.      Mouth/Throat:      Pharynx: Oropharynx is clear.   Eyes:      Extraocular Movements: Extraocular movements intact.      Conjunctiva/sclera: Conjunctivae normal.   Neck:      Vascular: No carotid bruit.   Cardiovascular:      Rate and Rhythm: Normal rate and regular rhythm.   Pulmonary:      Effort: No respiratory distress.      Breath sounds: Rales present.      Comments: Diminished breath sounds b/l  Abdominal:      General: Abdomen is flat. Bowel sounds are normal.      Palpations: Abdomen is soft. There is no mass.   Musculoskeletal:         General: No swelling or tenderness. Normal range of motion.      Cervical back: Neck supple.   Skin:     General: Skin is warm.   Neurological:      General: No focal deficit present.      Mental Status: He is alert and oriented to person, place, and time.   Psychiatric:         Mood and Affect: Mood normal.         Behavior: Behavior normal.         Fluids    Intake/Output Summary (Last 24 hours) at 11/10/2021 1431  Last data filed at 11/10/2021 0600  Gross per 24 hour   Intake 240 ml   Output 1500 ml   Net -1260 ml       Laboratory  Recent Labs     11/08/21  0452 11/09/21 0249   WBC 11.9* 11.0*   RBC 5.58 5.85   HEMOGLOBIN 17.4 17.9   HEMATOCRIT 52.2* 53.2*   MCV 93.5 90.9   MCH 31.2 30.6   MCHC 33.3* 33.6*   RDW 48.0 45.1   PLATELETCT 462* 517*   MPV 9.7 9.8     Recent Labs     11/08/21  0452 11/09/21  0249 11/10/21  0329   SODIUM 139 140 133*   POTASSIUM 4.6 4.5 3.9   CHLORIDE 103 102 97   CO2 23 23 21   GLUCOSE 111* 104* 117*   BUN 35*  40* 37*   CREATININE 1.05 0.99 0.90   CALCIUM 9.0 8.9 8.7                   Imaging  DX-CHEST-PORTABLE (1 VIEW)   Final Result      Ill-defined, predominantly interstitial bilateral pulmonary opacities. Edema is favored, although pneumonia can be considered in the appropriate clinical settings.              Assessment/Plan  * Acute respiratory failure with hypoxia (HCC)- (present on admission)  Assessment & Plan  Secondary to COVID infection  On high flow 60 L 100% FiO2, intermittently requiring additional 15L NRB  Procal 0.46, CRP 17.4, D-dimer 1.08, lactic acid elevated likely sec to hypoxia, proBNP 525  Completed 3 days of antibiotics, will hold further antibiotic therapy and monitor  Continue dexamethasone 6mg daily for 10 days  Continue baricitinib 11/5-11/18  Lasix as tolerated  Supportive managements: early mobilization, self prone, RT protocol, judicious fluid, IS  Wean off O2 as tolerated  Continue to wean oxygen as able    Hyponatremia  Assessment & Plan  Mild, likely due to COVID and decreased oral intake due to high flow   Monitor     Elevated troponin  Assessment & Plan  Likely sec to covid, denies active chest pain  Ordered EKG: NSR, no acute ST-T changes  Trending trop, trending down    Goals of care, counseling/discussion  Assessment & Plan  Full Code: I rediscussed code status with the patient, wife and son at the bedside. Patient has medical decision capacity.  Patient is currently maxed on high flow but oxygen levels have remained stable, it appears that patient and family would want intubation if it did come to that after discussion continue full code.  They would obviously want to be notified if he did do deteriorates a further discussions could be made (17 min 11/9/2021)      Lactic acidosis- (present on admission)  Assessment & Plan  Likely sec to hypoxia. Lactic acid over 5 on admit and trended down to 2.5 without fluids      Pneumonia due to COVID-19 virus- (present on admission)  Assessment  & Plan  See above    Metabolic acidosis- (present on admission)  Assessment & Plan  Improving, AG normal       VTE prophylaxis: enoxaparin ppx       I have performed a physical exam and reviewed and updated ROS and Plan today (11/10/2021). In review of yesterday's note (11/9/2021), there are no changes except as documented above.    Patient is critically ill.   The patient continues to have: Acute respiratory failure secondary to COVID-19  The vital organ system that is affected is the: Pulmonary  If untreated there is a high chance of deterioration into: Worsening respiratory failure requiring intubation  And eventually death.   The critical care that I am providing today is: Supplemental high flow oxygen  The critical that has been undertaken is medically complex.   There has been no overlap in critical care time.   Critical Care Time not including procedures: 40

## 2021-11-10 NOTE — CARE PLAN
The patient is Watcher - Medium risk of patient condition declining or worsening    Shift Goals  Clinical Goals: Maintain adequate oxygen perfusion/Decrease anxiety  Patient Goals: Sleep  Family Goals: Improve and go home     Progress made toward(s) clinical / shift goals: Assumed care of patient at 1900. Patient is A&Ox4. Patient required non-rebreather mask at 10L at 0000 in addition to 55L of high-flow O2. Education on proning has been discussed. Bed is low and locked, bed alarm is on, call light is within reach, hourly rounding continues.     At 0430 patient was increased to 60L high-flow and 15L non-rebreather mask. Nursing staff is continuously educating patient to prone.     Patient is not progressing towards the following goals:    Problem: Knowledge Deficit - Standard  Goal: Patient and family/care givers will demonstrate understanding of plan of care, disease process/condition, diagnostic tests and medications  Outcome: Not Progressing  Note: Patient is unable to demonstrate understanding of plan of care - will take off non-rebreather mask despite instructions provided in [place].     Problem: Respiratory  Goal: Patient will achieve/maintain optimum respiratory ventilation and gas exchange  Outcome: Not Progressing  Note: Patient requires 55L of O2 via high-flow oxygen. However, patient began saturating in the 70s. 10L Non-rebreather mask has been applied to maintain adequate oxygen perfusion.

## 2021-11-10 NOTE — CARE PLAN
The patient is Watcher - Medium risk of patient condition declining or worsening    Shift Goals  Clinical Goals: maintain spo2 above 88%, encourage proning and incentive spirometry  Patient Goals: rest  Family Goals: stable 02    Progress made toward(s) clinical / shift goals:  N/A    Patient is not progressing towards the following goals: Decreasing 02 demands      Problem: Respiratory  Goal: Patient will achieve/maintain optimum respiratory ventilation and gas exchange  Outcome: Not Progressing     Problem: Knowledge Deficit - Standard  Goal: Patient and family/care givers will demonstrate understanding of plan of care, disease process/condition, diagnostic tests and medications  Outcome: Progressing     Problem: Fall Risk  Goal: Patient will remain free from falls  Outcome: Progressing

## 2021-11-10 NOTE — PROGRESS NOTES
Assumed care of patient at 0715, received bedside report from night shift RN. Bed is locked and in lowest position with call light within reach. Treaded socks in place. Patient updated on plan of care, no complaints or pain at this time. White board updated. Pt A&O4. Max settings of HFNC with 15l NR over the HFNC. Pt is medical.

## 2021-11-11 PROBLEM — E87.6 HYPOKALEMIA: Status: ACTIVE | Noted: 2021-01-01

## 2021-11-11 NOTE — PROGRESS NOTES
Hospital Medicine Daily Progress Note    Date of Service  11/11/2021    Chief Complaint  Blue Wu is a 61 y.o. male admitted 11/5/2021 with sob    Hospital Course  61 y.o. male who presented 11/5/2021 with shortness of breath. Unvaccinated and no previously known medical conditions.10 days prior to admission he developed a cough with chills, headache, and sore throat.  He had a home over-the-counter COVID-19 test which was positive 9 days ago.  In ED, he was on HFNC 60L with NRB. Which was titrated down to 15 L but has since been titrated back to 60L. procalcitonin was elevated and he was started on antibiotic therapy. As well as On decadron and. barcitinib  CRP 17.4  D-dimer 1.08    Interval Problem Update  Patient seen and examined at bedside , wife and son are present, patient overall feels ill, no significant changes or new complaints.  - currently on max high flow, and 15L NRB, O2 sats 84-92%  - cont. To encourage proning as tolerated and IS   -Continue treatment with Decadron and Baricitinib  - will start Symbicort, pt has wheezing on exam   - anxiety improved with anxiolytics per family, will continue   - pt warm and diaphoretic, will schedule tylenol     Patient is critically ill and high risk for deterioration, discussed this with patient and family.       I have personally seen and examined the patient at bedside. I discussed the plan of care with patient, family and bedside RN.    Consultants/Specialty  critical care: signed off  Palliative    Code Status  Full Code    Disposition  Patient is not medically cleared.   Anticipate discharge to D.  I have placed the appropriate orders for post-discharge needs.    Review of Systems  Review of Systems   Constitutional: Positive for malaise/fatigue.   Respiratory: Positive for cough, sputum production and shortness of breath.    Neurological: Positive for weakness.   All other systems reviewed and are negative.       Physical Exam  Temp:  [36.2 °C (97.2  °F)-37.3 °C (99.2 °F)] 37.3 °C (99.2 °F)  Pulse:  [] 84  Resp:  [18-36] 20  BP: ()/(43-85) 113/66  SpO2:  [87 %-95 %] 89 %    Physical Exam  Vitals and nursing note reviewed.   Constitutional:       General: He is not in acute distress.     Appearance: Normal appearance. He is ill-appearing and diaphoretic. He is not toxic-appearing.   HENT:      Head: Normocephalic and atraumatic.      Mouth/Throat:      Pharynx: Oropharynx is clear.   Eyes:      Extraocular Movements: Extraocular movements intact.      Conjunctiva/sclera: Conjunctivae normal.   Neck:      Vascular: No carotid bruit.   Cardiovascular:      Rate and Rhythm: Normal rate and regular rhythm.   Pulmonary:      Effort: No respiratory distress.      Breath sounds: Wheezing and rales present.      Comments: Diminished breath sounds b/l  Abdominal:      General: Abdomen is flat. Bowel sounds are normal.      Palpations: Abdomen is soft. There is no mass.   Musculoskeletal:         General: No swelling or tenderness. Normal range of motion.      Cervical back: Neck supple.   Skin:     General: Skin is warm.   Neurological:      General: No focal deficit present.      Mental Status: He is alert and oriented to person, place, and time.   Psychiatric:         Mood and Affect: Mood normal.         Behavior: Behavior normal.         Fluids    Intake/Output Summary (Last 24 hours) at 11/11/2021 1423  Last data filed at 11/11/2021 0600  Gross per 24 hour   Intake --   Output 1000 ml   Net -1000 ml       Laboratory  Recent Labs     11/09/21 0249   WBC 11.0*   RBC 5.85   HEMOGLOBIN 17.9   HEMATOCRIT 53.2*   MCV 90.9   MCH 30.6   MCHC 33.6*   RDW 45.1   PLATELETCT 517*   MPV 9.8     Recent Labs     11/09/21  0249 11/10/21  0329 11/11/21  0147   SODIUM 140 133* 132*   POTASSIUM 4.5 3.9 3.5*   CHLORIDE 102 97 95*   CO2 23 21 23   GLUCOSE 104* 117* 127*   BUN 40* 37* 37*   CREATININE 0.99 0.90 0.90   CALCIUM 8.9 8.7 8.9                    Imaging  DX-CHEST-PORTABLE (1 VIEW)   Final Result      Ill-defined, predominantly interstitial bilateral pulmonary opacities. Edema is favored, although pneumonia can be considered in the appropriate clinical settings.              Assessment/Plan  * Acute respiratory failure with hypoxia (HCC)- (present on admission)  Assessment & Plan  Secondary to COVID infection  On high flow 60 L 100% FiO2, w/15L NRB, O2 sats 84-92%  Procal 0.46, CRP 17.4, D-dimer 1.08, lactic acid elevated likely sec to hypoxia, proBNP 525  Completed 3 days of antibiotics, will hold further antibiotic therapy and monitor given normal proca;  Continue dexamethasone 6mg daily for 10 days  Continue baricitinib 11/5-11/18  Add symbicort-wheezing on exam   Lasix as tolerated  Supportive managements: early mobilization, self prone, RT protocol, judicious fluid, IS  Wean off O2 as tolerated  Continue to wean oxygen as able    Hypokalemia  Assessment & Plan  K 3.5 today, likely due to reduced oral intake   Monitor and replace as needed    Hyponatremia  Assessment & Plan  Mild, likely due to COVID and decreased oral intake due to high flow   Monitor     Elevated troponin  Assessment & Plan  Likely sec to covid, denies active chest pain  Ordered EKG: NSR, no acute ST-T changes  Trending trop, trending down    Goals of care, counseling/discussion  Assessment & Plan  Full Code: I rediscussed code status with the patient, wife and son at the bedside. Patient has medical decision capacity.  Patient is currently maxed on high flow but oxygen levels have remained stable, it appears that patient and family would want intubation if it did come to that after discussion continue full code.  They would obviously want to be notified if he did do deteriorates a further discussions could be made (17 min 11/9/2021)      Lactic acidosis- (present on admission)  Assessment & Plan  Likely sec to hypoxia. Lactic acid over 5 on admit and trended down to 2.5 without  fluids      Pneumonia due to COVID-19 virus- (present on admission)  Assessment & Plan  See above    Metabolic acidosis- (present on admission)  Assessment & Plan  Improving, AG normal       VTE prophylaxis: enoxaparin ppx       I have performed a physical exam and reviewed and updated ROS and Plan today (11/11/2021). In review of yesterday's note (11/10/2021), there are no changes except as documented above.    Patient is critically ill.   The patient continues to have: Acute respiratory failure secondary to COVID-19  The vital organ system that is affected is the: Pulmonary  If untreated there is a high chance of deterioration into: Worsening respiratory failure requiring intubation  And eventually death.   The critical care that I am providing today is: Supplemental high flow oxygen  The critical that has been undertaken is medically complex.   There has been no overlap in critical care time.   Critical Care Time not including procedures: 36

## 2021-11-11 NOTE — PROGRESS NOTES
5:00- Patient BP 83/43. VSS otherwise. Yoana JOHNSON paged. 250 cc bolus ordered and given. Repeat BP after bolus was 92/60. No further orders.

## 2021-11-11 NOTE — CARE PLAN
The patient is Unstable - High likelihood or risk of patient condition declining or worsening    Shift Goals  Clinical Goals: Encourage proning, Mainitain spO2 >88%  Patient Goals: Rest  Family Goals: Update family with changes    Progress made toward(s) clinical / shift goals:  Patient mobilized to commode. Patient weak but tolerated it well with assistance of 1. Patient will prone with assistance    Patient is not progressing towards the following goals: Patient unable to be titrated down on O2. Attempted titration with help of RT but patient did not tolerate it well.       Problem: Respiratory  Goal: Patient will achieve/maintain optimum respiratory ventilation and gas exchange  Outcome: Not Met      Problem: Mobility  Goal: Patient's capacity to carry out activities will improve  Outcome: Progressing     Problem: Knowledge Deficit - Standard  Goal: Patient and family/care givers will demonstrate understanding of plan of care, disease process/condition, diagnostic tests and medications  Outcome: Progressing

## 2021-11-11 NOTE — PROGRESS NOTES
Assumed care of patient at 1915, received bedside report from day shift RN. Bed is locked and in lowest position with call light within reach. Treaded socks in place. Patient updated on plan of care with son and spouse at bedside, no complaints or pain at this time. White board updated. Pt A&Ox3- disoriented to date. Patient is on 60L at 100% HFNC with a nonrebreather. Oxyge saturations in the low 90s. Educated patient and family about the use of antianxiety medications.     Patient is medical.

## 2021-11-11 NOTE — DISCHARGE PLANNING
note:  Discussed with IDT.  Per MD, pt is now down to 6 liters O2. MRI pending and Barrium Swallow pending for today. CM attempted to call family again but no avail.

## 2021-11-11 NOTE — PROGRESS NOTES
Financial assistance form completed by patients family and placed into chart. This RN attempted to call listed phone number, but unfortunately it is now after business hours.

## 2021-11-11 NOTE — DISCHARGE PLANNING
note:  Pt is in isolation for COVID 19. CM attempted to call pt through his personal cellphone and room phone but no answer. CM also attempted to call son Blue Deluca but his number ( [phone]) is disconnected.

## 2021-11-12 NOTE — CARE PLAN
The patient is Watcher - Medium risk of patient condition declining or worsening    Shift Goals  Clinical Goals: prone, maintain o2 sats, decrease anxiety  Patient Goals: rest, comfort, decrease anxiety  Family Goals: Update family with changes    Progress made toward(s) clinical / shift goals:    Problem: Respiratory  Goal: Patient will achieve/maintain optimum respiratory ventilation and gas exchange  Outcome: Progressing   Patient maintaining adequate saturations with current O2 demands, states he does not tolerate proning however he has been laying on his side all night.     Problem: Psychosocial  Goal: Patient's level of anxiety will decrease  Outcome: Progressing  Patient medicated per MAR for anxiety, has been able to verbalize a decrease in anxiety throughout tonight, has been sleeping well.       Patient is not progressing towards the following goals:

## 2021-11-12 NOTE — PROGRESS NOTES
Hospital Medicine Daily Progress Note    Date of Service  11/12/2021    Chief Complaint  Blue Wu is a 61 y.o. male admitted 11/5/2021 with sob    Hospital Course  61 y.o. male who presented 11/5/2021 with shortness of breath. Unvaccinated and no previously known medical conditions.10 days prior to admission he developed a cough with chills, headache, and sore throat.  He had a home over-the-counter COVID-19 test which was positive 9 days ago.  In ED, he was on HFNC 60L with NRB. Which was titrated down to 15 L but has since been titrated back to 60L. procalcitonin was elevated and he was started on antibiotic therapy. As well as On decadron and. barcitinib  CRP 17.4  D-dimer 1.08    Interval Problem Update  Patient seen and examined at bedside , wife and son are present, no new complaints,   - still requiring max high flow, and 15L NRB, O2 sats a bit better today >90%  - cont. To encourage proning as tolerated and IS   -Continue treatment with Decadron and Baricitinib, continue Symbicort   - anxiety improved with anxiolytics, will continue       Patient remains critically ill and high risk for deterioration, discussed this with patient and family.       I have personally seen and examined the patient at bedside. I discussed the plan of care with patient, family and bedside RN.    Consultants/Specialty  critical care: signed off  Palliative    Code Status  Full Code    Disposition  Patient is not medically cleared.   Anticipate discharge to D.  I have placed the appropriate orders for post-discharge needs.    Review of Systems  Review of Systems   Constitutional: Positive for malaise/fatigue.   Respiratory: Positive for cough, sputum production and shortness of breath.    Neurological: Positive for weakness.   All other systems reviewed and are negative.       Physical Exam  Temp:  [36.2 °C (97.1 °F)-37.1 °C (98.8 °F)] 36.2 °C (97.1 °F)  Pulse:  [64-90] 88  Resp:  [16-32] 22  BP: ()/(61-76) 100/61  SpO2:   [89 %-97 %] 95 %    Physical Exam  Vitals and nursing note reviewed.   Constitutional:       General: He is not in acute distress.     Appearance: Normal appearance. He is ill-appearing and diaphoretic. He is not toxic-appearing.   HENT:      Head: Normocephalic and atraumatic.      Mouth/Throat:      Pharynx: Oropharynx is clear.   Eyes:      Extraocular Movements: Extraocular movements intact.      Conjunctiva/sclera: Conjunctivae normal.   Neck:      Vascular: No carotid bruit.   Cardiovascular:      Rate and Rhythm: Normal rate and regular rhythm.   Pulmonary:      Effort: No respiratory distress.      Breath sounds: Wheezing and rales present.      Comments: Improved inspiratory effort  Abdominal:      General: Abdomen is flat. Bowel sounds are normal.      Palpations: Abdomen is soft. There is no mass.   Musculoskeletal:         General: No swelling or tenderness. Normal range of motion.      Cervical back: Neck supple.   Skin:     General: Skin is warm.   Neurological:      General: No focal deficit present.      Mental Status: He is alert and oriented to person, place, and time.   Psychiatric:         Mood and Affect: Mood normal.         Behavior: Behavior normal.         Fluids    Intake/Output Summary (Last 24 hours) at 11/12/2021 1438  Last data filed at 11/12/2021 0909  Gross per 24 hour   Intake 120 ml   Output --   Net 120 ml       Laboratory  Recent Labs     11/12/21  0352   WBC 16.3*   RBC 5.35   HEMOGLOBIN 16.0   HEMATOCRIT 47.7   MCV 89.2   MCH 29.9   MCHC 33.5*   RDW 43.2   PLATELETCT 514*   MPV 10.1     Recent Labs     11/10/21  0329 11/11/21  0147 11/12/21  0352   SODIUM 133* 132* 135   POTASSIUM 3.9 3.5* 3.9   CHLORIDE 97 95* 100   CO2 21 23 24   GLUCOSE 117* 127* 112*   BUN 37* 37* 39*   CREATININE 0.90 0.90 0.87   CALCIUM 8.7 8.9 8.8                   Imaging  DX-CHEST-PORTABLE (1 VIEW)   Final Result      Ill-defined, predominantly interstitial bilateral pulmonary opacities. Edema is  favored, although pneumonia can be considered in the appropriate clinical settings.              Assessment/Plan  * Acute respiratory failure with hypoxia (HCC)- (present on admission)  Assessment & Plan  Secondary to COVID infection  On high flow 60 L 100% FiO2, w/15L NRB, O2 sats 84-92%  Procal 0.46, CRP 17.4, D-dimer 1.08, lactic acid elevated likely sec to hypoxia, proBNP 525  Completed 3 days of antibiotics, will hold further antibiotic therapy and monitor given normal proca;  Continue dexamethasone 6mg daily for 10 days  Continue baricitinib 11/5-11/18  Add symbicort-wheezing on exam   Lasix as tolerated  Supportive managements: early mobilization, self prone, RT protocol, judicious fluid, IS  Wean off O2 as tolerated  Continue to wean oxygen as able    Hypokalemia  Assessment & Plan  K 3.5 today, likely due to reduced oral intake   Monitor and replace as needed    Hyponatremia  Assessment & Plan  Mild, likely due to COVID and decreased oral intake due to high flow   Monitor     Elevated troponin  Assessment & Plan  Likely sec to covid, denies active chest pain  Ordered EKG: NSR, no acute ST-T changes  Trending trop, trending down    Goals of care, counseling/discussion  Assessment & Plan  Full Code: I rediscussed code status with the patient, wife and son at the bedside. Patient has medical decision capacity.  Patient is currently maxed on high flow but oxygen levels have remained stable, it appears that patient and family would want intubation if it did come to that after discussion continue full code.  They would obviously want to be notified if he did do deteriorates a further discussions could be made (17 min 11/9/2021)      Lactic acidosis- (present on admission)  Assessment & Plan  Likely sec to hypoxia. Lactic acid over 5 on admit and trended down to 2.5 without fluids      Pneumonia due to COVID-19 virus- (present on admission)  Assessment & Plan  See above    Metabolic acidosis- (present on  admission)  Assessment & Plan  Improving, AG normal       VTE prophylaxis: enoxaparin ppx       I have performed a physical exam and reviewed and updated ROS and Plan today (11/12/2021). In review of yesterday's note (11/11/2021), there are no changes except as documented above.    Patient is critically ill.   The patient continues to have: Acute respiratory failure secondary to COVID-19  The vital organ system that is affected is the: Pulmonary  If untreated there is a high chance of deterioration into: Worsening respiratory failure requiring intubation  And eventually death.   The critical care that I am providing today is: Supplemental high flow oxygen  The critical that has been undertaken is medically complex.   There has been no overlap in critical care time.   Critical Care Time not including procedures: 35

## 2021-11-13 NOTE — CARE PLAN
The patient is Watcher - Medium risk of patient condition declining or worsening    Shift Goals  Clinical Goals: maintain adeqaute O2  Patient Goals: breath better/decrease anxiety  Family Goals: for Blue to get better and come home    Progress made toward(s) clinical / shift goals:  Patient was educated on care plan and interventions such as high flow. Patient has maintained saturations above 90% and is able to mobilize self in bed and use the urinal without assistance.    Problem: Knowledge Deficit - Standard  Goal: Patient and family/care givers will demonstrate understanding of plan of care, disease process/condition, diagnostic tests and medications  Outcome: Progressing     Problem: Respiratory  Goal: Patient will achieve/maintain optimum respiratory ventilation and gas exchange  Outcome: Progressing     Problem: Mobility  Goal: Patient's capacity to carry out activities will improve  Outcome: Progressing

## 2021-11-13 NOTE — CARE PLAN
The patient is Watcher - Medium risk of patient condition declining or worsening    Shift Goals  Clinical Goals: maintain adequate oxygenation;continue supportive measures to improve respiratory status and O2 needs  Patient Goals: to get better  Family Goals: for Blue to get better    Progress made toward(s) clinical / shift goals:  Pulse ox sats maintaining in low-mid 90s with high flow 60L 100%FiO2; was able to take non-rebreather off late this afternoon, and pulse ox sats at 96%.  Wife at bedside, offering good support, and encouraging Blue to change positions, which he is compliant with.      Patient is not progressing towards the following goals:  Still maxed out on high flow, but was able to take off the non-rebreather late this afternoon, and pulse ox sats at 96%.  Lungs dim with some crackles. Easily desats with minimal activity.        Problem: Respiratory  Goal: Patient will achieve/maintain optimum respiratory ventilation and gas exchange  Outcome: Progressing     Problem: Mobility  Goal: Patient's capacity to carry out activities will improve  Outcome: Progressing     Problem: Psychosocial  Goal: Patient's level of anxiety will decrease  Outcome: Progressing

## 2021-11-13 NOTE — PROGRESS NOTES
Hospital Medicine Daily Progress Note    Date of Service  11/13/2021    Chief Complaint  Blue Wu is a 61 y.o. male admitted 11/5/2021 with sob    Hospital Course  61 y.o. male who presented 11/5/2021 with shortness of breath. Unvaccinated and no previously known medical conditions.10 days prior to admission he developed a cough with chills, headache, and sore throat.  He had a home over-the-counter COVID-19 test which was positive 9 days ago.  In ED, he was on HFNC 60L with NRB. Which was titrated down to 15 L but has since been titrated back to 60L. procalcitonin was elevated and he was started on antibiotic therapy. As well as On decadron and. barcitinib  CRP 17.4  D-dimer 1.08    Interval Problem Update  Patient seen and examined at bedside, wife at beside,no new complaints, still very tired and SOB  - still requiring max high flow, and 15L NRB, O2 sats stable, O2 sats 90-95%, will attempt to wean off NRB  - cont. To encourage proning as tolerated and IS   -Continue treatment with Decadron and Baricitinib, continue Symbicort   - anxiety improved with anxiolytics, will continue       Patient remains critically ill and high risk for deterioration, discussed this with patient and family.       I have personally seen and examined the patient at bedside. I discussed the plan of care with patient, family and bedside RN.    Consultants/Specialty  critical care: signed off  Palliative    Code Status  Full Code    Disposition  Patient is not medically cleared.   Anticipate discharge to D.  I have placed the appropriate orders for post-discharge needs.    Review of Systems  Review of Systems   Constitutional: Positive for malaise/fatigue.   Respiratory: Positive for cough, sputum production and shortness of breath.    Neurological: Positive for weakness.   All other systems reviewed and are negative.       Physical Exam  Temp:  [36.1 °C (96.9 °F)-36.8 °C (98.3 °F)] 36.4 °C (97.6 °F)  Pulse:  [82-94] 89  Resp:   [18-22] 20  BP: (109-115)/(76-80) 109/76  SpO2:  [88 %-95 %] 91 %    Physical Exam  Vitals and nursing note reviewed.   Constitutional:       General: He is not in acute distress.     Appearance: Normal appearance. He is ill-appearing and diaphoretic. He is not toxic-appearing.   HENT:      Head: Normocephalic and atraumatic.      Mouth/Throat:      Pharynx: Oropharynx is clear.   Eyes:      Extraocular Movements: Extraocular movements intact.      Conjunctiva/sclera: Conjunctivae normal.   Neck:      Vascular: No carotid bruit.   Cardiovascular:      Rate and Rhythm: Normal rate and regular rhythm.   Pulmonary:      Effort: No respiratory distress.      Breath sounds: Wheezing and rales present.      Comments: Improved inspiratory effort, improved air entry in bases  Abdominal:      General: Abdomen is flat. Bowel sounds are normal.      Palpations: Abdomen is soft. There is no mass.   Musculoskeletal:         General: No swelling or tenderness. Normal range of motion.      Cervical back: Neck supple.   Skin:     General: Skin is warm.   Neurological:      General: No focal deficit present.      Mental Status: He is alert and oriented to person, place, and time.   Psychiatric:         Mood and Affect: Mood normal.         Behavior: Behavior normal.         Fluids    Intake/Output Summary (Last 24 hours) at 11/13/2021 1121  Last data filed at 11/13/2021 0818  Gross per 24 hour   Intake 940 ml   Output 750 ml   Net 190 ml       Laboratory  Recent Labs     11/12/21  0352   WBC 16.3*   RBC 5.35   HEMOGLOBIN 16.0   HEMATOCRIT 47.7   MCV 89.2   MCH 29.9   MCHC 33.5*   RDW 43.2   PLATELETCT 514*   MPV 10.1     Recent Labs     11/11/21  0147 11/12/21  0352 11/13/21  0258   SODIUM 132* 135 133*   POTASSIUM 3.5* 3.9 3.7   CHLORIDE 95* 100 97   CO2 23 24 25   GLUCOSE 127* 112* 106*   BUN 37* 39* 33*   CREATININE 0.90 0.87 0.78   CALCIUM 8.9 8.8 8.8                   Imaging  DX-CHEST-PORTABLE (1 VIEW)   Final Result       Ill-defined, predominantly interstitial bilateral pulmonary opacities. Edema is favored, although pneumonia can be considered in the appropriate clinical settings.              Assessment/Plan  * Acute respiratory failure with hypoxia (HCC)- (present on admission)  Assessment & Plan  Secondary to COVID infection  On high flow 60 L 100% FiO2, w/15L NRB, O2 sats 84-92%  Procal 0.46, CRP 17.4, D-dimer 1.08, lactic acid elevated likely sec to hypoxia, proBNP 525  Completed 3 days of antibiotics, will hold further antibiotic therapy and monitor given normal proca;  Continue dexamethasone 6mg daily for 10 days  Continue baricitinib 11/5-11/18  Add symbicort-wheezing on exam   Lasix as tolerated  Supportive managements: early mobilization, self prone, RT protocol, judicious fluid, IS  Wean off O2 as tolerated  Continue to wean oxygen as able    Hypokalemia  Assessment & Plan  K 3.5 today, likely due to reduced oral intake   Monitor and replace as needed    Hyponatremia  Assessment & Plan  Mild, likely due to COVID and decreased oral intake due to high flow   Monitor     Elevated troponin  Assessment & Plan  Likely sec to covid, denies active chest pain  Ordered EKG: NSR, no acute ST-T changes  Trending trop, trending down    Goals of care, counseling/discussion  Assessment & Plan  Full Code: I rediscussed code status with the patient, wife and son at the bedside. Patient has medical decision capacity.  Patient is currently maxed on high flow but oxygen levels have remained stable, it appears that patient and family would want intubation if it did come to that after discussion continue full code.  They would obviously want to be notified if he did do deteriorates a further discussions could be made (17 min 11/9/2021)      Lactic acidosis- (present on admission)  Assessment & Plan  Likely sec to hypoxia. Lactic acid over 5 on admit and trended down to 2.5 without fluids      Pneumonia due to COVID-19 virus- (present on  admission)  Assessment & Plan  See above    Metabolic acidosis- (present on admission)  Assessment & Plan  Improving, AG normal       VTE prophylaxis: enoxaparin ppx       I have performed a physical exam and reviewed and updated ROS and Plan today (11/13/2021). In review of yesterday's note (11/12/2021), there are no changes except as documented above.    Patient is critically ill.   The patient continues to have: Acute respiratory failure secondary to COVID-19  The vital organ system that is affected is the: Pulmonary  If untreated there is a high chance of deterioration into: Worsening respiratory failure requiring intubation  And eventually death.   The critical care that I am providing today is: Supplemental high flow oxygen  The critical that has been undertaken is medically complex.   There has been no overlap in critical care time.   Critical Care Time not including procedures: 33

## 2021-11-13 NOTE — CARE PLAN
The patient is Watcher - Medium risk of patient condition declining or worsening    Shift Goals  Clinical Goals: maintain adequate oxygenation; monitor for decompensation  Patient Goals: to breathe better, get better  Family Goals: for Blue to get better and come home    Progress made toward(s) clinical / shift goals:  Remains on high flow 60L 100% FiO2 with 15L non-rebreather on top, with pulse ox sats maintaining in low-mid 90s.      Patient is not progressing towards the following goals: Still requiring lots of O2 to maintain pulse ox sats.  Desats very easily with minimal activity.  Is not willing to prone, but willing to get into modified positions to help aerate lungs. Non-productive cough; lungs with crackles to posterior bases; receiving dexamethasone, baricitinib, lasix, and mucinex.        Problem: Respiratory  Goal: Patient will achieve/maintain optimum respiratory ventilation and gas exchange  Outcome: Progressing

## 2021-11-14 NOTE — PROGRESS NOTES
Hospital Medicine Daily Progress Note    Date of Service  11/14/2021    Chief Complaint  Blue Wu is a 61 y.o. male admitted 11/5/2021 with sob    Hospital Course  61 y.o. male who presented 11/5/2021 with shortness of breath. Unvaccinated and no previously known medical conditions.10 days prior to admission he developed a cough with chills, headache, and sore throat.  He had a home over-the-counter COVID-19 test which was positive 9 days ago.  In ED, he was on HFNC 60L with NRB. Which was titrated down to 15 L but has since been titrated back to 60L. procalcitonin was elevated and he was started on antibiotic therapy. As well as On decadron and. barcitinib  CRP 17.4  D-dimer 1.08    Interval Problem Update  Patient seen and examined at bedside, wife at beside,no new complaints, still tired and SOB, clinically does look improved. Was able to be up at bedside today and did some arm exercises per wife  - still requiring max high flow, and 15L NRB, O2 sats stable, O2 sats 90-95%, was able to come off NRB for a bit, now back on   - cont. To encourage proning as tolerated, IS and mobility  -Continue treatment with Decadron and Baricitinib, continue Symbicort   - anxiety improved with anxiolytics, will continue       Patient remains critically ill and high risk for deterioration, discussed this with patient and family.       I have personally seen and examined the patient at bedside. I discussed the plan of care with patient, family and bedside RN.    Consultants/Specialty  critical care: signed off  Palliative    Code Status  Full Code    Disposition  Patient is not medically cleared.   Anticipate discharge to D.  I have placed the appropriate orders for post-discharge needs.    Review of Systems  Review of Systems   Constitutional: Positive for malaise/fatigue.   Respiratory: Positive for cough, sputum production and shortness of breath.    Neurological: Positive for weakness.   All other systems reviewed and are  negative.       Physical Exam  Temp:  [35.7 °C (96.3 °F)-36.5 °C (97.7 °F)] 35.7 °C (96.3 °F)  Pulse:  [] 104  Resp:  [18-22] 22  BP: (100-123)/(69-83) 100/69  SpO2:  [89 %-96 %] 89 %    Physical Exam  Vitals and nursing note reviewed.   Constitutional:       General: He is not in acute distress.     Appearance: Normal appearance. He is ill-appearing and diaphoretic. He is not toxic-appearing.   HENT:      Head: Normocephalic and atraumatic.      Mouth/Throat:      Pharynx: Oropharynx is clear.   Eyes:      Extraocular Movements: Extraocular movements intact.      Conjunctiva/sclera: Conjunctivae normal.   Neck:      Vascular: No carotid bruit.   Cardiovascular:      Rate and Rhythm: Normal rate and regular rhythm.   Pulmonary:      Effort: No respiratory distress.      Breath sounds: Wheezing and rales present.      Comments: Improved inspiratory effort, improved air entry in bases  Abdominal:      General: Abdomen is flat. Bowel sounds are normal.      Palpations: Abdomen is soft. There is no mass.   Musculoskeletal:         General: No swelling or tenderness. Normal range of motion.      Cervical back: Neck supple.   Skin:     General: Skin is warm.   Neurological:      General: No focal deficit present.      Mental Status: He is alert and oriented to person, place, and time.   Psychiatric:         Mood and Affect: Mood normal.         Behavior: Behavior normal.         Fluids    Intake/Output Summary (Last 24 hours) at 11/14/2021 1318  Last data filed at 11/14/2021 0851  Gross per 24 hour   Intake --   Output 300 ml   Net -300 ml       Laboratory  Recent Labs     11/12/21  0352   WBC 16.3*   RBC 5.35   HEMOGLOBIN 16.0   HEMATOCRIT 47.7   MCV 89.2   MCH 29.9   MCHC 33.5*   RDW 43.2   PLATELETCT 514*   MPV 10.1     Recent Labs     11/12/21  0352 11/13/21  0258 11/14/21  0309   SODIUM 135 133* 133*   POTASSIUM 3.9 3.7 3.5*   CHLORIDE 100 97 94*   CO2 24 25 30   GLUCOSE 112* 106* 83   BUN 39* 33* 29*    CREATININE 0.87 0.78 0.83   CALCIUM 8.8 8.8 9.1                   Imaging  DX-CHEST-PORTABLE (1 VIEW)   Final Result      Ill-defined, predominantly interstitial bilateral pulmonary opacities. Edema is favored, although pneumonia can be considered in the appropriate clinical settings.              Assessment/Plan  * Acute respiratory failure with hypoxia (HCC)- (present on admission)  Assessment & Plan  Secondary to COVID infection  On high flow 60 L 100% FiO2, w/15L NRB, O2 sats 84-92%  Procal 0.46, CRP 17.4, D-dimer 1.08, lactic acid elevated likely sec to hypoxia, proBNP 525  Completed 3 days of antibiotics, will hold further antibiotic therapy and monitor given normal proca;  Continue dexamethasone 6mg daily for 10 days  Continue baricitinib 11/5-11/18  Add symbicort-wheezing on exam   Lasix as tolerated  Supportive managements: early mobilization, self prone, RT protocol, judicious fluid, IS  Wean off O2 as tolerated  Continue to wean oxygen as able    Hypokalemia  Assessment & Plan  K 3.5 today, likely due to reduced oral intake   Monitor and replace as needed    Hyponatremia  Assessment & Plan  Mild, likely due to COVID and decreased oral intake due to high flow   Monitor     Elevated troponin  Assessment & Plan  Likely sec to covid, denies active chest pain  Ordered EKG: NSR, no acute ST-T changes  Trending trop, trending down    Goals of care, counseling/discussion  Assessment & Plan  Full Code: I rediscussed code status with the patient, wife and son at the bedside. Patient has medical decision capacity.  Patient is currently maxed on high flow but oxygen levels have remained stable, it appears that patient and family would want intubation if it did come to that after discussion continue full code.  They would obviously want to be notified if he did do deteriorates a further discussions could be made (17 min 11/9/2021)      Lactic acidosis- (present on admission)  Assessment & Plan  Likely sec to  hypoxia. Lactic acid over 5 on admit and trended down to 2.5 without fluids      Pneumonia due to COVID-19 virus- (present on admission)  Assessment & Plan  See above    Metabolic acidosis- (present on admission)  Assessment & Plan  Improving, AG normal       VTE prophylaxis: enoxaparin ppx       I have performed a physical exam and reviewed and updated ROS and Plan today (11/14/2021). In review of yesterday's note (11/13/2021), there are no changes except as documented above.    Patient is critically ill.   The patient continues to have: Acute respiratory failure secondary to COVID-19  The vital organ system that is affected is the: Pulmonary  If untreated there is a high chance of deterioration into: Worsening respiratory failure requiring intubation  And eventually death.   The critical care that I am providing today is: Supplemental high flow oxygen  The critical that has been undertaken is medically complex.   There has been no overlap in critical care time.   Critical Care Time not including procedures: 35

## 2021-11-15 NOTE — CARE PLAN
The patient is Watcher - Medium risk of patient condition declining or worsening    Shift Goals  Clinical Goals: maintain adequate oxygenation  Patient Goals: feel better/sleep  Family Goals: Blue to get better    Progress made toward(s) clinical / shift goals:  Patient was educated on care plan and oxygen use with family at bedside to translate. Patient has maintained above 90% saturation when not exerting himself. Patient is able to mobilize self in bed and getup to the commode.    Problem: Knowledge Deficit - Standard  Goal: Patient and family/care givers will demonstrate understanding of plan of care, disease process/condition, diagnostic tests and medications  Outcome: Progressing     Problem: Respiratory  Goal: Patient will achieve/maintain optimum respiratory ventilation and gas exchange  Outcome: Progressing     Problem: Mobility  Goal: Patient's capacity to carry out activities will improve  Outcome: Progressing

## 2021-11-16 PROBLEM — Z71.89 GOALS OF CARE, COUNSELING/DISCUSSION: Status: RESOLVED | Noted: 2021-01-01 | Resolved: 2021-01-01

## 2021-11-16 PROBLEM — U07.1 ACUTE HYPOXEMIC RESPIRATORY FAILURE DUE TO COVID-19 (HCC): Status: ACTIVE | Noted: 2021-01-01

## 2021-11-16 NOTE — PROGRESS NOTES
Hospital Medicine Daily Progress Note    Date of Service  11/16/2021    Chief Complaint  Blue Wu is a 61 y.o. male admitted 11/5/2021 with sob    Hospital Course  61 y.o. male who presented 11/5/2021 with shortness of breath. Unvaccinated and no previously known medical conditions.10 days prior to admission he developed a cough with chills, headache, and sore throat.  He had a home over-the-counter COVID-19 test which was positive 9 days ago.  In ED, he was on HFNC 60L with NRB. Which was titrated down to 15 L but has since been titrated back to 60L. procalcitonin was elevated and he was started on antibiotic therapy. As well as On decadron and. barcitinib    Interval Problem Update  Patient was seen and examined at bedside.  No acute events overnight. Family updated at bedside.     HFNC with rescue nonrebreather  Decadron day 9/10  Baricitinib day 10/14  Lasix BID  Encourage proning    I have personally seen and examined the patient at bedside. I discussed the plan of care with patient, family and bedside RN.    Consultants/Specialty  critical care: signed off  Palliative    Code Status  Full Code    Disposition  Patient is not medically cleared.   Anticipate discharge to D.  I have placed the appropriate orders for post-discharge needs.    Review of Systems  Review of Systems   Constitutional: Positive for malaise/fatigue.   HENT: Negative for sore throat.    Respiratory: Positive for cough and shortness of breath.    Cardiovascular: Negative for chest pain.   Gastrointestinal: Negative for abdominal pain, nausea and vomiting.   Genitourinary: Negative for frequency.   Musculoskeletal: Negative for falls.   Neurological: Negative for loss of consciousness.   All other systems reviewed and are negative.       Physical Exam  Temp:  [36.1 °C (97 °F)-37.9 °C (100.2 °F)] 36.6 °C (97.8 °F)  Pulse:  [] 105  Resp:  [18-30] 20  BP: (101-116)/(52-82) 111/76  SpO2:  [85 %-94 %] 92 %    Physical Exam  Vitals and  nursing note reviewed.   Constitutional:       General: He is not in acute distress.     Appearance: Normal appearance. He is ill-appearing.   HENT:      Head: Normocephalic and atraumatic.      Right Ear: External ear normal.      Left Ear: External ear normal.      Nose:      Comments: HFNC in place  Rescue nonrebreather     Mouth/Throat:      Pharynx: Oropharynx is clear.   Eyes:      General: No scleral icterus.     Extraocular Movements: Extraocular movements intact.      Conjunctiva/sclera: Conjunctivae normal.   Neck:      Vascular: No carotid bruit.   Cardiovascular:      Rate and Rhythm: Normal rate and regular rhythm.   Pulmonary:      Effort: No respiratory distress.      Comments: Crackles auscultated in lung bases bilaterally  Abdominal:      General: Abdomen is flat. Bowel sounds are normal.      Palpations: Abdomen is soft.      Tenderness: There is no abdominal tenderness. There is no guarding or rebound.   Musculoskeletal:         General: No swelling or deformity. Normal range of motion.      Cervical back: Neck supple.   Skin:     General: Skin is warm.      Coloration: Skin is not jaundiced.      Findings: No bruising.   Neurological:      General: No focal deficit present.      Mental Status: He is alert and oriented to person, place, and time.   Psychiatric:         Mood and Affect: Mood normal.         Behavior: Behavior normal.         Fluids    Intake/Output Summary (Last 24 hours) at 11/16/2021 1336  Last data filed at 11/16/2021 0900  Gross per 24 hour   Intake 120 ml   Output 700 ml   Net -580 ml       Laboratory  Recent Labs     11/16/21  0321   WBC 16.7*   RBC 5.34   HEMOGLOBIN 16.2   HEMATOCRIT 48.5   MCV 90.8   MCH 30.3   MCHC 33.4*   RDW 42.8   PLATELETCT 729*   MPV 10.5     Recent Labs     11/14/21  0309 11/15/21  0342 11/16/21  0321   SODIUM 133* 132* 132*   POTASSIUM 3.5* 3.6 3.9   CHLORIDE 94* 95* 91*   CO2 30 24 29   GLUCOSE 83 93 156*   BUN 29* 27* 32*   CREATININE 0.83 0.79  0.97   CALCIUM 9.1 8.9 9.3                   Imaging  DX-CHEST-LIMITED (1 VIEW)   Final Result         1.  Stable chest x-ray findings with persistent diffuse bilateral interstitial opacities and probable trace effusions.      DX-CHEST-PORTABLE (1 VIEW)   Final Result      Ill-defined, predominantly interstitial bilateral pulmonary opacities. Edema is favored, although pneumonia can be considered in the appropriate clinical settings.              Assessment/Plan  * Acute hypoxemic respiratory failure due to COVID-19 (HCC)- (present on admission)  Assessment & Plan  Secondary to COVID infection  On high flow 60 L 100% FiO2, w/15L NRB, O2 sats 90-95%  Procal 0.46, CRP 17.4, D-dimer 1.08, lactic acid elevated likely sec to hypoxia, proBNP 525  Completed 3 days of antibiotics, will hold further antibiotic therapy and monitor given normal proca;  compelted dexamethasone 6mg daily for 10 days, given additional 3-5 days due to wheezing on exam   Continue baricitinib 11/5-11/18  Add symbicort-wheezing on exam   Lasix as tolerated  Supportive managements: early mobilization, self prone, RT protocol, judicious fluid, IS  Wean off O2 as tolerated  HFNC with rescue nonrebreather    Hypokalemia  Assessment & Plan  K 3.5 today, likely due to reduced oral intake   Monitor and replace as needed    Hyponatremia  Assessment & Plan  128  monitor    Lactic acidosis- (present on admission)  Assessment & Plan  Likely sec to hypoxia. Lactic acid over 5 on admit and trended down to 2.5 without fluids      Pneumonia due to COVID-19 virus- (present on admission)  Assessment & Plan  See above    Elevated troponin  Assessment & Plan  Likely sec to covid, denies active chest pain  Ordered EKG: NSR, no acute ST-T changes  On admission, Trop peak 50 downtrended  Likely type 2 in setting of hypoxia    Metabolic acidosis- (present on admission)  Assessment & Plan  Improving, AG normal       VTE prophylaxis: enoxaparin ppx       I have performed a  physical exam and reviewed and updated ROS and Plan today (11/16/2021). In review of yesterday's note (11/15/2021), there are no changes except as documented above.    Patient is critically ill.   The patient continues to have: Acute respiratory failure secondary to COVID-19  The vital organ system that is affected is the: Pulmonary  If untreated there is a high chance of deterioration into: Worsening respiratory failure requiring intubation  And eventually death.   The critical care that I am providing today is: Supplemental high flow oxygen  The critical that has been undertaken is medically complex.   There has been no overlap in critical care time.   Critical Care Time not including procedures: 35

## 2021-11-16 NOTE — PALLIATIVE CARE
"Palliative Care follow-up  PC RN met with patient, his wife (Fay), and son (Blue) at bedside. With the assistance of  via iPad, introduced self and reintroduced the role of palliative care. Again discussed goals of care including code status and if Blue would be willing to go onto a mechanical ventilator should that be needed in the future. The patient and his wife expressed some anxiety around this stating \"the doctor told me I was doing fine.\" PC RN reassured pt and Fay that there is no immediate need for intubation per the medical team; however, it is best to have these conversations early so that Blue can participate in the conversation. Verbalized understanding.     We discussed in detail what mechanical ventilation entails including sedation and/or paralytics. Family inquiring what his chance of survival would be if he went on the mechanical ventilator. PC RN encouraged family to ask the physician about this. Asked Blue what his thoughts on our conversation are and he states \"I just want to get better and breathe better. I want to go home.\" Stephan requests that PC RN returns back to the bedside tomorrow after she has had time to talk to Blue further as well as their other children.    Plan: Will return to bedside to discuss further.     Thank you for allowing Palliative Care to support this patient and family. Contact [phone] for additional assistance, change in patient status, or with any questions/concerns.   "

## 2021-11-16 NOTE — PROGRESS NOTES
Hospital Medicine Daily Progress Note    Date of Service  11/15/2021    Chief Complaint  Blue Wu is a 61 y.o. male admitted 11/5/2021 with sob    Hospital Course  61 y.o. male who presented 11/5/2021 with shortness of breath. Unvaccinated and no previously known medical conditions.10 days prior to admission he developed a cough with chills, headache, and sore throat.  He had a home over-the-counter COVID-19 test which was positive 9 days ago.  In ED, he was on HFNC 60L with NRB. Which was titrated down to 15 L but has since been titrated back to 60L. procalcitonin was elevated and he was started on antibiotic therapy. As well as On decadron and. barcitinib  CRP 17.4  D-dimer 1.08    Interval Problem Update  Patient seen and examined at bedside, wife at beside,no new complaints, still very tired and SOB  - still requiring max high flow, and 15L NRB, O2 sats stable, O2 sats 90-95%  - cont. To encourage proning as tolerated and IS   -Continue treatment with Decadron for additional 3-5 days, still with wheezing on exam, cont. Baricitinib, continue Symbicort   - anxiety improved with anxiolytics, will continue   - low grade fevers, despite scheduled tylenol,  will repeat Procal, d-dimer and imaging       Patient remains critically ill and high risk for deterioration, discussed this with patient and family.       I have personally seen and examined the patient at bedside. I discussed the plan of care with patient, family and bedside RN.    Consultants/Specialty  critical care: signed off  Palliative    Code Status  Full Code    Disposition  Patient is not medically cleared.   Anticipate discharge to D.  I have placed the appropriate orders for post-discharge needs.    Review of Systems  Review of Systems   Constitutional: Positive for malaise/fatigue.   Respiratory: Positive for cough, sputum production and shortness of breath.    Neurological: Positive for weakness.   All other systems reviewed and are negative.        Physical Exam  Temp:  [36.3 °C (97.3 °F)-37.7 °C (99.9 °F)] 37.6 °C (99.6 °F)  Pulse:  [] 107  Resp:  [18-30] 18  BP: (101-119)/(66-87) 101/66  SpO2:  [88 %-95 %] 92 %    Physical Exam  Vitals and nursing note reviewed.   Constitutional:       General: He is not in acute distress.     Appearance: Normal appearance. He is ill-appearing and diaphoretic. He is not toxic-appearing.   HENT:      Head: Normocephalic and atraumatic.      Mouth/Throat:      Pharynx: Oropharynx is clear.   Eyes:      Extraocular Movements: Extraocular movements intact.      Conjunctiva/sclera: Conjunctivae normal.   Neck:      Vascular: No carotid bruit.   Cardiovascular:      Rate and Rhythm: Normal rate and regular rhythm.   Pulmonary:      Effort: No respiratory distress.      Breath sounds: Wheezing and rales present.      Comments: Improved inspiratory effort, improved air entry in bases  Abdominal:      General: Abdomen is flat. Bowel sounds are normal.      Palpations: Abdomen is soft. There is no mass.   Musculoskeletal:         General: No swelling or tenderness. Normal range of motion.      Cervical back: Neck supple.   Skin:     General: Skin is warm.   Neurological:      General: No focal deficit present.      Mental Status: He is alert and oriented to person, place, and time.   Psychiatric:         Mood and Affect: Mood normal.         Behavior: Behavior normal.         Fluids    Intake/Output Summary (Last 24 hours) at 11/15/2021 1708  Last data filed at 11/15/2021 1200  Gross per 24 hour   Intake 990 ml   Output 750 ml   Net 240 ml       Laboratory      Recent Labs     11/13/21  0258 11/14/21  0309 11/15/21  0342   SODIUM 133* 133* 132*   POTASSIUM 3.7 3.5* 3.6   CHLORIDE 97 94* 95*   CO2 25 30 24   GLUCOSE 106* 83 93   BUN 33* 29* 27*   CREATININE 0.78 0.83 0.79   CALCIUM 8.8 9.1 8.9                   Imaging  DX-CHEST-PORTABLE (1 VIEW)   Final Result      Ill-defined, predominantly interstitial bilateral pulmonary  opacities. Edema is favored, although pneumonia can be considered in the appropriate clinical settings.              Assessment/Plan  * Acute respiratory failure with hypoxia (HCC)- (present on admission)  Assessment & Plan  Secondary to COVID infection  On high flow 60 L 100% FiO2, w/15L NRB, O2 sats 90-95%  Procal 0.46, CRP 17.4, D-dimer 1.08, lactic acid elevated likely sec to hypoxia, proBNP 525  Completed 3 days of antibiotics, will hold further antibiotic therapy and monitor given normal proca;  compelted dexamethasone 6mg daily for 10 days, given additional 3-5 days due to wheezing on exam   Continue baricitinib 11/5-11/18  Add symbicort-wheezing on exam   Lasix as tolerated  Supportive managements: early mobilization, self prone, RT protocol, judicious fluid, IS  Wean off O2 as tolerated  Continue to wean oxygen as able    Hypokalemia  Assessment & Plan  K 3.5 today, likely due to reduced oral intake   Monitor and replace as needed    Hyponatremia  Assessment & Plan  Mild, likely due to COVID and decreased oral intake due to high flow   Monitor     Elevated troponin  Assessment & Plan  Likely sec to covid, denies active chest pain  Ordered EKG: NSR, no acute ST-T changes  Trending trop, trending down    Goals of care, counseling/discussion  Assessment & Plan  Full Code: I rediscussed code status with the patient, wife and son at the bedside. Patient has medical decision capacity.  Patient is currently maxed on high flow but oxygen levels have remained stable, it appears that patient and family would want intubation if it did come to that after discussion continue full code.  They would obviously want to be notified if he did do deteriorates a further discussions could be made (17 min 11/9/2021)      Lactic acidosis- (present on admission)  Assessment & Plan  Likely sec to hypoxia. Lactic acid over 5 on admit and trended down to 2.5 without fluids      Pneumonia due to COVID-19 virus- (present on  admission)  Assessment & Plan  See above    Metabolic acidosis- (present on admission)  Assessment & Plan  Improving, AG normal       VTE prophylaxis: enoxaparin ppx       I have performed a physical exam and reviewed and updated ROS and Plan today (11/15/2021). In review of yesterday's note (11/14/2021), there are no changes except as documented above.    Patient is critically ill.   The patient continues to have: Acute respiratory failure secondary to COVID-19  The vital organ system that is affected is the: Pulmonary  If untreated there is a high chance of deterioration into: Worsening respiratory failure requiring intubation  And eventually death.   The critical care that I am providing today is: Supplemental high flow oxygen  The critical that has been undertaken is medically complex.   There has been no overlap in critical care time.   Critical Care Time not including procedures: 35

## 2021-11-16 NOTE — DISCHARGE PLANNING
HEW left VM for patient @ [phone] requesting a call to complete CTT assessment. LORENZO also tried patient's son Blue but # disconnected.

## 2021-11-16 NOTE — CARE PLAN
The patient is Watcher - Medium risk of patient condition declining or worsening    Shift Goals  Clinical Goals: maintain adequate oxygenation  Patient Goals: feel better, rest  Family Goals: for Blue to get better        Problem: Respiratory  Goal: Patient will achieve/maintain optimum respiratory ventilation and gas exchange  Outcome: Progressing     Pt maintaining adequate oxygenation on HFNC and non-rebreather mask, however is maxed out. RN encouraged proning and use of I.S.    Problem: Mobility  Goal: Patient's capacity to carry out activities will improve  Outcome: Progressing     Pt encouraged to increase mobility as oxygen saturation allows, sitting in chair or at edge of bed for meals and performing in-bed exercises.

## 2021-11-16 NOTE — CARE PLAN
The patient is Watcher - Medium risk of patient condition declining or worsening    Shift Goals  Clinical Goals: maintain adequate oxygenation;promote positioning for best aeration  Patient Goals: to get better and go home  Family Goals: for Blue to get better    Progress made toward(s) clinical / shift goals:    Problem: Knowledge Deficit - Standard  Goal: Patient and family/care givers will demonstrate understanding of plan of care, disease process/condition, diagnostic tests and medications  Outcome: Progressing Patient is educated of disease process and condition. Treatment team has included patient in plan of care. All medications indications and side effects are explained. Patient is encouraged to ask questions. Patient indicates understanding.     Problem: Mobility  Goal: Patient's capacity to carry out activities will improve  Outcome: Progressing -Pt sat up in bed, encouraged to prone/lay on side to help with oxygen demand    Patient is not progressing towards the following goals:

## 2021-11-16 NOTE — PROGRESS NOTES
Assumed care of PT A&O 4, Slovak speaking. Pt resting in bed with no signs of labored breathing. On 60/100 HHFNC with 15L NRB. Pt is medical. Call light within reach, bed in lowest position, upper bed rails up. Pt was updated on plan of care for the day . Will continue to monitor.

## 2021-11-16 NOTE — DISCHARGE PLANNING
Anticipated Discharge Disposition: Home with O2    Action: LSW met with patient, spouse, son Blue @ bedside.     LSW received updated contact information. Blue Deluca [phone] and Fay [phone].    LSW called [facility] and provided them with updated contact information. [facility] to screen patient for medicaid/FAP.    Patient currently on highflow O2. Not a candidate for LTAC as he is uninsured at this time.     Barriers to Discharge: highflow O2    Plan: LSW to f/u with [facility] on screening

## 2021-11-16 NOTE — CARE PLAN
The patient is Watcher - Medium risk of patient condition declining or worsening    Shift Goals  Clinical Goals: maintain adequate oxygenation;promote positioning for best aeration  Patient Goals: to get better and go home  Family Goals: for Blue to get better    Progress made toward(s) clinical / shift goals:  Blue's pulse ox readings are maintaining in low 90s with 60L 100% FiO2 high flow, and 15L non-rebreather on top.  Wife and Blue demonstrate good understanding of importance of changing position and taking deep breaths as best that he can.    Patient is not progressing towards the following goals:  Still requiring maximum high flow with additional 15L non-rebreather on top to maintain adequate oxygenation; tachycardic and tachypneic. Desats very easily when non-rebreather taken off. MD added po dexamethasone today; receiving IV lasix and baricitinib.         Problem: Knowledge Deficit - Standard  Goal: Patient and family/care givers will demonstrate understanding of plan of care, disease process/condition, diagnostic tests and medications  Outcome: Progressing     Problem: Respiratory  Goal: Patient will achieve/maintain optimum respiratory ventilation and gas exchange  Outcome: Progressing

## 2021-11-16 NOTE — DISCHARGE PLANNING
Care Transition Team Assessment    LSW completed assessment via chart review.     Patient previously independent. Patient has good family support that includes spouse and adult children. Patient has no insurance listed. LSW review [facility] notes, they are following to screen patient for medicaid. LSW to continue to make attempts to contact patient and family to update phone numbers on facesheet.    Information Source  Orientation Level: Oriented X4  Information Given By: Other (Comments)  Informant's Name: chart review  Who is responsible for making decisions for patient? : Patient    Readmission Evaluation  Is this a readmission?: No    Elopement Risk  Legal Hold: No  Ambulatory or Self Mobile in Wheelchair: Yes  Disoriented: No  Psychiatric Symptoms: None  History of Wandering: No  Elopement this Admit: No  Vocalizing Wanting to Leave: No  Displays Behaviors, Body Language Wanting to Leave: No-Not at Risk for Elopement  Elopement Risk: Not at Risk for Elopement    Interdisciplinary Discharge Planning  Patient or legal guardian wants to designate a caregiver: No    Discharge Preparedness  What is your plan after discharge?: Home with help  What are your discharge supports?: Child,Spouse  Prior Functional Level: Ambulatory,Independent with Activities of Daily Living,Independent with Medication Management  Difficulity with ADLs: None  Difficulity with IADLs: None    Functional Assesment  Prior Functional Level: Ambulatory,Independent with Activities of Daily Living,Independent with Medication Management    Finances  Financial Barriers to Discharge: No  Prescription Coverage: No  Prescription Coverage Comments: uninsured              Advance Directive  Advance Directive?: None  Advance Directive offered?: AD Booklet given    Domestic Abuse  Have you ever been the victim of abuse or violence?: No  Physical Abuse or Sexual Abuse: No  Verbal Abuse or Emotional Abuse: No  Possible Abuse/Neglect Reported to:: Not  Applicable    Psychological Assessment  History of Substance Abuse: None  History of Psychiatric Problems: No  Non-compliant with Treatment: No  Newly Diagnosed Illness: Yes    Discharge Risks or Barriers  Discharge risks or barriers?: No PCP,Uninsured / underinsured  Patient risk factors: No PCP,Uninsured or underinsured    Anticipated Discharge Information  Discharge Disposition: Discharged to home/self care (01)  Discharge Address: [address] [place] [address]  [id number]

## 2021-11-17 NOTE — CARE PLAN
The patient is Watcher - Medium risk of patient condition declining or worsening    Shift Goals  Clinical Goals: maintain adequate oxygenation, wean O2 if tolerated  Patient Goals: feel better  Family Goals: For Blue to get better      Problem: Respiratory  Goal: Patient will achieve/maintain optimum respiratory ventilation and gas exchange  Outcome: Progressing     Pt maintaining adequate oxygenation however is maxed out on high flow nasal cannula and non-rebreather mask. Will attempt to wean off non-rebreather mask if tolerated.    Problem: Psychosocial  Goal: Patient's level of anxiety will decrease  Outcome: Progressing     Pt remains anxious, alleviated by family at bedside and Atarax prn. RN encouraged deep breathing exercises, reinforced relaxation techniques.

## 2021-11-17 NOTE — CARE PLAN
The patient is Watcher - Medium risk of patient condition declining or worsening    Shift Goals  Clinical Goals: Maintain oxygen saturation above 90%  Patient Goals: Sleep, feel better  Family Goals: For Blue to get better    Progress made toward(s) clinical / shift goals:  Patient has been maintaining O2 above 90%. Patient desats with movement. Patient does not tolerate well.    Patient is not progressing towards the following goals: Patient is maxed out on High flow and is also requiring a non-rebreather over his high flow oxygen. Patient gets anxious and desats quickly. Does not tolerate movement well.      Problem: Respiratory  Goal: Patient will achieve/maintain optimum respiratory ventilation and gas exchange  Outcome: Not Progressing     Problem: Psychosocial  Goal: Patient's level of anxiety will decrease  Outcome: Not Progressing

## 2021-11-17 NOTE — PROGRESS NOTES
Hospital Medicine Daily Progress Note    Date of Service  11/17/2021    Chief Complaint  Blue Wu is a 61 y.o. male admitted 11/5/2021 with sob    Hospital Course  61 y.o. male who presented 11/5/2021 with shortness of breath. Unvaccinated and no previously known medical conditions.10 days prior to admission he developed a cough with chills, headache, and sore throat.  He had a home over-the-counter COVID-19 test which was positive 9 days ago.  In ED, he was on HFNC 60L with NRB. Which was titrated down to 15 L but has since been titrated back to 60L. procalcitonin was elevated and he was started on antibiotic therapy. As well as On decadron and. barcitinib    Interval Problem Update  Patient was seen and examined at bedside.  No acute events overnight.     HFNC with rescue nonrebreather  Decadron day 10/10  Baricitinib day 11/14   Lasix BID  Encourage proning    I have personally seen and examined the patient at bedside. I discussed the plan of care with patient, family and bedside RN.    Consultants/Specialty  critical care: signed off  Palliative    Code Status  Full Code    Disposition  Patient is not medically cleared.   Anticipate discharge to TBD.  I have placed the appropriate orders for post-discharge needs.    Review of Systems  Review of Systems   Constitutional: Positive for malaise/fatigue.   HENT: Negative for sore throat.    Respiratory: Positive for cough and shortness of breath.    Cardiovascular: Negative for chest pain.   Gastrointestinal: Negative for abdominal pain, nausea and vomiting.   Genitourinary: Negative for frequency.   Musculoskeletal: Negative for falls.   Neurological: Negative for loss of consciousness.   All other systems reviewed and are negative.       Physical Exam  Temp:  [36 °C (96.8 °F)-36.6 °C (97.9 °F)] 36 °C (96.8 °F)  Pulse:  [] 110  Resp:  [18-22] 22  BP: (101-132)/(66-88) 132/88  SpO2:  [88 %-96 %] 96 %    Physical Exam  Vitals and nursing note reviewed.    Constitutional:       General: He is not in acute distress.     Appearance: Normal appearance. He is ill-appearing.   HENT:      Head: Normocephalic and atraumatic.      Right Ear: External ear normal.      Left Ear: External ear normal.      Nose:      Comments: HFNC in place  Rescue nonrebreather     Mouth/Throat:      Pharynx: Oropharynx is clear.   Eyes:      General: No scleral icterus.     Extraocular Movements: Extraocular movements intact.      Conjunctiva/sclera: Conjunctivae normal.   Neck:      Vascular: No carotid bruit.   Cardiovascular:      Rate and Rhythm: Normal rate and regular rhythm.   Pulmonary:      Effort: No respiratory distress.      Comments: Crackles auscultated in lung bases bilaterally  Abdominal:      General: Abdomen is flat. Bowel sounds are normal.      Palpations: Abdomen is soft.      Tenderness: There is no abdominal tenderness. There is no guarding or rebound.   Musculoskeletal:         General: No swelling or deformity. Normal range of motion.      Cervical back: Neck supple.   Skin:     General: Skin is warm.      Coloration: Skin is not jaundiced.      Findings: No bruising.   Neurological:      General: No focal deficit present.      Mental Status: He is alert and oriented to person, place, and time.   Psychiatric:         Mood and Affect: Mood normal.         Behavior: Behavior normal.     Patient was seen and examined at bedside. No changes in physical exam from prior evaluation.    Fluids    Intake/Output Summary (Last 24 hours) at 11/17/2021 1309  Last data filed at 11/17/2021 0500  Gross per 24 hour   Intake --   Output 400 ml   Net -400 ml       Laboratory  Recent Labs     11/16/21  0321 11/17/21  0056   WBC 16.7* 22.2*   RBC 5.34 5.04   HEMOGLOBIN 16.2 15.7   HEMATOCRIT 48.5 44.0   MCV 90.8 87.3   MCH 30.3 31.2   MCHC 33.4* 35.7*   RDW 42.8 39.5   PLATELETCT 729* 842*   MPV 10.5 11.0     Recent Labs     11/15/21  0342 11/16/21  0321 11/17/21  0056   SODIUM 132* 132*  129*   POTASSIUM 3.6 3.9 3.0*   CHLORIDE 95* 91* 92*   CO2 24 29 23   GLUCOSE 93 156* 112*   BUN 27* 32* 42*   CREATININE 0.79 0.97 0.84   CALCIUM 8.9 9.3 9.7                   Imaging  DX-CHEST-LIMITED (1 VIEW)   Final Result         1.  Stable chest x-ray findings with persistent diffuse bilateral interstitial opacities and probable trace effusions.      DX-CHEST-PORTABLE (1 VIEW)   Final Result      Ill-defined, predominantly interstitial bilateral pulmonary opacities. Edema is favored, although pneumonia can be considered in the appropriate clinical settings.              Assessment/Plan  * Acute hypoxemic respiratory failure due to COVID-19 (HCC)- (present on admission)  Assessment & Plan  Secondary to COVID infection  On high flow 60 L 100% FiO2, w/15L NRB, O2 sats 90-95%  Procal 0.46, CRP 17.4, D-dimer 1.08, lactic acid elevated likely sec to hypoxia, proBNP 525  Completed 3 days of antibiotics, will hold further antibiotic therapy and monitor given normal proca;  HFNC with rescue nonrebreather  Decadron day 10/10  Baricitinib day 11/14   Lasix BID  Encourage proning    Hypokalemia  Assessment & Plan  Monitor and replace    Hyponatremia  Assessment & Plan  129  monitor    Elevated troponin  Assessment & Plan  Likely sec to covid, denies active chest pain  Ordered EKG: NSR, no acute ST-T changes  On admission, Trop peak 50 downtrended  Likely type 2 in setting of hypoxia    Lactic acidosis- (present on admission)  Assessment & Plan  improved      Pneumonia due to COVID-19 virus- (present on admission)  Assessment & Plan  See above    Metabolic acidosis- (present on admission)  Assessment & Plan  Improving, AG normal       VTE prophylaxis: enoxaparin ppx       I have performed a physical exam and reviewed and updated ROS and Plan today (11/17/2021). In review of yesterday's note (11/16/2021), there are no changes except as documented above.    Patient is critically ill.   The patient continues to have:  respiratory failure  The vital organ system that is affected is the: Pulmonary  If untreated there is a high chance of deterioration into: respiratory arrest  And eventually death.   The critical care that I am providing today is: Positive pressure ventilation with HFNC  The critical that has been undertaken is medically complex.   There has been no overlap in critical care time.   Critical Care Time not including procedures: 33

## 2021-11-18 NOTE — PALLIATIVE CARE
Palliative Care follow-up  PC RN left VM with pt's son Blue to set time to re-visit code status discussion. Await call back.       Thank you for allowing Palliative Care to support this patient and family. Contact [phone] for additional assistance, change in patient status, or with any questions/concerns.

## 2021-11-18 NOTE — PROGRESS NOTES
Hospital Medicine Daily Progress Note    Date of Service  11/18/2021    Chief Complaint  Blue Wu is a 61 y.o. male admitted 11/5/2021 with sob    Hospital Course  61 y.o. male who presented 11/5/2021 with shortness of breath. Unvaccinated and no previously known medical conditions.10 days prior to admission he developed a cough with chills, headache, and sore throat.  He had a home over-the-counter COVID-19 test which was positive 9 days ago.  In ED, he was on HFNC 60L with NRB. Which was titrated down to 15 L but has since been titrated back to 60L. procalcitonin was elevated and he was started on antibiotic therapy. As well as On decadron and. barcitinib    Interval Problem Update  Patient was seen and examined at bedside.  No acute events overnight.     HFNC with rescue nonrebreather  Decadron completed 10 day course  Baricitinib day 12/14   Lasix BID  Encourage proning  CRP downtrending  Dimer down trending    I have personally seen and examined the patient at bedside. I discussed the plan of care with patient, family and bedside RN.    Consultants/Specialty  critical care: signed off  Palliative    Code Status  Full Code    Disposition  Patient is not medically cleared.   Anticipate discharge to D.  I have placed the appropriate orders for post-discharge needs.    Review of Systems  Review of Systems   Constitutional: Positive for malaise/fatigue.   HENT: Negative for sore throat.    Respiratory: Positive for cough and shortness of breath.    Cardiovascular: Negative for chest pain.   Gastrointestinal: Negative for abdominal pain, nausea and vomiting.   Genitourinary: Negative for frequency.   Musculoskeletal: Negative for falls.   Neurological: Negative for loss of consciousness.   All other systems reviewed and are negative.       Physical Exam  Temp:  [36.1 °C (97 °F)-36.7 °C (98.1 °F)] 36.7 °C (98.1 °F)  Pulse:  [] 100  Resp:  [18-20] 18  BP: (102-120)/(71-84) 120/84  SpO2:  [93 %-96 %] 93  %    Physical Exam  Vitals and nursing note reviewed.   Constitutional:       General: He is not in acute distress.     Appearance: Normal appearance. He is ill-appearing and toxic-appearing.   HENT:      Head: Normocephalic and atraumatic.      Right Ear: External ear normal.      Left Ear: External ear normal.      Nose:      Comments: HFNC in place  Rescue nonrebreather     Mouth/Throat:      Pharynx: Oropharynx is clear.   Eyes:      General: No scleral icterus.     Extraocular Movements: Extraocular movements intact.      Conjunctiva/sclera: Conjunctivae normal.   Neck:      Vascular: No carotid bruit.   Cardiovascular:      Rate and Rhythm: Normal rate and regular rhythm.   Pulmonary:      Effort: No respiratory distress.      Comments: Crackles auscultated in lung bases bilaterally  Abdominal:      General: Abdomen is flat. Bowel sounds are normal.      Palpations: Abdomen is soft.      Tenderness: There is no abdominal tenderness. There is no guarding or rebound.   Musculoskeletal:         General: No swelling or deformity. Normal range of motion.      Cervical back: Neck supple.   Skin:     General: Skin is warm.      Coloration: Skin is not jaundiced.      Findings: No bruising.   Neurological:      General: No focal deficit present.      Mental Status: He is alert and oriented to person, place, and time.      Motor: No weakness.   Psychiatric:         Mood and Affect: Mood normal.         Behavior: Behavior normal.         Fluids    Intake/Output Summary (Last 24 hours) at 11/18/2021 1402  Last data filed at 11/17/2021 1600  Gross per 24 hour   Intake 240 ml   Output --   Net 240 ml       Laboratory  Recent Labs     11/16/21  0321 11/17/21  0056 11/18/21  0640   WBC 16.7* 22.2* 18.5*   RBC 5.34 5.04 5.35   HEMOGLOBIN 16.2 15.7 16.4   HEMATOCRIT 48.5 44.0 47.3   MCV 90.8 87.3 88.4   MCH 30.3 31.2 30.7   MCHC 33.4* 35.7* 34.7   RDW 42.8 39.5 41.0   PLATELETCT 729* 842* 897*   MPV 10.5 11.0 10.4     Recent  Labs     11/16/21  0321 11/17/21  0056 11/18/21  0640   SODIUM 132* 129* 133*   POTASSIUM 3.9 3.0* 3.8   CHLORIDE 91* 92* 95*   CO2 29 23 25   GLUCOSE 156* 112* 106*   BUN 32* 42* 38*   CREATININE 0.97 0.84 1.06   CALCIUM 9.3 9.7 9.6                   Imaging  DX-CHEST-LIMITED (1 VIEW)   Final Result         1.  Stable chest x-ray findings with persistent diffuse bilateral interstitial opacities and probable trace effusions.      DX-CHEST-PORTABLE (1 VIEW)   Final Result      Ill-defined, predominantly interstitial bilateral pulmonary opacities. Edema is favored, although pneumonia can be considered in the appropriate clinical settings.              Assessment/Plan  * Acute hypoxemic respiratory failure due to COVID-19 (HCC)- (present on admission)  Assessment & Plan  Secondary to COVID infection  On high flow 60 L 100% FiO2, w/15L NRB, O2 sats 90-95%  Procal 0.46, CRP 17.4, D-dimer 1.08, lactic acid elevated likely sec to hypoxia, proBNP 525  Completed 3 days of antibiotics, will hold further antibiotic therapy and monitor given normal proca;  HFNC with rescue nonrebreather  Decadron completed 10 day course  Baricitinib day 12/14   Lasix BID  Encourage proning  CRP downtrending  Dimer down trending    Hypokalemia  Assessment & Plan  Monitor and replace    Hyponatremia  Assessment & Plan  133  monitor    Elevated troponin  Assessment & Plan  Likely sec to covid, denies active chest pain  Ordered EKG: NSR, no acute ST-T changes  On admission, Trop peak 50 downtrended  Likely type 2 in setting of hypoxia    Lactic acidosis- (present on admission)  Assessment & Plan  improved      Pneumonia due to COVID-19 virus- (present on admission)  Assessment & Plan  See above    Metabolic acidosis- (present on admission)  Assessment & Plan  Improving, AG normal       VTE prophylaxis: enoxaparin ppx       I have performed a physical exam and reviewed and updated ROS and Plan today (11/18/2021). In review of yesterday's note  (11/17/2021), there are no changes except as documented above.

## 2021-11-18 NOTE — PROGRESS NOTES
Received bedside report and accepted care of patient.    Pt is currently A/ox4, Martiniquais speaking only. Pt is 60L 100% at 15L Oxy. Per NOC RN pt was anxious overnight. This AM, Pt remains calm and above 90% at this time. Pt tolerating diet.    Patient currently resting in bed in no visible or stated signs of distress. Bed in locked and lowest position. Call light and personal possessions within reach. Patient educated about use of call light and verbalized understanding.

## 2021-11-18 NOTE — CARE PLAN
The patient is Stable - Low risk of patient condition declining or worsening    Shift Goals  Clinical Goals: stable O2 sats, anxiety reduction  Patient Goals: breathe better  Family Goals: For Blue to get better    Progress made toward(s) clinical / shift goals:    Problem: Knowledge Deficit - Standard  Goal: Patient and family/care givers will demonstrate understanding of plan of care, disease process/condition, diagnostic tests and medications  Outcome: Progressing     Problem: Respiratory  Goal: Patient will achieve/maintain optimum respiratory ventilation and gas exchange  Outcome: Progressing       Patient is not progressing towards the following goals:

## 2021-11-18 NOTE — CARE PLAN
The patient is Watcher - Medium risk of patient condition declining or worsening    Shift Goals  Clinical Goals: stable O2 sats, anxiety reduction  Patient Goals: breathe better  Family Goals: For Blue to get better    Progress made toward(s) clinical / shift goals:  pt educated on side laying and proning to help improve oxygenation, educated on use of IS, continuous pulse ox in effect, bed alarm on, educated on use of call light    Patient is not progressing towards the following goals:      Problem: Respiratory  Goal: Patient will achieve/maintain optimum respiratory ventilation and gas exchange  Outcome: Not Progressing   Pt requiring 60L 100% FiO2 on high flow NC, O2 saturations ranging from 86-92% when in prone position

## 2021-11-18 NOTE — PALLIATIVE CARE
Palliative Care follow-up  PC RN met with patient Blue, his son Blue, and his wife at bedside. Pt's son explains that family is still discussing code status prior to making decision. Blue asks that PC RN call on Saturday to follow up.       Updated: MD and BS RN    Plan: follow up Saturday for goals of care.     Thank you for allowing Palliative Care to support this patient and family. Contact [phone] for additional assistance, change in patient status, or with any questions/concerns.

## 2021-11-19 PROBLEM — Z71.89 ADVANCED CARE PLANNING/COUNSELING DISCUSSION: Status: ACTIVE | Noted: 2021-01-01

## 2021-11-19 NOTE — PROGRESS NOTES
Hospital Medicine Daily Progress Note    Date of Service  11/19/2021    Chief Complaint  Blue Wu is a 61 y.o. male admitted 11/5/2021 with sob    Hospital Course  61 y.o. male who presented 11/5/2021 with shortness of breath. Unvaccinated and no previously known medical conditions.10 days prior to admission he developed a cough with chills, headache, and sore throat.  He had a home over-the-counter COVID-19 test which was positive 9 days ago.  In ED, he was on HFNC 60L with NRB. Which was titrated down to 15 L but has since been titrated back to 60L. procalcitonin was elevated and he was started on antibiotic therapy. As well as On decadron and. barcitinib    Interval Problem Update  Patient was seen and examined at bedside.  No acute events overnight. Family updated at bedside.     HFNC with rescue nonrebreather  Decadron completed 10 day course  Baricitinib day 13/14   Lasix BID  Encourage proning  ABG/CXR pending    I have personally seen and examined the patient at bedside. I discussed the plan of care with patient, family and bedside RN.    Consultants/Specialty  critical care: signed off  Palliative    Code Status  Full Code    Disposition  Patient is not medically cleared.   Anticipate discharge to D.  I have placed the appropriate orders for post-discharge needs.    Review of Systems  Review of Systems   Constitutional: Positive for malaise/fatigue.   HENT: Negative for sore throat.    Respiratory: Positive for cough and shortness of breath.    Cardiovascular: Negative for chest pain.   Gastrointestinal: Negative for abdominal pain, nausea and vomiting.   Genitourinary: Negative for frequency.   Musculoskeletal: Negative for falls.   Neurological: Negative for loss of consciousness.   All other systems reviewed and are negative.       Physical Exam  Temp:  [36.1 °C (97 °F)-36.7 °C (98.1 °F)] 36.6 °C (97.9 °F)  Pulse:  [] 120  Resp:  [20-24] 22  BP: (105-141)/(76-88) 141/88  SpO2:  [87 %-96 %] 93  %    Physical Exam  Vitals and nursing note reviewed.   Constitutional:       General: He is not in acute distress.     Appearance: Normal appearance. He is ill-appearing and toxic-appearing.   HENT:      Head: Normocephalic and atraumatic.      Right Ear: External ear normal.      Left Ear: External ear normal.      Nose:      Comments: HFNC in place  Rescue nonrebreather     Mouth/Throat:      Pharynx: Oropharynx is clear.   Eyes:      General: No scleral icterus.     Extraocular Movements: Extraocular movements intact.      Conjunctiva/sclera: Conjunctivae normal.   Neck:      Vascular: No carotid bruit.   Cardiovascular:      Rate and Rhythm: Regular rhythm. Tachycardia present.   Pulmonary:      Effort: No respiratory distress.      Comments: Crackles auscultated in lung bases bilaterally  tachypneic   Abdominal:      General: Abdomen is flat. Bowel sounds are normal.      Palpations: Abdomen is soft.      Tenderness: There is no abdominal tenderness. There is no guarding or rebound.   Musculoskeletal:         General: No swelling or deformity. Normal range of motion.      Cervical back: Neck supple.   Skin:     General: Skin is warm.      Coloration: Skin is not jaundiced.      Findings: No bruising.   Neurological:      General: No focal deficit present.      Mental Status: He is alert and oriented to person, place, and time.      Motor: No weakness.   Psychiatric:         Mood and Affect: Mood normal.         Behavior: Behavior normal.         Fluids    Intake/Output Summary (Last 24 hours) at 11/19/2021 1349  Last data filed at 11/19/2021 0800  Gross per 24 hour   Intake 360 ml   Output --   Net 360 ml       Laboratory  Recent Labs     11/17/21  0056 11/18/21  0640 11/19/21  0340   WBC 22.2* 18.5* 16.4*   RBC 5.04 5.35 5.26   HEMOGLOBIN 15.7 16.4 16.2   HEMATOCRIT 44.0 47.3 47.4   MCV 87.3 88.4 90.1   MCH 31.2 30.7 30.8   MCHC 35.7* 34.7 34.2   RDW 39.5 41.0 42.5   PLATELETCT 842* 897* 826*   MPV 11.0 10.4  10.9     Recent Labs     11/17/21  0056 11/18/21  0640 11/19/21  0340   SODIUM 129* 133* 134*   POTASSIUM 3.0* 3.8 3.9   CHLORIDE 92* 95* 96   CO2 23 25 25   GLUCOSE 112* 106* 115*   BUN 42* 38* 34*   CREATININE 0.84 1.06 0.86   CALCIUM 9.7 9.6 9.5                   Imaging  DX-CHEST-LIMITED (1 VIEW)   Final Result         1.  Stable chest x-ray findings with persistent diffuse bilateral interstitial opacities and probable trace effusions.      DX-CHEST-PORTABLE (1 VIEW)   Final Result      Ill-defined, predominantly interstitial bilateral pulmonary opacities. Edema is favored, although pneumonia can be considered in the appropriate clinical settings.         DX-CHEST-LIMITED (1 VIEW)    (Results Pending)        Assessment/Plan  * Acute hypoxemic respiratory failure due to COVID-19 (HCC)- (present on admission)  Assessment & Plan  Secondary to COVID infection  On high flow 60 L 100% FiO2, w/15L NRB, O2 sats 90-95%  Procal 0.46, CRP 17.4, D-dimer 1.08, lactic acid elevated likely sec to hypoxia, proBNP 525  Completed 3 days of antibiotics, will hold further antibiotic therapy and monitor given normal proca;  HFNC with rescue nonrebreather  Decadron completed 10 day course  Baricitinib day 13/14   Lasix BID  Encourage proning  ABG/CXR pending    Advanced care planning/counseling discussion  Assessment & Plan  Discussion with patient wife and son at bedside. I explained that the patient was not improving and his increased work of breathing was concerning as patient has become more tachypneic. Patient and family wish to remain full code at this time. All questions were discussed in completeness. Total of 23 minutes spent in coordination and discussion of advanced care planning.     Hypokalemia  Assessment & Plan  Monitor and replace    Hyponatremia  Assessment & Plan  133  monitor    Elevated troponin  Assessment & Plan  Likely sec to covid, denies active chest pain  Ordered EKG: NSR, no acute ST-T changes  On  admission, Trop peak 50 downtrended  Likely type 2 in setting of hypoxia    Lactic acidosis- (present on admission)  Assessment & Plan  improved      Pneumonia due to COVID-19 virus- (present on admission)  Assessment & Plan  See above    Metabolic acidosis- (present on admission)  Assessment & Plan  Improving, AG normal       VTE prophylaxis: enoxaparin ppx       I have performed a physical exam and reviewed and updated ROS and Plan today (11/19/2021). In review of yesterday's note (11/18/2021), there are no changes except as documented above.

## 2021-11-19 NOTE — CARE PLAN
The patient is Stable - Low risk of patient condition declining or worsening    Shift Goals  Clinical Goals: Stable Oxygenation  Patient Goals: breathe better  Family Goals: For Blue to get better    Progress made toward(s) clinical / shift goals:    Problem: Knowledge Deficit - Standard  Goal: Patient and family/care givers will demonstrate understanding of plan of care, disease process/condition, diagnostic tests and medications  Outcome: Progressing     Problem: Respiratory  Goal: Patient will achieve/maintain optimum respiratory ventilation and gas exchange  Outcome: Progressing     Problem: Fall Risk  Goal: Patient will remain free from falls  Outcome: Progressing       Patient is not progressing towards the following goals:

## 2021-11-19 NOTE — PROGRESS NOTES
Received bedside report and accepted care of patient.    Pt is currently A/ox4, Lebanese speaking only. Pt is 60L 100% at 15L Oxy. Per NOC RN pt was anxious overnight, received PRNs. This AM, Pt remains calm and above 90% at this time. Pt tolerating diet.    Patient currently resting in bed in no visible or stated signs of distress. Bed in locked and lowest position. Call light and personal possessions within reach. Patient educated about use of call light and verbalized understanding.

## 2021-11-19 NOTE — ASSESSMENT & PLAN NOTE
Discussion with patient wife and son at bedside. I explained that the patient was not improving and his increased work of breathing was concerning as patient has become more tachypneic. Patient and family wish to remain full code at this time. All questions were discussed in completeness. Total of 23 minutes spent in coordination and discussion of advanced care planning.

## 2021-11-20 NOTE — PROGRESS NOTES
Assumed care of patient @1915, beside report received from Rivera MCPHERSON, Pt A&OX4 and denies any pain. PT is French speaking. Daughter in room translating. Bed locked an lowered with call light in reach and questions answered in present time.

## 2021-11-20 NOTE — PROGRESS NOTES
Critical care~    I had a long discussion with the patient, his wife and the son who is translating.  I have informed all of them that unfortunately Mr. Wu's prognosis is very poor.  He is currently on rescue BiPAP and moderate distress.  He would like to delay intubation and try a little sedation first.  I informed him that I believe his chances of recovery from his severe Covid pneumonia/ARDS is less than 20%, likely under 10% given the trajectory of disease so far.  He would like intubation and mechanical ventilation if he fails BiPAP.  I explained that he would be sedated and unable to communicate with his family at that point and he understands this.  I informed him that we would not perform CPR in this situation given not beneficial nature of chest compressions in this situation and they all understand this.  CODE STATUS was changed to DNR, intubation okay.  He has a very high likelihood of requiring intubation today.

## 2021-11-20 NOTE — CARE PLAN
Problem: Ventilation  Goal: Ability to achieve and maintain unassisted ventilation or tolerate decreased levels of ventilator support  Description: Document on Vent flowsheet    1.  Support and monitor invasive and noninvasive mechanical ventilation  2.  Monitor ventilator weaning response  3.  Perform ventilator associated pneumonia prevention interventions  4.  Manage ventilation therapy by monitoring diagnostic test results  Outcome: Not Progressing       Respiratory Update    Treatment modality: BiPAP/HHFNC  Frequency: Q4    Pt tolerating current treatments well with no adverse reactions.

## 2021-11-20 NOTE — PROGRESS NOTES
Patient arrived to [facility] at 1020 with RRT and charge nurse. Attached to bedside monitor, and BiPAP applied. Patient RR 50s HR 120s. Dr Monaco at bedside to discuss plan of care with patient and family. All questioned answered.

## 2021-11-20 NOTE — PROGRESS NOTES
"Critical Care Progress Note    Date of admission  11/5/2021    Chief Complaint  61 y.o. male, unvaccinated against COVID-19, admitted 11/5/2021 with COVID-19 pneumonia, requiring HFNC, now with oxygen desaturation and increased work of breathing, transferred to ICU 11/20    Hospital Course  \"61 y.o. male who presented 11/5/2021 with no PMH, BMI 30 that is unvaccinated that presented 11/5 for 10 days of cough, chills, headache, sore throat with + covid test 9 days prior. He had room air saturation of 62% and was admitted on HFNC. Today I was asked to consult on patient since he is on HFNC + NRB. He is afebrile, HR 77-80's, 's sat 90-92%. He was started on dexamethasone and barcitinib, crp 17, ddimer 1, lactic 2.5. Still undecided about code status and palliative care has been consulted and remains full code. Wife and son at bedside. Patient without complaints of chest pain, shortness or breath cough, n/v/d, leg pain or swelling.\"     11/20 -he has been managed on the medical floor with HFNC, Decadron, baricitinib, Lasix, empiric antibiotics; increased work of breathing, accessory muscle use today and transfer to ICU.    Interval Problem Update  Reviewed last 24 hour events:  Increased work of breathing, respiratory rate, accessory muscle use and desaturation on HFNC plus NRB  Transferred to ICU  CRP 1.56->13.71->3.23  PCT 0.46->0.1->0.05  D-dimer 1.08->2.06->1.28  Baricitinib day 14/14  Decadron x10 days, completed 11/17  Lasix 40 twice daily  I/O = -2.7L since admission?  Unasyn/azithromycin x 3 days 11/6-11/8  ABG 11/19: 7.46/32/71  CXR 11/19: Bilateral pulmonary alveolar and interstitial opacities,  HFNC 60 L/100% plus NRB,    CXR 11/19  Admitted 11/10  Review of Systems  Review of Systems   Unable to perform ROS: Acuity of condition        Vital Signs for last 24 hours   Temp:  [36 °C (96.8 °F)-36.6 °C (97.8 °F)] 36.2 °C (97.2 °F)  Pulse:  [107-125] 111  Resp:  [20-50] 20  BP: (111133)/(75-92) " 112/79  SpO2:  [91 %-95 %] 95 %    Hemodynamic parameters for last 24 hours       Respiratory Information for the last 24 hours       Physical Exam   Physical Exam  Vitals and nursing note reviewed.   Constitutional:       General: He is in acute distress.      Appearance: He is ill-appearing.      Comments: In distress; on bipap    HENT:      Head: Normocephalic.      Mouth/Throat:      Mouth: Mucous membranes are moist.   Eyes:      Pupils: Pupils are equal, round, and reactive to light.   Cardiovascular:      Rate and Rhythm: Normal rate.      Heart sounds: No murmur heard.      Abdominal:      General: There is no distension.      Palpations: Abdomen is soft.      Tenderness: There is no abdominal tenderness. There is no guarding.   Musculoskeletal:         General: No swelling or tenderness.      Cervical back: Normal range of motion and neck supple.   Skin:     General: Skin is warm.      Capillary Refill: Capillary refill takes less than 2 seconds.      Coloration: Skin is not jaundiced or pale.   Neurological:      Mental Status: He is oriented to person, place, and time.      Cranial Nerves: No cranial nerve deficit.      Motor: No weakness.      Comments: In resp distress; awake, follows and answers questions   Psychiatric:         Mood and Affect: Mood normal.         Medications  Current Facility-Administered Medications   Medication Dose Route Frequency Provider Last Rate Last Admin   • potassium chloride SA (Kdur) tablet 40 mEq  40 mEq Oral BID Red Rosenbuam D.O.   40 mEq at 11/20/21 0529   • budesonide-formoterol (SYMBICORT) 160-4.5 MCG/ACT inhaler 2 Puff  2 Puff Inhalation BID (RT) Viola Gray M.D.   2 Puff at 11/20/21 0719   • vitamin D3 (cholecalciferol) tablet 2,000 Units  2,000 Units Oral DAILY Yoana Parsons, A.P.R.N.   2,000 Units at 11/20/21 0530   • melatonin tablet 5 mg  5 mg Oral Nightly Yoana Parsons, A.P.R.N.   5 mg at 11/19/21 2026   • hydrOXYzine HCl (ATARAX) tablet 25 mg  25  mg Oral TID PRN Viola Gray M.D.   25 mg at 11/19/21 0556   • LORazepam (ATIVAN) tablet 0.5 mg  0.5 mg Oral Q8HRS PRN Viola Gray M.D.   0.5 mg at 11/17/21 2255   • guaiFENesin ER (MUCINEX) tablet 600 mg  600 mg Oral Q12HRS Tip Ingram M.D.   600 mg at 11/20/21 0530   • benzocaine-menthol (CEPACOL) lozenge 1 Lozenge  1 Lozenge Mouth/Throat Q2HRS PRN Tip Ingram M.D.   1 Lozenge at 11/09/21 1619   • furosemide (LASIX) injection 40 mg  40 mg Intravenous BID DIURETIC Tip Ingram M.D.   40 mg at 11/20/21 0529   • baricitinib (Olumiant) tablet 4 mg  4 mg Oral DAILY AT 1800 Viola Gray M.D.   4 mg at 11/19/21 1705   • ondansetron (ZOFRAN) syringe/vial injection 4 mg  4 mg Intravenous Q6HRS PRN Anurag Martinez M.D.       • ondansetron (ZOFRAN ODT) dispertab 4 mg  4 mg Oral Q6HRS PRN Anurag Martinez M.D.       • senna-docusate (PERICOLACE or SENOKOT S) 8.6-50 MG per tablet 2 Tablet  2 Tablet Oral BID Anurag Martinez M.D.   2 Tablet at 11/20/21 0530    And   • polyethylene glycol/lytes (MIRALAX) PACKET 1 Packet  1 Packet Oral QDAY PRN Anurag Martinez M.D.        And   • magnesium hydroxide (MILK OF MAGNESIA) suspension 30 mL  30 mL Oral QDAY PRN Anurag Martinez M.D.        And   • bisacodyl (DULCOLAX) suppository 10 mg  10 mg Rectal QDAY PRN Anurag Martinez M.D.       • enoxaparin (LOVENOX) inj 40 mg  40 mg Subcutaneous DAILY Anurag Martinez M.D.   40 mg at 11/20/21 0529   • acetaminophen (Tylenol) tablet 650 mg  650 mg Oral Q6HRS PRN Anurag Martinez M.D.   650 mg at 11/15/21 2105   • promethazine (PHENERGAN) tablet 12.5-25 mg  12.5-25 mg Oral Q4HRS PRN Anurag Martinez M.D.       • promethazine (PHENERGAN) suppository 12.5-25 mg  12.5-25 mg Rectal Q4HRS PRN Anurag Martinez M.D.       • prochlorperazine (COMPAZINE) injection 5-10 mg  5-10 mg Intravenous Q4HRS PRN Anurag Martinez M.D.           Fluids    Intake/Output Summary (Last 24 hours) at 11/20/2021 0934  Last data filed at 11/19/2021 1345  Gross per 24 hour    Intake 240 ml   Output --   Net 240 ml       Laboratory  Recent Labs     11/19/21  1414   QZPRT70B 7.46   [id number] 32.1   [id number] 71.5   [id number] 94.3   ARTHCO3 22   ARTBE 0         Recent Labs     11/18/21  0640 11/19/21  0340 11/20/21  0346   SODIUM 133* 134* 134*   POTASSIUM 3.8 3.9 4.6   CHLORIDE 95* 96 98   CO2 25 25 23   BUN 38* 34* 32*   CREATININE 1.06 0.86 0.78   MAGNESIUM 2.3 2.3 2.3   PHOSPHORUS 3.6 2.8 2.8   CALCIUM 9.6 9.5 9.8     Recent Labs     11/18/21  0640 11/19/21  0340 11/20/21  0346   ALTSGPT 30 37 41   ASTSGOT 27 38 37   ALKPHOSPHAT 66 70 68   TBILIRUBIN 0.6 0.8 0.7   GLUCOSE 106* 115* 122*     Recent Labs     11/18/21  0640 11/19/21  0340 11/20/21  0346   WBC 18.5* 16.4* 20.5*   NEUTSPOLYS 69.30 70.70 77.80*   LYMPHOCYTES 19.40* 17.30* 10.30*   MONOCYTES 6.50 5.50 7.70   EOSINOPHILS 0.50 1.70 1.70   BASOPHILS 0.50 0.50 0.00   ASTSGOT 27 38 37   ALTSGPT 30 37 41   ALKPHOSPHAT 66 70 68   TBILIRUBIN 0.6 0.8 0.7     Recent Labs     11/18/21  0640 11/19/21  0340 11/20/21  0346   RBC 5.35 5.26 5.18   HEMOGLOBIN 16.4 16.2 15.9   HEMATOCRIT 47.3 47.4 46.6   PLATELETCT 897* 826* 769*       Imaging  X-Ray:  I have personally reviewed the images and compared with prior images.    Assessment/Plan  * Acute hypoxemic respiratory failure due to COVID-19 (HCC)- (present on admission)  Assessment & Plan  Unvaccinated; Admitted 11/10, 10 days of symptoms PTA, managed on medical floor with HFNC  Increasing work of breathing despite HFNC/NRB, prone, transferred to ICU 11/20  Trial noninvasive ventilation for pulmonary hygiene/recruitment, alternating with HFNC, high risk for deterioration may require intubation  Completed course of dexamethasone 11/17, initiate hydrocortisone 100 every 8  Day #14/14 baricitinib   Ongoing diuresis, follow fluid status, renal function, electrolytes  Completed 3 days empiric CAP coverage on arrival, PCT low  DVT prophylaxis, D-dimer 1.08, screening US DVT study ordered        VTE:  Lovenox  Ulcer: H2 Antagonist  Lines: None    I have performed a physical exam and reviewed and updated ROS and Plan today (11/20/2021). In review of yesterday's note (11/19/2021), there are no changes except as documented above.     Discussed patient condition and risk of morbidity and/or mortality with Hospitalist, RN, RT and Pharmacy  The patient remains critically ill.  Critical care time = 35 minutes in directly providing and coordinating critical care and extensive data review.  No time overlap and excludes procedures.

## 2021-11-20 NOTE — PROGRESS NOTES
Hospital Medicine Daily Progress Note    Date of Service  11/20/2021    Chief Complaint  Blue Wu is a 61 y.o. male admitted 11/5/2021 with sob    Hospital Course  61 y.o. male who presented 11/5/2021 with shortness of breath. Unvaccinated and no previously known medical conditions.10 days prior to admission he developed a cough with chills, headache, and sore throat.  He had a home over-the-counter COVID-19 test which was positive 9 days ago.  In ED, he was on HFNC 60L with NRB. Which was titrated down to 15 L but has since been titrated back to 60L. procalcitonin was elevated and he was started on antibiotic therapy. As well as On decadron and. barcitinib    Interval Problem Update  Patient was seen and examined at bedside.  No acute events overnight. Interview and exam conducted with assistance of  services.      HFNC with rescue nonrebreather  Decadron completed 10 day course  Baricitinib day 14/14   Lasix BID  Encourage proning  Patient has increased work of breathing today, tachypneic, tachycardic, using accessory muscles. Patient family was updated at bedside and ICU was contacted for transfer. Patient and family still wish to remain Full code per my discussion with them this morning.     I have personally seen and examined the patient at bedside. I discussed the plan of care with patient, family and bedside RN.    Consultants/Specialty  critical care: signed off  Palliative    Code Status  Full Code    Disposition  Patient is not medically cleared.   Anticipate discharge to TBD.  I have placed the appropriate orders for post-discharge needs.    Review of Systems  Review of Systems   Constitutional: Positive for malaise/fatigue.   HENT: Negative for sore throat.    Respiratory: Positive for cough and shortness of breath.    Cardiovascular: Negative for chest pain.   Gastrointestinal: Negative for abdominal pain, nausea and vomiting.   Genitourinary: Negative for frequency.   Musculoskeletal:  Negative for falls.   Neurological: Negative for loss of consciousness.   All other systems reviewed and are negative.       Physical Exam  Temp:  [36 °C (96.8 °F)-36.6 °C (97.8 °F)] 36.2 °C (97.2 °F)  Pulse:  [107-131] 131  Resp:  [20-51] 51  BP: (111-161)/() 161/118  SpO2:  [91 %-95 %] 91 %    Physical Exam  Vitals and nursing note reviewed.   Constitutional:       General: He is not in acute distress.     Appearance: Normal appearance. He is ill-appearing and toxic-appearing.   HENT:      Head: Normocephalic and atraumatic.      Right Ear: External ear normal.      Left Ear: External ear normal.      Nose:      Comments: HFNC in place  Rescue nonrebreather     Mouth/Throat:      Pharynx: Oropharynx is clear.   Eyes:      General: No scleral icterus.     Extraocular Movements: Extraocular movements intact.      Conjunctiva/sclera: Conjunctivae normal.   Neck:      Vascular: No carotid bruit.   Cardiovascular:      Rate and Rhythm: Regular rhythm. Tachycardia present.      Heart sounds: Murmur heard.       Pulmonary:      Effort: Respiratory distress present.      Breath sounds: No wheezing.      Comments: Crackles auscultated over lung bases bilaterally  Abdominal:      General: Abdomen is flat. Bowel sounds are normal.      Palpations: Abdomen is soft.      Tenderness: There is no abdominal tenderness. There is no guarding or rebound.   Musculoskeletal:         General: No swelling or deformity. Normal range of motion.      Cervical back: Neck supple.   Skin:     General: Skin is warm.      Coloration: Skin is not jaundiced.      Findings: No bruising.   Neurological:      General: No focal deficit present.      Mental Status: He is alert and oriented to person, place, and time.      Motor: No weakness.   Psychiatric:         Mood and Affect: Mood normal.         Behavior: Behavior normal.         Fluids    Intake/Output Summary (Last 24 hours) at 11/20/2021 1052  Last data filed at 11/19/2021 1345  Gross  per 24 hour   Intake 240 ml   Output --   Net 240 ml       Laboratory  Recent Labs     11/18/21  0640 11/19/21  0340 11/20/21  0346   WBC 18.5* 16.4* 20.5*   RBC 5.35 5.26 5.18   HEMOGLOBIN 16.4 16.2 15.9   HEMATOCRIT 47.3 47.4 46.6   MCV 88.4 90.1 90.0   MCH 30.7 30.8 30.7   MCHC 34.7 34.2 34.1   RDW 41.0 42.5 42.8   PLATELETCT 897* 826* 769*   MPV 10.4 10.9 10.4     Recent Labs     11/18/21  0640 11/19/21  0340 11/20/21  0346   SODIUM 133* 134* 134*   POTASSIUM 3.8 3.9 4.6   CHLORIDE 95* 96 98   CO2 25 25 23   GLUCOSE 106* 115* 122*   BUN 38* 34* 32*   CREATININE 1.06 0.86 0.78   CALCIUM 9.6 9.5 9.8                   Imaging  DX-CHEST-LIMITED (1 VIEW)   Final Result      1.  Slight increase inflation improvement of LEFT lung base consolidation.   2.  Extensive ill-defined pulmonary infiltrates persist.   3.  No other significant change from prior exam.         DX-CHEST-LIMITED (1 VIEW)   Final Result         1.  Stable chest x-ray findings with persistent diffuse bilateral interstitial opacities and probable trace effusions.      DX-CHEST-PORTABLE (1 VIEW)   Final Result      Ill-defined, predominantly interstitial bilateral pulmonary opacities. Edema is favored, although pneumonia can be considered in the appropriate clinical settings.         US-EXTREMITY VENOUS LOWER BILAT    (Results Pending)   DX-CHEST-PORTABLE (1 VIEW)    (Results Pending)        Assessment/Plan  * Acute hypoxemic respiratory failure due to COVID-19 (HCC)- (present on admission)  Assessment & Plan  Secondary to COVID infection  On high flow 60 L 100% FiO2, w/15L NRB, O2 sats 90-95%  Procal 0.46, CRP 17.4, D-dimer 1.08, lactic acid elevated likely sec to hypoxia, proBNP 525  Completed 3 days of antibiotics, will hold further antibiotic therapy and monitor given normal proca;  HFNC with rescue nonrebreather  Decadron completed 10 day course  Baricitinib day 14/14   Lasix BID  Encourage proning  Increased work of breathing with RR 20-30 using  accessory muscles. ICU contacted for transfer.     Hypokalemia  Assessment & Plan  Monitor and replace    Hyponatremia  Assessment & Plan  133  monitor    Elevated troponin  Assessment & Plan  Likely sec to covid, denies active chest pain  Ordered EKG: NSR, no acute ST-T changes  On admission, Trop peak 50 downtrended  Likely type 2 in setting of hypoxia    Lactic acidosis- (present on admission)  Assessment & Plan  improved      Pneumonia due to COVID-19 virus- (present on admission)  Assessment & Plan  See above    Metabolic acidosis- (present on admission)  Assessment & Plan  Improving, AG normal       VTE prophylaxis: enoxaparin ppx       I have performed a physical exam and reviewed and updated ROS and Plan today (11/20/2021). In review of yesterday's note (11/19/2021), there are no changes except as documented above.

## 2021-11-20 NOTE — PROGRESS NOTES
Increased WOB, accessory muscle use, RR 40's, maxed out on HFNC w/ NRB for several days. Pt transferred to [facility]. Report called to Brsisa MCPHERSON

## 2021-11-21 PROBLEM — N17.9 AKI (ACUTE KIDNEY INJURY) (HCC): Status: ACTIVE | Noted: 2021-01-01

## 2021-11-21 NOTE — CARE PLAN
Problem: Ventilation  Goal: Ability to achieve and maintain unassisted ventilation or tolerate decreased levels of ventilator support  Description: Document on Vent flowsheet    1.  Support and monitor invasive and noninvasive mechanical ventilation  2.  Monitor ventilator weaning response  3.  Perform ventilator associated pneumonia prevention interventions  4.  Manage ventilation therapy by monitoring diagnostic test results  Outcome: Not Progressing      Ventilator Daily Summary    Vent Day # 1  8 @ 25    APVCMV: 26/380/+10/100%    Plan: Continue current ventilator settings and wean mechanical ventilation as tolerated per physician orders.

## 2021-11-21 NOTE — HOSPITAL COURSE
"Chief Complaint  61 y.o. male, unvaccinated against COVID-19, admitted 11/5/2021 with COVID-19 pneumonia, requiring HFNC, now with oxygen desaturation and increased work of breathing, transferred to ICU 11/20 on BiPAP, intubated 11/21     Hospital Course  \"61 y.o. male who presented 11/5/2021 with no PMH, BMI 30 that is unvaccinated that presented 11/5 for 10 days of cough, chills, headache, sore throat with + covid test 9 days prior. He had room air saturation of 62% and was admitted on HFNC. Today I was asked to consult on patient since he is on HFNC + NRB. He is afebrile, HR 77-80's, 's sat 90-92%. He was started on dexamethasone and barcitinib, crp 17, ddimer 1, lactic 2.5. Still undecided about code status and palliative care has been consulted and remains full code. Wife and son at bedside. Patient without complaints of chest pain, shortness or breath cough, n/v/d, leg pain or swelling.\"      11/20 - he has been managed on the medical floor with HFNC, Decadron, baricitinib, Lasix, empiric antibiotics; increased work of breathing, accessory muscle use today and transfer to ICU.  11/21- intubated prone paralyzed, very high peak pressures allowing permissive hypercapnia  11/22 - PEEP of 8, 60% FiO2, paralyzed/sedated/proned  11/23 PEEP 8, 50%, paralyzed/sedated/proned  11/24 - PEEP 8, 60%, sedated/proned, improving renal function  11/25 PEEP 8, 40%, discontinued proning  11/26 PEEP of 10, 70%, weaning sedation, fevers  11/27 PEEP of 10, ASV, weaning sedation, initiation of norepinephrine drip, started on Rocephin for UTI/possible pneumonia  11/29 - VD #9, %, 70% FiO2, ceftriaxone day 3 for Klebsiella pneumonia  11/30 - VD #10, ASV, ceftriaxone day 4, quiet bowel sounds, suspect some component of ileus, trial Relistor, further imaging as needed  12/1 - VD #11, APV/CMV, increased secretions, bronchoscopy today, CT C/A/P some pneumomediastinum, no PTX, no bowel abnormality advance TF  12/2 - VD 12, " PEEP 12, 80%, elevated airway pressures, amiodarone infusion, changed to Versed drip, GNR in sputum now, ABX escalated to Zosyn  12/3 - VD13, PEEP 12, 70%, Zosyn day 2 for Pseudomonas pneumonia, SR on amiodarone  12/4 - VD 14, on amiodarone, ST, low compliance, full ventilator support, ABX changed to cefepime for sensitive Pseudomonas  12/5 - VD15; 80%, Cst 10-15, cefepime  12/6 - VD16, PEEP 12, 80%, Cst 8-18, cefepime day 5/7, free water, Lasix today  12/7 - VD #17, 4 cc/KG, very low compliance, PCO2 100, cefepime day 6,  12/8 - VD #18, PEEP 12, 100%, Cst 10, sedated with Versed/Dilaudid, intermittent rocuronium, cefepime/vori/Bactrim  12/9 - VD #19, very difficult to ventilate now, very low compliance  12/10 - D/C sulfamethoxazole-trimethoprim, wean steroids, RASS goal 0 to -2, ETT advanced over bronchoscope  12/11 - phosphorus repletion, place PEEP back to 12  12/12 - phosphorus repletion  12/13 - decrease RR to 34 for alkalosis, D/C Navas  12/14 - decrease RR to 30 for persistent alkalosis, replete potassium and phosphorus, mucous plugging with desaturations to the 60s, too unstable for bronchoscopy  12/15 - decreased respiratory rate for respiratory acidosis

## 2021-11-21 NOTE — PROCEDURES
"Central Line Insertion    Date/Time: 11/21/2021 2:22 PM  Performed by: Katie Singh M.D.  Authorized by: Katie Singh M.D.     Consent:     Consent obtained:  Verbal    Consent given by:  Patient    Risks discussed:  Arterial puncture, incorrect placement, nerve damage, pneumothorax, infection and bleeding    Alternatives discussed:  No treatment, delayed treatment, alternative treatment and observation  Pre-procedure details:     Hand hygiene: Hand hygiene performed prior to insertion      Sterile barrier technique: All elements of maximal sterile technique followed      Skin preparation:  ChloraPrep    Skin preparation agent: Skin preparation agent completely dried prior to procedure    Sedation:     Sedation type:  None  Anesthesia:     Anesthesia method:  Local infiltration    Local anesthetic:  Lidocaine 1% w/o epi  Procedure details:     Location:  L internal jugular    Patient position:  Reverse Trendelenburg    Procedural supplies:  Triple lumen    Catheter size:  7.5 Fr    Landmarks identified: yes      Ultrasound guidance: yes      Sterile ultrasound techniques: Sterile gel and sterile probe covers were used      Number of attempts:  1    Successful placement: yes    Post-procedure details:     Post-procedure:  Dressing applied and line sutured    Assessment:  Blood return through all ports, free fluid flow, no pneumothorax on x-ray and placement verified by x-ray    Patient tolerance of procedure:  Tolerated well, no immediate complications  Arterial Line Insertion    Date/Time: 11/21/2021 2:22 PM  Performed by: Katie Singh M.D.  Authorized by: Katie Singh M.D.   Consent: Verbal consent obtained.  Risks and benefits: risks, benefits and alternatives were discussed  Consent given by: power of   Patient identity confirmed: arm band and hospital-assigned identification number  Time out: Immediately prior to procedure a \"time out\" was called to verify the correct patient, " procedure, equipment, support staff and site/side marked as required.  Preparation: Patient was prepped and draped in the usual sterile fashion.  Indications: multiple ABGs, respiratory failure and hemodynamic monitoring  Location: Rt axillary.  Anesthesia: local infiltration    Anesthesia:  Local Anesthetic: lidocaine 1% without epinephrine    Sedation:  Patient sedated: yes  Sedation type: moderate (conscious) sedation  Sedatives: fentanyl  Analgesia: fentanyl    August's test normal: yes  Needle gauge: 18  Seldinger technique: Seldinger technique used  Number of attempts: 1  Post-procedure: line sutured and dressing applied  Post-procedure CMS: normal  Patient tolerance: patient tolerated the procedure well with no immediate complications    Intubation    Date/Time: 11/21/2021 2:22 PM  Performed by: Katie Singh M.D.  Authorized by: Katie Singh M.D.     Consent:     Consent obtained:  Verbal    Consent given by:  Patient and spouse    Risks discussed:  Aspiration, death, bleeding, brain injury, dental trauma, laryngeal injury, pneumothorax and hypoxia    Alternatives discussed:  No treatment, delayed treatment and alternative treatment  Pre-procedure details:     Patient status:  Awake    Mallampati score:  III    Pretreatment meds: etomidate.    Paralytics:  Rocuronium  Procedure details:     Preoxygenation:  BiPAP    CPR in progress: no      Intubation method:  Oral    Oral intubation technique:  Video-assisted    Laryngoscope type:  GlideScope    Laryngoscope blade:  Mac 4    Cormack-Lehane Classification:  Grade 3    Tube size (mm):  7.5    Tube type:  Cuffed    Number of attempts:  1    Ventilation between attempts: no      Cricoid pressure: yes      Tube visualized through cords: yes    Placement assessment:     ETT to lip:  25    Tube secured with:  ETT winn    Breath sounds:  Equal    Placement verification: chest rise, condensation, CXR verification, direct visualization, equal breath sounds,  ETCO2 detector, fiberoptic scope and tube exhalation      CXR findings:  ETT in proper place  Post-procedure details:     Patient tolerance of procedure:  Tolerated well, no immediate complications

## 2021-11-21 NOTE — DIETARY
"Nutrition Support Assessment:  Day 16 of admit.  Blue Wu is a 61 y.o. male with admitting DX of COVID19 with ARDS per MD progress note.      Current problem list:  1. Hypokalemia  2. Hyponatremia  3. Metabolic acidosis  4. Pneumonia     Assessment:  Estimated Nutritional Needs based on:   Height: 167.6 cm (5' 5.98\")  Weight: 76.2 kg (167 lb 15.9 oz)  Weight to Use in Calculations: 76.2 kg (167 lb 15.9 oz) - via bed scale.   Ideal Body Weight: 64.4 kg (142 lb)  Body mass index is 27.13 kg/m²., BMI classification: Overweight though appears WNL.     Calculation/Equation: MSJ x 1.2 = 1801 kcal.  PSU = 1660 kcal (VE: 36.7, Tmax over the past 24 hours: 36.7).   Total Calories / day: 1700 - 1980 (Calories / k - 26)  Total Grams Protein / day: 91 - 114 (Grams Protein / k.2 - 1.5 actual wt); 97 - 129 (Grams Protein / k.5 - 2.0 IBW)     Evaluation:   1. Pt previously on Meg 1 on a PO diet where intake was generally >50%.  Worsening O2 status maxed on BiPAP, has since required intubation.  Consult received for TF recommendations.   2. Cortrak to be placed for enteral access.  3. Current clinical picture and MD progress notes reviewed.  Palliative care notes also reviewed.  4. No staged wounds or edema noted at this time.  5. Labs: Glucose: 152, BUN: 55.  6. Meds: Pepcid, Lasix, Solu-Cortef, SSI, Electrolyte replacement, Vitamin D3.  Propofol infusion d/cing per MAR, +Versed.  No pressors @ this time.  7. LBM:  0- large, brown.  8. Impact Peptide 1.5 is an appropriate elemental formula in the setting of ARDs to meet estimated nutritional needs.      Malnutrition Risk: Unable to be determined @ this time.      Recommendations/Plan:  1. Once Cortrak is placed and confirmed, initiate Impact Peptide 1.5 @ 25 mL/hr and advance per protocol to goal rate of 50 mL/hr, providing 1800 kcal, 113 gm protein, 168 gm CHO, and 924 mL of free water per day.  2. Fluids per MD.  3. Continue to monitor wt.  4. RD " continues to monitor labs daily.    RD follows.

## 2021-11-21 NOTE — PROGRESS NOTES
4 Eyes Skin Assessment Completed by KY Brumfield and KY Mills.    Head WDL  Ears Bilateral redness and scabbing on ears  Nose Small scratch on left side of nose   Mouth WDL  Neck WDL  Breast/Chest WDL  Shoulder Blades WDL  Spine WDL  (R) Arm/Elbow/Hand WDL  (L) Arm/Elbow/Hand WDL  Abdomen WDL  Groin WDL  Scrotum/Coccyx/Buttocks Redness, but blanching. Area offloaded  (R) Leg WDL  (L) Leg WDL  (R) Heel/Foot/Toe WDL  (L) Heel/Foot/Toe WDL          Devices In Places ECG, Blood Pressure Cuff, Pulse Ox, SCD's and Bipap      Interventions In Place Bipap Protecta-Gel, Sacral Mepilex, Pillows, Q2 Turns, Low Air Loss Mattress and Heels Loaded W/Pillows    Possible Skin Injury Yes     Pictures Uploaded Into Epic Yes   Wound Consult Placed Yes  RN Wound Prevention Protocol Ordered Yes

## 2021-11-21 NOTE — PROGRESS NOTES
"Critical Care Progress Note    Date of admission  11/5/2021        Hospital Course  \"61 y.o. male who presented 11/5/2021 with no PMH, BMI 30 that is unvaccinated that presented 11/5 for 10 days of cough, chills, headache, sore throat with + covid test 9 days prior. He had room air saturation of 62% and was admitted on HFNC. Today I was asked to consult on patient since he is on HFNC + NRB. He is afebrile, HR 77-80's, 's sat 90-92%. He was started on dexamethasone and barcitinib, crp 17, ddimer 1, lactic 2.5. Still undecided about code status and palliative care has been consulted and remains full code. Wife and son at bedside. Patient without complaints of chest pain, shortness or breath cough, n/v/d, leg pain or swelling.\"      11/20 -he has been managed on the medical floor with HFNC, Decadron, baricitinib, Lasix, empiric antibiotics; increased work of breathing, accessory muscle use today and transfer to ICU.      Interval Problem Update  Reviewed last 24 hour events:  Worsening renal function patient overall -3 L, has been receiving Lasix 40 mg twice daily, with no history of heart failure  K increasing will recheck, creatinine doubled    Review of Systems  Review of Systems   Constitutional: Positive for malaise/fatigue.   HENT: Negative for sore throat.    Respiratory: Positive for cough and shortness of breath.    Cardiovascular: Negative for chest pain.   Gastrointestinal: Negative for abdominal pain, nausea and vomiting.   Genitourinary: Negative for frequency.   Musculoskeletal: Negative for falls.   Neurological: Negative for loss of consciousness.   All other systems reviewed and are negative.       Vital Signs for last 24 hours   Temp:  [35.8 °C (96.4 °F)-36.7 °C (98 °F)] 36.2 °C (97.1 °F)  Pulse:  [] 118  Resp:  [24-49] 28  BP: ()/(62-91) 125/91  SpO2:  [75 %-98 %] 97 %    Hemodynamic parameters for last 24 hours       Respiratory Information for the last 24 hours  Vent Mode: " APVCMV  Rate (breaths/min): 26  Vt Target (mL): 380  PEEP/CPAP: 10  MAP: 18  Control VTE (exp VT): 384    Physical Exam   Physical Exam  Vitals and nursing note reviewed.   Constitutional:       General: He is in acute distress.      Appearance: He is ill-appearing.      Comments: In distress; on bipap    HENT:      Head: Normocephalic.      Mouth/Throat:      Mouth: Mucous membranes are moist.   Eyes:      Pupils: Pupils are equal, round, and reactive to light.   Cardiovascular:      Rate and Rhythm: Tachycardia present.      Heart sounds: No murmur heard.      Pulmonary:      Comments: Tachypnea to 40/min, O2 89 to 91%, however when BiPAP removed for adjustment immediate desaturation into the 70s patient appears tired with increased work of breathing  Abdominal:      General: There is no distension.      Palpations: Abdomen is soft.      Tenderness: There is no abdominal tenderness. There is no guarding.   Musculoskeletal:         General: No swelling or tenderness.      Cervical back: Normal range of motion and neck supple.   Skin:     General: Skin is warm.      Capillary Refill: Capillary refill takes less than 2 seconds.      Coloration: Skin is not jaundiced or pale.   Neurological:      Mental Status: He is oriented to person, place, and time.      Cranial Nerves: No cranial nerve deficit.      Motor: No weakness.      Comments: In resp distress; awake, follows and answers questions   Psychiatric:         Mood and Affect: Mood normal.         Medications  Current Facility-Administered Medications   Medication Dose Route Frequency Provider Last Rate Last Admin   • Respiratory Therapy Consult   Nebulization Continuous RT Katie Singh M.D.       • famotidine (PEPCID) tablet 20 mg  20 mg Enteral Tube Q12HRS Katie Singh M.D.        Or   • famotidine (PEPCID) injection 20 mg  20 mg Intravenous Q12HRS Katie Singh M.D.       • senna-docusate (PERICOLACE or SENOKOT S) 8.6-50 MG per tablet 2 Tablet  2  Tablet Enteral Tube BID Katie Singh M.D.        And   • polyethylene glycol/lytes (MIRALAX) PACKET 1 Packet  1 Packet Enteral Tube QDAY PRN Katie Singh M.D.        And   • magnesium hydroxide (MILK OF MAGNESIA) suspension 30 mL  30 mL Enteral Tube QDAY PRN Katie Singh M.D.        And   • bisacodyl (DULCOLAX) suppository 10 mg  10 mg Rectal QDAY PRN Katie Singh M.D.       • MD Alert...ICU Electrolyte Replacement per Pharmacy   Other PHARMACY TO DOSE Katie Singh M.D.       • lidocaine (XYLOCAINE) 1 % injection 2 mL  2 mL Tracheal Tube Q30 MIN PRN Katie Singh M.D.       • Pharmacy Consult: Enteral tube insertion - review meds/change route/product selection  1 Each Other PHARMACY TO DOSE Katie Singh M.D.       • insulin regular (HumuLIN R,NovoLIN R) injection  1-6 Units Subcutaneous 4X/DAY ACHS Katie Singh M.D.        And   • dextrose 50% (D50W) injection 50 mL  50 mL Intravenous Q15 MIN PRN Katie Singh M.D.       • norepinephrine (Levophed) 8 mg in 250 mL NS infusion (premix)  0-30 mcg/min Intravenous Continuous Katie Singh M.D.       • acetaminophen (Tylenol) tablet 650 mg  650 mg Enteral Tube Q6HRS PRN Katie Singh M.D.       • hydrOXYzine HCl (ATARAX) tablet 25 mg  25 mg Enteral Tube TID PRN Katie Singh M.D.       • ondansetron (ZOFRAN ODT) dispertab 4 mg  4 mg Enteral Tube Q6HRS PRN Katie Singh M.D.       • promethazine (PHENERGAN) tablet 12.5-25 mg  12.5-25 mg Enteral Tube Q4HRS PRN Katie Singh M.D.       • [START ON 11/22/2021] vitamin D3 (cholecalciferol) tablet 2,000 Units  2,000 Units Enteral Tube DAILY Katie Singh M.D.       • artificial tears (EYE LUBRICANT) ophth ointment 1 Application  1 Application Both Eyes Q8HRS Katie Singh M.D.       • midazolam (Versed) 2 MG/2ML injection 1 mg  1 mg Intravenous Once Katie Singh M.D.       • midazolam (VERSED) premix 125 mg/125 mL NS  0-10 mg/hr Intravenous Continuous  Katie Singh M.D. 8 mL/hr at 11/21/21 1608 8 mg/hr at 11/21/21 1608   • rocuronium (ZEMURON) injection 45.7 mg  0.6 mg/kg Intravenous Once Katie Singh M.D.       • rocuronium (ZEMURON) 250 mg in  mL Infusion  0-16 mcg/kg/min Intravenous Continuous Katie Singh M.D.       • rocuronium (ZEMURON) injection 45.7 mg  0.6 mg/kg Intravenous Q2HRS PRN Katie Singh M.D.       • fentaNYL (SUBLIMAZE) 50 mcg/mL in 50mL (Continuous Infusion)   Intravenous Continuous Katie Singh M.D. 4 mL/hr at 11/21/21 1515 200 mcg/hr at 11/21/21 1515   • hydrocortisone sodium succinate PF (Solu-CORTEF) 100 MG injection 100 mg  100 mg Intravenous Q8HRS Liam Monaco M.D.   100 mg at 11/21/21 1457   • ondansetron (ZOFRAN) syringe/vial injection 4 mg  4 mg Intravenous Q6HRS PRN Anurag Martinez M.D.       • enoxaparin (LOVENOX) inj 40 mg  40 mg Subcutaneous DAILY Anurag Martinez M.D.   40 mg at 11/21/21 0516   • promethazine (PHENERGAN) suppository 12.5-25 mg  12.5-25 mg Rectal Q4HRS PRN Anurag Martinez M.D.       • prochlorperazine (COMPAZINE) injection 5-10 mg  5-10 mg Intravenous Q4HRS PRN Anurag Martinez M.D.           Fluids    Intake/Output Summary (Last 24 hours) at 11/21/2021 1802  Last data filed at 11/21/2021 1600  Gross per 24 hour   Intake 1309.08 ml   Output 1325 ml   Net -15.92 ml       Laboratory  Recent Labs     11/19/21  1414 11/21/21  1443   PLGDT37Q 7.46  --    CONRJV006I 32.1  --    HMBAU032P 71.5  --    SOFP5NOK 94.3  --    ARTHCO3 22  --    ARTBE 0  --    ISTATAPH  --  7.258*   ISTATAPCO2  --  74.4*   ISTATAPO2  --  166*   ISTATATCO2  --  35*   YIVCSPU8SFT  --  99   ISTATARTHCO3  --  33.2*   ISTATARTBE  --  3   ISTATTEMP  --  see below   ISTATFIO2  --  100   ISTATSPEC  --  Arterial         Recent Labs     11/19/21  0340 11/19/21  0340 11/20/21  0346 11/21/21  0525 11/21/21  1031   SODIUM 134*   < > 134* 136 141   POTASSIUM 3.9   < > 4.6 6.2* 4.1   CHLORIDE 96   < > 98 103 103   CO2 25   < > 23 22  25   BUN 34*   < > 32* 55* 55*   CREATININE 0.86   < > 0.78 1.40 1.29   MAGNESIUM 2.3  --  2.3  --   --    PHOSPHORUS 2.8  --  2.8  --   --    CALCIUM 9.5   < > 9.8 9.7 11.1*    < > = values in this interval not displayed.     Recent Labs     11/19/21  0340 11/19/21  0340 11/20/21  0346 11/21/21  0525 11/21/21  1031   ALTSGPT 37  --  41 52*  --    ASTSGOT 38  --  37 48*  --    ALKPHOSPHAT 70  --  68 70  --    TBILIRUBIN 0.8  --  0.7 0.9  --    GLUCOSE 115*   < > 122* 124* 152*    < > = values in this interval not displayed.     Recent Labs     11/19/21 0340 11/20/21 0346 11/21/21  0525   WBC 16.4* 20.5* 20.0*   NEUTSPOLYS 70.70 77.80* 81.00*   LYMPHOCYTES 17.30* 10.30* 9.30*   MONOCYTES 5.50 7.70 3.60   EOSINOPHILS 1.70 1.70 0.60   BASOPHILS 0.50 0.00 0.60   ASTSGOT 38 37 48*   ALTSGPT 37 41 52*   ALKPHOSPHAT 70 68 70   TBILIRUBIN 0.8 0.7 0.9     Recent Labs     11/19/21 0340 11/20/21  0346 11/21/21  0525   RBC 5.26 5.18 4.71   HEMOGLOBIN 16.2 15.9 14.6   HEMATOCRIT 47.4 46.6 43.1   PLATELETCT 826* 769* 391       Imaging  X-Ray:  I have personally reviewed the images and compared with prior images.  Ultrasound:  Reviewed    Assessment/Plan  * Acute hypoxemic respiratory failure due to COVID-19 (HCC)- (present on admission)  Assessment & Plan  Unvaccinated; Admitted 11/10, 10 days of symptoms PTA, managed on medical floor with HFNC  Increasing work of breathing despite HFNC/NRB, prone, transferred to ICU 11/20  Trial noninvasive ventilation for pulmonary hygiene/recruitment, alternating with HFNC, high risk for deterioration may require intubation-patient now BiPAP dependent with 18/10 100% satting 88-92 with increasing tachypnea and work of breathing  Discussed with  with patient son and wife that now the BiPAP dependent the longer we wait to intubate the more likely we are to have complications during intubation, and as patient is not improving would recommend intubation at this time they showed  understanding and agreed, preparing for intubation followed by placement of central line arterial line and likely will require paralyzation and proning.   With how long patient has been symptomatic and evaluation of chest x-ray very likely in fibrotic stage of Covid ARDS  Patient and family aware of high likelihood of poor outcome, with  also discussed patient's wishes and goals of care if he gets through this acute part however his lungs are too damaged for him to breathe on his own, he was very clear that he would not want to be trached and would not want to live on a ventilator.     Completed course of dexamethasone 11/17, initiate hydrocortisone 100 every 8  Day #14/14 baricitinib   Ongoing diuresis, follow fluid status, renal function, electrolytes  Completed 3 days empiric CAP coverage on arrival, PCT low  DVT prophylaxis, D-dimer 1.08-> 10, ultrasound lower extremity was negative and patient no longer tachycardic so will defer empiric anticoagulation however will obtain upper extremity Dopplers when supine, if recurrent tachycardia and hypotension low threshold for empiric anticoagulation with this large increase in D-dimer and known coagulopathic state of Covid    ROBERTO (acute kidney injury) (HCC)  Assessment & Plan  11/21 patient's creatinine had doubled and K elevated to 6.2  Fluids given-patient has been on 40 Lasix twice daily for diuresis for several days and appears dry on exam  Calcium D5 insulin and bicarb  Repeat BMP to monitor potassium    Goals of care, counseling/discussion  Assessment & Plan  Multiple conversations about goals of care with patient throughout, including with palliative care  Discussion with Dr. Paulino yesterday patient is DNR, however would want to trial intubation understanding very poor prognosis once intubated  11/21 discussed with  with patient son and wife that now the BiPAP dependent the longer we wait to intubate the more likely we are to have  complications during intubation, and as patient is not improving would recommend intubation at this time they showed understanding and agreed, preparing for intubation followed by placement of central line arterial line and likely will require paralyzation and proning.   With how long patient has been symptomatic and evaluation of chest x-ray very likely in fibrotic stage of Covid ARDS  Patient and family aware of high likelihood of poor outcome, with  also discussed patient's wishes and goals of care if he gets through this acute part however his lungs are too damaged for him to breathe on his own, he was very clear that he would not want to be trached and would not want to live on a ventilator.        VTE:  Lovenox  Ulcer: H2 Antagonist  Lines: None    I have performed a physical exam and reviewed and updated ROS and Plan today (11/21/2021). In review of yesterday's note (11/20/2021), there are no changes except as documented above.     Discussed patient condition and risk of morbidity and/or mortality with Family, RN, RT, Pharmacy, Code status disscussed, Charge nurse / hot rounds, Patient and palliative care  The patient remains critically ill.  Critical care time = 70 minutes in directly providing and coordinating critical care and extensive data review.  No time overlap and excludes procedures.

## 2021-11-21 NOTE — PALLIATIVE CARE
"Palliative Care follow-up  PC RN received consult to review GOC with pt and family. PC RN met with pt, his wife Fay,and son Blue at bedside.  [employer] was utilized via iPad. Pt is lethargic and falls asleep during discussion, he nods in agreement when asked if he would for discussion to take place in room.  PC RN discussed intubation status and the concern that if pt becomes intubated, he may never safely wean from the ventilator. Pt's son Blue appears frustrated and states that this \"was already decided and my dad wants intubation.\" Blue feels that the pt is \"more stable\" than he was yesterday and doesn't feel it is appropriate to discuss code status at this time. PC RN explains that purpose of discussion is to allow family an opportunity to discuss what is most important to the pt if he were to become intubated. Pt's son feels that it is inappropriate to discuss at this time, and pt is unable to fully participate. Family requesting to speak with doctor. PC RN offered emotional support and stepped out of room.       Updated: Dr. Singh and BS RN who was present for meeting.     Plan: PC RN will follow and support.     Thank you for allowing Palliative Care to support this patient and family. Contact [phone] for additional assistance, change in patient status, or with any questions/concerns.   "

## 2021-11-21 NOTE — CARE PLAN
Problem: Nutritional:  Goal: Nutrition support tolerated and meeting greater than 85% of estimated needs  Outcome: Not Met      H/O abdominoplasty

## 2021-11-22 NOTE — DISCHARGE PLANNING
Care Transition Team Discharge Planning      Anticipated Discharge Disposition: TBD     Action: LSw completed chart review, pt is not medically cleared.     Barriers to Discharge: not medically stable     Plan: Lsw will continue to follow, and assist w/ d/c planning.

## 2021-11-22 NOTE — ASSESSMENT & PLAN NOTE
Palliative care following, DNR  Ongoing goals of care discussions with family  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  Family updated daily, prognosis dismal, difficult to ventilate now  They remain optimistic  They were informed on two occasions that we will not provide ivermectin as they had requested    Further counseling to family that patient's condition continues to decline despite maximal therapy

## 2021-11-22 NOTE — CARE PLAN
The patient is Watcher - Medium risk of patient condition declining or worsening    Shift Goals  Clinical Goals: maintain SpO2  Patient Goals: na  Family Goals: rest    Progress made toward(s) clinical / shift goals:      Patient is not progressing towards the following goals: pt required intubation      Problem: Knowledge Deficit - Standard  Goal: Patient and family/care givers will demonstrate understanding of plan of care, disease process/condition, diagnostic tests and medications  Outcome: Not Progressing     Problem: Respiratory  Goal: Patient will achieve/maintain optimum respiratory ventilation and gas exchange  Outcome: Not Progressing     Problem: Mobility  Goal: Patient's capacity to carry out activities will improve  Outcome: Not Progressing     Problem: Psychosocial  Goal: Patient's level of anxiety will decrease  Outcome: Not Progressing     Problem: Pain - Standard  Goal: Alleviation of pain or a reduction in pain to the patient’s comfort goal  Outcome: Not Progressing    Problem: Respiratory  Goal: Patient will achieve/maintain optimum respiratory ventilation and gas exchange  Outcome: Not Progressing     Problem: Mobility  Goal: Patient's capacity to carry out activities will improve  Outcome: Not Progressing

## 2021-11-22 NOTE — PROGRESS NOTES
"Critical Care Progress Note    Date of admission  11/5/2021      Hospital Course  \"61 y.o. male who presented 11/5/2021 with no PMH, BMI 30 that is unvaccinated that presented 11/5 for 10 days of cough, chills, headache, sore throat with + covid test 9 days prior. He had room air saturation of 62% and was admitted on HFNC. Today I was asked to consult on patient since he is on HFNC + NRB. He is afebrile, HR 77-80's, 's sat 90-92%. He was started on dexamethasone and barcitinib, crp 17, ddimer 1, lactic 2.5. Still undecided about code status and palliative care has been consulted and remains full code. Wife and son at bedside. Patient without complaints of chest pain, shortness or breath cough, n/v/d, leg pain or swelling.\"      11/20 -he has been managed on the medical floor with HFNC, Decadron, baricitinib, Lasix, empiric antibiotics; increased work of breathing, accessory muscle use today and transfer to ICU.      Interval Problem Update  Reviewed last 24 hour events:  Intubated yesterday, very high plat (~44), despite sedation so patient was paralyzed, and proned.   Allowing permissive hypercapnia for decreasing plateau pressures, today plat around 33, trial decreasing tidal volume.   PEEP 8 (due to high pressures and oxygenating appropriately) and 60%.       Review of Systems  ROS unable to assess is intubated sedated and paralyzed    Vital Signs for last 24 hours   Temp:  [37 °C (98.6 °F)-37.2 °C (99 °F)] 37.2 °C (99 °F)  Pulse:  [] 107  Resp:  [27-33] 32  BP: ()/(64-91) 107/72  SpO2:  [93 %-99 %] 94 %    Hemodynamic parameters for last 24 hours       Respiratory Information for the last 24 hours  Vent Mode: APVCMV  Rate (breaths/min): 32  Vt Target (mL): 250  PEEP/CPAP: 8  MAP: 17  Control VTE (exp VT): 343    Physical Exam   Physical Exam  Vitals and nursing note reviewed.   Constitutional:       General: He is in acute distress.      Appearance: He is ill-appearing.      Comments: Patient " intubated proned and paralyzed   HENT:      Head: Normocephalic.      Mouth/Throat:      Mouth: Mucous membranes are moist.   Eyes:      Pupils: Pupils are equal, round, and reactive to light.   Cardiovascular:      Rate and Rhythm: Tachycardia present.      Heart sounds: No murmur heard.      Pulmonary:      Comments: Intubated  Abdominal:      General: There is no distension.      Palpations: Abdomen is soft.      Tenderness: There is no abdominal tenderness. There is no guarding.   Musculoskeletal:         General: No swelling or tenderness.      Cervical back: Normal range of motion and neck supple.   Skin:     General: Skin is warm.      Capillary Refill: Capillary refill takes less than 2 seconds.      Coloration: Skin is not jaundiced or pale.   Neurological:      Cranial Nerves: No cranial nerve deficit.      Motor: No weakness.      Comments: Intubated proned and paralyzed   Psychiatric:         Mood and Affect: Mood normal.         Medications  Current Facility-Administered Medications   Medication Dose Route Frequency Provider Last Rate Last Admin   • [START ON 11/23/2021] dexamethasone (DECADRON) tablet 6 mg  6 mg Enteral Tube DAILY Katie Singh M.D.       • Respiratory Therapy Consult   Nebulization Continuous RT Katie Singh M.D.       • famotidine (PEPCID) tablet 20 mg  20 mg Enteral Tube Q12HRS Katie Singh M.D.   20 mg at 11/22/21 0515    Or   • famotidine (PEPCID) injection 20 mg  20 mg Intravenous Q12HRS Katie Singh M.D.       • senna-docusate (PERICOLACE or SENOKOT S) 8.6-50 MG per tablet 2 Tablet  2 Tablet Enteral Tube BID Katie Singh M.D.   2 Tablet at 11/22/21 0514    And   • polyethylene glycol/lytes (MIRALAX) PACKET 1 Packet  1 Packet Enteral Tube QDAY PRN Katie Singh M.D.   1 Packet at 11/22/21 1034    And   • magnesium hydroxide (MILK OF MAGNESIA) suspension 30 mL  30 mL Enteral Tube QDAY PRN Katie Singh M.D.        And   • bisacodyl (DULCOLAX)  suppository 10 mg  10 mg Rectal QDAY PRN Katie Singh M.D.       • MD Alert...ICU Electrolyte Replacement per Pharmacy   Other PHARMACY TO DOSE Katie Singh M.D.       • lidocaine (XYLOCAINE) 1 % injection 2 mL  2 mL Tracheal Tube Q30 MIN PRN Katie Singh M.D.       • Pharmacy Consult: Enteral tube insertion - review meds/change route/product selection  1 Each Other PHARMACY TO DOSE Katie Singh M.D.       • acetaminophen (Tylenol) tablet 650 mg  650 mg Enteral Tube Q6HRS PRN Katie Singh M.D.       • hydrOXYzine HCl (ATARAX) tablet 25 mg  25 mg Enteral Tube TID PRN Katie Singh M.D.       • ondansetron (ZOFRAN ODT) dispertab 4 mg  4 mg Enteral Tube Q6HRS PRN Katie Singh M.D.       • promethazine (PHENERGAN) tablet 12.5-25 mg  12.5-25 mg Enteral Tube Q4HRS PRN Katie Singh M.D.       • vitamin D3 (cholecalciferol) tablet 2,000 Units  2,000 Units Enteral Tube DAILY Katie Singh M.D.   2,000 Units at 11/22/21 0515   • artificial tears (EYE LUBRICANT) ophth ointment 1 Application  1 Application Both Eyes Q8HRS Katie Singh M.D.   1 Application at 11/22/21 1308   • midazolam (VERSED) premix 125 mg/125 mL NS  0-10 mg/hr Intravenous Continuous Katie Singh M.D. 8 mL/hr at 11/22/21 0700 8 mg/hr at 11/22/21 0700   • rocuronium (ZEMURON) 250 mg in  mL Infusion  0-16 mcg/kg/min Intravenous Continuous Katie Singh M.D. 9.1 mL/hr at 11/22/21 1246 2 mcg/kg/min at 11/22/21 1246   • rocuronium (ZEMURON) injection 45.7 mg  0.6 mg/kg Intravenous Q2HRS PRN Katie Singh M.D.       • fentaNYL (SUBLIMAZE) 50 mcg/mL in 50mL (Continuous Infusion)   Intravenous Continuous Katie Singh M.D. 4 mL/hr at 11/22/21 1539 200 mcg/hr at 11/22/21 1539   • insulin regular (HumuLIN R,NovoLIN R) injection  1-6 Units Subcutaneous Q6HRS Katie Singh M.D.   1 Units at 11/22/21 1308    And   • dextrose 50% (D50W) injection 50 mL  50 mL Intravenous Q15 MIN ROSA ARCE  GEOFF Singh       • ondansetron (ZOFRAN) syringe/vial injection 4 mg  4 mg Intravenous Q6HRS PRN Anurag Martinez M.D.       • enoxaparin (LOVENOX) inj 40 mg  40 mg Subcutaneous DAILY Anurag Martinez M.D.   40 mg at 11/22/21 0515   • promethazine (PHENERGAN) suppository 12.5-25 mg  12.5-25 mg Rectal Q4HRS PRN Anurag Martinez M.D.       • prochlorperazine (COMPAZINE) injection 5-10 mg  5-10 mg Intravenous Q4HRS PRN Anurag Martinez M.D.           Fluids    Intake/Output Summary (Last 24 hours) at 11/22/2021 1602  Last data filed at 11/22/2021 1533  Gross per 24 hour   Intake 927.84 ml   Output 1170 ml   Net -242.16 ml       Laboratory  Recent Labs     11/21/21  1443 11/22/21  0018 11/22/21  1254   ISTATAPH 7.258* 7.367* 7.304*   ISTATAPCO2 74.4* 55.0* 62.4*   ISTATAPO2 166* 214* 69   ISTATATCO2 35* 33 33   FTZDSJU2PNW 99 100* 91*   ISTATARTHCO3 33.2* 31.6* 31.0*   ISTATARTBE 3 5* 3   ISTATTEMP see below 37.1 C 37.1 C   ISTATFIO2 100 100 60   ISTATSPEC Arterial Arterial Arterial   ISTATAPHTC  --  7.366* 7.302*   [id number]  --  215* 70         Recent Labs     11/20/21  0346 11/20/21  0346 11/21/21  0525 11/21/21  1031 11/22/21  0526   SODIUM 134*   < > 136 141 143   POTASSIUM 4.6   < > 6.2* 4.1 4.4   CHLORIDE 98   < > 103 103 106   CO2 23   < > 22 25 26   BUN 32*   < > 55* 55* 65*   CREATININE 0.78   < > 1.40 1.29 1.23   MAGNESIUM 2.3  --   --   --  2.7*   PHOSPHORUS 2.8  --   --   --  5.1*   CALCIUM 9.8   < > 9.7 11.1* 9.5    < > = values in this interval not displayed.     Recent Labs     11/20/21 0346 11/20/21 0346 11/21/21 0525 11/21/21  1031 11/22/21 0526 11/22/21  1317   ALTSGPT 41  --  52*  --  63*  --    ASTSGOT 37  --  48*  --  48*  --    ALKPHOSPHAT 68  --  70  --  64  --    TBILIRUBIN 0.7  --  0.9  --  0.8  --    PREALBUMIN  --   --   --   --  23.5 23.2   GLUCOSE 122*   < > 124* 152* 154*  --     < > = values in this interval not displayed.     Recent Labs     11/20/21 0346 11/21/21 0525 11/22/21 0526    WBC 20.5* 20.0* 22.5*   NEUTSPOLYS 77.80* 81.00* 85.60*   LYMPHOCYTES 10.30* 9.30* 5.30*   MONOCYTES 7.70 3.60 4.10   EOSINOPHILS 1.70 0.60 0.10   BASOPHILS 0.00 0.60 0.40   ASTSGOT 37 48* 48*   ALTSGPT 41 52* 63*   ALKPHOSPHAT 68 70 64   TBILIRUBIN 0.7 0.9 0.8     Recent Labs     11/20/21  0346 11/21/21  0525 11/22/21  0526   RBC 5.18 4.71 4.67*   HEMOGLOBIN 15.9 14.6 14.4   HEMATOCRIT 46.6 43.1 43.3   PLATELETCT 769* 391 501*       Imaging  X-Ray:  I have personally reviewed the images and compared with prior images.    Assessment/Plan  * Acute hypoxemic respiratory failure due to COVID-19 (HCC)- (present on admission)  Assessment & Plan  Unvaccinated; Admitted 11/10, 10 days of symptoms PTA, managed on medical floor with HFNC  Increasing work of breathing despite HFNC/NRB, prone, transferred to ICU 11/20, intubated 11/21  Oxygenation was not terrible however lung compliance is very lo with plateau pressures greater than 40 initially, decreased to 33 today  Permissive hypercapnia allowed to maintain low tidal volume ventilation, and decrease plateau pressures and driving pressures in lung (goal plat less than 29, and maintain pH greater than 7.25)  VAP bundle ordered  We will defer SAT SBT at this time as peak pressures increase when sedation decreases leading to risk for lung damage and pneumothorax  We will continue paralysis for 48 hours, then trial off, however with high pressures and low compliance would continue sedation with high rass (patient requiring fentanyl and Versed-hypotensive on propofol Precedex not able to sedate enough and too much fluid with ketamine)    Completed course of dexamethasone 11/17, initiated hydrocortisone 100 every 8 (CRP low, change to decadron for another 10 days)  Finished  baricitinib   Held diuresis-when intubated patient very intravascularly dry had been being empirically diuresed with 40 Lasix twice daily  Completed 3 days empiric CAP coverage on arrival, PCT low  DVT  prophylaxis, D-dimer 1.08-> 10, ultrasound lower extremity was negative and patient no longer tachycardic so will defer empiric anticoagulation however will obtain upper extremity Dopplers when supine, if recurrent tachycardia and hypotension low threshold for empiric anticoagulation with this large increase in D-dimer and known coagulopathic state of Covid      ROBERTO (acute kidney injury) (HCC)  Assessment & Plan  11/21 patient's creatinine had doubled and K elevated to 6.2  Fluids given-patient has been on 40 Lasix twice daily for diuresis for several days and appears dry on exam  Calcium D5 insulin and bicarb  Repeat BMP to monitor potassium    Goals of care, counseling/discussion  Assessment & Plan  Multiple conversations about goals of care with patient throughout, including with palliative care  Discussion with Dr. Paulino yesterday patient is DNR, however would want to trial intubation understanding very poor prognosis once intubated  11/21 discussed with  with patient son and wife that now the BiPAP dependent the longer we wait to intubate the more likely we are to have complications during intubation, and as patient is not improving would recommend intubation at this time they showed understanding and agreed, preparing for intubation followed by placement of central line arterial line and likely will require paralyzation and proning.   With how long patient has been symptomatic and evaluation of chest x-ray very likely in fibrotic stage of Covid ARDS  Patient and family aware of high likelihood of poor outcome, with  also discussed patient's wishes and goals of care if he gets through this acute part however his lungs are too damaged for him to breathe on his own, he was very clear that he would not want to be trached and would not want to live on a ventilator.        VTE:  Heparin  Ulcer: H2 Antagonist  Lines: Central Line  Ongoing indication addressed, Arterial Line  Ongoing indication  addressed and Navas Catheter  Ongoing indication addressed    I have performed a physical exam and reviewed and updated ROS and Plan today (11/22/2021). In review of yesterday's note (11/21/2021), there are no changes except as documented above.     Discussed patient condition and risk of morbidity and/or mortality with Family, RN, RT, Pharmacy and Charge nurse / hot rounds  The patient remains critically ill.  Critical care time = 50 minutes in directly providing and coordinating critical care and extensive data review.  No time overlap and excludes procedures.

## 2021-11-22 NOTE — ASSESSMENT & PLAN NOTE
Secondary to ATN -improved  Avoid nephrotoxins  Monitor creatinine, urine output, electrolytes closely  Keep euvolemic

## 2021-11-22 NOTE — CARE PLAN
Problem: Nutritional:  Goal: Nutrition support tolerated and meeting greater than 85% of estimated needs  Outcome: Met   Impact Peptide 1.5 @ 50 mL/hr (final goal rate).  Will continue to monitor lab trends.

## 2021-11-23 NOTE — PROGRESS NOTES
Called Kellie Leal with infection prevention to assess when the patient can be taken off of isolation precautions. Pt OK to be cleared from isolation

## 2021-11-23 NOTE — CARE PLAN
Problem: Knowledge Deficit - Standard  Goal: Patient and family/care givers will demonstrate understanding of plan of care, disease process/condition, diagnostic tests and medications  Outcome: Progressing     Problem: Respiratory  Goal: Patient will achieve/maintain optimum respiratory ventilation and gas exchange  Outcome: Progressing     Problem: Psychosocial  Goal: Patient's level of anxiety will decrease  Outcome: Progressing     Problem: Fall Risk  Goal: Patient will remain free from falls  Outcome: Progressing     Problem: Pain - Standard  Goal: Alleviation of pain or a reduction in pain to the patient’s comfort goal  Outcome: Progressing   The patient is Watcher - Medium risk of patient condition declining or worsening    Shift Goals  Clinical Goals: Stable SAO2  Patient Goals: N/A  Family Goals: Get better    Progress made toward(s) clinical / shift goals:  Maintained sats above goal.

## 2021-11-23 NOTE — CARE PLAN
The patient is Watcher - Medium risk of patient condition declining or worsening    Shift Goals  Clinical Goals: tolerate supination  Patient Goals: na  Family Goals: get better    Progress made toward(s) clinical / shift goals:  Pt was able to be placed supine for the day and then reproned per schedule, tolerated as well as expected. Urine output improved during day.    Patient is not progressing towards the following goals: Peak pressures remain elevated with interventions.    Family at bedside, updated and questions answered with .  Problem: Knowledge Deficit - Standard  Goal: Patient and family/care givers will demonstrate understanding of plan of care, disease process/condition, diagnostic tests and medications  Outcome: Not Progressing     Problem: Respiratory  Goal: Patient will achieve/maintain optimum respiratory ventilation and gas exchange  Outcome: Not Progressing     Problem: Mobility  Goal: Patient's capacity to carry out activities will improve  Outcome: Not Progressing     Problem: Psychosocial  Goal: Patient's level of anxiety will decrease  Outcome: Not Progressing

## 2021-11-23 NOTE — PROGRESS NOTES
"Critical Care Progress Note    Date of admission  11/5/2021    Hospital Course  \"61 y.o. male who presented 11/5/2021 with no PMH, BMI 30 that is unvaccinated that presented 11/5 for 10 days of cough, chills, headache, sore throat with + covid test 9 days prior. He had room air saturation of 62% and was admitted on HFNC. Today I was asked to consult on patient since he is on HFNC + NRB. He is afebrile, HR 77-80's, 's sat 90-92%. He was started on dexamethasone and barcitinib, crp 17, ddimer 1, lactic 2.5. Still undecided about code status and palliative care has been consulted and remains full code. Wife and son at bedside. Patient without complaints of chest pain, shortness or breath cough, n/v/d, leg pain or swelling.\"      11/20 -he has been managed on the medical floor with HFNC, Decadron, baricitinib, Lasix, empiric antibiotics; increased work of breathing, accessory muscle use today and transfer to ICU.  11/21-intubated prone paralyzed, very high peak pressures allowing permissive hypercapnia  11/22 -PEEP of 8, 60% FiO2, paralyzed/sedated/proned      Interval Problem Update  Reviewed last 24 hour events:   - AF, decreasing WBC   - sinus tach, SBP    - versed, fent, yokasta gtts   - plts down to 499   - improving ROBERTO   - LFTs unchanged   - haile Tfs at goal, last BM 11/16 --> escalating bowel protocol   - good UOP   - PCT low   - VD # 3, increase RR 34, PEEP to remain at 8, Pplt 28-32   - no abx   - decadron day 4/10 (several episodes in past)    Review of Systems  Review of Systems   Unable to perform ROS: Intubated    unable to assess is intubated sedated and paralyzed    Vital Signs for last 24 hours   Temp:  [36.7 °C (98.1 °F)-37.8 °C (100 °F)] 36.8 °C (98.2 °F)  Pulse:  [] 103  Resp:  [32-33] 32  BP: ()/(68-80) 102/68  SpO2:  [93 %-97 %] 97 %    Hemodynamic parameters for last 24 hours       Respiratory Information for the last 24 hours  Vent Mode: APVCMV  Rate (breaths/min): 32  Vt " Target (mL): 350  PEEP/CPAP: 8  MAP: 16  Control VTE (exp VT): 346    Physical Exam   Physical Exam  Vitals and nursing note reviewed.   Constitutional:       General: He is in acute distress.      Appearance: He is overweight. He is ill-appearing.      Interventions: He is sedated, chemically paralyzed, intubated and restrained.   HENT:      Head: Normocephalic.      Nose: Nose normal. No congestion.      Comments: Nasal feeding tube in place     Mouth/Throat:      Mouth: Mucous membranes are moist.      Pharynx: Oropharynx is clear.      Comments: Endotracheal tube in place  Eyes:      General: No scleral icterus.     Conjunctiva/sclera: Conjunctivae normal.      Pupils: Pupils are equal, round, and reactive to light.   Neck:      Comments: Left IJ central venous catheter without surrounding hematoma  Cardiovascular:      Rate and Rhythm: Regular rhythm. Tachycardia present. Occasional extrasystoles are present.     Chest Wall: PMI is displaced.      Pulses: Normal pulses.      Heart sounds: Heart sounds are distant. No murmur heard.      Pulmonary:      Effort: Tachypnea present. No accessory muscle usage. He is intubated.      Breath sounds: Examination of the right-middle field reveals rhonchi. Examination of the left-middle field reveals rhonchi. Examination of the right-lower field reveals rhonchi. Examination of the left-lower field reveals rhonchi. Rhonchi present. No wheezing.      Comments: Plateau pressures mid 30s, poor compliance  Abdominal:      General: Bowel sounds are normal. There is no distension.      Palpations: Abdomen is soft.      Tenderness: There is no abdominal tenderness. There is no guarding.   Genitourinary:     Comments: Navas catheter in place  Musculoskeletal:         General: No tenderness.      Cervical back: Neck supple. No rigidity.      Right lower leg: No edema.      Left lower leg: No edema.   Skin:     General: Skin is warm and dry.      Capillary Refill: Capillary refill  takes less than 2 seconds.      Coloration: Skin is not pale.   Neurological:      Comments: Heavily sedated and paralyzed   Psychiatric:         Behavior: Behavior is uncooperative.      Comments: Unable to assess given current clinical condition         Medications  Current Facility-Administered Medications   Medication Dose Route Frequency Provider Last Rate Last Admin   • dexamethasone (DECADRON) tablet 6 mg  6 mg Enteral Tube DAILY Katie Singh M.D.   6 mg at 11/23/21 0504   • Respiratory Therapy Consult   Nebulization Continuous RT Katie Singh M.D.       • famotidine (PEPCID) tablet 20 mg  20 mg Enteral Tube Q12HRS Katie Singh M.D.   20 mg at 11/22/21 1734    Or   • famotidine (PEPCID) injection 20 mg  20 mg Intravenous Q12HRS Katie Singh M.D.   20 mg at 11/23/21 0504   • senna-docusate (PERICOLACE or SENOKOT S) 8.6-50 MG per tablet 2 Tablet  2 Tablet Enteral Tube BID Katie Singh M.D.   2 Tablet at 11/23/21 0504    And   • polyethylene glycol/lytes (MIRALAX) PACKET 1 Packet  1 Packet Enteral Tube QDAY PRN Katie Singh M.D.   1 Packet at 11/22/21 1034    And   • magnesium hydroxide (MILK OF MAGNESIA) suspension 30 mL  30 mL Enteral Tube QDAY PRN Katie Singh M.D.        And   • bisacodyl (DULCOLAX) suppository 10 mg  10 mg Rectal QDAY PRN Katie Singh M.D.       • MD Alert...ICU Electrolyte Replacement per Pharmacy   Other PHARMACY TO DOSE Katie Singh M.D.       • lidocaine (XYLOCAINE) 1 % injection 2 mL  2 mL Tracheal Tube Q30 MIN PRN Katie Singh M.D.       • Pharmacy Consult: Enteral tube insertion - review meds/change route/product selection  1 Each Other PHARMACY TO DOSE Katie Singh M.D.       • acetaminophen (Tylenol) tablet 650 mg  650 mg Enteral Tube Q6HRS PRN Katie Singh M.D.       • hydrOXYzine HCl (ATARAX) tablet 25 mg  25 mg Enteral Tube TID PRN Katie Singh M.D.       • ondansetron (ZOFRAN ODT) dispertab 4 mg  4 mg Enteral Tube  Q6HRS PRN Katie Singh M.D.       • promethazine (PHENERGAN) tablet 12.5-25 mg  12.5-25 mg Enteral Tube Q4HRS PRN Katie Singh M.D.       • vitamin D3 (cholecalciferol) tablet 2,000 Units  2,000 Units Enteral Tube DAILY Katie Singh M.D.   2,000 Units at 11/23/21 0504   • artificial tears (EYE LUBRICANT) ophth ointment 1 Application  1 Application Both Eyes Q8HRS Katie Singh M.D.   1 Application at 11/23/21 0522   • midazolam (VERSED) premix 125 mg/125 mL NS  0-10 mg/hr Intravenous Continuous Katie Singh M.D. 8 mL/hr at 11/22/21 1914 8 mg/hr at 11/22/21 1914   • rocuronium (ZEMURON) 250 mg in  mL Infusion  0-16 mcg/kg/min Intravenous Continuous Katie Singh M.D. 4.6 mL/hr at 11/23/21 0655 1 mcg/kg/min at 11/23/21 0655   • rocuronium (ZEMURON) injection 45.7 mg  0.6 mg/kg Intravenous Q2HRS PRN Katie Singh M.D.       • fentaNYL (SUBLIMAZE) 50 mcg/mL in 50mL (Continuous Infusion)   Intravenous Continuous Katie Singh M.D. 4 mL/hr at 11/23/21 0654 200 mcg/hr at 11/23/21 0654   • insulin regular (HumuLIN R,NovoLIN R) injection  1-6 Units Subcutaneous Q6HRS Katie Singh M.D.   1 Units at 11/22/21 1734    And   • dextrose 50% (D50W) injection 50 mL  50 mL Intravenous Q15 MIN PRN Katie Singh M.D.       • ondansetron (ZOFRAN) syringe/vial injection 4 mg  4 mg Intravenous Q6HRS PRN Anurag Martinez M.D.       • enoxaparin (LOVENOX) inj 40 mg  40 mg Subcutaneous DAILY Anurag Martinez M.D.   40 mg at 11/23/21 0504   • promethazine (PHENERGAN) suppository 12.5-25 mg  12.5-25 mg Rectal Q4HRS PRN Anurag Martinez M.D.       • prochlorperazine (COMPAZINE) injection 5-10 mg  5-10 mg Intravenous Q4HRS PRN Anurag Martinez M.D.           Fluids    Intake/Output Summary (Last 24 hours) at 11/23/2021 0659  Last data filed at 11/23/2021 0600  Gross per 24 hour   Intake 1160.98 ml   Output 2145 ml   Net -984.02 ml       Laboratory  Recent Labs     11/22/21  0018 11/22/21  1254  11/23/21  0347   ISTATAPH 7.367* 7.304* 7.286*   ISTATAPCO2 55.0* 62.4* 72.1*   ISTATAPO2 214* 69 83   ISTATATCO2 33 33 36*   [id number] 100* 91* 94   ISTATARTHCO3 31.6* 31.0* 34.3*   ISTATARTBE 5* 3 5*   ISTATTEMP 37.1 C 37.1 C 36.7 C   ISTATFIO2 100 60 60   ISTATSPEC Arterial Arterial Arterial   ISTATAPHTC 7.366* 7.302* 7.290*   [id number] 215* 70 81         Recent Labs     11/21/21  1031 11/22/21  0526 11/23/21 0217   SODIUM 141 143 145   POTASSIUM 4.1 4.4 4.5   CHLORIDE 103 106 109   CO2 25 26 30   BUN 55* 65* 63*   CREATININE 1.29 1.23 1.03   MAGNESIUM  --  2.7* 2.6*   PHOSPHORUS  --  5.1* 2.8   CALCIUM 11.1* 9.5 10.0     Recent Labs     11/21/21  0525 11/21/21  0525 11/21/21  1031 11/22/21  0526 11/22/21  1317 11/23/21 0217   ALTSGPT 52*  --   --  63*  --  87*   ASTSGOT 48*  --   --  48*  --  63*   ALKPHOSPHAT 70  --   --  64  --  74   TBILIRUBIN 0.9  --   --  0.8  --  0.5   PREALBUMIN  --   --   --  23.5 23.2  --    GLUCOSE 124*   < > 152* 154*  --  169*    < > = values in this interval not displayed.     Recent Labs     11/21/21  0525 11/22/21  0526 11/23/21 0217   WBC 20.0* 22.5* 19.0*   NEUTSPOLYS 81.00* 85.60* 80.60*   LYMPHOCYTES 9.30* 5.30* 8.40*   MONOCYTES 3.60 4.10 5.20   EOSINOPHILS 0.60 0.10 1.30   BASOPHILS 0.60 0.40 0.50   ASTSGOT 48* 48* 63*   ALTSGPT 52* 63* 87*   ALKPHOSPHAT 70 64 74   TBILIRUBIN 0.9 0.8 0.5     Recent Labs     11/21/21  0525 11/22/21  0526 11/23/21  0217   RBC 4.71 4.67* 4.67*   HEMOGLOBIN 14.6 14.4 14.3   HEMATOCRIT 43.1 43.3 45.2   PLATELETCT 391 501* 499*       Imaging  X-Ray:  I have personally reviewed the images and compared with prior images. and My impression is: Unchanged diffuse bilateral infiltrates with retrocardiac opacification, pneumomediastinum/pneumopericardium, endotracheal tube/gastric tube/left IJ central venous catheter in good position  Ultrasound:  Reviewed    Assessment/Plan  * Acute hypoxemic respiratory failure due to COVID-19 (HCC)- (present on  admission)  Assessment & Plan  Unvaccinated; Admitted 11/10, 10 days of symptoms PTA, managed on medical floor with HFNC  Intubated 11/21  Continue full mechanical ventilatory support, increased respiratory to 34, PEEP to 10  Titrate FiO2 to goal SPO2 greater than 85%  RT/O2 protocol  Continue Versed, fentanyl and rocuronium drips today  Continue proning protocol    Pneumonia due to COVID-19 virus- (present on admission)  Assessment & Plan  Continue with the following therapies while monitoring closely:  Steroids: Completed several rounds of steroids, continue current round of 10 days of Decadron  Remdesivir: Not a candidate given degree of respiratory failure  Monoclonal antibody: Status post baricitinib  Anticoagulation: Chemical DVT prophylaxis only  Diuresis: Continue Lasix  Antitussives, supportive care, monitoring LFTs  Maintain strict isolation precautions per hospital guidelines  Continue proning protocols for an additional 24 hours with heavy sedation/paralysis    ROBERTO (acute kidney injury) (HCC)  Assessment & Plan  Secondary to ATN -improving  Avoid nephrotoxins  Monitor creatinine, urine output, electrolytes closely    Hypokalemia  Assessment & Plan  Repletion and monitor closely    Hyponatremia  Assessment & Plan  Improved    Elevated troponin  Assessment & Plan  Secondary to demand ischemia    Goals of care, counseling/discussion  Assessment & Plan  Palliative care consulted  Ongoing goals of care discussions  DNR  Very guarded prognosis given likely fibrotic state of Covid       VTE:  Lovenox  Ulcer: H2 Antagonist  Lines: Central Line  Ongoing indication addressed, Arterial Line  Ongoing indication addressed and Navas Catheter  Ongoing indication addressed    I have performed a physical exam and reviewed and updated ROS and Plan today (11/23/2021). In review of yesterday's note (11/22/2021), there are no changes except as documented above.     Patient is critically ill today requiring active management  of his mechanical ventilatory support as well as titration of sedative drips and paralytic.  Very guarded prognosis. High risk of deterioration and worsening vital organ dysfunction and death without the above critical care interventions.    Discussed patient condition and risk of morbidity and/or mortality with RN, RT, Pharmacy and Charge nurse / hot rounds. Critical care time = 46 minutes in directly providing and coordinating critical care and extensive data review.  No time overlap and excludes procedures.

## 2021-11-23 NOTE — INFECTION CONTROL
This patient is considered COVID RECOVERED.    Patient initially tested positive for COVID on 11/5/2021.  Patient is greater than or equal to 21 days from symptom onset (10/25)  and/or positive test, with symptom improvement.  Per the CDC guidance, this patient no longer requires transmission based precautions.  Patient may be placed on any unit per the bed assignment policy, including placement in a semi-private room with a roommate.  This patient is considered COVID RECOVERED.

## 2021-11-23 NOTE — PROGRESS NOTES
Patients' Pre NMBA charted at a stimulation of 5 with the TOF. Subsequent post NMBA response changed to stimulation of  7. Will continue to assess Post NBMA at a stimulation of 5 as it is baseline, see Flowsheets.

## 2021-11-23 NOTE — WOUND TEAM
"[facility]  Inpatient Services  Initial Wound and Skin Care Evaluation    Admission Date: 11/5/2021     Last order of IP CONSULT TO WOUND CARE was found on 11/20/2021 from Hospital Encounter on 11/5/2021     HPI, PMH, SH: Reviewed    No past surgical history on file.  Social History     Tobacco Use   • Smoking status: Not on file   • Smokeless tobacco: Not on file   Substance Use Topics   • Alcohol use: Not on file     Chief Complaint   Patient presents with   • Shortness of Breath     x2d, COVID +     Diagnosis: COVID-19 virus infection [U07.1]    Unit where seen by Wound Team: T630/00     WOUND CONSULT/FOLLOW UP RELATED TO:  Bilateral ears    WOUND HISTORY:  Per H&P: \"Blue Wu is a 61 y.o. male who presented 11/5/2021 with shortness of breath.  Mr. Wu is an unvaccinated 61-year-old male that has no previously known medical conditions.  10 days ago he developed a cough with chills, headache, and sore throat.  He had a home over-the-counter COVID-19 test which was +9 days ago.  Since then he has had a progression of his shortness of breath and cough.  His son checked his oxygen saturations yesterday, 11/4/2021 and found them 62% on room air.  Today patient elected to come to the emergency room where his oxygen saturations have been in the 60s percent range on room air.  He was placed on a nonrebreather and now high flow oxygen.  He has been deemed to be a candidate for baricitinib by infectious disease.  Will be placed on steroids and admitted in guarded condition.  I had a long discussion with patient and his son about his guarded condition and apparently his wife does not want him to be on a ventilator but patient does not want make that decision without her here and this will be readdressed when she gets here.\"    WOUND ASSESSMENT/LDA  Wound 11/20/21 Pressure Injury Ear Upper Bilateral (Active)     Right     Left 11/22/21 1400   Site Assessment Red;[place] 11/22/21 1400   Periwound Assessment " Intact 11/22/21 1400   Margins Attached edges 11/22/21 1400   Closure Secondary intention 11/22/21 1400   Drainage Amount Scant 11/22/21 1400   Drainage Description Serosanguineous 11/22/21 1400   Treatments Cleansed 11/22/21 1400   Wound Cleansing Normal Saline Irrigation 11/22/21 1400   Periwound Protectant Hydrocolloid 11/22/21 1400   Dressing Cleansing/Solutions Not Applicable 11/22/21 1400   Dressing Options Hydrocolloid Thin 11/22/21 1400   Dressing Changed New 11/22/21 1400   Dressing Status Intact 11/22/21 1400   Dressing Change/Treatment Frequency Every 72 hrs, and As Needed 11/22/21 1400   NEXT Dressing Change/Treatment Date 11/25/21 11/22/21 1400   NEXT Weekly Photo (Inpatient Only) 11/29/21 11/22/21 1400   Pressure Injury Stage 2 11/22/21 1400   Wound Length (cm) 0.5 cm  both 11/22/21 1400   Wound Width (cm) 0.4 cm  R, 0.3 L 11/22/21 1400   Wound Depth (cm) 0.1 cm left, R w/ scab 11/22/21 1400   Wound Surface Area (cm^2) 0.2 cm^2 11/22/21 1400   Wound Volume (cm^3) 0.02 cm^3 11/22/21 1400   Shape linear Left, circular right 11/22/21 1400   Wound Odor None 11/22/21 1400   Exposed Structures None 11/22/21 1400   Number of days: 2        Vascular:    ELI:   No results found.    Lab Values:    Lab Results   Component Value Date/Time    WBC 22.5 (H) 11/22/2021 05:26 AM    RBC 4.67 (L) 11/22/2021 05:26 AM    HEMOGLOBIN 14.4 11/22/2021 05:26 AM    HEMATOCRIT 43.3 11/22/2021 05:26 AM    CREACTPROT 2.31 (H) 11/22/2021 01:17 PM        Culture Results show:  No results found for this or any previous visit (from the past 720 hour(s)).    Pain Level/Medicated:  No s/s of distress      INTERVENTIONS BY WOUND TEAM:  Chart and images reviewed. Discussed with bedside RN. All areas of concern (based on picture review, LDA review and discussion with bedside RN) have been thoroughly assessed. Documentation of areas based on significant findings. This RN in to assess patient. Performed standard wound care which includes  appropriate positioning, dressing removal and non-selective debridement. Pictures and measurements obtained weekly if/when required.  Preparation for Dressing removal: ARTEMIO  Non-selectively Debrided with:  NS and gauze.  Sharp debridement: NA  Colleen wound: Cleansed with NS, dried  Primary Dressing: thin hydrocolloid  Secondary (Outer) Dressing: NA    Interdisciplinary consultation: Patient, Bedside RN    EVALUATION / RATIONALE FOR TREATMENT:  Most Recent Date:  11/22: Pt has St pressure injuries to bilateral superior ears. Hydrocolloid applied to provide occlusive drsg to help promote autolytic debridement. It will also promote a moisture-balanced environment conducive to wound healing.    Goals: Steady decrease in wound area and depth weekly.    WOUND TEAM PLAN OF CARE ([X] for frequency of wound follow up,):   Nursing to follow orders written for wound care. Contact wound team if area fails to progress, deteriorates or with any questions/concerns  Dressing changes by wound team:                   Follow up 3 times weekly:                NPWT change 3 times weekly:     Follow up 1-2 times weekly: x     Follow up Bi-Monthly:                   Follow up as needed:     Other (explain):     NURSING PLAN OF CARE ORDERS (X):  Dressing changes: See Dressing Care orders:   Skin care: See Skin Care orders:   RN Prevention Protocol: x  Rectal tube care: See Rectal Tube Care orders:   Other orders:    RSKIN:   CURRENTLY IN PLACE (X), APPLIED THIS VISIT (A), ORDERED (O):   Q shift Abelardo:  X  Q shift pressure point assessments:  X    Surface/Positioning   Pressure redistribution mattress            Low Airloss   x       Bariatric foam      Bariatric ARSH     Waffle cushion        Waffle Overlay          Reposition q 2 hours   x   TAPs Turning system     Z Suraj Pillow     Offloading/Redistribution not assessed  Sacral Mepilex (Silicone dressing)     Heel Mepilex (Silicone dressing)         Heel float boots (Prevalon boot)              Float Heels off Bed with Pillows           Respiratory   Silicone O2 tubing         Gray Foam Ear protectors     Cannula fixation Device (Tender )          High flow offloading Clip    Elastic head band offloading device      Anchorfast      x     Taped in place at this time r/t being placed prone                                              Trach with Optifoam split foam             Containment/Moisture Prevention     Rectal tube or BMS    Purwick/Condom Cath        Navas Catheter  x  Barrier wipes           Barrier paste       Antifungal tx      Interdry        Mobilization not assessed     Up to chair        Ambulate      PT/OT      Nutrition       Dietician        Diabetes Education      PO     TF x    TPN     NPO   # days     Other        Anticipated discharge plans: no advanced needs @ this time  LTACH:        SNF/Rehab:                  Home Health Care:           Outpatient Wound Center:            Self/Family Care:        Other:           Vac Discharge Needs:   Not Applicable Pt not on a wound vac:   x                      Regular Vac in use:                                                                Veraflo Vac while inpatient, ok to transition to Regular Vac on Discharge:                                 Veraflo Vac while inpatient, will need to remain on Veraflo Vac upon discharge:

## 2021-11-24 PROBLEM — E87.0 HYPERNATREMIA: Status: ACTIVE | Noted: 2021-01-01

## 2021-11-24 PROBLEM — E87.20 LACTIC ACIDOSIS: Status: RESOLVED | Noted: 2021-01-01 | Resolved: 2021-01-01

## 2021-11-24 PROBLEM — E87.1 HYPONATREMIA: Status: RESOLVED | Noted: 2021-01-01 | Resolved: 2021-01-01

## 2021-11-24 PROBLEM — E87.20 METABOLIC ACIDOSIS: Status: RESOLVED | Noted: 2021-01-01 | Resolved: 2021-01-01

## 2021-11-24 PROBLEM — E83.39 HYPOPHOSPHATEMIA: Status: ACTIVE | Noted: 2021-01-01

## 2021-11-24 NOTE — PROGRESS NOTES
"Critical Care Progress Note    Date of admission  11/5/2021    Hospital Course  \"61 y.o. male who presented 11/5/2021 with no PMH, BMI 30 that is unvaccinated that presented 11/5 for 10 days of cough, chills, headache, sore throat with + covid test 9 days prior. He had room air saturation of 62% and was admitted on HFNC. Today I was asked to consult on patient since he is on HFNC + NRB. He is afebrile, HR 77-80's, 's sat 90-92%. He was started on dexamethasone and barcitinib, crp 17, ddimer 1, lactic 2.5. Still undecided about code status and palliative care has been consulted and remains full code. Wife and son at bedside. Patient without complaints of chest pain, shortness or breath cough, n/v/d, leg pain or swelling.\"      11/20 -he has been managed on the medical floor with HFNC, Decadron, baricitinib, Lasix, empiric antibiotics; increased work of breathing, accessory muscle use today and transfer to ICU.  11/21-intubated prone paralyzed, very high peak pressures allowing permissive hypercapnia  11/22 -PEEP of 8, 60% FiO2, paralyzed/sedated/proned  11/23 PEEP 8, 50%, paralyzed/sedated/proned      Interval Problem Update  Reviewed last 24 hour events:   - proned overnight   - remains paralyzed this morning on rocuronium   - versed 8/fent 200 gtts   - AF, slight decrease in WBC   - sinus tach, SBP    - Hgb at 13.6   - good UOP   - Na up to 152 --> enteral free water   - improving ROBERTO   - low phos   - VD # 4, no change to MV, wean FiO2   - no abx   - decadron day 5/10   - increasing LFTs    Review of Systems  Review of Systems   Unable to perform ROS: Intubated     Vital Signs for last 24 hours   Temp:  [36.8 °C (98.2 °F)-37.5 °C (99.5 °F)] 37.5 °C (99.5 °F)  Pulse:  [] 110  Resp:  [24-35] 30  BP: ()/(56-74) 101/71  SpO2:  [87 %-100 %] 98 %    Respiratory Information for the last 24 hours  Vent Mode: APVCMV  Rate (breaths/min): 34  Vt Target (mL): 350  PEEP/CPAP: 8  MAP: 14  Control VTE " (exp VT): 360    Physical Exam   Physical Exam  Vitals and nursing note reviewed.   Constitutional:       General: He is in acute distress.      Appearance: He is overweight. He is ill-appearing.      Interventions: He is sedated, chemically paralyzed, intubated and restrained.      Comments: Proned this morning during exam   HENT:      Head: Normocephalic.      Nose: Nose normal.      Comments: Nasal feeding tube in place     Mouth/Throat:      Mouth: Mucous membranes are moist.      Pharynx: Oropharynx is clear.      Comments: Endotracheal tube in place  Eyes:      Conjunctiva/sclera: Conjunctivae normal.      Pupils: Pupils are equal, round, and reactive to light.   Neck:      Comments: Left IJ central venous catheter without surrounding hematoma  Cardiovascular:      Rate and Rhythm: Regular rhythm. Tachycardia present. Occasional extrasystoles are present.     Chest Wall: PMI is displaced.      Pulses: Normal pulses.      Heart sounds: Heart sounds are distant. No murmur heard.      Pulmonary:      Effort: No accessory muscle usage. He is intubated.      Breath sounds: Examination of the right-middle field reveals rhonchi. Examination of the left-middle field reveals rhonchi. Examination of the right-lower field reveals rhonchi and rales. Examination of the left-lower field reveals rhonchi and rales. Rhonchi and rales present. No wheezing.      Comments: Plateau pressures 28-30, poor compliance, minimal secretions  Abdominal:      General: Bowel sounds are normal. There is no distension.      Palpations: Abdomen is soft.      Tenderness: There is no abdominal tenderness. There is no guarding.   Genitourinary:     Comments: Navas catheter in place  Musculoskeletal:         General: No tenderness.      Cervical back: Neck supple. No tenderness.      Right lower leg: No edema.      Left lower leg: No edema.   Skin:     General: Skin is warm and dry.      Capillary Refill: Capillary refill takes less than 2  seconds.      Coloration: Skin is not pale.   Neurological:      Comments: Heavily sedated and paralyzed   Psychiatric:         Behavior: Behavior is uncooperative.      Comments: Unable to assess given current clinical condition         Medications  Current Facility-Administered Medications   Medication Dose Route Frequency Provider Last Rate Last Admin   • norepinephrine (Levophed) 8 mg in 250 mL NS infusion (premix)  0-30 mcg/min Intravenous Continuous Jeremy M Gonda, M.D.   Dose not Required at 11/23/21 1645   • dexamethasone (DECADRON) tablet 6 mg  6 mg Enteral Tube DAILY Katie Singh M.D.   6 mg at 11/24/21 0542   • Respiratory Therapy Consult   Nebulization Continuous RT Katie Singh M.D.       • famotidine (PEPCID) tablet 20 mg  20 mg Enteral Tube Q12HRS Katie Singh M.D.   20 mg at 11/23/21 1700    Or   • famotidine (PEPCID) injection 20 mg  20 mg Intravenous Q12HRS Katie Singh M.D.   20 mg at 11/24/21 0542   • senna-docusate (PERICOLACE or SENOKOT S) 8.6-50 MG per tablet 2 Tablet  2 Tablet Enteral Tube BID Katie Singh M.D.   2 Tablet at 11/24/21 0542    And   • polyethylene glycol/lytes (MIRALAX) PACKET 1 Packet  1 Packet Enteral Tube QDAY PRN Katie Singh M.D.   1 Packet at 11/22/21 1034    And   • magnesium hydroxide (MILK OF MAGNESIA) suspension 30 mL  30 mL Enteral Tube QDAY PRN Katie Singh M.D.   30 mL at 11/23/21 0836    And   • bisacodyl (DULCOLAX) suppository 10 mg  10 mg Rectal QDAY PRN Katie Singh M.D.       • MD Alert...ICU Electrolyte Replacement per Pharmacy   Other PHARMACY TO DOSE Katie Singh M.D.       • lidocaine (XYLOCAINE) 1 % injection 2 mL  2 mL Tracheal Tube Q30 MIN PRN Katie Singh M.D.       • Pharmacy Consult: Enteral tube insertion - review meds/change route/product selection  1 Each Other PHARMACY TO DOSE Katie Singh M.D.       • acetaminophen (Tylenol) tablet 650 mg  650 mg Enteral Tube Q6HRS PRN Katie Singh M.D.        • hydrOXYzine HCl (ATARAX) tablet 25 mg  25 mg Enteral Tube TID PRN Katie Singh M.D.       • ondansetron (ZOFRAN ODT) dispertab 4 mg  4 mg Enteral Tube Q6HRS PRN Katie Singh M.D.       • promethazine (PHENERGAN) tablet 12.5-25 mg  12.5-25 mg Enteral Tube Q4HRS PRN Katie Singh M.D.       • vitamin D3 (cholecalciferol) tablet 2,000 Units  2,000 Units Enteral Tube DAILY Katie Singh M.D.   2,000 Units at 11/24/21 0542   • artificial tears (EYE LUBRICANT) ophth ointment 1 Application  1 Application Both Eyes Q8HRS Katie Singh M.D.   1 Application at 11/24/21 0543   • midazolam (VERSED) premix 125 mg/125 mL NS  0-10 mg/hr Intravenous Continuous Katie Singh M.D. 10 mL/hr at 11/24/21 0400 10 mg/hr at 11/24/21 0400   • rocuronium (ZEMURON) 250 mg in  mL Infusion  0-16 mcg/kg/min Intravenous Continuous Katie Singh M.D. 13.7 mL/hr at 11/23/21 2233 3 mcg/kg/min at 11/23/21 2233   • rocuronium (ZEMURON) injection 45.7 mg  0.6 mg/kg Intravenous Q2HRS PRN Katie Singh M.D.       • fentaNYL (SUBLIMAZE) 50 mcg/mL in 50mL (Continuous Infusion)   Intravenous Continuous Katie Singh M.D. 4 mL/hr at 11/23/21 1859 2,500 mcg at 11/24/21 0553   • insulin regular (HumuLIN R,NovoLIN R) injection  1-6 Units Subcutaneous Q6HRS Katie Singh M.D.   1 Units at 11/24/21 0543    And   • dextrose 50% (D50W) injection 50 mL  50 mL Intravenous Q15 MIN PRN Katie Singh M.D.       • ondansetron (ZOFRAN) syringe/vial injection 4 mg  4 mg Intravenous Q6HRS PRN Anurag Martinez M.D.       • enoxaparin (LOVENOX) inj 40 mg  40 mg Subcutaneous DAILY Anurag Martinez M.D.   40 mg at 11/24/21 0542   • promethazine (PHENERGAN) suppository 12.5-25 mg  12.5-25 mg Rectal Q4HRS PRN Anurag Martinez M.D.       • prochlorperazine (COMPAZINE) injection 5-10 mg  5-10 mg Intravenous Q4HRS PRN Anurag Martinez M.D.           Fluids    Intake/Output Summary (Last 24 hours) at 11/24/2021 0712  Last data filed  at 11/24/2021 0600  Gross per 24 hour   Intake 1142.46 ml   Output 1275 ml   Net -132.54 ml       Laboratory  Recent Labs     11/23/21  0347 11/23/21  0856 11/24/21  0409   ISTATAPH 7.286* 7.287* 7.365*   ISTATAPCO2 72.1* 73.7* 63.0*   ISTATAPO2 83 71 61*   ISTATATCO2 36* 37* 38*   OVNQUYE0OKS 94 91* 89*   ISTATARTHCO3 34.3* 35.2* 36.0*   ISTATARTBE 5* 6* 9*   ISTATTEMP 36.7 C 36.8 C 37.9 C   ISTATFIO2 60 60 50   ISTATSPEC Arterial Arterial Arterial   ISTATAPHTC 7.290* 7.290* 7.352*   VTCHUTKR4BS 81 70 65         Recent Labs     11/22/21  0526 11/23/21  0217 11/24/21  0410   SODIUM 143 145 152*   POTASSIUM 4.4 4.5 4.9   CHLORIDE 106 109 115*   CO2 26 30 32   BUN 65* 63* 56*   CREATININE 1.23 1.03 0.76   MAGNESIUM 2.7* 2.6* 2.4   PHOSPHORUS 5.1* 2.8 1.3*   CALCIUM 9.5 10.0 10.0     Recent Labs     11/22/21  0526 11/22/21  1317 11/23/21  0217 11/24/21  0410   ALTSGPT 63*  --  87* 106*   ASTSGOT 48*  --  63* 69*   ALKPHOSPHAT 64  --  74 82   TBILIRUBIN 0.8  --  0.5 0.4   PREALBUMIN 23.5 23.2  --   --    GLUCOSE 154*  --  169* 181*     Recent Labs     11/22/21  0526 11/23/21  0217 11/24/21  0410   WBC 22.5* 19.0* 18.4*   NEUTSPOLYS 85.60* 80.60* 83.20*   LYMPHOCYTES 5.30* 8.40* 6.90*   MONOCYTES 4.10 5.20 3.50   EOSINOPHILS 0.10 1.30 1.10   BASOPHILS 0.40 0.50 0.60   ASTSGOT 48* 63* 69*   ALTSGPT 63* 87* 106*   ALKPHOSPHAT 64 74 82   TBILIRUBIN 0.8 0.5 0.4     Recent Labs     11/22/21  0526 11/23/21  0217 11/24/21  0410   RBC 4.67* 4.67* 4.39*   HEMOGLOBIN 14.4 14.3 13.6*   HEMATOCRIT 43.3 45.2 43.3   PLATELETCT 501* 499* 413       Imaging  X-Ray:  I have personally reviewed the images and compared with prior images. and No film today    Assessment/Plan  * Acute hypoxemic respiratory failure due to COVID-19 (HCC)- (present on admission)  Assessment & Plan  Intubated 11/21  Continue full mechanical ventilatory support, no change to mandatory minute ventilation and PEEP today  Continue to titrate FiO2 to goal SPO2 greater  than 85%, PaO2 greater than 55  RT/O2 protocol  Continue Versed, fentanyl drips --> DC paralytic today if tolerates  Proning protocol    Pneumonia due to COVID-19 virus- (present on admission)  Assessment & Plan  Continue with the following therapies while monitoring closely:  Steroids: Completed several rounds of steroids, continue current round of 10 days of Decadron (day 5)  Remdesivir: Not a candidate given degree of respiratory failure  Monoclonal antibody: Status post baricitinib  Anticoagulation: Chemical DVT prophylaxis only  Diuresis: Holding for today given evidence of intravascular volume depletion  Antitussives, supportive care, monitoring LFTs  Cleared from isolation precautions per hospital guidelines  Continue proning protocols if p.m. ABG shows poor PF ratio    ROBERTO (acute kidney injury) (HCC)  Assessment & Plan  Secondary to ATN -improving again today  Avoid nephrotoxins  Monitor creatinine, urine output, electrolytes closely  Holding diuretic for today    Hypernatremia  Assessment & Plan  Increase enteral free water and monitor    Hypophosphatemia  Assessment & Plan  Replete and monitor closely    Hypokalemia  Assessment & Plan  Repleted    Elevated troponin  Assessment & Plan  Secondary to demand ischemia    Goals of care, counseling/discussion  Assessment & Plan  Palliative care consulted  Ongoing goals of care discussions with family  DNR  Very guarded prognosis given likely fibrotic state of Covid       VTE:  Lovenox  Ulcer: H2 Antagonist  Lines: Central Line  Ongoing indication addressed, Arterial Line  Ongoing indication addressed and Navas Catheter  Ongoing indication addressed    I have performed a physical exam and reviewed and updated ROS and Plan today (11/24/2021). In review of yesterday's note (11/23/2021), there are no changes except as documented above.     Patient remains critically ill today requiring active management of his mechanical ventilatory support as well as titration of  sedative/paralytic drips. High risk of deterioration and worsening vital organ dysfunction and death without the above critical care interventions.    Discussed patient condition and risk of morbidity and/or mortality with Family, RN, RT, Pharmacy and Charge nurse / hot rounds. Critical care time = 37 minutes in directly providing and coordinating critical care and extensive data review.  No time overlap and excludes procedures.

## 2021-11-24 NOTE — ASSESSMENT & PLAN NOTE
Continue with the following therapies while monitoring closely:  Steroids: Completed primary rounds of steroids, continue current round of 10 days of Decadron (day 9)  Remdesivir: Not a candidate given degree of respiratory failure  Monoclonal antibody: Status post baricitinib  Anticoagulation: Chemical DVT prophylaxis only  Diuresis: Not tolerating, continue to hold for now  Antitussives, supportive care, monitoring LFTs  Cleared from isolation precautions per hospital guidelines  Status post proning protocols - completed 11/25   Monitor for secondary bacterial infection and thromboembolic disease

## 2021-11-24 NOTE — CARE PLAN
Problem: Knowledge Deficit - Standard  Goal: Patient and family/care givers will demonstrate understanding of plan of care, disease process/condition, diagnostic tests and medications  Outcome: Progressing     Problem: Respiratory  Goal: Patient will achieve/maintain optimum respiratory ventilation and gas exchange  Outcome: Progressing     Problem: Mobility  Goal: Patient's capacity to carry out activities will improve  Outcome: Progressing     Problem: Psychosocial  Goal: Patient's level of anxiety will decrease  Outcome: Progressing     Problem: Fall Risk  Goal: Patient will remain free from falls  Outcome: Progressing     Problem: Skin Integrity  Goal: Skin integrity is maintained or improved  Outcome: Progressing     Problem: Pain - Standard  Goal: Alleviation of pain or a reduction in pain to the patient’s comfort goal  Outcome: Progressing   The patient is Watcher - Medium risk of patient condition declining or worsening    Shift Goals  Clinical Goals: Stable SAO2  Patient Goals: N/A  Family Goals: Get better    Progress made toward(s) clinical / shift goals:  progressing    Patient is not progressing towards the following goals:

## 2021-11-25 PROBLEM — K59.00 OBSTIPATION: Status: ACTIVE | Noted: 2021-01-01

## 2021-11-25 NOTE — PROGRESS NOTES
"Critical Care Progress Note    Date of admission  11/5/2021    Hospital Course  \"61 y.o. male who presented 11/5/2021 with no PMH, BMI 30 that is unvaccinated that presented 11/5 for 10 days of cough, chills, headache, sore throat with + covid test 9 days prior. He had room air saturation of 62% and was admitted on HFNC. Today I was asked to consult on patient since he is on HFNC + NRB. He is afebrile, HR 77-80's, 's sat 90-92%. He was started on dexamethasone and barcitinib, crp 17, ddimer 1, lactic 2.5. Still undecided about code status and palliative care has been consulted and remains full code. Wife and son at bedside. Patient without complaints of chest pain, shortness or breath cough, n/v/d, leg pain or swelling.\"      11/20 -he has been managed on the medical floor with HFNC, Decadron, baricitinib, Lasix, empiric antibiotics; increased work of breathing, accessory muscle use today and transfer to ICU.  11/21-intubated prone paralyzed, very high peak pressures allowing permissive hypercapnia  11/22 -PEEP of 8, 60% FiO2, paralyzed/sedated/proned  11/23 PEEP 8, 50%, paralyzed/sedated/proned      Interval Problem Update  Reviewed last 24 hour events:   - slightly worsening oxygenation, PEEP increased to 10 overnight   - AF, WBC unchanged   - sinus tach, SBP    - versed 8, fent 200   - haile Tfs at goal   - ok UOP   - improving resp acidosis   - VD # 5, wean FiO2   - increasing Na up to 154 --> increased free water   - ok kidney function   - LFTs improving slightly   - no recent BM despite relistor --> enema    Review of Systems  Review of Systems   Unable to perform ROS: Intubated     Vital Signs for last 24 hours   Temp:  [37.2 °C (99 °F)-37.9 °C (100.2 °F)] 37.9 °C (100.2 °F)  Pulse:  [] 101  Resp:  [19-36] 23  BP: ()/(59-80) 100/65  SpO2:  [85 %-98 %] 93 %    Respiratory Information for the last 24 hours  Vent Mode: APVCMV  Rate (breaths/min): 34  Vt Target (mL): 350  PEEP/CPAP: " 10  MAP: 16  Control VTE (exp VT): 430    Physical Exam   Physical Exam  Vitals and nursing note reviewed.   Constitutional:       Appearance: He is overweight. He is ill-appearing. He is not diaphoretic.      Interventions: He is sedated, intubated and restrained.      Comments: Remains proned this morning   HENT:      Head: Normocephalic and atraumatic.      Nose: No congestion.      Comments: Nasal feeding tube in place     Mouth/Throat:      Mouth: Mucous membranes are moist.      Comments: Endotracheal tube in place  Eyes:      General: No scleral icterus.     Pupils: Pupils are equal, round, and reactive to light.   Neck:      Comments: Left IJ central venous catheter without surrounding hematoma  Cardiovascular:      Rate and Rhythm: Regular rhythm. Tachycardia present.  No extrasystoles are present.     Chest Wall: PMI is displaced.      Pulses: Normal pulses.      Heart sounds: Heart sounds are distant. No murmur heard.      Pulmonary:      Effort: Tachypnea present. No prolonged expiration. He is intubated.      Breath sounds: Examination of the right-middle field reveals rhonchi. Examination of the left-middle field reveals rhonchi. Examination of the right-lower field reveals rhonchi. Examination of the left-lower field reveals rhonchi. Rhonchi present. No wheezing or rales.      Comments: Plateau pressures 30-34, poor compliance, minimal secretions  Abdominal:      General: Bowel sounds are normal. There is no distension.      Palpations: Abdomen is soft.      Tenderness: There is no abdominal tenderness. There is no guarding.   Genitourinary:     Comments: Navas catheter in place  Musculoskeletal:      Cervical back: Neck supple.      Right lower leg: No edema.      Left lower leg: No edema.   Lymphadenopathy:      Cervical: No cervical adenopathy.   Skin:     General: Skin is warm and dry.      Capillary Refill: Capillary refill takes less than 2 seconds.      Findings: No rash.   Neurological:       Comments: Heavily sedated   Psychiatric:         Behavior: Behavior is uncooperative.      Comments: Unable to assess given current clinical condition         Medications  Current Facility-Administered Medications   Medication Dose Route Frequency Provider Last Rate Last Admin   • norepinephrine (Levophed) 8 mg in 250 mL NS infusion (premix)  0-30 mcg/min Intravenous Continuous Jeremy M Gonda, M.D.   Dose not Required at 11/23/21 1645   • dexamethasone (DECADRON) tablet 6 mg  6 mg Enteral Tube DAILY Katie Singh M.D.   6 mg at 11/25/21 0518   • Respiratory Therapy Consult   Nebulization Continuous RT Katie Singh M.D.       • famotidine (PEPCID) tablet 20 mg  20 mg Enteral Tube Q12HRS Katie Singh M.D.   20 mg at 11/24/21 1706    Or   • famotidine (PEPCID) injection 20 mg  20 mg Intravenous Q12HRS Katie Singh M.D.   20 mg at 11/25/21 0520   • senna-docusate (PERICOLACE or SENOKOT S) 8.6-50 MG per tablet 2 Tablet  2 Tablet Enteral Tube BID Katie Singh M.D.   2 Tablet at 11/25/21 0519    And   • polyethylene glycol/lytes (MIRALAX) PACKET 1 Packet  1 Packet Enteral Tube QDAY PRN Katie Singh M.D.   1 Packet at 11/22/21 1034    And   • magnesium hydroxide (MILK OF MAGNESIA) suspension 30 mL  30 mL Enteral Tube QDAY PRN Katie Singh M.D.   30 mL at 11/23/21 0836    And   • bisacodyl (DULCOLAX) suppository 10 mg  10 mg Rectal QDAY PRN Katie Singh M.D.   10 mg at 11/24/21 1143   • MD Alert...ICU Electrolyte Replacement per Pharmacy   Other PHARMACY TO DOSE Katie Singh M.D.       • lidocaine (XYLOCAINE) 1 % injection 2 mL  2 mL Tracheal Tube Q30 MIN PRN Katie Singh M.D.       • Pharmacy Consult: Enteral tube insertion - review meds/change route/product selection  1 Each Other PHARMACY TO DOSE Katie Singh M.D.       • acetaminophen (Tylenol) tablet 650 mg  650 mg Enteral Tube Q6HRS PRN Katie Singh M.D.       • hydrOXYzine HCl (ATARAX) tablet 25 mg  25 mg  Enteral Tube TID PRN Katie Singh M.D.       • ondansetron (ZOFRAN ODT) dispertab 4 mg  4 mg Enteral Tube Q6HRS PRN Katie Singh M.D.       • promethazine (PHENERGAN) tablet 12.5-25 mg  12.5-25 mg Enteral Tube Q4HRS PRN Katie Singh M.D.       • vitamin D3 (cholecalciferol) tablet 2,000 Units  2,000 Units Enteral Tube DAILY Katie Singh M.D.   2,000 Units at 11/25/21 0519   • artificial tears (EYE LUBRICANT) ophth ointment 1 Application  1 Application Both Eyes Q8HRS Katie Singh M.D.   1 Application at 11/25/21 0520   • midazolam (VERSED) premix 125 mg/125 mL NS  0-10 mg/hr Intravenous Continuous Katie Singh M.D. 8 mL/hr at 11/24/21 2011 8 mg/hr at 11/24/21 2011   • rocuronium (ZEMURON) 250 mg in  mL Infusion  0-16 mcg/kg/min Intravenous Continuous Katie Singh M.D.   Stopped at 11/24/21 0842   • rocuronium (ZEMURON) injection 45.7 mg  0.6 mg/kg Intravenous Q2HRS PRN Katie Singh M.D.       • fentaNYL (SUBLIMAZE) 50 mcg/mL in 50mL (Continuous Infusion)   Intravenous Continuous Katie Singh M.D. 4 mL/hr at 11/24/21 1856 200 mcg/hr at 11/24/21 1856   • insulin regular (HumuLIN R,NovoLIN R) injection  1-6 Units Subcutaneous Q6HRS Katie Singh M.D.   1 Units at 11/25/21 0520    And   • dextrose 50% (D50W) injection 50 mL  50 mL Intravenous Q15 MIN PRN Katie Singh M.D.       • ondansetron (ZOFRAN) syringe/vial injection 4 mg  4 mg Intravenous Q6HRS PRN Anurag Martinez M.D.       • enoxaparin (LOVENOX) inj 40 mg  40 mg Subcutaneous DAILY Anurag Martinez M.D.   40 mg at 11/25/21 0519   • promethazine (PHENERGAN) suppository 12.5-25 mg  12.5-25 mg Rectal Q4HRS PRN Anurag Martinez M.D.       • prochlorperazine (COMPAZINE) injection 5-10 mg  5-10 mg Intravenous Q4HRS PRN Anurag Martinez M.D.           Fluids    Intake/Output Summary (Last 24 hours) at 11/25/2021 0649  Last data filed at 11/25/2021 0600  Gross per 24 hour   Intake 1989.99 ml   Output 1390 ml   Net  599.99 ml       Laboratory  Recent Labs     11/24/21  0409 11/24/21  1457 11/25/21  0506   ISTATAPH 7.365* 7.396* 7.407   ISTATAPCO2 63.0* 57.2* 59.2*   ISTATAPO2 61* 55* 51*   ISTATATCO2 38* 37* 39*   [id number] 89* 87* 85*   ISTATARTHCO3 36.0* 35.1* 37.2*   ISTATARTBE 9* 9* 11*   ISTATTEMP 37.9 C 37.4 C 37.1 C   ISTATFIO2 50 45 50   ISTATSPEC Arterial Arterial Arterial   ISTATAPHTC 7.352* 7.390* 7.405   [id number] 65 56* 52*         Recent Labs     11/23/21  0217 11/24/21  0410 11/25/21  0450   SODIUM 145 152* 154*   POTASSIUM 4.5 4.9 4.2   CHLORIDE 109 115* 115*   CO2 30 32 33   BUN 63* 56* 51*   CREATININE 1.03 0.76 0.68   MAGNESIUM 2.6* 2.4  --    PHOSPHORUS 2.8 1.3*  --    CALCIUM 10.0 10.0 9.6     Recent Labs     11/22/21  1317 11/23/21  0217 11/24/21  0410 11/25/21  0450   ALTSGPT  --  87* 106* 90*   ASTSGOT  --  63* 69* 54*   ALKPHOSPHAT  --  74 82 80   TBILIRUBIN  --  0.5 0.4 0.3   PREALBUMIN 23.2  --   --   --    GLUCOSE  --  169* 181* 154*     Recent Labs     11/23/21  0217 11/24/21  0410 11/25/21  0450   WBC 19.0* 18.4* 18.6*   NEUTSPOLYS 80.60* 83.20* 85.50*   LYMPHOCYTES 8.40* 6.90* 9.40*   MONOCYTES 5.20 3.50 2.60   EOSINOPHILS 1.30 1.10 1.70   BASOPHILS 0.50 0.60 0.80   ASTSGOT 63* 69* 54*   ALTSGPT 87* 106* 90*   ALKPHOSPHAT 74 82 80   TBILIRUBIN 0.5 0.4 0.3     Recent Labs     11/23/21  0217 11/24/21  0410 11/25/21  0450   RBC 4.67* 4.39* 4.17*   HEMOGLOBIN 14.3 13.6* 12.7*   HEMATOCRIT 45.2 43.3 41.3*   PLATELETCT 499* 413 373       Imaging  X-Ray:  I have personally reviewed the images and compared with prior images. and My impression is: Unchanged diffuse bilateral infiltrates with pneumomediastinum and pneumopericardium and trace left neck subcu emphysema, endotracheal tube/gastric tube/left IJ central venous catheter in good position    Assessment/Plan  * Acute hypoxemic respiratory failure due to COVID-19 (HCC)- (present on admission)  Assessment & Plan  Intubated 11/21  Continue full  mechanical ventilatory support, no change to mandatory minute ventilation and PEEP again today  Continue to titrate FiO2 to goal SPO2 greater than 85%, PaO2 greater than 50  RT/O2 protocol  Continue Versed, fentanyl drips, hold off on additional paralysis for now  Proning protocol  Resume diuresis    Pneumonia due to COVID-19 virus- (present on admission)  Assessment & Plan  Continue with the following therapies while monitoring closely:  Steroids: Completed several rounds of steroids, continue current round of 10 days of Decadron (day 6)  Remdesivir: Not a candidate given degree of respiratory failure  Monoclonal antibody: Status post baricitinib  Anticoagulation: Chemical DVT prophylaxis only  Diuresis: Resume low-dose Lasix today  Antitussives, supportive care, monitoring LFTs  Cleared from isolation precautions per hospital guidelines  Continue proning protocols    ROBERTO (acute kidney injury) (HCC)  Assessment & Plan  Secondary to ATN -improving daily  Avoid nephrotoxins  Monitor creatinine, urine output, electrolytes closely  Resume low-dose diuretic and monitor    Obstipation  Assessment & Plan  Increase aggressiveness of bowel protocol, enema  S/p Relistor    Hypernatremia  Assessment & Plan  Worsening again today  Increase enteral free water and monitor    Hypophosphatemia  Assessment & Plan  Repleted    Hypokalemia  Assessment & Plan  Daily repletion and monitoring    Elevated troponin  Assessment & Plan  Secondary to demand ischemia -improved    Goals of care, counseling/discussion  Assessment & Plan  Palliative care consulted  Ongoing goals of care discussions with family  DNR  Very guarded prognosis given likely fibrotic state of Covid       VTE:  Lovenox  Ulcer: H2 Antagonist  Lines: Central Line  Ongoing indication addressed, Arterial Line  Ongoing indication addressed and Navas Catheter  Ongoing indication addressed    I have performed a physical exam and reviewed and updated ROS and Plan today  (11/25/2021). In review of yesterday's note (11/24/2021), there are no changes except as documented above.     Patient is critically ill today requiring active management of his mechanical ventilatory support as well as titration of sedative drips.  I updated his spouse and son today on patient's current course and prognosis.  High risk of deterioration and worsening vital organ dysfunction and death without the above critical care interventions.    Discussed patient condition and risk of morbidity and/or mortality with Family, RN, RT, Pharmacy and Charge nurse / hot rounds. Critical care time = 32 minutes in directly providing and coordinating critical care and extensive data review.  No time overlap and excludes procedures.

## 2021-11-26 NOTE — PROGRESS NOTES
Late Entry    Bedside report received from [place] Reyes and patient care assumed at 1900.  Lines and infusions assessed.  Plan of care discussed with patient's wife and son.

## 2021-11-26 NOTE — PROGRESS NOTES
"Critical Care Progress Note    Date of admission  11/5/2021    Hospital Course  \"61 y.o. male who presented 11/5/2021 with no PMH, BMI 30 that is unvaccinated that presented 11/5 for 10 days of cough, chills, headache, sore throat with + covid test 9 days prior. He had room air saturation of 62% and was admitted on HFNC. Today I was asked to consult on patient since he is on HFNC + NRB. He is afebrile, HR 77-80's, 's sat 90-92%. He was started on dexamethasone and barcitinib, crp 17, ddimer 1, lactic 2.5. Still undecided about code status and palliative care has been consulted and remains full code. Wife and son at bedside. Patient without complaints of chest pain, shortness or breath cough, n/v/d, leg pain or swelling.\"      11/20 -he has been managed on the medical floor with HFNC, Decadron, baricitinib, Lasix, empiric antibiotics; increased work of breathing, accessory muscle use today and transfer to ICU.  11/21-intubated prone paralyzed, very high peak pressures allowing permissive hypercapnia  11/22 -PEEP of 8, 60% FiO2, paralyzed/sedated/proned  11/23 PEEP 8, 50%, paralyzed/sedated/proned  11/24 -PEEP 8, 60%, sedated/proned, improving renal function  11/25 PEEP 8, 40%, discontinued proning      Interval Problem Update  Reviewed last 24 hour events:   - worsening oxygenation   - fever overnight but decreasing WBC   - increasing BUN/crt on lasix --> discontinued   - Na up to 159 despite free water --> increased + D5W MIVFs   - NSR, -110   - versed @ 8, fent @ 200 --> start to wean today   - good UOP   - VD # 6, decrease FiO2   - day 7/10 decadron    Review of Systems  Review of Systems   Unable to perform ROS: Intubated     Vital Signs for last 24 hours   Temp:  [36.4 °C (97.5 °F)-38.2 °C (100.8 °F)] 36.9 °C (98.4 °F)  Pulse:  [] 97  Resp:  [11-41] 15  BP: ()/(66-79) 100/71  SpO2:  [90 %-97 %] 94 %    Respiratory Information for the last 24 hours  Vent Mode: APVCMV  Rate (breaths/min): " 34  Vt Target (mL): 348  PEEP/CPAP: 10  MAP: 17  Control VTE (exp VT): 322    Physical Exam   Physical Exam  Vitals and nursing note reviewed.   Constitutional:       Appearance: He is overweight. He is ill-appearing.      Interventions: He is sedated, intubated and restrained.      Comments: Supined since yesterday   HENT:      Head: Normocephalic.      Nose: Nose normal. No rhinorrhea.      Comments: Nasal feeding tube in place     Mouth/Throat:      Mouth: Mucous membranes are moist.      Pharynx: Oropharynx is clear.      Comments: Endotracheal tube in place  Eyes:      Conjunctiva/sclera: Conjunctivae normal.      Pupils: Pupils are equal, round, and reactive to light.   Neck:      Comments: Left IJ central venous catheter without surrounding hematoma  Cardiovascular:      Rate and Rhythm: Normal rate and regular rhythm.  No extrasystoles are present.     Chest Wall: PMI is displaced.      Pulses: Normal pulses.      Heart sounds: Heart sounds are distant. No murmur heard.      Pulmonary:      Effort: No tachypnea. He is intubated.      Breath sounds: Examination of the right-middle field reveals rhonchi. Examination of the left-middle field reveals rhonchi. Examination of the right-lower field reveals rhonchi. Examination of the left-lower field reveals rhonchi. Rhonchi present. No decreased breath sounds or rales.      Comments: Poor compliance, minimal secretions  Abdominal:      General: Bowel sounds are normal. There is no distension.      Palpations: Abdomen is soft.      Tenderness: There is no abdominal tenderness. There is no guarding.   Genitourinary:     Comments: Navas catheter in place  Musculoskeletal:         General: No tenderness.      Cervical back: Neck supple.      Right lower leg: No edema.      Left lower leg: No edema.   Lymphadenopathy:      Cervical: No cervical adenopathy.   Skin:     General: Skin is warm and dry.      Capillary Refill: Capillary refill takes less than 2 seconds.       Coloration: Skin is not pale.   Neurological:      Comments: Heavily sedated   Psychiatric:         Behavior: Behavior is uncooperative.      Comments: Unable to assess given current clinical condition         Medications  Current Facility-Administered Medications   Medication Dose Route Frequency Provider Last Rate Last Admin   • dexamethasone (DECADRON) tablet 6 mg  6 mg Enteral Tube DAILY Katie Singh M.D.   6 mg at 11/26/21 0600   • Respiratory Therapy Consult   Nebulization Continuous RT Katie Singh M.D.       • famotidine (PEPCID) tablet 20 mg  20 mg Enteral Tube Q12HRS Katie Singh M.D.   20 mg at 11/26/21 0600    Or   • famotidine (PEPCID) injection 20 mg  20 mg Intravenous Q12HRS Katie Singh M.D.   20 mg at 11/25/21 0520   • senna-docusate (PERICOLACE or SENOKOT S) 8.6-50 MG per tablet 2 Tablet  2 Tablet Enteral Tube BID Katie Singh M.D.   2 Tablet at 11/25/21 1711    And   • polyethylene glycol/lytes (MIRALAX) PACKET 1 Packet  1 Packet Enteral Tube QDAY PRN Katie Singh M.D.   1 Packet at 11/22/21 1034    And   • magnesium hydroxide (MILK OF MAGNESIA) suspension 30 mL  30 mL Enteral Tube QDAY PRN Katie Singh M.D.   30 mL at 11/23/21 0836    And   • bisacodyl (DULCOLAX) suppository 10 mg  10 mg Rectal QDAY PRN Katie Singh M.D.   10 mg at 11/24/21 1143   • MD Alert...ICU Electrolyte Replacement per Pharmacy   Other PHARMACY TO DOSE Katie Singh M.D.       • lidocaine (XYLOCAINE) 1 % injection 2 mL  2 mL Tracheal Tube Q30 MIN PRN Katie Singh M.D.       • Pharmacy Consult: Enteral tube insertion - review meds/change route/product selection  1 Each Other PHARMACY TO DOSE Katie Singh M.D.       • acetaminophen (Tylenol) tablet 650 mg  650 mg Enteral Tube Q6HRS PRN Katie Singh M.D.   650 mg at 11/26/21 0008   • hydrOXYzine HCl (ATARAX) tablet 25 mg  25 mg Enteral Tube TID PRN Katie Singh M.D.       • ondansetron (ZOFRAN ODT) dispertab 4 mg   4 mg Enteral Tube Q6HRS PRN Katie Singh M.D.       • promethazine (PHENERGAN) tablet 12.5-25 mg  12.5-25 mg Enteral Tube Q4HRS PRN Katie Singh M.D.       • vitamin D3 (cholecalciferol) tablet 2,000 Units  2,000 Units Enteral Tube DAILY Katie Singh M.D.   2,000 Units at 11/26/21 0601   • artificial tears (EYE LUBRICANT) ophth ointment 1 Application  1 Application Both Eyes Q8HRS Katie Singh M.D.   1 Application at 11/26/21 0600   • midazolam (VERSED) premix 125 mg/125 mL NS  0-10 mg/hr Intravenous Continuous Katie Singh M.D. 8 mL/hr at 11/25/21 1900 8 mg/hr at 11/25/21 1900   • fentaNYL (SUBLIMAZE) 50 mcg/mL in 50mL (Continuous Infusion)   Intravenous Continuous Katie Singh M.D. 4 mL/hr at 11/25/21 2250 200 mcg/hr at 11/25/21 2250   • insulin regular (HumuLIN R,NovoLIN R) injection  1-6 Units Subcutaneous Q6HRS Katie Singh M.D.   1 Units at 11/25/21 1714    And   • dextrose 50% (D50W) injection 50 mL  50 mL Intravenous Q15 MIN PRN Katie Singh M.D.       • ondansetron (ZOFRAN) syringe/vial injection 4 mg  4 mg Intravenous Q6HRS PRN Anurag Martinez M.D.       • enoxaparin (LOVENOX) inj 40 mg  40 mg Subcutaneous DAILY Anurag Martinez M.D.   40 mg at 11/26/21 0600   • promethazine (PHENERGAN) suppository 12.5-25 mg  12.5-25 mg Rectal Q4HRS PRN Anurag Martinez M.D.       • prochlorperazine (COMPAZINE) injection 5-10 mg  5-10 mg Intravenous Q4HRS PRN Anurag Martinez M.D.           Fluids    Intake/Output Summary (Last 24 hours) at 11/26/2021 0659  Last data filed at 11/26/2021 0600  Gross per 24 hour   Intake 2050.37 ml   Output 2500 ml   Net -449.63 ml       Laboratory  Recent Labs     11/25/21  0506 11/25/21  1428 11/26/21  0429   ISTATAPH 7.407 7.410 7.429   ISTATAPCO2 59.2* 65.6* 64.1*   ISTATAPO2 51* 65 65   ISTATATCO2 39* 44* 44*   [id number] 85* 92* 92*   ISTATARTHCO3 37.2* 41.6* 42.5*   ISTATARTBE 11* 14* 15*   ISTATTEMP 37.1 C 36.4 C 36.9 C   ISTATFIO2 50 55 70    ISTATSPEC Arterial Arterial Arterial   ISTATAPHTC 7.405 7.419 7.431   [id number] 52* 63* 65         Recent Labs     11/24/21 0410 11/25/21 0450 11/26/21 0427   SODIUM 152* 154* 159*   POTASSIUM 4.9 4.2 4.2   CHLORIDE 115* 115* 117*   CO2 32 33 37*   BUN 56* 51* 58*   CREATININE 0.76 0.68 0.93   MAGNESIUM 2.4  --   --    PHOSPHORUS 1.3*  --   --    CALCIUM 10.0 9.6 9.7     Recent Labs     11/24/21 0410 11/25/21 0450 11/26/21 0427   ALTSGPT 106* 90*  --    ASTSGOT 69* 54*  --    ALKPHOSPHAT 82 80  --    TBILIRUBIN 0.4 0.3  --    GLUCOSE 181* 154* 161*     Recent Labs     11/24/21 0410 11/25/21 0450 11/26/21 0427   WBC 18.4* 18.6* 17.2*   NEUTSPOLYS 83.20* 85.50* 78.80*   LYMPHOCYTES 6.90* 9.40* 9.70*   MONOCYTES 3.50 2.60 0.90   EOSINOPHILS 1.10 1.70 6.20   BASOPHILS 0.60 0.80 0.00   ASTSGOT 69* 54*  --    ALTSGPT 106* 90*  --    ALKPHOSPHAT 82 80  --    TBILIRUBIN 0.4 0.3  --      Recent Labs     11/24/21 0410 11/25/21 0450 11/26/21 0427   RBC 4.39* 4.17* 4.17*   HEMOGLOBIN 13.6* 12.7* 13.1*   HEMATOCRIT 43.3 41.3* 41.6*   PLATELETCT 413 373 337       Imaging  X-Ray:  I have personally reviewed the images and compared with prior images. and No film today    Assessment/Plan  * Acute hypoxemic respiratory failure due to COVID-19 (HCC)- (present on admission)  Assessment & Plan  Intubated 11/21  Continue full mechanical ventilatory support, still unable to change mandatory minute ventilation and PEEP again today  Continue to titrate FiO2 to goal SPO2 greater than 85%, PaO2 greater than 50  RT/O2 protocol  Wean down Versed, fentanyl drips, as needed oxycodone  Status post proning protocol (completed 11/25)    Pneumonia due to COVID-19 virus- (present on admission)  Assessment & Plan  Continue with the following therapies while monitoring closely:  Steroids: Completed several rounds of steroids, continue current round of 10 days of Decadron (day 7)  Remdesivir: Not a candidate given degree of respiratory  failure  Monoclonal antibody: Status post baricitinib  Anticoagulation: Chemical DVT prophylaxis only  Diuresis: Not tolerating, hold for now  Antitussives, supportive care, monitoring LFTs  Cleared from isolation precautions per hospital guidelines  Status post proning protocols - completed 11/25    ROBERTO (acute kidney injury) (HCC)  Assessment & Plan  Secondary to ATN -worsening again, likely diuretic induced  Avoid nephrotoxins  Monitor creatinine, urine output, electrolytes closely  IV fluids    Obstipation  Assessment & Plan  Continue aggressive bowel protocol, enema  S/p Relistor    Hypernatremia  Assessment & Plan  Worsening again today 11/26  Increase enteral free water again and monitor  D5 water maintenance IV fluids, recheck sodium this afternoon    Hypophosphatemia  Assessment & Plan  Repleted    Hypokalemia  Assessment & Plan  Repleted    Elevated troponin  Assessment & Plan  Secondary to demand ischemia -improved    Goals of care, counseling/discussion  Assessment & Plan  Palliative care consulted  Ongoing goals of care discussions with family  DNR  Very guarded prognosis given likely fibrotic state of Covid       VTE:  Lovenox  Ulcer: H2 Antagonist  Lines: Central Line  Ongoing indication addressed, Arterial Line  Ongoing indication addressed and Navas Catheter  Ongoing indication addressed    I have performed a physical exam and reviewed and updated ROS and Plan today (11/26/2021). In review of yesterday's note (11/25/2021), there are no changes except as documented above.     Patient remains critically ill today requiring active management of his mechanical ventilatory support. High risk of deterioration and worsening vital organ dysfunction and death without the above critical care interventions.    Discussed patient condition and risk of morbidity and/or mortality with RN, RT, Pharmacy, Charge nurse / hot rounds and Patient. Critical care time = 35 minutes in directly providing and coordinating  critical care and extensive data review.  No time overlap and excludes procedures.

## 2021-11-26 NOTE — PROGRESS NOTES
eHath from Lab called with critical result of Na 159 at 0511. Critical lab result read back to Isaak.     ELIZABETH Pacheco notified of critical lab result at 0530.  Critical lab result read back by ELIZABETH Lucas.

## 2021-11-26 NOTE — PROGRESS NOTES
Patient's arterial line no longer drawing back.  Requiring frequent power flushing to obtain appropriate waveform.  Discussed with ELIZABETH Pachceo and received approval to remove arterial line.

## 2021-11-27 PROBLEM — E83.42 HYPOMAGNESEMIA: Status: ACTIVE | Noted: 2021-01-01

## 2021-11-27 PROBLEM — R50.9 FEVER: Status: ACTIVE | Noted: 2021-01-01

## 2021-11-27 NOTE — PROGRESS NOTES
Responded to vent alarming and found patient to be quite aggitated, HR 150s, vent dyssynchrony, O2 sats 78%. Dr Patten immediately notified, RT paged to beside.     Orders received from Dr Patten

## 2021-11-27 NOTE — PROGRESS NOTES
"Critical Care Progress Note    Date of admission  11/5/2021    Hospital Course  \"61 y.o. male who presented 11/5/2021 with no PMH, BMI 30 that is unvaccinated that presented 11/5 for 10 days of cough, chills, headache, sore throat with + covid test 9 days prior. He had room air saturation of 62% and was admitted on HFNC. Today I was asked to consult on patient since he is on HFNC + NRB. He is afebrile, HR 77-80's, 's sat 90-92%. He was started on dexamethasone and barcitinib, crp 17, ddimer 1, lactic 2.5. Still undecided about code status and palliative care has been consulted and remains full code. Wife and son at bedside. Patient without complaints of chest pain, shortness or breath cough, n/v/d, leg pain or swelling.\"      11/20 -he has been managed on the medical floor with HFNC, Decadron, baricitinib, Lasix, empiric antibiotics; increased work of breathing, accessory muscle use today and transfer to ICU.  11/21-intubated prone paralyzed, very high peak pressures allowing permissive hypercapnia  11/22 -PEEP of 8, 60% FiO2, paralyzed/sedated/proned  11/23 PEEP 8, 50%, paralyzed/sedated/proned  11/24 -PEEP 8, 60%, sedated/proned, improving renal function  11/25 PEEP 8, 40%, discontinued proning  11/26 PEEP of 10, 70%, weaning sedation, fevers      Interval Problem Update  Reviewed last 24 hour events:   - TM 39 -> cultured, decreasing WBC   - sinus tach and vent dyssynchrony last night   - increased PEEP overnight   - versed 5, fent pushes   - Hgb down to 12   - Na down to 146 after D5W gtt, enteral water   - VD # 7, decrease PEEP to 10, trial ASV   - day 8/10 decadron   - low K and Mag   - haile TF at goal    Review of Systems  Review of Systems   Unable to perform ROS: Intubated     Vital Signs for last 24 hours   Temp:  [36 °C (96.8 °F)-38.4 °C (101.1 °F)] 36 °C (96.8 °F)  Pulse:  [] 78  Resp:  [14-38] 38  BP: ()/() 90/62  SpO2:  [82 %-95 %] 93 %    Respiratory Information for the last 24 " hours  Vent Mode: APVCMV  Rate (breaths/min): 34  Vt Target (mL): 350  PEEP/CPAP: 12  MAP: 21  Control VTE (exp VT): 389    Physical Exam   Physical Exam  Vitals and nursing note reviewed.   Constitutional:       General: He is sleeping.      Appearance: He is overweight. He is ill-appearing. He is not diaphoretic.      Interventions: He is sedated, intubated and restrained.      Comments: More easily awoken and getting agitated at times   HENT:      Head: Normocephalic.      Right Ear: External ear normal.      Left Ear: External ear normal.      Nose:      Comments: Nasal feeding tube in place     Mouth/Throat:      Mouth: Mucous membranes are moist.      Pharynx: No posterior oropharyngeal erythema.      Comments: Endotracheal tube in place  Eyes:      General: No scleral icterus.     Pupils: Pupils are equal, round, and reactive to light.   Neck:      Comments: Left IJ central venous catheter without surrounding hematoma, subcu emphysema in bilateral neck  Cardiovascular:      Rate and Rhythm: Regular rhythm. Tachycardia present. Occasional extrasystoles are present.     Chest Wall: PMI is displaced.      Pulses: Normal pulses.      Heart sounds: Heart sounds are distant. No murmur heard.      Pulmonary:      Effort: Tachypnea present. No accessory muscle usage. He is intubated.      Breath sounds: Examination of the right-middle field reveals rhonchi. Examination of the left-middle field reveals rhonchi. Examination of the right-lower field reveals rhonchi. Examination of the left-lower field reveals rhonchi. Rhonchi present. No wheezing or rales.      Comments: Poor compliance, minimal secretions, some ventilator dyssynchrony  Abdominal:      General: Bowel sounds are normal. There is no distension.      Palpations: Abdomen is soft.      Tenderness: There is no abdominal tenderness. There is no guarding.   Genitourinary:     Comments: Navas catheter in place  Musculoskeletal:         General: No tenderness.       Cervical back: Neck supple. No rigidity.      Right lower leg: No edema.      Left lower leg: No edema.   Skin:     General: Skin is warm and dry.      Capillary Refill: Capillary refill takes less than 2 seconds.      Findings: No rash.   Neurological:      Mental Status: He is easily aroused.      Comments: Beginning to awaken more, grimaces and withdraws to pain but not opening eyes not following commands   Psychiatric:         Behavior: Behavior is uncooperative.      Comments: Unable to assess given current clinical condition         Medications  Current Facility-Administered Medications   Medication Dose Route Frequency Provider Last Rate Last Admin   • MD Alert...ICU Electrolyte Replacement per Pharmacy   Other PHARMACY TO DOSE Khushboo Lucas, A.P.R.N.       • magnesium sulfate IVPB premix 2 g  2 g Intravenous Once Khushboo Lucas A.P.R.N. 25 mL/hr at 11/27/21 0605 2 g at 11/27/21 0605   • potassium chloride in water (KCL) ivpb (ICU ONLY) 40 mEq  40 mEq Intravenous Once Khushboo Lucas A.P.R.N. 50 mL/hr at 11/27/21 0556 40 mEq at 11/27/21 0556   • dextrose 5% infusion   Intravenous Continuous Jeremy M Gonda, M.D. 75 mL/hr at 11/27/21 0003 New Bag at 11/27/21 0003   • insulin regular (HumuLIN R,NovoLIN R) injection  3-14 Units Subcutaneous Q6HRS Jeremy M Gonda, M.D.   3 Units at 11/27/21 0504    And   • dextrose 50% (D50W) injection 50 mL  50 mL Intravenous Q15 MIN PRN Jeremy M Gonda, M.D.       • oxyCODONE immediate-release (ROXICODONE) tablet 5-10 mg  5-10 mg Enteral Tube Q4HRS PRN Jeremy M Gonda, M.D.   10 mg at 11/26/21 2022   • fentaNYL (SUBLIMAZE) injection  mcg   mcg Intravenous Q HOUR PRN La Patten M.D.   100 mcg at 11/26/21 2308   • dexamethasone (DECADRON) tablet 6 mg  6 mg Enteral Tube DAILY Katie Singh M.D.   6 mg at 11/27/21 0432   • Respiratory Therapy Consult   Nebulization Continuous RT Katie Singh M.D.       • famotidine (PEPCID) tablet 20 mg  20 mg Enteral  Tube Q12HRS Katie Singh M.D.   20 mg at 11/27/21 0432    Or   • famotidine (PEPCID) injection 20 mg  20 mg Intravenous Q12HRS Katie Singh M.D.   20 mg at 11/25/21 0520   • senna-docusate (PERICOLACE or SENOKOT S) 8.6-50 MG per tablet 2 Tablet  2 Tablet Enteral Tube BID Katie Singh M.D.   2 Tablet at 11/27/21 0432    And   • polyethylene glycol/lytes (MIRALAX) PACKET 1 Packet  1 Packet Enteral Tube QDAY PRN Katie Singh M.D.   1 Packet at 11/22/21 1034    And   • magnesium hydroxide (MILK OF MAGNESIA) suspension 30 mL  30 mL Enteral Tube QDAY PRN Katie Singh M.D.   30 mL at 11/23/21 0836    And   • bisacodyl (DULCOLAX) suppository 10 mg  10 mg Rectal QDAY PRN Katie Singh M.D.   10 mg at 11/24/21 1143   • lidocaine (XYLOCAINE) 1 % injection 2 mL  2 mL Tracheal Tube Q30 MIN PRN Katie Singh M.D.       • Pharmacy Consult: Enteral tube insertion - review meds/change route/product selection  1 Each Other PHARMACY TO DOSE Katie Singh M.D.       • acetaminophen (Tylenol) tablet 650 mg  650 mg Enteral Tube Q6HRS PRN Katie Singh M.D.   650 mg at 11/27/21 0051   • hydrOXYzine HCl (ATARAX) tablet 25 mg  25 mg Enteral Tube TID PRN Katie Singh M.D.       • ondansetron (ZOFRAN ODT) dispertab 4 mg  4 mg Enteral Tube Q6HRS PRN Ktaie Singh M.D.       • promethazine (PHENERGAN) tablet 12.5-25 mg  12.5-25 mg Enteral Tube Q4HRS PRN Katie Singh M.D.       • vitamin D3 (cholecalciferol) tablet 2,000 Units  2,000 Units Enteral Tube DAILY Katie Singh M.D.   2,000 Units at 11/27/21 0433   • artificial tears (EYE LUBRICANT) ophth ointment 1 Application  1 Application Both Eyes Q8HRS Katie Singh M.D.   1 Application at 11/27/21 0436   • midazolam (VERSED) premix 125 mg/125 mL NS  0-10 mg/hr Intravenous Continuous Khushboo Lucas, A.P.R.N. 5 mL/hr at 11/27/21 0603 5 mg/hr at 11/27/21 0603   • fentaNYL (SUBLIMAZE) 50 mcg/mL in 50mL (Continuous Infusion)    Intravenous Continuous Katie Singh M.D. 4 mL/hr at 11/27/21 0450 200 mcg/hr at 11/27/21 0450   • ondansetron (ZOFRAN) syringe/vial injection 4 mg  4 mg Intravenous Q6HRS PRN Anurag Martinez M.D.       • enoxaparin (LOVENOX) inj 40 mg  40 mg Subcutaneous DAILY Anurag Martinez M.D.   40 mg at 11/27/21 0433   • promethazine (PHENERGAN) suppository 12.5-25 mg  12.5-25 mg Rectal Q4HRS PRN Anurag Martinez M.D.       • prochlorperazine (COMPAZINE) injection 5-10 mg  5-10 mg Intravenous Q4HRS PRN Anurag Martinez M.D.           Fluids    Intake/Output Summary (Last 24 hours) at 11/27/2021 0710  Last data filed at 11/27/2021 0625  Gross per 24 hour   Intake 4527.82 ml   Output 2330 ml   Net 2197.82 ml       Laboratory  Recent Labs     11/25/21  1428 11/26/21  0429 11/27/21  0348   ISTATAPH 7.410 7.429 7.402   ISTATAPCO2 65.6* 64.1* 65.0*   ISTATAPO2 65 65 59*   ISTATATCO2 44* 44* 42*   [id number] 92* 92* 89*   ISTATARTHCO3 41.6* 42.5* 40.5*   ISTATARTBE 14* 15* 13*   ISTATTEMP 36.4 C 36.9 C 37.2 C   ISTATFIO2 55 70 70   ISTATSPEC Arterial Arterial Arterial   ISTATAPHTC 7.419 7.431 7.399*   [id number] 63* 65 60*         Recent Labs     11/25/21  0450 11/25/21  0450 11/26/21  0427 11/26/21  1340 11/27/21  0331   SODIUM 154*   < > 159* 154* 146*   POTASSIUM 4.2  --  4.2  --  3.7   CHLORIDE 115*  --  117*  --  104   CO2 33  --  37*  --  36*   BUN 51*  --  58*  --  44*   CREATININE 0.68  --  0.93  --  0.80   MAGNESIUM  --   --   --   --  1.9   CALCIUM 9.6  --  9.7  --  9.4    < > = values in this interval not displayed.     Recent Labs     11/25/21 0450 11/26/21 0427 11/27/21 0331   ALTSGPT 90*  --   --    ASTSGOT 54*  --   --    ALKPHOSPHAT 80  --   --    TBILIRUBIN 0.3  --   --    GLUCOSE 154* 161* 179*     Recent Labs     11/25/21 0450 11/26/21 0427 11/27/21 0331   WBC 18.6* 17.2* 13.2*   NEUTSPOLYS 85.50* 78.80* 77.20*   LYMPHOCYTES 9.40* 9.70* 8.80*   MONOCYTES 2.60 0.90 3.50   EOSINOPHILS 1.70 6.20 1.70   BASOPHILS  0.80 0.00 0.00   ASTSGOT 54*  --   --    ALTSGPT 90*  --   --    ALKPHOSPHAT 80  --   --    TBILIRUBIN 0.3  --   --      Recent Labs     11/25/21  0450 11/26/21  0427 11/27/21  0331   RBC 4.17* 4.17* 4.02*   HEMOGLOBIN 12.7* 13.1* 12.2*   HEMATOCRIT 41.3* 41.6* 40.4*   PLATELETCT 373 337 282       Imaging  X-Ray:  I have personally reviewed the images and compared with prior images. and My impression is: Unchanged diffuse bilateral infiltrates with pneumomediastinum and pneumopericardium, increasing bilateral neck subcutaneous emphysema but no pneumothorax, tubes and lines are in good position    Assessment/Plan  * Acute hypoxemic respiratory failure due to COVID-19 (HCC)- (present on admission)  Assessment & Plan  Intubated 11/21  Continue full mechanical ventilatory support, trial transition to ASV given ventilator dyssynchrony   Decrease PEEP back to 10 given worsening subcu emphysema  Continue to titrate FiO2 to goal SPO2 greater than 85%, PaO2 greater than 50  RT/O2 protocol  Continue to titrate down Versed, fentanyl drips, as needed oxycodone  Status post proning protocol (completed 11/25)    Pneumonia due to COVID-19 virus- (present on admission)  Assessment & Plan  Continue with the following therapies while monitoring closely:  Steroids: Completed several rounds of steroids, continue current round of 10 days of Decadron (day 8)  Remdesivir: Not a candidate given degree of respiratory failure  Monoclonal antibody: Status post baricitinib  Anticoagulation: Chemical DVT prophylaxis only  Diuresis: Not tolerating, continue to hold for now  Antitussives, supportive care, monitoring LFTs  Cleared from isolation precautions per hospital guidelines  Status post proning protocols - completed 11/25    ROBERTO (acute kidney injury) ([facility])  Assessment & Plan  Secondary to ATN - improving again, likely diuretic induced  Avoid nephrotoxins  Monitor creatinine, urine output, electrolytes closely  Discontinue IV  fluids    Fever  Assessment & Plan  Reculture today, check procalcitonin  Hold off on antibiotics for now    Hypomagnesemia  Assessment & Plan  Replete and monitor closely    Obstipation  Assessment & Plan  Continue aggressive bowel protocol, enema  S/p Relistor    Hypernatremia  Assessment & Plan  Improving today  Continue current dose of enteral free water and monitor  Discontinue D5 water maintenance IV fluids    Hypophosphatemia  Assessment & Plan  Repleted    Hypokalemia  Assessment & Plan  Replete again today and monitor    Elevated troponin  Assessment & Plan  Secondary to demand ischemia -improved    Goals of care, counseling/discussion  Assessment & Plan  Palliative care consulted  Ongoing goals of care discussions with family  DNR  Very guarded prognosis given likely fibrotic state of Covid       VTE:  Lovenox  Ulcer: H2 Antagonist  Lines: Central Line  Ongoing indication addressed, Arterial Line  Ongoing indication addressed and Navas Catheter  Ongoing indication addressed    I have performed a physical exam and reviewed and updated ROS and Plan today (11/27/2021). In review of yesterday's note (11/26/2021), there are no changes except as documented above.     Patient is critically ill today requiring active management of his mechanical ventilatory support as well as titration of sedative drips. High risk of deterioration and worsening vital organ dysfunction and death without the above critical care interventions.    Discussed patient condition and risk of morbidity and/or mortality with Family, RN, RT, Pharmacy, Charge nurse / hot rounds and Patient. Critical care time = 44 minutes in directly providing and coordinating critical care and extensive data review.  No time overlap and excludes procedures.

## 2021-11-27 NOTE — PROGRESS NOTES
Informed YessicaELIZABETH, about patient throwing frequent PVC, ordered 12-lead EKG and Magnesium.

## 2021-11-27 NOTE — RESPIRATORY CARE
Called to bedside by Rn due to desaturation into low 80s, per MD Keeperman ok to adjust settings to help with desaturation. Will obtain morning ABG post vent changes.

## 2021-11-27 NOTE — PROGRESS NOTES
Titrating sedation down, patient tolerating for a time but now with increasing agitation and HR 130s.     Dr Gonda updated. Maintain decreased sedation, PRN orders received.

## 2021-11-27 NOTE — ASSESSMENT & PLAN NOTE
Klebsiella pneumoniae pneumonia (BAL 11/27) - ceftriaxone 11/27 - 12/1  Pseudomonas pneumonia (BAL 12/1) - zosyn 12/2 - 12/4, cefepime 12/4 - 12/8  Aspergillus spp (BAL 12/1) - voriconazole 12/7 - 12/21  Sputum PJP negative

## 2021-11-27 NOTE — PROGRESS NOTES
1910 Received report from day RN, POC discussed. Pt sedated with Fentanyl and Versed (see MAR)  and intubated. Pt tolerating tube feeds.    1922 Patient over breathing the vent, pt medicated per MAR by KY Ortiz.

## 2021-11-27 NOTE — PROGRESS NOTES
Informed Dr. Salcedo about patient's oxygen sats in 84% (order is 85% and above), fighting the vent, RASS of -4, Versed RASS order of -1 to 1. Informed said MD that per RT, they cannot touch FiO2 and PEEP per their orders. Per MD, order said ok to titrate FiO2 and PEEP, relayed this order to RT. FiO2 titrated to 70% and PEEP of 12, pt's oxygen sats at 88%. Informed Dr. Salcedo, that day shift tried to titrate him to that RASS goal -1 to 1 but patient became agitated and over breathing the vent, per MD call APRN at bedside when patient gets agitated.    Updated APRN about the situation, new orders made (see MAR).

## 2021-11-27 NOTE — PROGRESS NOTES
Informed Yessica, ELIZABETH, about patient's temp:38.8C, ordered to use cooling blanket on patient, placed ice packs on patient for now.

## 2021-11-28 PROBLEM — I95.9 HYPOTENSION: Status: ACTIVE | Noted: 2021-01-01

## 2021-11-28 PROBLEM — N30.01 ACUTE CYSTITIS WITH HEMATURIA: Status: ACTIVE | Noted: 2021-01-01

## 2021-11-28 NOTE — CARE PLAN
The patient is Unstable - High likelihood or risk of patient condition declining or worsening    Shift Goals  Clinical Goals: Maintain O2, wean sedation  Patient Goals: NA  Family Goals: Comfort    Progress made toward(s) clinical / shift goals:    Problem: Respiratory  Goal: Patient will achieve/maintain optimum respiratory ventilation and gas exchange  Outcome: Not Progressing     Problem: Mobility  Goal: Patient's capacity to carry out activities will improve  Outcome: Not Progressing       Patient is not progressing towards the following goals:      Problem: Respiratory  Goal: Patient will achieve/maintain optimum respiratory ventilation and gas exchange  Outcome: Not Progressing     Problem: Mobility  Goal: Patient's capacity to carry out activities will improve  Outcome: Not Progressing

## 2021-11-28 NOTE — PALLIATIVE CARE
Palliative Care follow-up  Another order placed for keena Mcarthur RN and stated that Blue's wife could use more support she has been crying and appears depressed. Went to bedside to visit with family and provide support they had left for the day. Will follow up in the am with family when they return to the bedside.       Updated: RN     Plan: Continue to provide support and education about the clinical picture.     Thank you for allowing Palliative Care to support this patient and family. Contact [phone] for additional assistance, change in patient status, or with any questions/concerns.

## 2021-11-28 NOTE — CARE PLAN
Problem: Knowledge Deficit - Standard  Goal: Patient and family/care givers will demonstrate understanding of plan of care, disease process/condition, diagnostic tests and medications  Outcome: Progressing     Problem: Safety - Medical Restraint  Goal: Free from restraint(s) (Restraint for Interference with Medical Device)  Outcome: Progressing   The patient is Stable - Low risk of patient condition declining or worsening    Shift Goals  Clinical Goals: Maintain O2, wean sedation  Patient Goals: NA  Family Goals: Comfort    Progress made toward(s) clinical / shift goals:  pt has no injuries from restraints

## 2021-11-28 NOTE — ASSESSMENT & PLAN NOTE
Developed 11/27 likely due to hypovolemia and UTI  Continue to titrate norepinephrine drip to maintain mean arterial pressure greater than 65  Unable to tolerate aggressive fluid resuscitation given COVID-19 pneumonia/ARDS  Continue antibiotics and follow-up on cultures   yes

## 2021-11-28 NOTE — PROGRESS NOTES
Received report from day RN, POC discussed. Pt sedated with Fentanyl and Versed, pt on Levophed (see MAR)  and intubated. Pt tolerating tube feeds.

## 2021-11-28 NOTE — PROGRESS NOTES
"Critical Care Progress Note    Date of admission  11/5/2021    Hospital Course  \"61 y.o. male who presented 11/5/2021 with no PMH, BMI 30 that is unvaccinated that presented 11/5 for 10 days of cough, chills, headache, sore throat with + covid test 9 days prior. He had room air saturation of 62% and was admitted on HFNC. Today I was asked to consult on patient since he is on HFNC + NRB. He is afebrile, HR 77-80's, 's sat 90-92%. He was started on dexamethasone and barcitinib, crp 17, ddimer 1, lactic 2.5. Still undecided about code status and palliative care has been consulted and remains full code. Wife and son at bedside. Patient without complaints of chest pain, shortness or breath cough, n/v/d, leg pain or swelling.\"      11/20 -he has been managed on the medical floor with HFNC, Decadron, baricitinib, Lasix, empiric antibiotics; increased work of breathing, accessory muscle use today and transfer to ICU.  11/21-intubated prone paralyzed, very high peak pressures allowing permissive hypercapnia  11/22 -PEEP of 8, 60% FiO2, paralyzed/sedated/proned  11/23 PEEP 8, 50%, paralyzed/sedated/proned  11/24 -PEEP 8, 60%, sedated/proned, improving renal function  11/25 PEEP 8, 40%, discontinued proning  11/26 PEEP of 10, 70%, weaning sedation, fevers  11/27 PEEP of 10, ASV, weaning sedation, initiation of norepinephrine drip, started on Rocephin for UTI/possible pneumonia    Interval Problem Update  Reviewed last 24 hour events:   - started on levophed yesterday for developing shock (no pneumothorax on x-ray)   - agitated overnight requiring increased sedation    - sinus tach, SBP    - withdraws to pain   - levophed gtt @ 3   - TM 38.9, decreasing WBC   - Hgb down to 11   - last BM 11/25, haile Tfs at goal   - ok UOP   - VD # 7, % with PIP 22, spont 50-70%   - Na down to 142   - improving ROBERTO   - day 2/5 rocephin   - day 9/10 decadron (second course)    Review of Systems  Review of Systems   Unable to " perform ROS: Intubated     Vital Signs for last 24 hours   Pulse:  [] 93  Resp:  [14-34] 27  BP: ()/(49-83) 92/64  SpO2:  [83 %-96 %] 94 %    Respiratory Information for the last 24 hours  Vent Mode: ASV  PEEP/CPAP: 10  MAP: 13  Control VTE (exp VT): 720 170%    Physical Exam   Physical Exam  Vitals and nursing note reviewed.   Constitutional:       General: He is sleeping. He is in acute distress.      Appearance: He is overweight.      Interventions: He is sedated, intubated and restrained.   HENT:      Head: Normocephalic.      Nose: Nose normal.      Comments: Nasal feeding tube in place     Mouth/Throat:      Mouth: Mucous membranes are moist.      Pharynx: Oropharynx is clear.      Comments: Endotracheal tube in place  Eyes:      Conjunctiva/sclera: Conjunctivae normal.      Pupils: Pupils are equal, round, and reactive to light.   Neck:      Comments: Left IJ central venous catheter without surrounding hematoma, mild increase in subcu emphysema in bilateral neck (L>R)  Cardiovascular:      Rate and Rhythm: Regular rhythm. Tachycardia present. Occasional extrasystoles are present.     Chest Wall: PMI is displaced.      Pulses: Decreased pulses.           Radial pulses are 1+ on the right side and 1+ on the left side.      Heart sounds: Heart sounds are distant. No murmur heard.       Comments: Requiring ongoing norepinephrine drip for persistent hypotension  Pulmonary:      Effort: Tachypnea present. No prolonged expiration. He is intubated.      Breath sounds: Examination of the right-middle field reveals rhonchi. Examination of the left-middle field reveals rhonchi. Examination of the right-lower field reveals rhonchi. Examination of the left-lower field reveals rhonchi. Rhonchi present. No wheezing or rales.      Comments: Ongoing poor compliance, minimal secretions, tolerating ASV  Abdominal:      General: Bowel sounds are normal. There is no distension.      Palpations: Abdomen is soft.       Tenderness: There is no abdominal tenderness. There is no guarding.   Genitourinary:     Comments: Navas catheter in place  Musculoskeletal:         General: No tenderness.      Right lower leg: No edema.      Left lower leg: No edema.   Lymphadenopathy:      Cervical: No cervical adenopathy.   Skin:     General: Skin is warm and dry.      Capillary Refill: Capillary refill takes 2 to 3 seconds.      Findings: No rash.   Neurological:      Mental Status: He is easily aroused.      Comments: Awakens and grimaces and opens eyes but not following commands, moves all extremities weakly   Psychiatric:         Behavior: Behavior is uncooperative.      Comments: Unable to assess given current clinical condition         Medications  Current Facility-Administered Medications   Medication Dose Route Frequency Provider Last Rate Last Admin   • MD Alert...ICU Electrolyte Replacement per Pharmacy   Other PHARMACY TO DOSE TRESSA Lovell       • norepinephrine (Levophed) 8 mg in 250 mL NS infusion (premix)  0-30 mcg/min Intravenous Continuous Jeremy M Gonda, M.D. 11.3 mL/hr at 11/28/21 0530 6 mcg/min at 11/28/21 0530   • cefTRIAXone (Rocephin) 1 g in  mL IVPB  1 g Intravenous Daily-1400 Jeremy M Gonda, M.D.   Stopped at 11/27/21 1424   • insulin regular (HumuLIN R,NovoLIN R) injection  3-14 Units Subcutaneous Q6HRS Jeremy M Gonda, M.D.   3 Units at 11/27/21 1703    And   • dextrose 50% (D50W) injection 50 mL  50 mL Intravenous Q15 MIN PRN Jeremy M Gonda, M.D.       • oxyCODONE immediate-release (ROXICODONE) tablet 5-10 mg  5-10 mg Enteral Tube Q4HRS PRN Jeremy M Gonda, M.D.   10 mg at 11/26/21 2022   • fentaNYL (SUBLIMAZE) injection  mcg   mcg Intravenous Q HOUR PRN La Patten M.D.   100 mcg at 11/28/21 0458   • dexamethasone (DECADRON) tablet 6 mg  6 mg Enteral Tube DAILY Katie Singh M.D.   6 mg at 11/28/21 0424   • Respiratory Therapy Consult   Nebulization Continuous RT Katie Singh  M.D.       • famotidine (PEPCID) tablet 20 mg  20 mg Enteral Tube Q12HRS Katie Singh M.D.   20 mg at 11/28/21 0424    Or   • famotidine (PEPCID) injection 20 mg  20 mg Intravenous Q12HRS Katie Singh M.D.   20 mg at 11/25/21 0520   • senna-docusate (PERICOLACE or SENOKOT S) 8.6-50 MG per tablet 2 Tablet  2 Tablet Enteral Tube BID Katie Singh M.D.   2 Tablet at 11/28/21 0425    And   • polyethylene glycol/lytes (MIRALAX) PACKET 1 Packet  1 Packet Enteral Tube QDAY PRN Katie Singh M.D.   1 Packet at 11/22/21 1034    And   • magnesium hydroxide (MILK OF MAGNESIA) suspension 30 mL  30 mL Enteral Tube QDAY PRN Katie Singh M.D.   30 mL at 11/23/21 0836    And   • bisacodyl (DULCOLAX) suppository 10 mg  10 mg Rectal QDAY PRN Katie Singh M.D.   10 mg at 11/24/21 1143   • lidocaine (XYLOCAINE) 1 % injection 2 mL  2 mL Tracheal Tube Q30 MIN PRN Katie Singh M.D.       • Pharmacy Consult: Enteral tube insertion - review meds/change route/product selection  1 Each Other PHARMACY TO DOSE Katie Singh M.D.       • acetaminophen (Tylenol) tablet 650 mg  650 mg Enteral Tube Q6HRS PRN Katie Singh M.D.   650 mg at 11/27/21 0051   • hydrOXYzine HCl (ATARAX) tablet 25 mg  25 mg Enteral Tube TID PRN Katie Singh M.D.       • ondansetron (ZOFRAN ODT) dispertab 4 mg  4 mg Enteral Tube Q6HRS PRN Katie Singh M.D.       • promethazine (PHENERGAN) tablet 12.5-25 mg  12.5-25 mg Enteral Tube Q4HRS PRN Katie Singh M.D.       • vitamin D3 (cholecalciferol) tablet 2,000 Units  2,000 Units Enteral Tube DAILY Katie Singh M.D.   2,000 Units at 11/28/21 0425   • artificial tears (EYE LUBRICANT) ophth ointment 1 Application  1 Application Both Eyes Q8HRS Katie Singh M.D.   1 Application at 11/28/21 0424   • midazolam (VERSED) premix 125 mg/125 mL NS  0-10 mg/hr Intravenous Continuous Jeremy M Gonda, M.D. 5 mL/hr at 11/27/21 0603 5 mg/hr at 11/27/21 0603   • fentaNYL  (SUBLIMAZE) 50 mcg/mL in 50mL (Continuous Infusion)   Intravenous Continuous Katie Singh M.D. 4 mL/hr at 11/28/21 0457 200 mcg/hr at 11/28/21 0457   • ondansetron (ZOFRAN) syringe/vial injection 4 mg  4 mg Intravenous Q6HRS PRN Anurag Martinez M.D.       • enoxaparin (LOVENOX) inj 40 mg  40 mg Subcutaneous DAILY Anurag Martinez M.D.   40 mg at 11/28/21 0425   • promethazine (PHENERGAN) suppository 12.5-25 mg  12.5-25 mg Rectal Q4HRS PRN Anurag Martinez M.D.       • prochlorperazine (COMPAZINE) injection 5-10 mg  5-10 mg Intravenous Q4HRS PRN Anurag Martinez M.D.           Fluids    Intake/Output Summary (Last 24 hours) at 11/28/2021 0701  Last data filed at 11/28/2021 0600  Gross per 24 hour   Intake 4013.98 ml   Output 1430 ml   Net 2583.98 ml       Laboratory  Recent Labs     11/27/21  0348 11/27/21  1049 11/28/21  0347   ISTATAPH 7.402 7.451 7.428   ISTATAPCO2 65.0* 53.2* 57.3*   ISTATAPO2 59* 49* 60*   ISTATATCO2 42* 39* 40*   PLDSTVQ8KQA 89* 85* 91*   ISTATARTHCO3 40.5* 37.1* 37.8*   ISTATARTBE 13* 12* 12*   ISTATTEMP 37.2 C 36.4 C 37.7 C   ISTATFIO2 70 60 80   ISTATSPEC Arterial Arterial Arterial   ISTATAPHTC 7.399* 7.461 7.418   KRXLOFNP5PD 60* 47* 63*         Recent Labs     11/26/21  0427 11/26/21  0427 11/26/21  1340 11/27/21  0331 11/28/21  0314   SODIUM 159*   < > 154* 146* 142   POTASSIUM 4.2  --   --  3.7 4.1   CHLORIDE 117*  --   --  104 104   CO2 37*  --   --  36* 35*   BUN 58*  --   --  44* 34*   CREATININE 0.93  --   --  0.80 0.56   MAGNESIUM  --   --   --  1.9  --    CALCIUM 9.7  --   --  9.4 8.8    < > = values in this interval not displayed.     Recent Labs     11/26/21 0427 11/27/21 0331 11/28/21 0314   GLUCOSE 161* 179* 123*     Recent Labs     11/26/21 0427 11/27/21 0331 11/28/21 0314   WBC 17.2* 13.2* 11.8*   NEUTSPOLYS 78.80* 77.20* 74.00*   LYMPHOCYTES 9.70* 8.80* 14.50*   MONOCYTES 0.90 3.50 2.00   EOSINOPHILS 6.20 1.70 4.60   BASOPHILS 0.00 0.00 0.50     Recent Labs      11/26/21  0427 11/27/21  0331 11/28/21  0314   RBC 4.17* 4.02* 3.56*   HEMOGLOBIN 13.1* 12.2* 11.1*   HEMATOCRIT 41.6* 40.4* 35.5*   PLATELETCT 337 282 249       Imaging  X-Ray:  No film today 11/28    Assessment/Plan  * Acute hypoxemic respiratory failure due to COVID-19 (HCC)- (present on admission)  Assessment & Plan  Intubated 11/21  Continue full mechanical ventilatory support, ASV mode without decrease in percent MV  Continue PEEP at 10  Continue to titrate FiO2 to goal SPO2 greater than 85%, PaO2 greater than 50  RT/O2 protocol  Continue to titrate off Versed, fentanyl drips, as needed oxycodone -> trial of Precedex drip as needed  Status post proning protocol (completed 11/25)    Hypotension  Assessment & Plan  Developed 11/27 likely due to hypovolemia and UTI  Continue to titrate norepinephrine drip to maintain mean arterial pressure greater than 65  Unable to tolerate aggressive fluid resuscitation given COVID-19 pneumonia/ARDS  Continue antibiotics and follow-up on cultures    Pneumonia due to COVID-19 virus- (present on admission)  Assessment & Plan  Continue with the following therapies while monitoring closely:  Steroids: Completed primary rounds of steroids, continue current round of 10 days of Decadron (day 9)  Remdesivir: Not a candidate given degree of respiratory failure  Monoclonal antibody: Status post baricitinib  Anticoagulation: Chemical DVT prophylaxis only  Diuresis: Not tolerating, continue to hold for now  Antitussives, supportive care, monitoring LFTs  Cleared from isolation precautions per hospital guidelines  Status post proning protocols - completed 11/25   Monitor for secondary bacterial infection and thromboembolic disease    Acute cystitis with hematuria  Assessment & Plan  11/27  Continue Rocephin x5-day course    ROBERTO (acute kidney injury) (Formerly [facility])  Assessment & Plan  Secondary to ATN - improving again today, likely diuretic induced  Avoid nephrotoxins  Monitor creatinine, urine  output, electrolytes closely  Keep euvolemic    Fever  Assessment & Plan  11/27 -UTI versus secondary bacterial pneumonia  Follow-up on repeat cultures 11/27    Hypomagnesemia  Assessment & Plan  Repleted    Obstipation  Assessment & Plan  Continue aggressive bowel protocol, enema  S/p Relistor    Hypernatremia  Assessment & Plan  Improving again today  Decrease current dose of enteral free water and monitor    Hypophosphatemia  Assessment & Plan  Repleted    Hypokalemia  Assessment & Plan  Repleted    Elevated troponin  Assessment & Plan  Secondary to demand ischemia -improved    Goals of care, counseling/discussion  Assessment & Plan  Palliative care consulted  Ongoing goals of care discussions with family  DNR  Very guarded prognosis given likely fibrotic state of Covid       VTE:  Lovenox  Ulcer: H2 Antagonist  Lines: Central Line  Ongoing indication addressed, Arterial Line  Ongoing indication addressed and Navas Catheter  Ongoing indication addressed    I have performed a physical exam and reviewed and updated ROS and Plan today (11/28/2021). In review of yesterday's note (11/27/2021), there are no changes except as documented above.     Patient remains critically ill today requiring active management of his mechanical ventilatory support as well as titration of sedative drips and vasopressor.  Poor prognosis but ongoing medical management at the request of family. High risk of deterioration and worsening vital organ dysfunction and death without the above critical care interventions.    Discussed patient condition and risk of morbidity and/or mortality with Family, RN, RT, Pharmacy, Charge nurse / hot rounds and Patient. Critical care time = 37 minutes in directly providing and coordinating critical care and extensive data review.  No time overlap and excludes procedures.

## 2021-11-29 NOTE — PROGRESS NOTES
"Critical Care Progress Note    Date of admission  11/5/2021    Chief Complaint  61 y.o. male, unvaccinated against COVID-19, admitted 11/5/2021 with COVID-19 pneumonia, requiring HFNC, now with oxygen desaturation and increased work of breathing, transferred to ICU 11/20 on BiPAP, intubated 11/21     Hospital Course  \"61 y.o. male who presented 11/5/2021 with no PMH, BMI 30 that is unvaccinated that presented 11/5 for 10 days of cough, chills, headache, sore throat with + covid test 9 days prior. He had room air saturation of 62% and was admitted on HFNC. Today I was asked to consult on patient since he is on HFNC + NRB. He is afebrile, HR 77-80's, 's sat 90-92%. He was started on dexamethasone and barcitinib, crp 17, ddimer 1, lactic 2.5. Still undecided about code status and palliative care has been consulted and remains full code. Wife and son at bedside. Patient without complaints of chest pain, shortness or breath cough, n/v/d, leg pain or swelling.\"      11/20 -he has been managed on the medical floor with HFNC, Decadron, baricitinib, Lasix, empiric antibiotics; increased work of breathing, accessory muscle use today and transfer to ICU.  11/21-intubated prone paralyzed, very high peak pressures allowing permissive hypercapnia  11/22 -PEEP of 8, 60% FiO2, paralyzed/sedated/proned  11/23 PEEP 8, 50%, paralyzed/sedated/proned  11/24 -PEEP 8, 60%, sedated/proned, improving renal function  11/25 PEEP 8, 40%, discontinued proning  11/26 PEEP of 10, 70%, weaning sedation, fevers  11/27 PEEP of 10, ASV, weaning sedation, initiation of norepinephrine drip, started on Rocephin for UTI/possible pneumonia  11/29 -VD #9, %, 70% FiO2, ceftriaxone day 3 for Klebsiella pneumonia    Interval Problem Update  Reviewed last 24 hour events:  Failed SAT with marked agitation, some purposeful movement, weakly followed  Low threshold to change dexmedetomidine to propofol  SR/ST  SBP 70 -140, norepi 3  T-max 100.2  WBC " 10.5  I/O = 2.8/2.7  VD #9: %, PEEP 8, FiO2 70%  CXR: none today  AB.44/49/54  Decadron day 10/10 (second course)  Ceftriaxone day 3/5  CXs:     - Sputum  Kleb pneumoniae   Lovenox 40 daily  PCT 0.14 -> 0.95  CRP  haile TF at goal  fent 250  Free water 300 q 4; Na 141, decrease to 250 q8  Adv cortrak to post-pyloric if able      Review of Systems  Review of Systems   Unable to perform ROS: Intubated     Vital Signs for last 24 hours   Pulse:  [] 74  Resp:  [17-36] 21  BP: ()/(43-94) 82/57  SpO2:  [81 %-96 %] 90 %    Respiratory Information for the last 24 hours  Vent Mode: ASV  PEEP/CPAP: 8  MAP: 8.7  Control VTE (exp VT): 761 170%    Physical Exam   Physical Exam  Vitals and nursing note reviewed.   Constitutional:       General: He is sleeping. He is in acute distress.      Appearance: He is overweight.      Interventions: He is sedated, intubated and restrained.   HENT:      Head: Normocephalic.      Nose: Nose normal.      Comments: Nasal feeding tube in place     Mouth/Throat:      Mouth: Mucous membranes are moist.      Pharynx: Oropharynx is clear.      Comments: Endotracheal tube in place  Eyes:      Conjunctiva/sclera: Conjunctivae normal.      Pupils: Pupils are equal, round, and reactive to light.   Neck:      Comments: Left IJ central venous catheter without surrounding hematoma, mild increase in subcu emphysema in bilateral neck (L>R)  Cardiovascular:      Rate and Rhythm: Regular rhythm. Tachycardia present. Occasional extrasystoles are present.     Chest Wall: PMI is displaced.      Pulses: Decreased pulses.           Radial pulses are 1+ on the right side and 1+ on the left side.      Heart sounds: Heart sounds are distant. No murmur heard.       Comments: Requiring ongoing norepinephrine drip for persistent hypotension  Pulmonary:      Effort: Tachypnea present. No prolonged expiration. He is intubated.      Breath sounds: Examination of the right-middle field reveals  rhonchi. Examination of the left-middle field reveals rhonchi. Examination of the right-lower field reveals rhonchi. Examination of the left-lower field reveals rhonchi. Rhonchi present. No wheezing or rales.      Comments: Ongoing poor compliance, minimal secretions, tolerating ASV  Abdominal:      General: Bowel sounds are normal. There is no distension.      Palpations: Abdomen is soft.      Tenderness: There is no abdominal tenderness. There is no guarding.   Genitourinary:     Comments: Navas catheter in place  Musculoskeletal:         General: No tenderness.      Right lower leg: No edema.      Left lower leg: No edema.   Lymphadenopathy:      Cervical: No cervical adenopathy.   Skin:     General: Skin is warm and dry.      Capillary Refill: Capillary refill takes 2 to 3 seconds.      Findings: No rash.   Neurological:      Mental Status: He is easily aroused.      Comments: Awakens and grimaces and opens eyes but not following commands, moves all extremities weakly   Psychiatric:         Behavior: Behavior is uncooperative.      Comments: Unable to assess given current clinical condition         Medications  Current Facility-Administered Medications   Medication Dose Route Frequency Provider Last Rate Last Admin   • MIDAZOLAM HCL 5 MG/5ML INJ SOLN (WRAPPED)            • fentaNYL (SUBLIMAZE) injection  mcg   mcg Intravenous Q HOUR PRN Jeremy M Gonda, M.D.   100 mcg at 11/29/21 0722   • oxyCODONE immediate-release (ROXICODONE) tablet 5-10 mg  5-10 mg Enteral Tube Q4HRS PRN Jeremy M Gonda, M.D.   10 mg at 11/28/21 1754   • dexmedetomidine (PRECEDEX) 400 mcg/100mL NS premix infusion  0.1-1.5 mcg/kg/hr Intravenous Continuous Jeremy M Gonda, M.D. 20.9 mL/hr at 11/29/21 0728 1 mcg/kg/hr at 11/29/21 0728   • MD Alert...ICU Electrolyte Replacement per Pharmacy   Other PHARMACY TO DOSE TRESSA Lovell       • norepinephrine (Levophed) 8 mg in 250 mL NS infusion (premix)  0-30 mcg/min Intravenous  Continuous Jeremy M Gonda, M.D. 3.8 mL/hr at 11/29/21 0521 2 mcg/min at 11/29/21 0521   • cefTRIAXone (Rocephin) 1 g in  mL IVPB  1 g Intravenous Daily-1400 Jeremy M Gonda, M.D.   Stopped at 11/28/21 1409   • insulin regular (HumuLIN R,NovoLIN R) injection  3-14 Units Subcutaneous Q6HRS Jeremy M Gonda, M.D.   4 Units at 11/28/21 1137    And   • dextrose 50% (D50W) injection 50 mL  50 mL Intravenous Q15 MIN PRN Jeremy M Gonda, M.D.       • Respiratory Therapy Consult   Nebulization Continuous RT Katie Singh M.D.       • famotidine (PEPCID) tablet 20 mg  20 mg Enteral Tube Q12HRS Katie Singh M.D.   20 mg at 11/29/21 0511    Or   • famotidine (PEPCID) injection 20 mg  20 mg Intravenous Q12HRS Katie Singh M.D.   20 mg at 11/25/21 0520   • senna-docusate (PERICOLACE or SENOKOT S) 8.6-50 MG per tablet 2 Tablet  2 Tablet Enteral Tube BID Katie Singh M.D.   2 Tablet at 11/29/21 0511    And   • polyethylene glycol/lytes (MIRALAX) PACKET 1 Packet  1 Packet Enteral Tube QDAY PRN Katie Singh M.D.   1 Packet at 11/22/21 1034    And   • magnesium hydroxide (MILK OF MAGNESIA) suspension 30 mL  30 mL Enteral Tube QDAY PRN Katie Singh M.D.   30 mL at 11/23/21 0836    And   • bisacodyl (DULCOLAX) suppository 10 mg  10 mg Rectal QDAY PRN Katie Singh M.D.   10 mg at 11/24/21 1143   • lidocaine (XYLOCAINE) 1 % injection 2 mL  2 mL Tracheal Tube Q30 MIN PRN Katie Singh M.D.       • Pharmacy Consult: Enteral tube insertion - review meds/change route/product selection  1 Each Other PHARMACY TO DOSE Katie Singh M.D.       • acetaminophen (Tylenol) tablet 650 mg  650 mg Enteral Tube Q6HRS PRN Katie Singh M.D.   650 mg at 11/27/21 0051   • hydrOXYzine HCl (ATARAX) tablet 25 mg  25 mg Enteral Tube TID PRN Katie Singh M.D.       • ondansetron (ZOFRAN ODT) dispertab 4 mg  4 mg Enteral Tube Q6HRS PRN Katie Singh M.D.       • promethazine (PHENERGAN) tablet 12.5-25 mg   12.5-25 mg Enteral Tube Q4HRS PRN Katie Singh M.D.       • vitamin D3 (cholecalciferol) tablet 2,000 Units  2,000 Units Enteral Tube DAILY Katie Singh M.D.   2,000 Units at 11/29/21 0511   • artificial tears (EYE LUBRICANT) ophth ointment 1 Application  1 Application Both Eyes Q8HRS Katie Singh M.D.   1 Application at 11/29/21 0512   • fentaNYL (SUBLIMAZE) 50 mcg/mL in 50mL (Continuous Infusion)   Intravenous Continuous Katie Singh M.D. 5 mL/hr at 11/29/21 0735 250 mcg/hr at 11/29/21 0735   • ondansetron (ZOFRAN) syringe/vial injection 4 mg  4 mg Intravenous Q6HRS PRN Anurag Martinez M.D.       • enoxaparin (LOVENOX) inj 40 mg  40 mg Subcutaneous DAILY Anurag Martinez M.D.   40 mg at 11/29/21 0512   • promethazine (PHENERGAN) suppository 12.5-25 mg  12.5-25 mg Rectal Q4HRS PRN Anurag Martinez M.D.       • prochlorperazine (COMPAZINE) injection 5-10 mg  5-10 mg Intravenous Q4HRS PRN Anurag Martinez M.D.           Fluids    Intake/Output Summary (Last 24 hours) at 11/29/2021 0806  Last data filed at 11/29/2021 0800  Gross per 24 hour   Intake 2631.81 ml   Output 3370 ml   Net -738.19 ml       Laboratory  Recent Labs     11/27/21  1049 11/28/21  0347 11/29/21  0341   ISTATAPH 7.451 7.428 7.447   ISTATAPCO2 53.2* 57.3* 48.8*   ISTATAPO2 49* 60* 53*   ISTATATCO2 39* 40* 35*   LESLFVU3SCL 85* 91* 88*   ISTATARTHCO3 37.1* 37.8* 33.7*   ISTATARTBE 12* 12* 8*   ISTATTEMP 36.4 C 37.7 C 37.2 C   ISTATFIO2 60 80 70   ISTATSPEC Arterial Arterial Arterial   ISTATAPHTC 7.461 7.418 7.444   IIUSXPNE6QY 47* 63* 54*         Recent Labs     11/27/21  0331 11/28/21  0314 11/29/21  0245   SODIUM 146* 142 141   POTASSIUM 3.7 4.1 4.4   CHLORIDE 104 104 104   CO2 36* 35* 29   BUN 44* 34* 27*   CREATININE 0.80 0.56 0.55   MAGNESIUM 1.9  --   --    CALCIUM 9.4 8.8 8.8     Recent Labs     11/27/21 0331 11/28/21  0314 11/29/21  0245   GLUCOSE 179* 123* 117*     Recent Labs     11/27/21  0331 11/28/21  0314 11/29/21  0245    WBC 13.2* 11.8* 10.5   NEUTSPOLYS 77.20* 74.00* 77.20*   LYMPHOCYTES 8.80* 14.50* 14.00*   MONOCYTES 3.50 2.00 1.90   EOSINOPHILS 1.70 4.60 3.00   BASOPHILS 0.00 0.50 0.60     Recent Labs     11/27/21  0331 11/28/21  0314 11/29/21  0245   RBC 4.02* 3.56* 3.78*   HEMOGLOBIN 12.2* 11.1* 11.6*   HEMATOCRIT 40.4* 35.5* 36.3*   PLATELETCT 282 249 259       Imaging  X-Ray:  No film today 11/28    Assessment/Plan  * Acute hypoxemic respiratory failure due to COVID-19 (HCC)- (present on admission)  Assessment & Plan  Intubated 11/21  Continue full mechanical ventilatory support, ASV for ventilator synchrony  Status post proning protocol (completed 11/25)  Continue to titrate FiO2 to goal SPO2 greater than 85%, PaO2 greater than 50  A-F bundle  Sedation - minimize as tolerates  Steroids: Completed primary rounds of steroids, Decadron day 10/10 (second course)  Remdesivir: Not a candidate given degree of respiratory failure  Monoclonal antibody: Status post baricitinib  Anticoagulation: Chemical DVT prophylaxis; f/u screening DVT US study  Diuresis: keep euvolemic/net negative as haile  Cleared from isolation precautions per hospital guidelines    Acute cystitis with hematuria  Assessment & Plan  11/27  Continue Rocephin x5-day course    Fever  Assessment & Plan  Ceftriaxone started 11/27 - Klebsiella pneumoniae pneumonia    Obstipation  Assessment & Plan  Continue aggressive bowel protocol, enema  S/p Relistor    Hypernatremia  Assessment & Plan  Improving; decreasing free water    ROBERTO (acute kidney injury) (HCC)  Assessment & Plan  Secondary to ATN - improving again today, likely diuretic induced  Avoid nephrotoxins  Monitor creatinine, urine output, electrolytes closely  Keep euvolemic    Elevated troponin  Assessment & Plan  Secondary to demand ischemia -improved    Goals of care, counseling/discussion  Assessment & Plan  Palliative care consulted  Ongoing goals of care discussions with family  DNR  Very guarded prognosis  given likely fibrotic state of Covid     Update:  Updated family again in detail bedside today.  Continue full care.    VTE:  Lovenox  Ulcer: H2 Antagonist  Lines: Central Line  Ongoing indication addressed, Arterial Line  Ongoing indication addressed and Navas Catheter  Ongoing indication addressed    I have performed a physical exam and reviewed and updated ROS and Plan today (11/29/2021). In review of yesterday's note (11/28/2021), there are no changes except as documented above.       Discussed patient condition and risk of morbidity and/or mortality with Family, RN, RT, Pharmacy, Charge nurse / hot rounds and Patient. Critical care time = 50 minutes in directly providing and coordinating critical care and extensive data review.  No time overlap and excludes procedures.

## 2021-11-29 NOTE — DIETARY
"Nutrition support weekly update:  Day 24 of admit.  Blue Wu is a 61 y.o. male with admitting DX of COVID19 infection.  Tube feeding initiated on . Current TF via gastric fundus just below the gastroesophageal junction is Impact Peptide 1.5 @ 50 mL/hr, providing 1800 kcal, 113 gm protein, 168 gm CHO, and 924 mL of free water per day.  TF currently paused for x-ray.     Assessment:  Weight from  was 83.4 kg via bed scale, which is up 7.2 kg from previous weeks wt of 76.2 kg via bed scale.  Overall wt trends reviewed - stable with initial wt.  Noted with trace edema to BLE and +4.5L per I/Os.      Estimated Nutritional Needs based on:   Height: 167.6 cm (5' 5.98\")  Weight: 76.2 kg (167 lb 15.9 oz)  Weight to Use in Calculations: 76.2 kg (167 lb 15.9 oz) - via bed scale.   Ideal Body Weight: 64.4 kg (142 lb)     Calculation/Equation: MSJ x 1.2 = 1801 kcal.  PSU = 2463 kcal (VE: 27.6, Tmax over the past 24 hours: 38.2).   Total Calories / day: 1800 - 2100 (Calories / k - 28)  Total Grams Protein / day: 91 - 114 (Grams Protein / k.2 - 1.5 actual wt); 97 - 129 (Grams Protein / k.5 - 2.0 IBW)    Evaluation:   1. Pt noted with green, bloody output  - TF held then restarted @ 25 mL/hr and was able to tolerate back up to goal rate of 50 mL/hr.  Pt is receiving 250 mL of free water flushes every eight hours per order.  2. Vent day 8.    3. Current clinical picture and MD progress notes reviewed.  Improving ROBERTO noted.    4. Wound team note from  reviewed.  Palliative care notes reviewed.  5. Labs: Glucose: 117, BUN: 27.    6. Meds: Pepcid, SSI, Electrolyte replacement, Vitamin D3, Levophed @ 3 mcg/min, +Precedex.    7. LBM:  - medium, loose.  8. Current formula remains appropriate however will increase rate.     Malnutrition risk: No new risk identified at this time.     Recommendations/Plan:  1. Increase Impact Peptide 1.5 to 55 mL/hr, providing 1980 kcal, 124 gm protein, 185 gm CHO, " and 1016 mL of free water per day.  2. Fluids per MD.  3. Continue to monitor wt.  4. RD continues to monitor labs.    RD follows.

## 2021-11-29 NOTE — CARE PLAN
Problem: Ventilation  Goal: Ability to achieve and maintain unassisted ventilation or tolerate decreased levels of ventilator support  Description: Document on Vent flowsheet    1.  Support and monitor invasive and noninvasive mechanical ventilation  2.  Monitor ventilator weaning response  3.  Perform ventilator associated pneumonia prevention interventions  4.  Manage ventilation therapy by monitoring diagnostic test results  Outcome: Not Met   Ventilator Daily Summary    Vent Day # 7 ETT 8.0@25    Ventilator settings changed this shift: /10/70%    Weaning trials: No    Respiratory Procedures: None    Plan: Continue current ventilator settings and wean mechanical ventilation as tolerated per physician orders.

## 2021-11-29 NOTE — DISCHARGE PLANNING
Care Transition Team Discharge Planning      Anticipated Discharge Disposition: TBD     Action: LSw attended AM rounds, pt is not medically cleared.     Barriers to Discharge: not medically stable     Plan: Lsw will continue to follow, and assist w/ d/c planning.

## 2021-11-29 NOTE — PROGRESS NOTES
Received report from day RN, POC discussed. Pt RASS -4. Pt has tube feeding clamped, OG tube connected to low suction putting out green,bloody secretions. Pt has normoactive bowel sounds, abdomen soft to palpate, pt has no vomiting episodes.

## 2021-11-29 NOTE — PROGRESS NOTES
Informed Yessica, APRN, about OG tube output of 100 ml green and bloody output within 30mins- 1 hour of restarting suction and had 50 ml for dayshift, POC blood sugar: 85. Per day RN OG tube was placed because of tube-feeding like consistency and color output in ET tube, ET tube was then advanced, then no more white thick secretions in ET tube. Cortrak and OG tube in the stomach. Per APRN, clamp OG tube and okay to start tube feeding at 25 ml/hr and then advanced to goal rate at 50 ml.hr in 8 hrs if patient tolerating tube feed.

## 2021-11-29 NOTE — CARE PLAN
Problem: Knowledge Deficit - Standard  Goal: Patient and family/care givers will demonstrate understanding of plan of care, disease process/condition, diagnostic tests and medications  Outcome: Progressing     Problem: Fall Risk  Goal: Patient will remain free from falls  Outcome: Progressing     Problem: Safety - Medical Restraint  Goal: Free from restraint(s) (Restraint for Interference with Medical Device)  Outcome: Progressing   The patient is Stable - Low risk of patient condition declining or worsening    Shift Goals  Clinical Goals: Oxygenation, engagment, wean sedation  Patient Goals: EDMUND  Family Goals: comfort, improved condition    Progress made toward(s) clinical / shift goals:  pt has no restraint injuries

## 2021-11-30 NOTE — PALLIATIVE CARE
Palliative Care follow-up  Met with pt's wife and son at bedside. Wife declined translation and used her son to help translate what PC RN talked about. PC RN asked their understanding of the current POC and Blue stated that they have been given updates by the team and Dr. Paulino.  Blue expressed that Dr. Paulino mention keep trying to wean him off the vent and sedation and see how his body responds and if he is not progressing then the conversation will lean towards comfort care approach.   They understand that they have tried to wean sedation and that Blue Sr was more agitated. PC RN helped educate about agitation and also the over all clinical health picture and the complications when being on a ventilator. Also provided support for Fay and encouraged her to seek assistance if she is not sleeping well due to the stress from her  being very sick. Provided some recommendations for self care..   Updated: RN    Plan: Continue to provide support.     Thank you for allowing Palliative Care to support this patient and family. Contact [phone] for additional assistance, change in patient status, or with any questions/concerns.

## 2021-11-30 NOTE — PROGRESS NOTES
Called RT for severe cuff leak. ET tube was found to be pushed out several cm. ET tube was advanced by RT, MD was notified and order for chest x-ray was put in.

## 2021-11-30 NOTE — CARE PLAN
Problem: Knowledge Deficit - Standard  Goal: Patient and family/care givers will demonstrate understanding of plan of care, disease process/condition, diagnostic tests and medications  Outcome: Progressing     Problem: Respiratory  Goal: Patient will achieve/maintain optimum respiratory ventilation and gas exchange  Outcome: Progressing     Problem: Safety - Medical Restraint  Goal: Remains free of injury from restraints (Restraint for Interference with Medical Device)  Outcome: Progressing  Flowsheets (Taken 11/29/2021 1613)  Addressed this shift: Remains free of injury from restraints (restraint for interference with medical device):   Determine that other, less restrictive measures have been tried or would not be effective before applying the restraint   Evaluate the patient's condition at the time of restraint application   Inform patient/family regarding the reason for restraint   Every 2 hours: Monitor safety, psychosocial status, comfort, nutrition and hydration  Goal: Free from restraint(s) (Restraint for Interference with Medical Device)  Outcome: Progressing  Flowsheets (Taken 11/29/2021 1613)  Addressed this shift: Free from restraint(s) (restraint for interference with medical device):   ONCE/SHIFT or MINIMUM Every 12 hours: Assess and document the continuing need for restraints   Every 24 hours: Continued use of restraint requires Licensed Independent Practitioner to perform face to face examination and written order   Identify and implement measures to help patient regain control     The patient is Watcher - Medium risk of patient condition declining or worsening    Shift Goals  Clinical Goals: Vent synchrony  Patient Goals: EDMUND  Family Goals: NA    Progress made toward(s) clinical / shift goals:  Patient able to follow commands. Decreased amount of PRN fentanyl pushes used over shift.

## 2021-11-30 NOTE — PROGRESS NOTES
"Critical Care Progress Note    Date of admission  11/5/2021    Chief Complaint  61 y.o. male, unvaccinated against COVID-19, admitted 11/5/2021 with COVID-19 pneumonia, requiring HFNC, now with oxygen desaturation and increased work of breathing, transferred to ICU 11/20 on BiPAP, intubated 11/21     Hospital Course  \"61 y.o. male who presented 11/5/2021 with no PMH, BMI 30 that is unvaccinated that presented 11/5 for 10 days of cough, chills, headache, sore throat with + covid test 9 days prior. He had room air saturation of 62% and was admitted on HFNC. Today I was asked to consult on patient since he is on HFNC + NRB. He is afebrile, HR 77-80's, 's sat 90-92%. He was started on dexamethasone and barcitinib, crp 17, ddimer 1, lactic 2.5. Still undecided about code status and palliative care has been consulted and remains full code. Wife and son at bedside. Patient without complaints of chest pain, shortness or breath cough, n/v/d, leg pain or swelling.\"      11/20 -he has been managed on the medical floor with HFNC, Decadron, baricitinib, Lasix, empiric antibiotics; increased work of breathing, accessory muscle use today and transfer to ICU.  11/21-intubated prone paralyzed, very high peak pressures allowing permissive hypercapnia  11/22 -PEEP of 8, 60% FiO2, paralyzed/sedated/proned  11/23 PEEP 8, 50%, paralyzed/sedated/proned  11/24 -PEEP 8, 60%, sedated/proned, improving renal function  11/25 PEEP 8, 40%, discontinued proning  11/26 PEEP of 10, 70%, weaning sedation, fevers  11/27 PEEP of 10, ASV, weaning sedation, initiation of norepinephrine drip, started on Rocephin for UTI/possible pneumonia  11/29 -VD #9, %, 70% FiO2, ceftriaxone day 3 for Klebsiella pneumonia  11/30 -VD #10, ASV, ceftriaxone day 4, quiet bowel sounds, suspect some component of ileus, trial Relistor, further imaging as needed    Interval Problem Update  Reviewed last 24 hour events:  Tm 102.9  Int follows with "   Prop/fent  SR/ST  norepi 3  Bilious o/p OGT  VD 9 ASV   lovenox pepcid ppx  C3 4/5  relistor today    Review of Systems  Review of Systems   Unable to perform ROS: Intubated     Vital Signs for last 24 hours   Temp:  [37.5 °C (99.5 °F)-39.4 °C (102.9 °F)] 37.5 °C (99.5 °F)  Pulse:  [] 113  Resp:  [13-41] 17  BP: ()/() 124/78  SpO2:  [84 %-98 %] 94 %    Respiratory Information for the last 24 hours  Vent Mode: ASV  PEEP/CPAP: 8  MAP: 15  Control VTE (exp VT): 462 170%    Physical Exam   Physical Exam  Vitals and nursing note reviewed.   Constitutional:       General: He is sleeping. He is in acute distress.      Appearance: He is overweight.      Interventions: He is sedated, intubated and restrained.   HENT:      Head: Normocephalic.      Nose: Nose normal.      Comments: Nasal feeding tube in place     Mouth/Throat:      Mouth: Mucous membranes are moist.      Pharynx: Oropharynx is clear.      Comments: Endotracheal tube in place  Eyes:      Conjunctiva/sclera: Conjunctivae normal.      Pupils: Pupils are equal, round, and reactive to light.   Neck:      Comments: Left IJ central venous catheter without surrounding hematoma, mild increase in subcu emphysema in bilateral neck (L>R)  Cardiovascular:      Rate and Rhythm: Regular rhythm. Tachycardia present. Occasional extrasystoles are present.     Chest Wall: PMI is displaced.      Pulses: Decreased pulses.           Radial pulses are 1+ on the right side and 1+ on the left side.      Heart sounds: Heart sounds are distant. No murmur heard.       Comments: Requiring ongoing norepinephrine drip for persistent hypotension  Pulmonary:      Effort: Tachypnea present. No prolonged expiration. He is intubated.      Breath sounds: Examination of the right-middle field reveals rhonchi. Examination of the left-middle field reveals rhonchi. Examination of the right-lower field reveals rhonchi. Examination of the left-lower field reveals rhonchi. Rhonchi  present. No wheezing or rales.      Comments: Ongoing poor compliance, minimal secretions, tolerating ASV  Abdominal:      General: Bowel sounds are normal. There is no distension.      Palpations: Abdomen is soft.      Tenderness: There is no abdominal tenderness. There is no guarding.   Genitourinary:     Comments: Navas catheter in place  Musculoskeletal:         General: No tenderness.      Right lower leg: No edema.      Left lower leg: No edema.   Lymphadenopathy:      Cervical: No cervical adenopathy.   Skin:     General: Skin is warm and dry.      Capillary Refill: Capillary refill takes 2 to 3 seconds.      Findings: No rash.   Neurological:      Mental Status: He is easily aroused.      Comments: Awakens and grimaces and opens eyes but not following commands, moves all extremities weakly   Psychiatric:         Behavior: Behavior is uncooperative.      Comments: Unable to assess given current clinical condition         Medications  Current Facility-Administered Medications   Medication Dose Route Frequency Provider Last Rate Last Admin   • potassium bicarbonate (KLYTE) effervescent tablet 25 mEq  25 mEq Enteral Tube Once Liam Monaco M.D.       • midazolam (VERSED) injection 1-5 mg  1-5 mg Intravenous Q HOUR PRN Liam Monaco M.D.   5 mg at 11/29/21 1747   • fentaNYL (SUBLIMAZE) 50 mcg/mL in 50mL (Continuous Infusion)   Intravenous Continuous Liam Monaco M.D. 5 mL/hr at 11/30/21 0352 250 mcg/hr at 11/30/21 0352   • propofol (DIPRIVAN) injection  0-80 mcg/kg/min Intravenous Continuous Liam Monaco M.D. 20 mL/hr at 11/30/21 0801 40 mcg/kg/min at 11/30/21 0801   • fentaNYL (SUBLIMAZE) injection  mcg   mcg Intravenous Q HOUR PRN Jeremy M Gonda, M.D.   100 mcg at 11/29/21 1938   • oxyCODONE immediate-release (ROXICODONE) tablet 5-10 mg  5-10 mg Enteral Tube Q4HRS PRN Jeremy M Gonda, M.D.   10 mg at 11/28/21 1754   • dexmedetomidine (PRECEDEX) 400 mcg/100mL NS premix infusion  0.1-1.5  mcg/kg/hr Intravenous Continuous Jeremy M Gonda, M.D.   Stopped at 11/29/21 1847   • MD Alert...ICU Electrolyte Replacement per Pharmacy   Other PHARMACY TO DOSE HARVEY Lovell.       • norepinephrine (Levophed) 8 mg in 250 mL NS infusion (premix)  0-30 mcg/min Intravenous Continuous Jeremy M Gonda, M.D. 3.8 mL/hr at 11/30/21 0200 2 mcg/min at 11/30/21 0200   • cefTRIAXone (Rocephin) 1 g in  mL IVPB  1 g Intravenous Daily-1400 Jeremy M Gonda, M.D.   Stopped at 11/29/21 1242   • insulin regular (HumuLIN R,NovoLIN R) injection  3-14 Units Subcutaneous Q6HRS Jeremy M Gonda, M.D.   3 Units at 11/29/21 1228    And   • dextrose 50% (D50W) injection 50 mL  50 mL Intravenous Q15 MIN PRN Jeremy M Gonda, M.D.       • Respiratory Therapy Consult   Nebulization Continuous RT Katie Singh M.D.       • famotidine (PEPCID) tablet 20 mg  20 mg Enteral Tube Q12HRS Katie Singh M.D.   20 mg at 11/30/21 0519    Or   • famotidine (PEPCID) injection 20 mg  20 mg Intravenous Q12HRS Katie Singh M.D.   20 mg at 11/25/21 0520   • senna-docusate (PERICOLACE or SENOKOT S) 8.6-50 MG per tablet 2 Tablet  2 Tablet Enteral Tube BID Katie Singh M.D.   2 Tablet at 11/30/21 0600    And   • polyethylene glycol/lytes (MIRALAX) PACKET 1 Packet  1 Packet Enteral Tube QDAY PRN Katie Singh M.D.   1 Packet at 11/29/21 1019    And   • magnesium hydroxide (MILK OF MAGNESIA) suspension 30 mL  30 mL Enteral Tube QDAY PRN Katie Singh M.D.   30 mL at 11/23/21 0836    And   • bisacodyl (DULCOLAX) suppository 10 mg  10 mg Rectal QDAY PRN Katie Singh M.D.   10 mg at 11/24/21 1143   • lidocaine (XYLOCAINE) 1 % injection 2 mL  2 mL Tracheal Tube Q30 MIN PRN Katie Singh M.D.       • Pharmacy Consult: Enteral tube insertion - review meds/change route/product selection  1 Each Other PHARMACY TO DOSE Katie Singh M.D.       • acetaminophen (Tylenol) tablet 650 mg  650 mg Enteral Tube Q6HRS PRN Katie ARCE  GEOFF Singh   650 mg at 11/29/21 1802   • hydrOXYzine HCl (ATARAX) tablet 25 mg  25 mg Enteral Tube TID PRN Katie Singh M.D.       • ondansetron (ZOFRAN ODT) dispertab 4 mg  4 mg Enteral Tube Q6HRS PRN Katie Singh M.D.       • promethazine (PHENERGAN) tablet 12.5-25 mg  12.5-25 mg Enteral Tube Q4HRS PRN Katie Singh M.D.       • vitamin D3 (cholecalciferol) tablet 2,000 Units  2,000 Units Enteral Tube DAILY Katie Singh M.D.   2,000 Units at 11/30/21 0518   • ondansetron (ZOFRAN) syringe/vial injection 4 mg  4 mg Intravenous Q6HRS PRN Anurag Martinez M.D.       • enoxaparin (LOVENOX) inj 40 mg  40 mg Subcutaneous DAILY Anurag Martinez M.D.   40 mg at 11/30/21 0519   • promethazine (PHENERGAN) suppository 12.5-25 mg  12.5-25 mg Rectal Q4HRS PRN Anurag Martinez M.D.       • prochlorperazine (COMPAZINE) injection 5-10 mg  5-10 mg Intravenous Q4HRS PRN Anurag Martinez M.D.           Fluids    Intake/Output Summary (Last 24 hours) at 11/30/2021 0829  Last data filed at 11/30/2021 0800  Gross per 24 hour   Intake 1207.43 ml   Output 2250 ml   Net -1042.57 ml       Laboratory  Recent Labs     11/29/21  0341 11/30/21  0301 11/30/21  0455   ISTATAPH 7.447 7.395* 7.403   ISTATAPCO2 48.8* 49.7* 49.0*   ISTATAPO2 53* 49* 48*   ISTATATCO2 35* 32 32   XYBXFSJ9VPM 88* 83* 83*   ISTATARTHCO3 33.7* 30.5* 30.6*   ISTATARTBE 8* 5* 5*   ISTATTEMP 37.2 C 37.3 C 37.8 C   ISTATFIO2 70 70 70   ISTATSPEC Arterial Arterial Arterial   ISTATAPHTC 7.444 7.391* 7.391*   [id number] 54* 50* 51*         Recent Labs     11/28/21  0314 11/29/21  0245 11/30/21  0300   SODIUM 142 141 138   POTASSIUM 4.1 4.4 3.9   CHLORIDE 104 104 104   CO2 35* 29 25   BUN 34* 27* 25*   CREATININE 0.56 0.55 0.53   CALCIUM 8.8 8.8 8.7     Recent Labs     11/28/21  0314 11/29/21  0245 11/29/21  1220 11/30/21  0300   PREALBUMIN  --   --  11.1*  --    GLUCOSE 123* 117*  --  101*     Recent Labs     11/28/21  0314 11/29/21  0245 11/30/21  0300   WBC 11.8*  10.5 13.3*   NEUTSPOLYS 74.00* 77.20* 75.00*   LYMPHOCYTES 14.50* 14.00* 15.90*   MONOCYTES 2.00 1.90 2.70   EOSINOPHILS 4.60 3.00 2.90   BASOPHILS 0.50 0.60 0.70     Recent Labs     11/28/21  0314 11/29/21  0245 11/30/21  0300   RBC 3.56* 3.78* 4.09*   HEMOGLOBIN 11.1* 11.6* 12.4*   HEMATOCRIT 35.5* 36.3* 39.4*   PLATELETCT 249 259 258       Imaging  X-Ray:  I have personally reviewed the images and compared with prior images.       Assessment/Plan  * Acute hypoxemic respiratory failure due to COVID-19 (HCC)- (present on admission)  Assessment & Plan  Intubated 11/21  Continue full mechanical ventilatory support, ASV for ventilator synchrony  Status post proning protocol (completed 11/25)  Continue to titrate FiO2 to goal SPO2 greater than 85%, PaO2 greater than 50  A-F bundle  Sedation - minimize as tolerates  Steroids: Completed primary rounds of steroids, Decadron day 10/10 (second course)  Remdesivir: Not a candidate given degree of respiratory failure  Monoclonal antibody: Status post baricitinib  Anticoagulation: Chemical DVT prophylaxis; f/u screening DVT US study  Diuresis: keep euvolemic/net negative as haile  Cleared from isolation precautions per hospital guidelines    Acute cystitis with hematuria  Assessment & Plan  11/27  Continue Rocephin x5-day course    Fever  Assessment & Plan  Ceftriaxone started 11/27 - Klebsiella pneumoniae pneumonia    Obstipation  Assessment & Plan  Continue aggressive bowel protocol, enema  S/p Relistor    Hypernatremia  Assessment & Plan  Improving; decreasing free water    ROBERTO (acute kidney injury) (HCC)  Assessment & Plan  Secondary to ATN - improving again today, likely diuretic induced  Avoid nephrotoxins  Monitor creatinine, urine output, electrolytes closely  Keep euvolemic    Elevated troponin  Assessment & Plan  Secondary to demand ischemia -improved    Goals of care, counseling/discussion  Assessment & Plan  Palliative care consulted  Ongoing goals of care  discussions with family  DNR  Very guarded prognosis given likely fibrotic state of Covid     Update:  Full ventilator support  Possible ileus, Relistor trial, further imaging if no response  Resume enteral nutrition      VTE:  Lovenox  Ulcer: H2 Antagonist  Lines: Central Line  Ongoing indication addressed, Arterial Line  Ongoing indication addressed and Navas Catheter  Ongoing indication addressed    I have performed a physical exam and reviewed and updated ROS and Plan today (11/30/2021). In review of yesterday's note (11/29/2021), there are no changes except as documented above.       Discussed patient condition and risk of morbidity and/or mortality with Family, RN, RT, Pharmacy, Charge nurse / hot rounds and Patient. Critical care time = 35 minutes in directly providing and coordinating critical care and extensive data review.  No time overlap and excludes procedures.

## 2021-11-30 NOTE — WOUND TEAM
"[facility]  Inpatient Services  Wound and Skin Care Evaluation    Admission Date: 11/5/2021     Last order of IP CONSULT TO WOUND CARE was found on 11/20/2021 from Hospital Encounter on 11/5/2021     HPI, PMH, SH: Reviewed    No past surgical history on file.  Social History     Tobacco Use   • Smoking status: Former Smoker   • Smokeless tobacco: Never Used   Substance Use Topics   • Alcohol use: Not on file     Chief Complaint   Patient presents with   • Shortness of Breath     x2d, COVID +     Diagnosis: COVID-19 virus infection [U07.1]    Unit where seen by Wound Team: T630/00     WOUND CONSULT/FOLLOW UP RELATED TO:  Bilateral ears    WOUND HISTORY:  Per H&P: \"Blue Wu is a 61 y.o. male who presented 11/5/2021 with shortness of breath.  Mr. Wu is an unvaccinated 61-year-old male that has no previously known medical conditions.  10 days ago he developed a cough with chills, headache, and sore throat.  He had a home over-the-counter COVID-19 test which was +9 days ago.  Since then he has had a progression of his shortness of breath and cough.  His son checked his oxygen saturations yesterday, 11/4/2021 and found them 62% on room air.  Today patient elected to come to the emergency room where his oxygen saturations have been in the 60s percent range on room air.  He was placed on a nonrebreather and now high flow oxygen.  He has been deemed to be a candidate for baricitinib by infectious disease.  Will be placed on steroids and admitted in guarded condition.  I had a long discussion with patient and his son about his guarded condition and apparently his wife does not want him to be on a ventilator but patient does not want make that decision without her here and this will be readdressed when she gets here.\"    WOUND ASSESSMENT/LDA              RESOLVED     Vascular:    ELI:   No results found.    Lab Values:    Lab Results   Component Value Date/Time    WBC 13.3 (H) 11/30/2021 03:00 AM    RBC " 4.09 (L) 11/30/2021 03:00 AM    HEMOGLOBIN 12.4 (L) 11/30/2021 03:00 AM    HEMATOCRIT 39.4 (L) 11/30/2021 03:00 AM    CREACTPROT 17.78 (H) 11/29/2021 12:20 PM      Culture Results show:  No results found for this or any previous visit (from the past 720 hour(s)).    Pain Level/Medicated:  No s/s of distress      INTERVENTIONS BY WOUND TEAM:  Chart and images reviewed. Discussed with bedside RN. All areas of concern (based on picture review, LDA review and discussion with bedside RN) have been thoroughly assessed. Documentation of areas based on significant findings. This RN in to assess patient. Performed standard wound care which includes appropriate positioning, dressing removal and non-selective debridement. Pictures and measurements obtained weekly if/when required.  Preparation for Dressing removal: hydrocolloid thin removed without difficulty.  Non-selectively Debrided with:  NS and gauze.  Sharp debridement: NA  Colleen wound: Cleansed with NS, dried  Primary Dressing: NA open to air  Secondary (Outer) Dressing: NA    Interdisciplinary consultation: Patient, Bedside RN    EVALUATION / RATIONALE FOR TREATMENT:  Most Recent Date:  11/30/21: Pt intubated in [facility]. Pressure Injuries resolved.     11/22: Pt has St pressure injuries to bilateral superior ears. Hydrocolloid applied to provide occlusive drsg to help promote autolytic debridement. It will also promote a moisture-balanced environment conducive to wound healing.    Goals: Steady decrease in wound area and depth weekly.    WOUND TEAM PLAN OF CARE ([X] for frequency of wound follow up,):   Nursing to follow orders written for wound care. Contact wound team if area fails to progress, deteriorates or with any questions/concerns  Dressing changes by wound team:                   Follow up 3 times weekly:                NPWT change 3 times weekly:     Follow up 1-2 times weekly:     Follow up Bi-Monthly:                   Follow up as needed:   X  Other (explain):      NURSING PLAN OF CARE ORDERS (X):  Dressing changes: See Dressing Care orders:   Skin care: See Skin Care orders:   RN Prevention Protocol: x  Rectal tube care: See Rectal Tube Care orders:   Other orders:    RSKIN:   CURRENTLY IN PLACE (X), APPLIED THIS VISIT (A), ORDERED (O):   Q shift Abelardo:  X  Q shift pressure point assessments:  X    Surface/Positioning   Pressure redistribution mattress            Low Airloss   x       Bariatric foam      Bariatric ARSH     Waffle cushion        Waffle Overlay          Reposition q 2 hours   x   TAPs Turning system     Z Suraj Pillow     Offloading/Redistribution not assessed  Sacral Mepilex (Silicone dressing)     Heel Mepilex (Silicone dressing)         Heel float boots (Prevalon boot)             Float Heels off Bed with Pillows           Respiratory   Silicone O2 tubing         Gray Foam Ear protectors     Cannula fixation Device (Tender )          High flow offloading Clip    Elastic head band offloading device      Anchorfast      x     Taped in place at this time r/t being placed prone                                              Trach with Optifoam split foam             Containment/Moisture Prevention     Rectal tube or BMS    Purwick/Condom Cath        Navas Catheter  x  Barrier wipes           Barrier paste       Antifungal tx      Interdry        Mobilization not assessed     Up to chair        Ambulate      PT/OT      Nutrition       Dietician        Diabetes Education      PO     TF x    TPN     NPO   # days     Other        Anticipated discharge plans: no advanced needs @ this time  LTACH:        SNF/Rehab:                  Home Health Care:           Outpatient Wound Center:            Self/Family Care:        Other:           Vac Discharge Needs:   Not Applicable Pt not on a wound vac:   x                      Regular Vac in use:                                                                Veraflo Vac while inpatient, ok to transition to Regular Vac on  Discharge:                                 Veraflo Vac while inpatient, will need to remain on Veraflo Vac upon discharge:

## 2021-11-30 NOTE — CARE PLAN
Problem: Skin Integrity  Goal: Skin integrity is maintained or improved  Outcome: Progressing  Note: Provided frequent turns and repositioning. Utilized pillows, mepilex and waffle mattress for support.      Problem: Safety - Medical Restraint  Goal: Remains free of injury from restraints (Restraint for Interference with Medical Device)  Outcome: Progressing  Flowsheets (Taken 11/30/2021 0129)  Addressed this shift: Remains free of injury from restraints (restraint for interference with medical device):   Determine that other, less restrictive measures have been tried or would not be effective before applying the restraint   Evaluate the patient's condition at the time of restraint application   Inform patient/family regarding the reason for restraint   Every 2 hours: Monitor safety, psychosocial status, comfort, nutrition and hydration

## 2021-11-30 NOTE — DIETARY
Nutrition Services: Update   Day 25 of admit.  TF currently held - Cortrak was replaced overnight however per d/w Dr Monaco, concern for possible ileus; depending on imaging, TF may be restarted @ trophic rate.  Pt now with Propofol infusing, currently @ 40 mcg/kg/min (20 mL/hr), providing 528 kcal.  Will adjust TF rate for with & without Propofol in the event TF restarts:  With Propofol: Impact Peptide 1.5 @ 45 mL/hr, providing 1620 kcal (+kcal from Propofol), 102 gm protein, 151 gm CHO, and 832 mL of free water per day.  When Propofol d/c'd: Impact Peptide 1.5 @ 55 mL/hr.  RD continues to monitor nutrition POC.  Restart TF per MD.

## 2021-11-30 NOTE — CARE PLAN
Problem: Safety - Medical Restraint  Goal: Remains free of injury from restraints (Restraint for Interference with Medical Device)  Outcome: Progressing  Flowsheets (Taken 11/30/2021 1525)  Addressed this shift: Remains free of injury from restraints (restraint for interference with medical device):   Determine that other, less restrictive measures have been tried or would not be effective before applying the restraint   Evaluate the patient's condition at the time of restraint application   Inform patient/family regarding the reason for restraint   Every 2 hours: Monitor safety, psychosocial status, comfort, nutrition and hydration  Goal: Free from restraint(s) (Restraint for Interference with Medical Device)  Outcome: Progressing  Flowsheets (Taken 11/30/2021 1529)  Addressed this shift: Free from restraint(s) (restraint for interference with medical device):   ONCE/SHIFT or MINIMUM Every 12 hours: Assess and document the continuing need for restraints   Every 24 hours: Continued use of restraint requires Licensed Independent Practitioner to perform face to face examination and written order   Identify and implement measures to help patient regain control     The patient is Watcher - Medium risk of patient condition declining or worsening    Shift Goals  Clinical Goals: Vent synchrony  Patient Goals: EDMUND

## 2021-11-30 NOTE — PROGRESS NOTES
Cortrak Placement    Tube Team verified patient name and medical record number prior to tube placement.  Cortrak tube (43 inches, 10 Ukrainian) placed at 75 cm in left nare.  Per Cortrak picture, tube appears to be in the stomach.  Nursing Instructions: Awaiting KUB to confirm placement before use for medications or feeding. Once placement confirmed, flush tube with 30 ml of water, and then remove and save stylet, in patient medication drawer.

## 2021-12-01 NOTE — CARE PLAN
Problem: Knowledge Deficit - Standard  Goal: Patient and family/care givers will demonstrate understanding of plan of care, disease process/condition, diagnostic tests and medications  Outcome: Not Progressing     Problem: Respiratory  Goal: Patient will achieve/maintain optimum respiratory ventilation and gas exchange  Outcome: Not Progressing     Problem: Mobility  Goal: Patient's capacity to carry out activities will improve  Outcome: Not Progressing     Problem: Psychosocial  Goal: Patient's level of anxiety will decrease  Outcome: Not Progressing     Problem: Fall Risk  Goal: Patient will remain free from falls  Outcome: Progressing     Problem: Skin Integrity  Goal: Skin integrity is maintained or improved  Outcome: Progressing     Problem: Pain - Standard  Goal: Alleviation of pain or a reduction in pain to the patient’s comfort goal  Outcome: Progressing     Problem: Safety - Medical Restraint  Goal: Remains free of injury from restraints (Restraint for Interference with Medical Device)  Outcome: Progressing  Goal: Free from restraint(s) (Restraint for Interference with Medical Device)  Outcome: Progressing

## 2021-12-01 NOTE — PROGRESS NOTES
"Critical Care Progress Note    Date of admission  11/5/2021    Chief Complaint  61 y.o. male, unvaccinated against COVID-19, admitted 11/5/2021 with COVID-19 pneumonia, requiring HFNC, now with oxygen desaturation and increased work of breathing, transferred to ICU 11/20 on BiPAP, intubated 11/21     Hospital Course  \"61 y.o. male who presented 11/5/2021 with no PMH, BMI 30 that is unvaccinated that presented 11/5 for 10 days of cough, chills, headache, sore throat with + covid test 9 days prior. He had room air saturation of 62% and was admitted on HFNC. Today I was asked to consult on patient since he is on HFNC + NRB. He is afebrile, HR 77-80's, 's sat 90-92%. He was started on dexamethasone and barcitinib, crp 17, ddimer 1, lactic 2.5. Still undecided about code status and palliative care has been consulted and remains full code. Wife and son at bedside. Patient without complaints of chest pain, shortness or breath cough, n/v/d, leg pain or swelling.\"      11/20 -he has been managed on the medical floor with HFNC, Decadron, baricitinib, Lasix, empiric antibiotics; increased work of breathing, accessory muscle use today and transfer to ICU.  11/21-intubated prone paralyzed, very high peak pressures allowing permissive hypercapnia  11/22 -PEEP of 8, 60% FiO2, paralyzed/sedated/proned  11/23 PEEP 8, 50%, paralyzed/sedated/proned  11/24 -PEEP 8, 60%, sedated/proned, improving renal function  11/25 PEEP 8, 40%, discontinued proning  11/26 PEEP of 10, 70%, weaning sedation, fevers  11/27 PEEP of 10, ASV, weaning sedation, initiation of norepinephrine drip, started on Rocephin for UTI/possible pneumonia  11/29 -VD #9, %, 70% FiO2, ceftriaxone day 3 for Klebsiella pneumonia  11/30 -VD #10, ASV, ceftriaxone day 4, quiet bowel sounds, suspect some component of ileus, trial Relistor, further imaging as needed  12/1 -VD #11, APV/CMV, increased secretions, bronchoscopy today, CT C/A/P some pneumomediastinum, no " PTX, no bowel abnormality advance TF    Interval Problem Update  Reviewed last 24 hour events:  Int follows, RASS -2  Prop/fent  SR/ST  SBP   T-max 102  WBC 10.5-13.3-17.5  Ceftriaxone day #5  PCT 0.95-0.68  CRP 25.5  Sputum 11/27 heavy growth sensitive Klebsiella pneumonia  TF at 10  CT C/A/P 11/3009-wrffaiaj-akgwubbim consolidation, GGIs, pneumomediastinum, cholelithiasis, no dilated bowel  VD# 10, CMV 34/38185/80, inc sec; bronch today  C3 5/7  replce k and mg  Off steroid - stopped 11/30  Add PO narcan       Review of Systems  Review of Systems   Unable to perform ROS: Intubated     Vital Signs for last 24 hours   Pulse:  [] 110  Resp:  [12-39] 13  BP: ()/(52-95) 114/71  SpO2:  [87 %-98 %] 91 %    Respiratory Information for the last 24 hours  Vent Mode: APVCMV  Rate (breaths/min): 34  Vt Target (mL): 350  PEEP/CPAP: 10  MAP: 19  Control VTE (exp VT): 362 170%    Physical Exam   Physical Exam  Vitals and nursing note reviewed.   Constitutional:       General: He is sleeping. He is in acute distress.      Appearance: He is overweight.      Interventions: He is sedated, intubated and restrained.   HENT:      Head: Normocephalic.      Nose: Nose normal.      Comments: Nasal feeding tube in place     Mouth/Throat:      Mouth: Mucous membranes are moist.      Pharynx: Oropharynx is clear.      Comments: Endotracheal tube in place  Eyes:      Conjunctiva/sclera: Conjunctivae normal.      Pupils: Pupils are equal, round, and reactive to light.   Neck:      Comments: Left IJ central venous catheter without surrounding hematoma, mild increase in subcu emphysema in bilateral neck (L>R)  Cardiovascular:      Rate and Rhythm: Regular rhythm. Tachycardia present. Occasional extrasystoles are present.     Chest Wall: PMI is displaced.      Pulses: Decreased pulses.           Radial pulses are 1+ on the right side and 1+ on the left side.      Heart sounds: Heart sounds are distant. No murmur heard.        Comments: Requiring ongoing norepinephrine drip for persistent hypotension  Pulmonary:      Effort: Tachypnea present. No prolonged expiration. He is intubated.      Breath sounds: Examination of the right-middle field reveals rhonchi. Examination of the left-middle field reveals rhonchi. Examination of the right-lower field reveals rhonchi. Examination of the left-lower field reveals rhonchi. Rhonchi present. No wheezing or rales.      Comments: Ongoing poor compliance, minimal secretions, tolerating ASV  Abdominal:      General: Bowel sounds are normal. There is no distension.      Palpations: Abdomen is soft.      Tenderness: There is no abdominal tenderness. There is no guarding.   Genitourinary:     Comments: Navas catheter in place  Musculoskeletal:         General: No tenderness.      Right lower leg: No edema.      Left lower leg: No edema.   Lymphadenopathy:      Cervical: No cervical adenopathy.   Skin:     General: Skin is warm and dry.      Capillary Refill: Capillary refill takes 2 to 3 seconds.      Findings: No rash.   Neurological:      Mental Status: He is easily aroused.      Comments: Awakens and grimaces and opens eyes but not following commands, moves all extremities weakly   Psychiatric:         Behavior: Behavior is uncooperative.      Comments: Unable to assess given current clinical condition         Medications  Current Facility-Administered Medications   Medication Dose Route Frequency Provider Last Rate Last Admin   • midazolam (VERSED) injection 1-5 mg  1-5 mg Intravenous Q HOUR PRN Liam Monaco M.D.   5 mg at 11/29/21 1747   • fentaNYL (SUBLIMAZE) 50 mcg/mL in 50mL (Continuous Infusion)   Intravenous Continuous Liam Monaco M.D. 5 mL/hr at 12/01/21 0059 250 mcg/hr at 12/01/21 0059   • propofol (DIPRIVAN) injection  0-80 mcg/kg/min Intravenous Continuous Liam Monaco M.D. 22.5 mL/hr at 12/01/21 0451 45 mcg/kg/min at 12/01/21 0451   • fentaNYL (SUBLIMAZE) injection  mcg    mcg Intravenous Q HOUR PRN Jeremy M Gonda, M.D.   100 mcg at 11/30/21 1605   • oxyCODONE immediate-release (ROXICODONE) tablet 5-10 mg  5-10 mg Enteral Tube Q4HRS PRN Jeremy M Gonda, M.D.   10 mg at 11/28/21 1754   • MD Alert...ICU Electrolyte Replacement per Pharmacy   Other PHARMACY TO DOSE HARVE[facility] Lovell.       • norepinephrine (Levophed) 8 mg in 250 mL NS infusion (premix)  0-30 mcg/min Intravenous Continuous Jeremy M Gonda, M.D. 7.5 mL/hr at 12/01/21 0451 4 mcg/min at 12/01/21 0451   • cefTRIAXone (Rocephin) 1 g in  mL IVPB  1 g Intravenous Daily-1400 Jeremy M Gonda, M.D.   Stopped at 11/30/21 1253   • insulin regular (HumuLIN R,NovoLIN R) injection  3-14 Units Subcutaneous Q6HRS Jeremy M Gonda, M.D.   3 Units at 11/29/21 1228    And   • dextrose 50% (D50W) injection 50 mL  50 mL Intravenous Q15 MIN PRN Jeremy M Gonda, M.D.       • Respiratory Therapy Consult   Nebulization Continuous RT Katie Singh M.D.       • famotidine (PEPCID) tablet 20 mg  20 mg Enteral Tube Q12HRS Katie Singh M.D.   20 mg at 12/01/21 0452    Or   • famotidine (PEPCID) injection 20 mg  20 mg Intravenous Q12HRS Katie Singh M.D.   20 mg at 11/25/21 0520   • senna-docusate (PERICOLACE or SENOKOT S) 8.6-50 MG per tablet 2 Tablet  2 Tablet Enteral Tube BID Katie Singh M.D.   2 Tablet at 12/01/21 0452    And   • polyethylene glycol/lytes (MIRALAX) PACKET 1 Packet  1 Packet Enteral Tube QDAY PRN Katie Singh M.D.   1 Packet at 11/29/21 1019    And   • magnesium hydroxide (MILK OF MAGNESIA) suspension 30 mL  30 mL Enteral Tube QDAY PRN Katie Singh M.D.   30 mL at 11/23/21 0836    And   • bisacodyl (DULCOLAX) suppository 10 mg  10 mg Rectal QDAY PRN Katie Singh M.D.   10 mg at 11/24/21 1143   • lidocaine (XYLOCAINE) 1 % injection 2 mL  2 mL Tracheal Tube Q30 MIN PRN Katie Singh M.D.       • Pharmacy Consult: Enteral tube insertion - review meds/change route/product selection  1  Each Other PHARMACY TO DOSE Katie Singh M.D.       • acetaminophen (Tylenol) tablet 650 mg  650 mg Enteral Tube Q6HRS PRN Katie Singh M.D.   650 mg at 11/30/21 1729   • hydrOXYzine HCl (ATARAX) tablet 25 mg  25 mg Enteral Tube TID PRN Katie Singh M.D.       • ondansetron (ZOFRAN ODT) dispertab 4 mg  4 mg Enteral Tube Q6HRS PRN Katie Singh M.D.       • promethazine (PHENERGAN) tablet 12.5-25 mg  12.5-25 mg Enteral Tube Q4HRS PRN Katie Singh M.D.       • vitamin D3 (cholecalciferol) tablet 2,000 Units  2,000 Units Enteral Tube DAILY Katie Singh M.D.   2,000 Units at 12/01/21 0452   • ondansetron (ZOFRAN) syringe/vial injection 4 mg  4 mg Intravenous Q6HRS PRN Anurag Martinez M.D.       • enoxaparin (LOVENOX) inj 40 mg  40 mg Subcutaneous DAILY Anurag Martinez M.D.   40 mg at 12/01/21 0451   • promethazine (PHENERGAN) suppository 12.5-25 mg  12.5-25 mg Rectal Q4HRS PRN Anurag Martinez M.D.       • prochlorperazine (COMPAZINE) injection 5-10 mg  5-10 mg Intravenous Q4HRS PRN Anurag Martinez M.D.           Fluids    Intake/Output Summary (Last 24 hours) at 12/1/2021 0904  Last data filed at 12/1/2021 0800  Gross per 24 hour   Intake 865.78 ml   Output 1740 ml   Net -874.22 ml       Laboratory  Recent Labs     11/30/21  0301 11/30/21  0455 12/01/21  0337   ISTATAPH 7.395* 7.403 7.361*   ISTATAPCO2 49.7* 49.0* 58.0*   ISTATAPO2 49* 48* 63*   ISTATATCO2 32 32 35*   [id number] 83* 83* 90*   ISTATARTHCO3 30.5* 30.6* 32.8*   ISTATARTBE 5* 5* 6*   ISTATTEMP 37.3 C 37.8 C 37.7 C   ISTATFIO2 70 70 80   ISTATSPEC Arterial Arterial Arterial   ISTATAPHTC 7.391* 7.391* 7.350*   [id number] 50* 51* 66         Recent Labs     11/29/21  0245 11/30/21  0300 12/01/21  0319   SODIUM 141 138 141   POTASSIUM 4.4 3.9 3.8   CHLORIDE 104 104 103   CO2 29 25 33   BUN 27* 25* 17   CREATININE 0.55 0.53 0.42*   MAGNESIUM  --   --  1.9   CALCIUM 8.8 8.7 9.0     Recent Labs     11/29/21  0245 11/29/21  1220  11/30/21 0300 12/01/21 0319   ALTSGPT  --   --   --  31   ASTSGOT  --   --   --  24   ALKPHOSPHAT  --   --   --  96   TBILIRUBIN  --   --   --  0.3   PREALBUMIN  --  11.1*  --   --    GLUCOSE 117*  --  101* 105*     Recent Labs     11/29/21 0245 11/30/21 0300 12/01/21 0319   WBC 10.5 13.3* 17.5*   NEUTSPOLYS 77.20* 75.00* 82.10*   LYMPHOCYTES 14.00* 15.90* 8.60*   MONOCYTES 1.90 2.70 1.80   EOSINOPHILS 3.00 2.90 4.60   BASOPHILS 0.60 0.70 0.60   ASTSGOT  --   --  24   ALTSGPT  --   --  31   ALKPHOSPHAT  --   --  96   TBILIRUBIN  --   --  0.3     Recent Labs     11/29/21 0245 11/30/21 0300 12/01/21 0319   RBC 3.78* 4.09* 3.97*   HEMOGLOBIN 11.6* 12.4* 12.0*   HEMATOCRIT 36.3* 39.4* 38.9*   PLATELETCT 259 258 234       Imaging  X-Ray:  I have personally reviewed the images and compared with prior images.       Assessment/Plan  * Acute hypoxemic respiratory failure due to COVID-19 (HCC)- (present on admission)  Assessment & Plan  Intubated 11/21  Continue full mechanical ventilatory support, ASV for ventilator synchrony  Status post proning protocol (completed 11/25)  Continue to titrate FiO2 to goal SPO2 greater than 85%, PaO2 greater than 50  A-F bundle  Sedation - minimize as tolerates  Steroids: Completed primary rounds of steroids, Decadron day 10/10 (second course)  Remdesivir: Not a candidate given degree of respiratory failure  Monoclonal antibody: Status post baricitinib  Anticoagulation: Chemical DVT prophylaxis; f/u screening DVT US study  Diuresis: keep euvolemic/net negative as haile  Cleared from isolation precautions per hospital guidelines    Acute cystitis with hematuria  Assessment & Plan  11/27  Continue Rocephin x5-day course    Fever  Assessment & Plan  Ceftriaxone started 11/27 - Klebsiella pneumoniae pneumonia    Obstipation  Assessment & Plan  Continue aggressive bowel protocol, enema  S/p Relistor    Hypernatremia  Assessment & Plan  Improving; decreasing free water    ROBERTO (acute kidney  injury) (HCC)  Assessment & Plan  Secondary to ATN - improving again today, likely diuretic induced  Avoid nephrotoxins  Monitor creatinine, urine output, electrolytes closely  Keep euvolemic    Elevated troponin  Assessment & Plan  Secondary to demand ischemia -improved    Goals of care, counseling/discussion  Assessment & Plan  Palliative care consulted  Ongoing goals of care discussions with family  DNR  Very guarded prognosis given likely fibrotic state of Covid     Update:  Full ventilator support, bronchoscopy today with increasing secretions  Continue ceftriaxone  CT C/A/P, reviewed.  Monitor closely for pneumothorax with evidence of pneumomediastinum on CT  Slowly advance enteral tube feeds again, add enteral Narcan  Patient examined and reexamined.  Remains a very high risk for deterioration.    VTE:  Lovenox  Ulcer: H2 Antagonist  Lines: Central Line  Ongoing indication addressed, Arterial Line  Ongoing indication addressed and Navas Catheter  Ongoing indication addressed    I have performed a physical exam and reviewed and updated ROS and Plan today (12/1/2021). In review of yesterday's note (11/30/2021), there are no changes except as documented above.       Discussed patient condition and risk of morbidity and/or mortality with Family, RN, RT, Pharmacy, Charge nurse / hot rounds and Patient. Critical care time = 78 minutes in directly providing and coordinating critical care and extensive data review.  No time overlap and excludes procedures.

## 2021-12-02 NOTE — PROGRESS NOTES
Monitor Summary    SR   (F) PAC  .16/.08/.36      Paroxysmal Afib 1550; rate: 100-150, Dr Monaco notified, pt converted back to SR 1610

## 2021-12-02 NOTE — CARE PLAN
Problem: Safety - Medical Restraint  Goal: Free from restraint(s) (Restraint for Interference with Medical Device)  Outcome: Not Progressing  Note: Pt remains retrained until extubation     Problem: Knowledge Deficit - Standard  Goal: Patient and family/care givers will demonstrate understanding of plan of care, disease process/condition, diagnostic tests and medications  Outcome: Progressing  Note: Family updated at bedside     Problem: Respiratory  Goal: Patient will achieve/maintain optimum respiratory ventilation and gas exchange  Outcome: Progressing  Note: Sedation changed to help pt tolerate vent     Problem: Skin Integrity  Goal: Skin integrity is maintained or improved  Outcome: Progressing  Note: Skin tear on buttocks; turning Q2     Problem: Safety - Medical Restraint  Goal: Remains free of injury from restraints (Restraint for Interference with Medical Device)  Outcome: Progressing  Note: Skin intact   The patient is Unstable - High likelihood or risk of patient condition declining or worsening    Shift Goals  Clinical Goals: vent synchrony  Patient Goals: EDMUND  Family Goals: pt safety, improvement    Progress made toward(s) clinical / shift goals:  y    Patient is not progressing towards the following goals:      Problem: Safety - Medical Restraint  Goal: Free from restraint(s) (Restraint for Interference with Medical Device)  Outcome: Not Progressing  Note: Pt remains retrained until extubation

## 2021-12-02 NOTE — CARE PLAN
Problem: Respiratory  Goal: Patient will achieve/maintain optimum respiratory ventilation and gas exchange  Outcome: Progressing     Problem: Fall Risk  Goal: Patient will remain free from falls  Outcome: Progressing     Problem: Skin Integrity  Goal: Skin integrity is maintained or improved  Outcome: Progressing     The patient is Unstable - High likelihood or risk of patient condition declining or worsening    Shift Goals  Clinical Goals: Vent synchrony  Patient Goals: EDMUND  Family Goals: NA    Progress made toward(s) clinical / shift goals:  Q2 turns in place, Mepilex in use. Fall precautions in place. Bed is low and locked.    Patient is not progressing towards the following goals: Patient remains on levophed for hemodynamic support. Patients Tmax 101.8.

## 2021-12-02 NOTE — CARE PLAN
Problem: Knowledge Deficit - Standard  Goal: Patient and family/care givers will demonstrate understanding of plan of care, disease process/condition, diagnostic tests and medications  Outcome: Not Progressing     Problem: Respiratory  Goal: Patient will achieve/maintain optimum respiratory ventilation and gas exchange  Outcome: Not Progressing     Problem: Mobility  Goal: Patient's capacity to carry out activities will improve  Outcome: Not Progressing     Problem: Psychosocial  Goal: Patient's level of anxiety will decrease  Outcome: Not Progressing     Problem: Fall Risk  Goal: Patient will remain free from falls  Outcome: Progressing     Problem: Skin Integrity  Goal: Skin integrity is maintained or improved  Outcome: Progressing     Problem: Pain - Standard  Goal: Alleviation of pain or a reduction in pain to the patient’s comfort goal  Outcome: Progressing     Problem: Safety - Medical Restraint  Goal: Remains free of injury from restraints (Restraint for Interference with Medical Device)  Outcome: Progressing

## 2021-12-02 NOTE — CARE PLAN
Problem: Ventilation  Goal: Ability to achieve and maintain unassisted ventilation or tolerate decreased levels of ventilator support  Description: Document on Vent flowsheet    1.  Support and monitor invasive and noninvasive mechanical ventilation  2.  Monitor ventilator weaning response  3.  Perform ventilator associated pneumonia prevention interventions  4.  Manage ventilation therapy by monitoring diagnostic test results  Outcome: Not Progressing      Ventilator Daily Summary    Vent Day # 12  8 @ 24    APVCMV: 32/350/+12/80%    Plan: Continue current ventilator settings and wean mechanical ventilation as tolerated per physician orders.

## 2021-12-02 NOTE — PROGRESS NOTES
"Critical Care Progress Note    Date of admission  11/5/2021    Chief Complaint  61 y.o. male, unvaccinated against COVID-19, admitted 11/5/2021 with COVID-19 pneumonia, requiring HFNC, now with oxygen desaturation and increased work of breathing, transferred to ICU 11/20 on BiPAP, intubated 11/21     Hospital Course  \"61 y.o. male who presented 11/5/2021 with no PMH, BMI 30 that is unvaccinated that presented 11/5 for 10 days of cough, chills, headache, sore throat with + covid test 9 days prior. He had room air saturation of 62% and was admitted on HFNC. Today I was asked to consult on patient since he is on HFNC + NRB. He is afebrile, HR 77-80's, 's sat 90-92%. He was started on dexamethasone and barcitinib, crp 17, ddimer 1, lactic 2.5. Still undecided about code status and palliative care has been consulted and remains full code. Wife and son at bedside. Patient without complaints of chest pain, shortness or breath cough, n/v/d, leg pain or swelling.\"      11/20 -he has been managed on the medical floor with HFNC, Decadron, baricitinib, Lasix, empiric antibiotics; increased work of breathing, accessory muscle use today and transfer to ICU.  11/21-intubated prone paralyzed, very high peak pressures allowing permissive hypercapnia  11/22 -PEEP of 8, 60% FiO2, paralyzed/sedated/proned  11/23 PEEP 8, 50%, paralyzed/sedated/proned  11/24 -PEEP 8, 60%, sedated/proned, improving renal function  11/25 PEEP 8, 40%, discontinued proning  11/26 PEEP of 10, 70%, weaning sedation, fevers  11/27 PEEP of 10, ASV, weaning sedation, initiation of norepinephrine drip, started on Rocephin for UTI/possible pneumonia  11/29 -VD #9, %, 70% FiO2, ceftriaxone day 3 for Klebsiella pneumonia  11/30 -VD #10, ASV, ceftriaxone day 4, quiet bowel sounds, suspect some component of ileus, trial Relistor, further imaging as needed  12/1 -VD #11, APV/CMV, increased secretions, bronchoscopy today, CT C/A/P some pneumomediastinum, no " PTX, no bowel abnormality advance TF  12/2 -VD 12, PEEP 12, 80%, elevated airway pressures, amiodarone infusion, changed to Versed drip, GNR in sputum now, ABX escalated to Zosyn    Interval Problem Update  Reviewed last 24 hour events:  Versed and int Leonel for asychrony  Prop 80, fent 250  Tm = 101.5  RASS -5 now   - 120  haile TF at goal  I/O = 3141/1665  amio gtt  VD 12: 32/350/12/80  CXR: bilat infs, lines  Pepcid, lovenox  C3 6/7  BAL many GNR - change to Zosyn  Change to versed gtt; dilaudid gtt and prn  PO narcan ongoing      Review of Systems  Review of Systems   Unable to perform ROS: Intubated     Vital Signs for last 24 hours   Temp:  [38.4 °C (101.2 °F)] 38.4 °C (101.2 °F)  Pulse:  [] 81  Resp:  [13-39] 23  BP: ()/(49-93) 112/65  SpO2:  [82 %-99 %] 94 %    Respiratory Information for the last 24 hours  Vent Mode: APVCMV  Rate (breaths/min): 32  Vt Target (mL): 350  PEEP/CPAP: 12  MAP: 18  Control VTE (exp VT): 322 170%    Physical Exam   Physical Exam  Vitals and nursing note reviewed.   Constitutional:       General: He is sleeping. He is in acute distress.      Appearance: He is overweight.      Interventions: He is sedated, intubated and restrained.   HENT:      Head: Normocephalic.      Nose: Nose normal.      Comments: Nasal feeding tube in place     Mouth/Throat:      Mouth: Mucous membranes are moist.      Pharynx: Oropharynx is clear.      Comments: Endotracheal tube in place  Eyes:      Conjunctiva/sclera: Conjunctivae normal.      Pupils: Pupils are equal, round, and reactive to light.   Neck:      Comments: Left IJ central venous catheter without surrounding hematoma, mild increase in subcu emphysema in bilateral neck (L>R)  Cardiovascular:      Rate and Rhythm: Regular rhythm. Tachycardia present. Occasional extrasystoles are present.     Chest Wall: PMI is displaced.      Pulses: Decreased pulses.           Radial pulses are 1+ on the right side and 1+ on the left side.       Heart sounds: Heart sounds are distant. No murmur heard.       Comments: Requiring ongoing norepinephrine drip for persistent hypotension  Pulmonary:      Effort: Tachypnea present. No prolonged expiration. He is intubated.      Breath sounds: Examination of the right-middle field reveals rhonchi. Examination of the left-middle field reveals rhonchi. Examination of the right-lower field reveals rhonchi. Examination of the left-lower field reveals rhonchi. Rhonchi present. No wheezing or rales.      Comments: Ongoing poor compliance, minimal secretions, tolerating ASV  Abdominal:      General: Bowel sounds are normal. There is no distension.      Palpations: Abdomen is soft.      Tenderness: There is no abdominal tenderness. There is no guarding.   Genitourinary:     Comments: Navas catheter in place  Musculoskeletal:         General: No tenderness.      Right lower leg: No edema.      Left lower leg: No edema.   Lymphadenopathy:      Cervical: No cervical adenopathy.   Skin:     General: Skin is warm and dry.      Capillary Refill: Capillary refill takes 2 to 3 seconds.      Findings: No rash.   Neurological:      Mental Status: He is easily aroused.      Comments: Awakens and grimaces and opens eyes but not following commands, moves all extremities weakly   Psychiatric:         Behavior: Behavior is uncooperative.      Comments: Unable to assess given current clinical condition         Medications  Current Facility-Administered Medications   Medication Dose Route Frequency Provider Last Rate Last Admin   • rocuronium (ZEMURON) injection 50 mg  50 mg Intravenous Q HOUR PRN Katie Singh M.D.   50 mg at 12/02/21 0911   • Naloxone (NARCAN) 2 mg/2 mL oral syringe 4 mg  4 mg Enteral Tube Q8HRS Liam Monaco M.D.   4 mg at 12/02/21 0433   • hydrocortisone sodium succinate PF (Solu-CORTEF) 100 MG injection 50 mg  50 mg Intravenous Q6HRS Liam Monaco M.D.   50 mg at 12/02/21 0432   • amiodarone (NEXTERONE) 360  mg/200 mL infusion  0.5-1 mg/min Intravenous Continuous Liam Monaco M.D. 17 mL/hr at 12/02/21 0622 0.5 mg/min at 12/02/21 0622   • midazolam (VERSED) injection 1-5 mg  1-5 mg Intravenous Q HOUR PRN Liam Monaco M.D.   5 mg at 12/02/21 0450   • fentaNYL (SUBLIMAZE) 50 mcg/mL in 50mL (Continuous Infusion)   Intravenous Continuous Liam Monaco M.D. 5 mL/hr at 12/01/21 2141 250 mcg at 12/02/21 0622   • propofol (DIPRIVAN) injection  0-80 mcg/kg/min Intravenous Continuous Liam Monaco M.D. 30 mL/hr at 12/02/21 0622 60 mcg/kg/min at 12/02/21 0622   • fentaNYL (SUBLIMAZE) injection  mcg   mcg Intravenous Q HOUR PRN Jeremy M Gonda, M.D.   100 mcg at 12/01/21 1417   • oxyCODONE immediate-release (ROXICODONE) tablet 5-10 mg  5-10 mg Enteral Tube Q4HRS PRN Jeremy M Gonda, M.D.   10 mg at 11/28/21 1754   • MD Alert...ICU Electrolyte Replacement per Pharmacy   Other PHARMACY TO DOSE [facility] Lovell.       • norepinephrine (Levophed) 8 mg in 250 mL NS infusion (premix)  0-30 mcg/min Intravenous Continuous Jeremy M Gonda, M.D. 22.5 mL/hr at 12/02/21 0432 12 mcg/min at 12/02/21 0432   • cefTRIAXone (Rocephin) 1 g in  mL IVPB  1 g Intravenous Daily-1400 Liam Monaco M.D.   Stopped at 12/01/21 1434   • insulin regular (HumuLIN R,NovoLIN R) injection  3-14 Units Subcutaneous Q6HRS Jeremy M Gonda, M.D.   4 Units at 12/02/21 0450    And   • dextrose 50% (D50W) injection 50 mL  50 mL Intravenous Q15 MIN PRN Jeremy M Gonda, M.D.       • Respiratory Therapy Consult   Nebulization Continuous RT Katie Singh M.D.       • famotidine (PEPCID) tablet 20 mg  20 mg Enteral Tube Q12HRS Katie Singh M.D.   20 mg at 12/02/21 0433    Or   • famotidine (PEPCID) injection 20 mg  20 mg Intravenous Q12HRS Katie Singh M.D.   20 mg at 11/25/21 0520   • senna-docusate (PERICOLACE or SENOKOT S) 8.6-50 MG per tablet 2 Tablet  2 Tablet Enteral Tube BID Katie Singh M.D.   2 Tablet at 12/02/21 0433     And   • polyethylene glycol/lytes (MIRALAX) PACKET 1 Packet  1 Packet Enteral Tube QDAY PRN Katie Singh M.D.   1 Packet at 11/29/21 1019    And   • magnesium hydroxide (MILK OF MAGNESIA) suspension 30 mL  30 mL Enteral Tube QDAY PRN Katie Singh M.D.   30 mL at 12/01/21 1048    And   • bisacodyl (DULCOLAX) suppository 10 mg  10 mg Rectal QDAY PRN Katie Singh M.D.   10 mg at 11/24/21 1143   • lidocaine (XYLOCAINE) 1 % injection 2 mL  2 mL Tracheal Tube Q30 MIN PRN Katie Singh M.D.       • Pharmacy Consult: Enteral tube insertion - review meds/change route/product selection  1 Each Other PHARMACY TO DOSE Katie Singh M.D.       • acetaminophen (Tylenol) tablet 650 mg  650 mg Enteral Tube Q6HRS PRN Katie Singh M.D.   650 mg at 12/01/21 1405   • hydrOXYzine HCl (ATARAX) tablet 25 mg  25 mg Enteral Tube TID PRN Katie Singh M.D.       • ondansetron (ZOFRAN ODT) dispertab 4 mg  4 mg Enteral Tube Q6HRS PRN Katie Singh M.D.       • promethazine (PHENERGAN) tablet 12.5-25 mg  12.5-25 mg Enteral Tube Q4HRS PRN Katie Singh M.D.       • vitamin D3 (cholecalciferol) tablet 2,000 Units  2,000 Units Enteral Tube DAILY Katie Singh M.D.   2,000 Units at 12/02/21 0433   • ondansetron (ZOFRAN) syringe/vial injection 4 mg  4 mg Intravenous Q6HRS PRN Anurag Martinez M.D.       • enoxaparin (LOVENOX) inj 40 mg  40 mg Subcutaneous DAILY Anurag Martinez M.D.   40 mg at 12/02/21 0433   • promethazine (PHENERGAN) suppository 12.5-25 mg  12.5-25 mg Rectal Q4HRS PRN Anurag Martinez M.D.       • prochlorperazine (COMPAZINE) injection 5-10 mg  5-10 mg Intravenous Q4HRS PRN Anurag Martinez M.D.           Fluids    Intake/Output Summary (Last 24 hours) at 12/2/2021 0921  Last data filed at 12/2/2021 0600  Gross per 24 hour   Intake 2811.12 ml   Output 1515 ml   Net 1296.12 ml       Laboratory  Recent Labs     11/30/21  0455 12/01/21  0337 12/02/21  0257   ISTATAPH 7.403 7.361* 7.508*    ISTATAPCO2 49.0* 58.0* 46.2*   ISTATAPO2 48* 63* 181*   ISTATATCO2 32 35* 38*   CJETLEO4ETQ 83* 90* 100*   ISTATARTHCO3 30.6* 32.8* 36.7*   ISTATARTBE 5* 6* 12*   ISTATTEMP 37.8 C 37.7 C 37.2 C   ISTATFIO2 70 80 90   ISTATSPEC Arterial Arterial Arterial   ISTATAPHTC 7.391* 7.350* 7.505*   FEFTPPYL6MG 51* 66 182*         Recent Labs     11/30/21  0300 12/01/21 0319 12/02/21  0447   SODIUM 138 141 139   POTASSIUM 3.9 3.8 4.6   CHLORIDE 104 103 101   CO2 25 33 32   BUN 25* 17 20   CREATININE 0.53 0.42* 0.36*   MAGNESIUM  --  1.9 2.1   CALCIUM 8.7 9.0 8.7     Recent Labs     11/29/21  1220 11/30/21 0300 12/01/21 0319 12/02/21  0447   ALTSGPT  --   --  31 20   ASTSGOT  --   --  24 16   ALKPHOSPHAT  --   --  96 106*   TBILIRUBIN  --   --  0.3 0.2   PREALBUMIN 11.1*  --   --   --    GLUCOSE  --  101* 105* 199*     Recent Labs     11/30/21  0300 12/01/21 0319 12/02/21  0447   WBC 13.3* 17.5* 20.2*   NEUTSPOLYS 75.00* 82.10* 89.80*   LYMPHOCYTES 15.90* 8.60* 5.20*   MONOCYTES 2.70 1.80 1.70   EOSINOPHILS 2.90 4.60 0.10   BASOPHILS 0.70 0.60 0.40   ASTSGOT  --  24 16   ALTSGPT  --  31 20   ALKPHOSPHAT  --  96 106*   TBILIRUBIN  --  0.3 0.2     Recent Labs     11/30/21  0300 12/01/21 0319 12/02/21  0447   RBC 4.09* 3.97* 3.82*   HEMOGLOBIN 12.4* 12.0* 11.8*   HEMATOCRIT 39.4* 38.9* 37.7*   PLATELETCT 258 234 243       Imaging  X-Ray:  I have personally reviewed the images and compared with prior images.       Assessment/Plan  * Acute hypoxemic respiratory failure due to COVID-19 (HCC)- (present on admission)  Assessment & Plan  Intubated 11/21  Continue full mechanical ventilatory support, ASV for ventilator synchrony  Status post proning protocol (completed 11/25)  Continue to titrate FiO2 to goal SPO2 greater than 85%, PaO2 greater than 50  A-F bundle  Sedation - minimize as tolerates  Steroids: Completed primary rounds of steroids, Decadron day 10/10 (second course)  Remdesivir: Not a candidate given degree of  respiratory failure  Monoclonal antibody: Status post baricitinib  Anticoagulation: Chemical DVT prophylaxis; f/u screening DVT US study  Diuresis: keep euvolemic/net negative as haile  Cleared from isolation precautions per hospital guidelines    Acute cystitis with hematuria  Assessment & Plan  11/27  Continue Rocephin x5-day course    Fever  Assessment & Plan  Ceftriaxone started 11/27 - Klebsiella pneumoniae pneumonia    Obstipation  Assessment & Plan  Continue aggressive bowel protocol, enema  S/p Relistor    Hypernatremia  Assessment & Plan  Improving; decreasing free water    ROBERTO (acute kidney injury) (HCC)  Assessment & Plan  Secondary to ATN - improving again today, likely diuretic induced  Avoid nephrotoxins  Monitor creatinine, urine output, electrolytes closely  Keep euvolemic    Elevated troponin  Assessment & Plan  Secondary to demand ischemia -improved    Goals of care, counseling/discussion  Assessment & Plan  Palliative care consulted  Ongoing goals of care discussions with family  DNR  Very guarded prognosis given likely fibrotic state of Covid     Update:  Full ventilator support, BAL 12/1 with many GNR, escalate ceftriaxone to Zosyn, follow culture results  P/F improved somewhat on ABG  Transition sedation to Versed as required intermittent rocuronium dosing for asynchrony  Continue p.o. Narcan, tube feed as tolerated  Overall prognosis very poor.  Family updated at bedside and remain hopeful.    VTE:  Lovenox  Ulcer: H2 Antagonist  Lines: Central Line  Ongoing indication addressed, Arterial Line  Ongoing indication addressed and Navas Catheter  Ongoing indication addressed    I have performed a physical exam and reviewed and updated ROS and Plan today (12/2/2021). In review of yesterday's note (12/1/2021), there are no changes except as documented above.       Discussed patient condition and risk of morbidity and/or mortality with Family, RN, RT, Pharmacy, Charge nurse / hot rounds and Patient.  Critical care time = 76 minutes in directly providing and coordinating critical care and extensive data review.  No time overlap and excludes procedures.

## 2021-12-03 NOTE — PROGRESS NOTES
"Critical Care Progress Note    Date of admission  11/5/2021    Chief Complaint  61 y.o. male, unvaccinated against COVID-19, admitted 11/5/2021 with COVID-19 pneumonia, requiring HFNC, now with oxygen desaturation and increased work of breathing, transferred to ICU 11/20 on BiPAP, intubated 11/21     Hospital Course  \"61 y.o. male who presented 11/5/2021 with no PMH, BMI 30 that is unvaccinated that presented 11/5 for 10 days of cough, chills, headache, sore throat with + covid test 9 days prior. He had room air saturation of 62% and was admitted on HFNC. Today I was asked to consult on patient since he is on HFNC + NRB. He is afebrile, HR 77-80's, 's sat 90-92%. He was started on dexamethasone and barcitinib, crp 17, ddimer 1, lactic 2.5. Still undecided about code status and palliative care has been consulted and remains full code. Wife and son at bedside. Patient without complaints of chest pain, shortness or breath cough, n/v/d, leg pain or swelling.\"      11/20 -he has been managed on the medical floor with HFNC, Decadron, baricitinib, Lasix, empiric antibiotics; increased work of breathing, accessory muscle use today and transfer to ICU.  11/21-intubated prone paralyzed, very high peak pressures allowing permissive hypercapnia  11/22 -PEEP of 8, 60% FiO2, paralyzed/sedated/proned  11/23 PEEP 8, 50%, paralyzed/sedated/proned  11/24 -PEEP 8, 60%, sedated/proned, improving renal function  11/25 PEEP 8, 40%, discontinued proning  11/26 PEEP of 10, 70%, weaning sedation, fevers  11/27 PEEP of 10, ASV, weaning sedation, initiation of norepinephrine drip, started on Rocephin for UTI/possible pneumonia  11/29 -VD #9, %, 70% FiO2, ceftriaxone day 3 for Klebsiella pneumonia  11/30 -VD #10, ASV, ceftriaxone day 4, quiet bowel sounds, suspect some component of ileus, trial Relistor, further imaging as needed  12/1 -VD #11, APV/CMV, increased secretions, bronchoscopy today, CT C/A/P some pneumomediastinum, no " PTX, no bowel abnormality advance TF  12/2 -VD 12, PEEP 12, 80%, elevated airway pressures, amiodarone infusion, changed to Versed drip, GNR in sputum now, ABX escalated to Zosyn  12/3 - VD13, PEEP 12, 70%, Zosyn day 2 for Pseudomonas pneumonia, SR on amiodarone    Interval Problem Update  Reviewed last 24 hour events:  Versed 8, dilaudid 3, RASS -4  AF-> SR 70's - 90's on amio gtt  Tm = 99.5  I/O = 1489/1264  TF at 55; + BM  VD #13: 32/350/12/70  Cst 12  7.3/82/81  Zosyn day 2 - pseudomonas BAL, sens P  Day 23 total steroids; solucortef        Review of Systems  Review of Systems   Unable to perform ROS: Intubated     Vital Signs for last 24 hours   Temp:  [37.5 °C (99.5 °F)] 37.5 °C (99.5 °F)  Pulse:  [77-91] 78  Resp:  [18-33] 32  BP: ()/(58-77) 112/65  SpO2:  [92 %-97 %] 96 %    Respiratory Information for the last 24 hours  Vent Mode: APVCMV  Rate (breaths/min): 32  Vt Target (mL): 350  PEEP/CPAP: 12  MAP: 19  Control VTE (exp VT): 340 170%    Physical Exam   Physical Exam  Vitals and nursing note reviewed.   Constitutional:       General: He is sleeping. He is in acute distress.      Appearance: He is overweight.      Interventions: He is sedated, intubated and restrained.   HENT:      Head: Normocephalic.      Nose: Nose normal.      Comments: Nasal feeding tube in place     Mouth/Throat:      Mouth: Mucous membranes are moist.      Pharynx: Oropharynx is clear.      Comments: Endotracheal tube in place  Eyes:      Conjunctiva/sclera: Conjunctivae normal.      Pupils: Pupils are equal, round, and reactive to light.   Neck:      Comments: Left IJ central venous catheter without surrounding hematoma, mild increase in subcu emphysema in bilateral neck (L>R)  Cardiovascular:      Rate and Rhythm: Regular rhythm. Tachycardia present. Occasional extrasystoles are present.     Chest Wall: PMI is displaced.      Pulses: Decreased pulses.           Radial pulses are 1+ on the right side and 1+ on the left side.       Heart sounds: Heart sounds are distant. No murmur heard.       Comments: Requiring ongoing norepinephrine drip for persistent hypotension  Pulmonary:      Effort: Tachypnea present. No prolonged expiration. He is intubated.      Breath sounds: Examination of the right-middle field reveals rhonchi. Examination of the left-middle field reveals rhonchi. Examination of the right-lower field reveals rhonchi. Examination of the left-lower field reveals rhonchi. Rhonchi present. No wheezing or rales.      Comments: Ongoing poor compliance, minimal secretions, tolerating ASV  Abdominal:      General: Bowel sounds are normal. There is no distension.      Palpations: Abdomen is soft.      Tenderness: There is no abdominal tenderness. There is no guarding.   Genitourinary:     Comments: Navas catheter in place  Musculoskeletal:         General: No tenderness.      Right lower leg: No edema.      Left lower leg: No edema.   Lymphadenopathy:      Cervical: No cervical adenopathy.   Skin:     General: Skin is warm and dry.      Capillary Refill: Capillary refill takes 2 to 3 seconds.      Findings: No rash.   Neurological:      Mental Status: He is easily aroused.      Comments: Awakens and grimaces and opens eyes but not following commands, moves all extremities weakly   Psychiatric:         Behavior: Behavior is uncooperative.      Comments: Unable to assess given current clinical condition         Medications  Current Facility-Administered Medications   Medication Dose Route Frequency Provider Last Rate Last Admin   • rocuronium (ZEMURON) injection 50 mg  50 mg Intravenous Q HOUR PRN Katie Singh M.D.   50 mg at 12/02/21 0911   • piperacillin-tazobactam (ZOSYN) 4.5 g in  mL IVPB  4.5 g Intravenous Q8HRS Liam Monaco M.D. 25 mL/hr at 12/03/21 0512 4.5 g at 12/03/21 0512   • midazolam (VERSED) premix 125 mg/125 mL NS  0-15 mg/hr Intravenous Continuous Liam Monaco M.D. 10 mL/hr at 12/02/21 2214 10 mg/hr at  12/02/21 2214   • HYDROmorphone (DILAUDID) 1 mg/mL in 50mL NS (HIGH ALERT - Non-Standard Continuous Infusion Concentration)   Intravenous Continuous Liam Monaco M.D. 3 mL/hr at 12/02/21 2358 3 mg/hr at 12/02/21 2358   • midazolam (VERSED) injection 1-5 mg  1-5 mg Intravenous Q HOUR PRN Liam Monaco M.D.   5 mg at 12/03/21 0541   • HYDROmorphone (Dilaudid) injection 1-2 mg  1-2 mg Intravenous Q30 MIN PRN Liam Moncao M.D.       • Naloxone (NARCAN) 2 mg/2 mL oral syringe 4 mg  4 mg Enteral Tube Q8HRS Liam Monaco M.D.   4 mg at 12/03/21 0512   • hydrocortisone sodium succinate PF (Solu-CORTEF) 100 MG injection 50 mg  50 mg Intravenous Q6HRS Liam Monaco M.D.   50 mg at 12/03/21 0511   • amiodarone (NEXTERONE) 360 mg/200 mL infusion  0.5-1 mg/min Intravenous Continuous Liam Monaco M.D. 17 mL/hr at 12/02/21 1930 0.5 mg/min at 12/02/21 1930   • MD Alert...ICU Electrolyte Replacement per Pharmacy   Other PHARMACY TO DOSE HARVE[facility] Lovell.       • norepinephrine (Levophed) 8 mg in 250 mL NS infusion (premix)  0-30 mcg/min Intravenous Continuous Jeremy M Gonda, M.D. 13.1 mL/hr at 12/02/21 1743 7 mcg/min at 12/02/21 1743   • insulin regular (HumuLIN R,NovoLIN R) injection  3-14 Units Subcutaneous Q6HRS Jeremy M Gonda, M.D.   3 Units at 12/03/21 0520    And   • dextrose 50% (D50W) injection 50 mL  50 mL Intravenous Q15 MIN PRN Jeremy M Gonda, M.D.       • Respiratory Therapy Consult   Nebulization Continuous RT Katie Singh M.D.       • famotidine (PEPCID) tablet 20 mg  20 mg Enteral Tube Q12HRS Katie Singh M.D.   20 mg at 12/03/21 0511    Or   • famotidine (PEPCID) injection 20 mg  20 mg Intravenous Q12HRS Katie Singh M.D.   20 mg at 11/25/21 0520   • senna-docusate (PERICOLACE or SENOKOT S) 8.6-50 MG per tablet 2 Tablet  2 Tablet Enteral Tube BID Katie Singh M.D.   2 Tablet at 12/03/21 0511    And   • polyethylene glycol/lytes (MIRALAX) PACKET 1 Packet  1 Packet Enteral  Tube QDAY PRN Katie Singh M.D.   1 Packet at 11/29/21 1019    And   • magnesium hydroxide (MILK OF MAGNESIA) suspension 30 mL  30 mL Enteral Tube QDAY PRN Katie Singh M.D.   30 mL at 12/01/21 1048    And   • bisacodyl (DULCOLAX) suppository 10 mg  10 mg Rectal QDAY PRN Katie Singh M.D.   10 mg at 12/02/21 1354   • lidocaine (XYLOCAINE) 1 % injection 2 mL  2 mL Tracheal Tube Q30 MIN PRN Katie Singh M.D.       • Pharmacy Consult: Enteral tube insertion - review meds/change route/product selection  1 Each Other PHARMACY TO DOSE Katie Singh M.D.       • acetaminophen (Tylenol) tablet 650 mg  650 mg Enteral Tube Q6HRS PRN Katie Singh M.D.   650 mg at 12/02/21 1739   • hydrOXYzine HCl (ATARAX) tablet 25 mg  25 mg Enteral Tube TID PRN Katie Singh M.D.       • ondansetron (ZOFRAN ODT) dispertab 4 mg  4 mg Enteral Tube Q6HRS PRN Katie Singh M.D.       • promethazine (PHENERGAN) tablet 12.5-25 mg  12.5-25 mg Enteral Tube Q4HRS PRN Katie Singh M.D.       • vitamin D3 (cholecalciferol) tablet 2,000 Units  2,000 Units Enteral Tube DAILY Katie Singh M.D.   2,000 Units at 12/03/21 0511   • ondansetron (ZOFRAN) syringe/vial injection 4 mg  4 mg Intravenous Q6HRS PRN Anurag Martinez M.D.       • enoxaparin (LOVENOX) inj 40 mg  40 mg Subcutaneous DAILY Anurag Martinez M.D.   40 mg at 12/03/21 0511   • promethazine (PHENERGAN) suppository 12.5-25 mg  12.5-25 mg Rectal Q4HRS PRN Anurag Martinez M.D.       • prochlorperazine (COMPAZINE) injection 5-10 mg  5-10 mg Intravenous Q4HRS PRN Anurag M Juan, M.D.           Fluids    Intake/Output Summary (Last 24 hours) at 12/3/2021 0729  Last data filed at 12/3/2021 0600  Gross per 24 hour   Intake 270 ml   Output 1485 ml   Net -1215 ml       Laboratory  Recent Labs     12/01/21  0337 12/02/21  0257 12/03/21  0253   ISTATAPH 7.361* 7.508* 7.307*   ISTATAPCO2 58.0* 46.2* 81.0*   ISTATAPO2 63* 181* 79   ISTATATCO2 35* 38* 43*   QUATMFW1XKZ  90* 100* 93   ISTATARTHCO3 32.8* 36.7* 40.5*   ISTATARTBE 6* 12* 11*   ISTATTEMP 37.7 C 37.2 C 37.4 C   ISTATFIO2 80 90 80   ISTATSPEC Arterial Arterial Arterial   ISTATAPHTC 7.350* 7.505* 7.301*   QPXHPUPI3GD 66 182* 81         Recent Labs     12/01/21 0319 12/02/21  0447 12/03/21  0524   SODIUM 141 139 147*   POTASSIUM 3.8 4.6 4.2   CHLORIDE 103 101 107   CO2 33 32 37*   BUN 17 20 25*   CREATININE 0.42* 0.36* 0.40*   MAGNESIUM 1.9 2.1 2.1   CALCIUM 9.0 8.7 8.2*     Recent Labs     12/01/21 0319 12/02/21  0447 12/03/21  0524   ALTSGPT 31 20 17   ASTSGOT 24 16 14   ALKPHOSPHAT 96 106* 83   TBILIRUBIN 0.3 0.2 <0.2   GLUCOSE 105* 199* 180*     Recent Labs     12/01/21 0319 12/02/21 0447 12/03/21  0524   WBC 17.5* 20.2* 19.6*   NEUTSPOLYS 82.10* 89.80* 88.90*   LYMPHOCYTES 8.60* 5.20* 5.40*   MONOCYTES 1.80 1.70 2.40   EOSINOPHILS 4.60 0.10 0.20   BASOPHILS 0.60 0.40 0.30   ASTSGOT 24 16 14   ALTSGPT 31 20 17   ALKPHOSPHAT 96 106* 83   TBILIRUBIN 0.3 0.2 <0.2     Recent Labs     12/01/21 0319 12/02/21 0447 12/03/21  0524   RBC 3.97* 3.82* 3.37*   HEMOGLOBIN 12.0* 11.8* 10.4*   HEMATOCRIT 38.9* 37.7* 34.2*   PLATELETCT 234 243 253       Imaging  X-Ray:  I have personally reviewed the images and compared with prior images.       Assessment/Plan  * Acute hypoxemic respiratory failure due to COVID-19 (HCC)- (present on admission)  Assessment & Plan  Intubated 11/21  Continue full mechanical ventilatory support, ASV for ventilator synchrony  Status post proning protocol (completed 11/25)  Continue to titrate FiO2 to goal SPO2 greater than 85%, PaO2 greater than 50  A-F bundle  Sedation - minimize as tolerates  Steroids: Completed primary rounds of steroids, Decadron day 10/10 (second course)  Remdesivir: Not a candidate given degree of respiratory failure  Monoclonal antibody: Status post baricitinib  Anticoagulation: Chemical DVT prophylaxis; f/u screening DVT US study  Diuresis: keep euvolemic/net negative as  haile  Cleared from isolation precautions per hospital guidelines    Acute cystitis with hematuria  Assessment & Plan  11/27  Continue Rocephin x5-day course    Fever  Assessment & Plan  Ceftriaxone started 11/27 - Klebsiella pneumoniae pneumonia    Obstipation  Assessment & Plan  Continue aggressive bowel protocol, enema  S/p Relistor    Hypernatremia  Assessment & Plan  Improving; decreasing free water    ROBERTO (acute kidney injury) (HCC)  Assessment & Plan  Secondary to ATN - improving again today, likely diuretic induced  Avoid nephrotoxins  Monitor creatinine, urine output, electrolytes closely  Keep euvolemic    Elevated troponin  Assessment & Plan  Secondary to demand ischemia -improved    Goals of care, counseling/discussion  Assessment & Plan  Palliative care consulted  Ongoing goals of care discussions with family  DNR  Very guarded prognosis given likely fibrotic state of Covid     Update:  Continue full ventilator support  Zosyn for Pseudomonas pneumonia, follow-up sensitivities  Continue amiodarone, maintains a sinus rhythm today  Enteral nutrition, p.o. Narcan  Family updated, overall prognosis remains quite poor    VTE:  Lovenox  Ulcer: H2 Antagonist  Lines: Central Line  Ongoing indication addressed, Arterial Line  Ongoing indication addressed and Navas Catheter  Ongoing indication addressed    I have performed a physical exam and reviewed and updated ROS and Plan today (12/3/2021). In review of yesterday's note (12/2/2021), there are no changes except as documented above.       Discussed patient condition and risk of morbidity and/or mortality with Family, RN, RT, Pharmacy, Charge nurse / hot rounds and Patient. Critical care time = 48 minutes in directly providing and coordinating critical care and extensive data review.  No time overlap and excludes procedures.

## 2021-12-03 NOTE — CARE PLAN
The patient is Unstable - High likelihood or risk of patient condition declining or worsening    Shift Goals  Clinical Goals: synchrony with the ventilator  Patient Goals: EDMUND  Family Goals: pt safety, improvement    Progress made toward(s) clinical / shift goals:  Restraints in place for patient safety. No signs of skin breakdown at this time. Range of Motion provided. Safety maintained, patient remains free from falls.    Patient is not progressing towards the following goals: Patient remains on mechanical ventilation support; difficulty weaning off of pressors, sedation, ventilator. Hemodynamically stable on pressor support infusions.

## 2021-12-04 NOTE — CARE PLAN
Problem: Ventilation  Goal: Ability to achieve and maintain unassisted ventilation or tolerate decreased levels of ventilator support  Description: Document on Vent flowsheet    1.  Support and monitor invasive and noninvasive mechanical ventilation  2.  Monitor ventilator weaning response  3.  Perform ventilator associated pneumonia prevention interventions  4.  Manage ventilation therapy by monitoring diagnostic test results  Outcome: Not Progressing      Ventilator Daily Summary    Vent Day # 14  8 @ 24    APVCMV: 32/350/+12/80%    Plan: Continue current ventilator settings and wean mechanical ventilation as tolerated per physician orders.

## 2021-12-04 NOTE — CARE PLAN
Adult Ventilation Update    Total Vent Days: 14    Patient Lines/Drains/Airways Status       Active Airway       Name Placement date Placement time Site Days    Airway ETT Oral 8.0 11/21/21  1343  Oral  12                    Plateau Pressure: 28 (12/03/21 1402)  Static Compliance (ml / cm H2O): 13.6 (12/04/21 0233)    Patient failed trials because of Safety screen spontaneous breathing trial (SBT): FiO2 >60% or PEEP >10 CM H2O (12/03/21 1402)         Sputum/Suction   Cough: Productive (12/03/21 2231)  Sputum Amount: Large (12/03/21 2231)  Sputum Color: Yellow;Clear (12/03/21 2231)  Sputum Consistency: Thick (12/03/21 2231)    Mobility  Level of Mobility: Level I (12/03/21 2000)  Activity Performed: Unable to mobilize (12/03/21 0800)  Time Activity Tolerated: 15 min (11/19/21 2100)  Distance Per Occurrence (ft.): 0 feet (11/19/21 2100)  # of Times Distance was Traveled: 0 (11/19/21 2100)  Assistance: Assistance of Two or More (12/02/21 0800)  Ambulation Tolerance: Does Not Tolerate (12/02/21 0800)  Pt Calls for Assistance: No (12/03/21 2000)  Staff Present for Mobilization: RN (12/02/21 0800)  Gait: Unable to Ambulate (12/03/21 2000)  Assistive Devices: Rails (11/26/21 1600)  Reason Not Mobilized: Bed rest;Unstable condition (12/03/21 2000)  Mobilization Comments: 70% FiO2, Vented, sedated (12/03/21 0800)    Events/Summary/Plan: titrated FiO2 to 70% (12/03/21 2231)

## 2021-12-04 NOTE — PROGRESS NOTES
"Critical Care Progress Note    Date of admission  11/5/2021    Chief Complaint  61 y.o. male, unvaccinated against COVID-19, admitted 11/5/2021 with COVID-19 pneumonia, requiring HFNC, now with oxygen desaturation and increased work of breathing, transferred to ICU 11/20 on BiPAP, intubated 11/21     Hospital Course  \"61 y.o. male who presented 11/5/2021 with no PMH, BMI 30 that is unvaccinated that presented 11/5 for 10 days of cough, chills, headache, sore throat with + covid test 9 days prior. He had room air saturation of 62% and was admitted on HFNC. Today I was asked to consult on patient since he is on HFNC + NRB. He is afebrile, HR 77-80's, 's sat 90-92%. He was started on dexamethasone and barcitinib, crp 17, ddimer 1, lactic 2.5. Still undecided about code status and palliative care has been consulted and remains full code. Wife and son at bedside. Patient without complaints of chest pain, shortness or breath cough, n/v/d, leg pain or swelling.\"      11/20 -he has been managed on the medical floor with HFNC, Decadron, baricitinib, Lasix, empiric antibiotics; increased work of breathing, accessory muscle use today and transfer to ICU.  11/21-intubated prone paralyzed, very high peak pressures allowing permissive hypercapnia  11/22 -PEEP of 8, 60% FiO2, paralyzed/sedated/proned  11/23 PEEP 8, 50%, paralyzed/sedated/proned  11/24 -PEEP 8, 60%, sedated/proned, improving renal function  11/25 PEEP 8, 40%, discontinued proning  11/26 PEEP of 10, 70%, weaning sedation, fevers  11/27 PEEP of 10, ASV, weaning sedation, initiation of norepinephrine drip, started on Rocephin for UTI/possible pneumonia  11/29 -VD #9, %, 70% FiO2, ceftriaxone day 3 for Klebsiella pneumonia  11/30 -VD #10, ASV, ceftriaxone day 4, quiet bowel sounds, suspect some component of ileus, trial Relistor, further imaging as needed  12/1 -VD #11, APV/CMV, increased secretions, bronchoscopy today, CT C/A/P some pneumomediastinum, no " PTX, no bowel abnormality advance TF  12/2 -VD 12, PEEP 12, 80%, elevated airway pressures, amiodarone infusion, changed to Versed drip, GNR in sputum now, ABX escalated to Zosyn  12/3 - VD13, PEEP 12, 70%, Zosyn day 2 for Pseudomonas pneumonia, SR on amiodarone  12/4 - VD 14    Interval Problem Update  Reviewed last 24 hour events:  Off norepi  Versed 10, diludid 4  +c/g/c, RASS -4  SR/ST  amio 0.5  VD #14: 32/350/12/80  Cst 10-15, Ppl 30-35  ABG:  desats quickly   loveonx ppx, pepcid ppx  Pan-sens Pseudomonas BAL - change to cefepime x 7 day  Cont solucortef day 14 total steroids      Review of Systems  Review of Systems   Unable to perform ROS: Intubated     Vital Signs for last 24 hours   Pulse:  [] 97  Resp:  [23-39] 24  BP: (101-172)/(50-88) 125/65  SpO2:  [83 %-97 %] 83 %    Respiratory Information for the last 24 hours  Vent Mode: APVCMV  Rate (breaths/min): 32  Vt Target (mL): 350  PEEP/CPAP: 12  MAP: 19  Control VTE (exp VT): 367 170%    Physical Exam   Physical Exam  Vitals and nursing note reviewed.   Constitutional:       General: He is sleeping. He is in acute distress.      Appearance: He is overweight.      Interventions: He is sedated, intubated and restrained.   HENT:      Head: Normocephalic.      Nose: Nose normal.      Comments: Nasal feeding tube in place     Mouth/Throat:      Mouth: Mucous membranes are moist.      Pharynx: Oropharynx is clear.      Comments: Endotracheal tube in place  Eyes:      Conjunctiva/sclera: Conjunctivae normal.      Pupils: Pupils are equal, round, and reactive to light.   Neck:      Comments: Left IJ central venous catheter without surrounding hematoma, mild increase in subcu emphysema in bilateral neck (L>R)  Cardiovascular:      Rate and Rhythm: Regular rhythm. Tachycardia present. Occasional extrasystoles are present.     Chest Wall: PMI is displaced.      Pulses: Decreased pulses.           Radial pulses are 1+ on the right side and 1+ on the left side.       Heart sounds: Heart sounds are distant. No murmur heard.       Comments: Requiring ongoing norepinephrine drip for persistent hypotension  Pulmonary:      Effort: Tachypnea present. No prolonged expiration. He is intubated.      Breath sounds: Examination of the right-middle field reveals rhonchi. Examination of the left-middle field reveals rhonchi. Examination of the right-lower field reveals rhonchi. Examination of the left-lower field reveals rhonchi. Rhonchi present. No wheezing or rales.      Comments: Ongoing poor compliance, minimal secretions, tolerating ASV  Abdominal:      General: Bowel sounds are normal. There is no distension.      Palpations: Abdomen is soft.      Tenderness: There is no abdominal tenderness. There is no guarding.   Genitourinary:     Comments: Navas catheter in place  Musculoskeletal:         General: No tenderness.      Right lower leg: No edema.      Left lower leg: No edema.   Lymphadenopathy:      Cervical: No cervical adenopathy.   Skin:     General: Skin is warm and dry.      Capillary Refill: Capillary refill takes 2 to 3 seconds.      Findings: No rash.   Neurological:      Mental Status: He is easily aroused.      Comments: Awakens and grimaces and opens eyes but not following commands, moves all extremities weakly   Psychiatric:         Behavior: Behavior is uncooperative.      Comments: Unable to assess given current clinical condition         Medications  Current Facility-Administered Medications   Medication Dose Route Frequency Provider Last Rate Last Admin   • rocuronium (ZEMURON) injection 50 mg  50 mg Intravenous Q HOUR PRN Katie Singh M.D.   50 mg at 12/04/21 0429   • piperacillin-tazobactam (ZOSYN) 4.5 g in  mL IVPB  4.5 g Intravenous Q8HRS Liam Monaco M.D. 25 mL/hr at 12/04/21 0431 4.5 g at 12/04/21 0431   • midazolam (VERSED) premix 125 mg/125 mL NS  0-15 mg/hr Intravenous Continuous Liam Monaco M.D. 10 mL/hr at 12/04/21 0309 10 mg/hr at  12/04/21 0309   • HYDROmorphone (DILAUDID) 1 mg/mL in 50mL NS (HIGH ALERT - Non-Standard Continuous Infusion Concentration)   Intravenous Continuous Liam Monaco M.D. 4 mL/hr at 12/03/21 1400 New Bag at 12/03/21 1823   • midazolam (VERSED) injection 1-5 mg  1-5 mg Intravenous Q HOUR PRN Liam Monaco M.D.   5 mg at 12/04/21 0655   • HYDROmorphone (Dilaudid) injection 1-2 mg  1-2 mg Intravenous Q30 MIN PRN Liam Monaco M.D.       • Naloxone (NARCAN) 2 mg/2 mL oral syringe 4 mg  4 mg Enteral Tube Q8HRS Liam Monaco M.D.   4 mg at 12/04/21 0600   • hydrocortisone sodium succinate PF (Solu-CORTEF) 100 MG injection 50 mg  50 mg Intravenous Q6HRS Liam Monaco M.D.   50 mg at 12/04/21 0601   • amiodarone (NEXTERONE) 360 mg/200 mL infusion  0.5-1 mg/min Intravenous Continuous Liam Monaco M.D. 17 mL/hr at 12/04/21 0601 0.5 mg/min at 12/04/21 0601   • MD Alert...ICU Electrolyte Replacement per Pharmacy   Other PHARMACY TO DOSE GEETA [email or web address].       • norepinephrine (Levophed) 8 mg in 250 mL NS infusion (premix)  0-30 mcg/min Intravenous Continuous Jeremy M Gonda, M.D.   Stopped at 12/03/21 2232   • insulin regular (HumuLIN R,NovoLIN R) injection  3-14 Units Subcutaneous Q6HRS Jeremy M Gonda, M.D.   3 Units at 12/04/21 0600    And   • dextrose 50% (D50W) injection 50 mL  50 mL Intravenous Q15 MIN PRN Jeremy M Gonda, M.D.       • Respiratory Therapy Consult   Nebulization Continuous RT Katie Singh M.D.       • famotidine (PEPCID) tablet 20 mg  20 mg Enteral Tube Q12HRS Katie Singh M.D.   20 mg at 12/03/21 1749    Or   • famotidine (PEPCID) injection 20 mg  20 mg Intravenous Q12HRS Katie Singh M.D.   20 mg at 12/04/21 0601   • senna-docusate (PERICOLACE or SENOKOT S) 8.6-50 MG per tablet 2 Tablet  2 Tablet Enteral Tube BID Katie Singh M.D.   2 Tablet at 12/04/21 0601    And   • polyethylene glycol/lytes (MIRALAX) PACKET 1 Packet  1 Packet Enteral Tube QDAY PRN Katie Singh,  M.D.   1 Packet at 11/29/21 1019    And   • magnesium hydroxide (MILK OF MAGNESIA) suspension 30 mL  30 mL Enteral Tube QDAY PRN Katie Singh M.D.   30 mL at 12/01/21 1048    And   • bisacodyl (DULCOLAX) suppository 10 mg  10 mg Rectal QDAY PRN Katie Singh M.D.   10 mg at 12/02/21 1354   • lidocaine (XYLOCAINE) 1 % injection 2 mL  2 mL Tracheal Tube Q30 MIN PRN Katie Singh M.D.       • Pharmacy Consult: Enteral tube insertion - review meds/change route/product selection  1 Each Other PHARMACY TO DOSE Katie Singh M.D.       • acetaminophen (Tylenol) tablet 650 mg  650 mg Enteral Tube Q6HRS PRN Katie Singh M.D.   650 mg at 12/02/21 1739   • hydrOXYzine HCl (ATARAX) tablet 25 mg  25 mg Enteral Tube TID PRN Katie Singh M.D.       • ondansetron (ZOFRAN ODT) dispertab 4 mg  4 mg Enteral Tube Q6HRS PRN Katie Signh M.D.       • promethazine (PHENERGAN) tablet 12.5-25 mg  12.5-25 mg Enteral Tube Q4HRS PRN Katie Singh M.D.       • vitamin D3 (cholecalciferol) tablet 2,000 Units  2,000 Units Enteral Tube DAILY Katie Singh M.D.   2,000 Units at 12/04/21 0601   • ondansetron (ZOFRAN) syringe/vial injection 4 mg  4 mg Intravenous Q6HRS PRN Anurag Martinez M.D.       • enoxaparin (LOVENOX) inj 40 mg  40 mg Subcutaneous DAILY Anurag Martinez M.D.   40 mg at 12/04/21 0601   • promethazine (PHENERGAN) suppository 12.5-25 mg  12.5-25 mg Rectal Q4HRS PRN Anurag Martinez M.D.       • prochlorperazine (COMPAZINE) injection 5-10 mg  5-10 mg Intravenous Q4HRS PRN Anurag Martinez M.D.           Fluids    Intake/Output Summary (Last 24 hours) at 12/4/2021 0736  Last data filed at 12/4/2021 0600  Gross per 24 hour   Intake 3810.2 ml   Output 2170 ml   Net 1640.2 ml       Laboratory  Recent Labs     12/02/21  0257 12/03/21  0253   ISTATAPH 7.508* 7.307*   ISTATAPCO2 46.2* 81.0*   ISTATAPO2 181* 79   ISTATATCO2 38* 43*   [id number] 100* 93   ISTATARTHCO3 36.7* 40.5*   ISTATARTBE 12* 11*    ISTATTEMP 37.2 C 37.4 C   ISTATFIO2 90 80   ISTATSPEC Arterial Arterial   ISTATAPHTC 7.505* 7.301*   [id number] 182* 81         Recent Labs     12/02/21  0447 12/03/21  0524 12/04/21  0400   SODIUM 139 147* 146*   POTASSIUM 4.6 4.2 3.9   CHLORIDE 101 107 105   CO2 32 37* 36*   BUN 20 25* 27*   CREATININE 0.36* 0.40* 0.43*   MAGNESIUM 2.1 2.1  --    CALCIUM 8.7 8.2* 8.0*     Recent Labs     12/02/21  0447 12/03/21  0524 12/04/21  0400   ALTSGPT 20 17 25   ASTSGOT 16 14 27   ALKPHOSPHAT 106* 83 85   TBILIRUBIN 0.2 <0.2 <0.2   GLUCOSE 199* 180* 171*     Recent Labs     12/02/21 0447 12/03/21  0524 12/04/21  0400   WBC 20.2* 19.6* 24.1*   NEUTSPOLYS 89.80* 88.90* 95.60*   LYMPHOCYTES 5.20* 5.40* 3.50*   MONOCYTES 1.70 2.40 0.90   EOSINOPHILS 0.10 0.20 0.00   BASOPHILS 0.40 0.30 0.00   ASTSGOT 16 14 27   ALTSGPT 20 17 25   ALKPHOSPHAT 106* 83 85   TBILIRUBIN 0.2 <0.2 <0.2     Recent Labs     12/02/21 0447 12/03/21  0524 12/04/21  0400   RBC 3.82* 3.37* 3.08*   HEMOGLOBIN 11.8* 10.4* 9.3*   HEMATOCRIT 37.7* 34.2* 31.7*   PLATELETCT 243 253 266       Imaging  X-Ray:  I have personally reviewed the images and compared with prior images.       Assessment/Plan  * Acute hypoxemic respiratory failure due to COVID-19 (HCC)- (present on admission)  Assessment & Plan  Intubated 11/21  Continue full mechanical ventilatory support, ASV for ventilator synchrony  Status post proning protocol (completed 11/25)  Continue to titrate FiO2 to goal SPO2 greater than 85%, PaO2 greater than 50  A-F bundle  Sedation - minimize as tolerates  Steroids: Completed primary rounds of steroids, Decadron day 10/10 (second course)  Remdesivir: Not a candidate given degree of respiratory failure  Monoclonal antibody: Status post baricitinib  Anticoagulation: Chemical DVT prophylaxis; f/u screening DVT US study  Diuresis: keep euvolemic/net negative as haile  Cleared from isolation precautions per hospital guidelines    Acute cystitis with  hematuria  Assessment & Plan  11/27  Continue Rocephin x5-day course    Fever  Assessment & Plan  Ceftriaxone started 11/27 - Klebsiella pneumoniae pneumonia    Obstipation  Assessment & Plan  Continue aggressive bowel protocol, enema  S/p Relistor    Hypernatremia  Assessment & Plan  Improving; decreasing free water    ROBERTO (acute kidney injury) (HCC)  Assessment & Plan  Secondary to ATN - improving again today, likely diuretic induced  Avoid nephrotoxins  Monitor creatinine, urine output, electrolytes closely  Keep euvolemic    Elevated troponin  Assessment & Plan  Secondary to demand ischemia -improved    Goals of care, counseling/discussion  Assessment & Plan  Palliative care consulted  Ongoing goals of care discussions with family  DNR  Very guarded prognosis given likely fibrotic state of Covid     Update:  Family updated again at bedside, prognosis poor.  DNR  Ongoing treatment as above, Zosyn transition to cefepime for sensitive Pseudomonas to complete ABX course      VTE:  Lovenox  Ulcer: H2 Antagonist  Lines: Central Line  Ongoing indication addressed, Arterial Line  Ongoing indication addressed and Navas Catheter  Ongoing indication addressed    I have performed a physical exam and reviewed and updated ROS and Plan today (12/4/2021). In review of yesterday's note (12/3/2021), there are no changes except as documented above.       Discussed patient condition and risk of morbidity and/or mortality with Family, RN, RT, Pharmacy, Charge nurse / hot rounds and Patient. Critical care time = 45 minutes in directly providing and coordinating critical care and extensive data review.  No time overlap and excludes procedures.

## 2021-12-05 NOTE — CARE PLAN
Problem: Ventilation  Goal: Ability to achieve and maintain unassisted ventilation or tolerate decreased levels of ventilator support  Description: Document on Vent flowsheet    1.  Support and monitor invasive and noninvasive mechanical ventilation  2.  Monitor ventilator weaning response  3.  Perform ventilator associated pneumonia prevention interventions  4.  Manage ventilation therapy by monitoring diagnostic test results  Outcome: Not Progressing      Ventilator Daily Summary    Vent Day # 15  8 @ 24    APVCMV: 32/350/+12/80%    Plan: Continue current ventilator settings and wean mechanical ventilation as tolerated per physician orders.

## 2021-12-05 NOTE — PROGRESS NOTES
Pharmacy Pharmacotherapy Consult for LOS >30 days    Admit Date: 11/5/2021      Medications were reviewed for appropriateness and ongoing need.     Current Facility-Administered Medications   Medication Dose Route Frequency Provider Last Rate Last Admin   • insulin regular (HumuLIN R,NovoLIN R) injection  3-14 Units Subcutaneous Q6HRS Liam Monaco M.D.        And   • dextrose 50% (D50W) injection 50 mL  50 mL Intravenous Q15 MIN PRN Liam Monaco M.D.       • senna-docusate (PERICOLACE or SENOKOT S) 8.6-50 MG per tablet 2 Tablet  2 Tablet Enteral Tube BID Liam Monaco M.D.        And   • polyethylene glycol/lytes (MIRALAX) PACKET 1 Packet  1 Packet Enteral Tube QDAY PRN Liam Monaco M.D.        And   • magnesium hydroxide (MILK OF MAGNESIA) suspension 30 mL  30 mL Enteral Tube QDAY PRN Liam Monaco M.D.        And   • bisacodyl (DULCOLAX) suppository 10 mg  10 mg Rectal QDAY PRN Liam Monaco M.D.       • acetaminophen (Tylenol) tablet 650 mg  650 mg Enteral Tube Q6HRS PRN Liam Monaco M.D.       • artificial tears (EYE LUBRICANT) ophth ointment 1 Application  1 Application Both Eyes Q8HRS Liam Monaco M.D.   1 Application at 12/05/21 0738   • hydrocortisone sodium succinate PF (Solu-CORTEF) 100 MG injection 50 mg  50 mg Intravenous Q6HRS Liam Monaco M.D.   50 mg at 12/05/21 1234   • cefepime (Maxipime) 2 g in  mL IVPB  2 g Intravenous Q8HRS Liam Monaco M.D.   Stopped at 12/05/21 0605   • midazolam (VERSED) injection 5 mg  5 mg Intravenous Q HOUR PRN Liam Monaco M.D.   5 mg at 12/05/21 0737   • rocuronium (ZEMURON) injection 50 mg  50 mg Intravenous Q HOUR PRN Liam Monaco M.D.   50 mg at 12/04/21 2359   • midazolam (VERSED) premix 125 mg/125 mL NS  0-15 mg/hr Intravenous Continuous Liam Monaco M.D. 10 mL/hr at 12/05/21 0600 10 mg/hr at 12/05/21 0600   • HYDROmorphone (DILAUDID) 1 mg/mL in 50mL NS (HIGH ALERT - Non-Standard Continuous Infusion Concentration)    Intravenous Continuous Liam Monaco M.D. 4 mL/hr at 12/05/21 1234 4 mg/hr at 12/05/21 1234   • HYDROmorphone (Dilaudid) injection 1-2 mg  1-2 mg Intravenous Q30 MIN PRN Liam Monaco M.D.       • Naloxone (NARCAN) 2 mg/2 mL oral syringe 4 mg  4 mg Enteral Tube Q8HRS Liam Monaco M.D.   4 mg at 12/05/21 0536   • amiodarone (NEXTERONE) 360 mg/200 mL infusion  0.5-1 mg/min Intravenous Continuous Liam Monaco M.D. 17 mL/hr at 12/05/21 0535 0.5 mg/min at 12/05/21 0535   • MD Alert...ICU Electrolyte Replacement per Pharmacy   Other PHARMACY TO DOSE Liam Monaco M.D.       • Respiratory Therapy Consult   Nebulization Continuous RT Liam Monaco M.D.       • famotidine (PEPCID) tablet 20 mg  20 mg Enteral Tube Q12HRS Liam Monaco M.D.   20 mg at 12/05/21 0533    Or   • famotidine (PEPCID) injection 20 mg  20 mg Intravenous Q12HRS Liam Monaco M.D.   20 mg at 12/04/21 0601   • lidocaine (XYLOCAINE) 1 % injection 2 mL  2 mL Tracheal Tube Q30 MIN PRN Liam Monaco M.D.       • Pharmacy Consult: Enteral tube insertion - review meds/change route/product selection  1 Each Other PHARMACY TO DOSE Katie Singh M.D.       • vitamin D3 (cholecalciferol) tablet 2,000 Units  2,000 Units Enteral Tube DAILY Katie Singh M.D.   2,000 Units at 12/05/21 0534   • enoxaparin (LOVENOX) inj 40 mg  40 mg Subcutaneous DAILY Liam Monaco M.D.   40 mg at 12/05/21 0535       Recommendations:  Discontinue antiemetics and PRN hydroxyzine due to no usage.  No other recommendations.  Discussed with physician and in agreement.     Brissa Steven, PharmD, BCPS, BCCCP

## 2021-12-05 NOTE — PROGRESS NOTES
"Critical Care Progress Note    Date of admission  11/5/2021    Chief Complaint  61 y.o. male, unvaccinated against COVID-19, admitted 11/5/2021 with COVID-19 pneumonia, requiring HFNC, now with oxygen desaturation and increased work of breathing, transferred to ICU 11/20 on BiPAP, intubated 11/21     Hospital Course  \"61 y.o. male who presented 11/5/2021 with no PMH, BMI 30 that is unvaccinated that presented 11/5 for 10 days of cough, chills, headache, sore throat with + covid test 9 days prior. He had room air saturation of 62% and was admitted on HFNC. Today I was asked to consult on patient since he is on HFNC + NRB. He is afebrile, HR 77-80's, 's sat 90-92%. He was started on dexamethasone and barcitinib, crp 17, ddimer 1, lactic 2.5. Still undecided about code status and palliative care has been consulted and remains full code. Wife and son at bedside. Patient without complaints of chest pain, shortness or breath cough, n/v/d, leg pain or swelling.\"      11/20 -he has been managed on the medical floor with HFNC, Decadron, baricitinib, Lasix, empiric antibiotics; increased work of breathing, accessory muscle use today and transfer to ICU.  11/21-intubated prone paralyzed, very high peak pressures allowing permissive hypercapnia  11/22 -PEEP of 8, 60% FiO2, paralyzed/sedated/proned  11/23 PEEP 8, 50%, paralyzed/sedated/proned  11/24 -PEEP 8, 60%, sedated/proned, improving renal function  11/25 PEEP 8, 40%, discontinued proning  11/26 PEEP of 10, 70%, weaning sedation, fevers  11/27 PEEP of 10, ASV, weaning sedation, initiation of norepinephrine drip, started on Rocephin for UTI/possible pneumonia  11/29 -VD #9, %, 70% FiO2, ceftriaxone day 3 for Klebsiella pneumonia  11/30 -VD #10, ASV, ceftriaxone day 4, quiet bowel sounds, suspect some component of ileus, trial Relistor, further imaging as needed  12/1 -VD #11, APV/CMV, increased secretions, bronchoscopy today, CT C/A/P some pneumomediastinum, no " PTX, no bowel abnormality advance TF  12/ -VD 12, PEEP 12, 80%, elevated airway pressures, amiodarone infusion, changed to Versed drip, GNR in sputum now, ABX escalated to Zosyn  12/3 - VD13, PEEP 12, 70%, Zosyn day 2 for Pseudomonas pneumonia, SR on amiodarone   - VD 14, on amiodarone, ST, low compliance, full ventilator support, ABX changed to cefepime for sensitive Pseudomonas   - VD15; 80%, Cst 10-15, cefepime    Interval Problem Update  Reviewed last 24 hour events:  T-max = 38.6  Versed 10, dilaudid 4  WBC 24.1-22.3, no bands  Sodium 146 free water  VD #15: 32/350/12/80%  AB.28/94/60  Cst = 10-15, Ppl =   CXR: Unchanged extensive bilateral airspace opacities  F/u inflammatory markers in a.m., CRP  -> 25.5  Remains on Solu-Cortef  BAL  sensitive Pseudomonas  Zosyn de-escalate to cefepime day       Review of Systems  Review of Systems   Unable to perform ROS: Intubated     Vital Signs for last 24 hours   Temp:  [38 °C (100.4 °F)-38.3 °C (100.9 °F)] 38.3 °C (100.9 °F)  Pulse:  [] 120  Resp:  [20-36] 29  BP: ()/(55-77) 120/73  SpO2:  [84 %-92 %] 89 %    Respiratory Information for the last 24 hours  Vent Mode: APVCMV  Rate (breaths/min): 32  Vt Target (mL): 350  PEEP/CPAP: 12  MAP: 14  Control VTE (exp VT): 452 170%    Physical Exam   Physical Exam  Vitals and nursing note reviewed.   Constitutional:       General: He is sleeping. He is in acute distress.      Appearance: He is overweight.      Interventions: He is sedated, intubated and restrained.   HENT:      Head: Normocephalic.      Nose: Nose normal.      Comments: Nasal feeding tube in place     Mouth/Throat:      Mouth: Mucous membranes are moist.      Pharynx: Oropharynx is clear.      Comments: Endotracheal tube in place  Eyes:      Conjunctiva/sclera: Conjunctivae normal.      Pupils: Pupils are equal, round, and reactive to light.   Neck:      Comments: Left IJ central venous catheter without surrounding hematoma,  mild increase in subcu emphysema in bilateral neck (L>R)  Cardiovascular:      Rate and Rhythm: Regular rhythm. Tachycardia present. Occasional extrasystoles are present.     Chest Wall: PMI is displaced.      Pulses: Decreased pulses.           Radial pulses are 1+ on the right side and 1+ on the left side.      Heart sounds: Heart sounds are distant. No murmur heard.       Comments: Requiring ongoing norepinephrine drip for persistent hypotension  Pulmonary:      Effort: Tachypnea present. No prolonged expiration. He is intubated.      Breath sounds: Examination of the right-middle field reveals rhonchi. Examination of the left-middle field reveals rhonchi. Examination of the right-lower field reveals rhonchi. Examination of the left-lower field reveals rhonchi. Rhonchi present. No wheezing or rales.      Comments: Ongoing poor compliance, minimal secretions, tolerating ASV  Abdominal:      General: Bowel sounds are normal. There is no distension.      Palpations: Abdomen is soft.      Tenderness: There is no abdominal tenderness. There is no guarding.   Genitourinary:     Comments: Navas catheter in place  Musculoskeletal:         General: No tenderness.      Right lower leg: No edema.      Left lower leg: No edema.   Lymphadenopathy:      Cervical: No cervical adenopathy.   Skin:     General: Skin is warm and dry.      Capillary Refill: Capillary refill takes 2 to 3 seconds.      Findings: No rash.   Neurological:      Mental Status: He is easily aroused.      Comments: Awakens and grimaces and opens eyes but not following commands, moves all extremities weakly   Psychiatric:         Behavior: Behavior is uncooperative.      Comments: Unable to assess given current clinical condition         Medications  Current Facility-Administered Medications   Medication Dose Route Frequency Provider Last Rate Last Admin   • artificial tears (EYE LUBRICANT) ophth ointment 1 Application  1 Application Both Eyes Q8HRS Liam RG  GEOFF Monaco   1 Application at 12/05/21 0738   • hydrocortisone sodium succinate PF (Solu-CORTEF) 100 MG injection 50 mg  50 mg Intravenous Q6HRS Liam Monaco M.D.   50 mg at 12/05/21 0536   • cefepime (Maxipime) 2 g in  mL IVPB  2 g Intravenous Q8HRS Liam Monaco M.D.   Stopped at 12/05/21 0605   • midazolam (VERSED) injection 5 mg  5 mg Intravenous Q HOUR PRN Liam Monaco M.D.   5 mg at 12/05/21 0737   • rocuronium (ZEMURON) injection 50 mg  50 mg Intravenous Q HOUR PRN Liam Monaco M.D.   50 mg at 12/04/21 2359   • midazolam (VERSED) premix 125 mg/125 mL NS  0-15 mg/hr Intravenous Continuous Liam Monaco M.D. 10 mL/hr at 12/05/21 0600 10 mg/hr at 12/05/21 0600   • HYDROmorphone (DILAUDID) 1 mg/mL in 50mL NS (HIGH ALERT - Non-Standard Continuous Infusion Concentration)   Intravenous Continuous Liam Monaco M.D. 4 mL/hr at 12/05/21 0600 4 mg/hr at 12/05/21 0600   • HYDROmorphone (Dilaudid) injection 1-2 mg  1-2 mg Intravenous Q30 MIN PRN Liam Monaco M.D.       • Naloxone (NARCAN) 2 mg/2 mL oral syringe 4 mg  4 mg Enteral Tube Q8HRS Liam Monaco M.D.   4 mg at 12/05/21 0536   • amiodarone (NEXTERONE) 360 mg/200 mL infusion  0.5-1 mg/min Intravenous Continuous Liam Monaco M.D. 17 mL/hr at 12/05/21 0535 0.5 mg/min at 12/05/21 0535   • MD Alert...ICU Electrolyte Replacement per Pharmacy   Other PHARMACY TO DOSE GEETA [email or web address].       • norepinephrine (Levophed) 8 mg in 250 mL NS infusion (premix)  0-30 mcg/min Intravenous Continuous Jeremy M Gonda, M.D.   Stopped at 12/03/21 2232   • insulin regular (HumuLIN R,NovoLIN R) injection  3-14 Units Subcutaneous Q6HRS Jeremy M Gonda, M.D.   3 Units at 12/05/21 0537    And   • dextrose 50% (D50W) injection 50 mL  50 mL Intravenous Q15 MIN PRN Jeremy M Gonda, M.D.       • Respiratory Therapy Consult   Nebulization Continuous RT Katie Singh M.D.       • famotidine (PEPCID) tablet 20 mg  20 mg Enteral Tube Q12HRS Katie ARCE  GEOFF Singh   20 mg at 12/05/21 0533    Or   • famotidine (PEPCID) injection 20 mg  20 mg Intravenous Q12HRS Katie Singh M.D.   20 mg at 12/04/21 0601   • senna-docusate (PERICOLACE or SENOKOT S) 8.6-50 MG per tablet 2 Tablet  2 Tablet Enteral Tube BID Katie Singh M.D.   2 Tablet at 12/05/21 0533    And   • polyethylene glycol/lytes (MIRALAX) PACKET 1 Packet  1 Packet Enteral Tube QDAY PRN Katie Singh M.D.   1 Packet at 12/05/21 0738    And   • magnesium hydroxide (MILK OF MAGNESIA) suspension 30 mL  30 mL Enteral Tube QDAY PRN Katie Singh M.D.   30 mL at 12/01/21 1048    And   • bisacodyl (DULCOLAX) suppository 10 mg  10 mg Rectal QDAY PRN Katie Singh M.D.   10 mg at 12/02/21 1354   • lidocaine (XYLOCAINE) 1 % injection 2 mL  2 mL Tracheal Tube Q30 MIN PRN Katie Singh M.D.       • Pharmacy Consult: Enteral tube insertion - review meds/change route/product selection  1 Each Other PHARMACY TO DOSE Katie Singh M.D.       • acetaminophen (Tylenol) tablet 650 mg  650 mg Enteral Tube Q6HRS PRN Katie Singh M.D.   650 mg at 12/05/21 0534   • hydrOXYzine HCl (ATARAX) tablet 25 mg  25 mg Enteral Tube TID PRN Katie Singh M.D.       • ondansetron (ZOFRAN ODT) dispertab 4 mg  4 mg Enteral Tube Q6HRS PRN Katie Singh M.D.       • promethazine (PHENERGAN) tablet 12.5-25 mg  12.5-25 mg Enteral Tube Q4HRS PRN Katie Singh M.D.       • vitamin D3 (cholecalciferol) tablet 2,000 Units  2,000 Units Enteral Tube DAILY Katie Singh M.D.   2,000 Units at 12/05/21 0534   • ondansetron (ZOFRAN) syringe/vial injection 4 mg  4 mg Intravenous Q6HRS PRN Anurag Martinez M.D.       • enoxaparin (LOVENOX) inj 40 mg  40 mg Subcutaneous DAILY Anurag Martinez M.D.   40 mg at 12/05/21 0535   • promethazine (PHENERGAN) suppository 12.5-25 mg  12.5-25 mg Rectal Q4HRS PRN Anurag Martinez M.D.       • prochlorperazine (COMPAZINE) injection 5-10 mg  5-10 mg Intravenous Q4HRS PRN Anurag GILLESPIE  SHEN Martinez.           Fluids    Intake/Output Summary (Last 24 hours) at 12/5/2021 0757  Last data filed at 12/4/2021 2200  Gross per 24 hour   Intake 1250.92 ml   Output 1425 ml   Net -174.08 ml       Laboratory  Recent Labs     12/03/21  0253 12/05/21  0155   ISTATAPH 7.307* 7.279*   ISTATAPCO2 81.0* 93.9*   ISTATAPO2 79 60*   ISTATATCO2 43* 47*   YBADXFG5MKM 93 84*   ISTATARTHCO3 40.5* 44.0*   ISTATARTBE 11* 14*   ISTATTEMP 37.4 C see below   ISTATFIO2 80 80   ISTATSPEC Arterial Arterial   ISTATAPHTC 7.301*  --    [id number] 81  --          Recent Labs     12/03/21  0524 12/04/21  0400 12/05/21  0508   SODIUM 147* 146* 146*   POTASSIUM 4.2 3.9 4.1   CHLORIDE 107 105 105   CO2 37* 36* 40*   BUN 25* 27* 27*   CREATININE 0.40* 0.43* 0.42*   MAGNESIUM 2.1  --   --    CALCIUM 8.2* 8.0* 7.9*     Recent Labs     12/03/21  0524 12/04/21  0400 12/05/21  0508   ALTSGPT 17 25 23   ASTSGOT 14 27 27   ALKPHOSPHAT 83 85 83   TBILIRUBIN <0.2 <0.2 <0.2   GLUCOSE 180* 171* 178*     Recent Labs     12/03/21  0524 12/04/21  0400 12/05/21  0508   WBC 19.6* 24.1* 22.3*   NEUTSPOLYS 88.90* 95.60* 87.50*   LYMPHOCYTES 5.40* 3.50* 5.30*   MONOCYTES 2.40 0.90 2.60   EOSINOPHILS 0.20 0.00 0.60   BASOPHILS 0.30 0.00 0.70   ASTSGOT 14 27 27   ALTSGPT 17 25 23   ALKPHOSPHAT 83 85 83   TBILIRUBIN <0.2 <0.2 <0.2     Recent Labs     12/03/21 0524 12/04/21  0400 12/05/21  0508   RBC 3.37* 3.08* 3.42*   HEMOGLOBIN 10.4* 9.3* 10.4*   HEMATOCRIT 34.2* 31.7* 36.0*   PLATELETCT 253 266 284       Imaging  X-Ray:  I have personally reviewed the images and compared with prior images.   12/5      Assessment/Plan  * Acute hypoxemic respiratory failure due to COVID-19 (HCC)- (present on admission)  Assessment & Plan  Intubated 11/21  Continue full mechanical ventilatory support, ASV for ventilator synchrony  Status post proning protocol (completed 11/25)  Continue to titrate FiO2 to goal SPO2 greater than 85%, PaO2 greater than 50  A-F bundle  Sedation  - minimize as tolerates  Steroids: Completed primary rounds of steroids, Decadron day 10/10 (second course)  Remdesivir: Not a candidate given degree of respiratory failure  Monoclonal antibody: Status post baricitinib  Anticoagulation: Chemical DVT prophylaxis; f/u screening DVT US study  Diuresis: keep euvolemic/net negative as haile  Cleared from isolation precautions per hospital guidelines    Acute cystitis with hematuria  Assessment & Plan  11/27  Continue Rocephin x5-day course    Fever  Assessment & Plan  Ceftriaxone started 11/27 - Klebsiella pneumoniae pneumonia    Obstipation  Assessment & Plan  Continue aggressive bowel protocol, enema  S/p Relistor    Hypernatremia  Assessment & Plan  Improving; decreasing free water    ROBERTO (acute kidney injury) (HCC)  Assessment & Plan  Secondary to ATN - improving again today, likely diuretic induced  Avoid nephrotoxins  Monitor creatinine, urine output, electrolytes closely  Keep euvolemic    Elevated troponin  Assessment & Plan  Secondary to demand ischemia -improved    Goals of care, counseling/discussion  Assessment & Plan  Palliative care consulted  Ongoing goals of care discussions with family  DNR  Very guarded prognosis given likely fibrotic state of Covid     Update:  Continue full ventilator support  LTV/LPS, very poor compliance, ongoing risk for barotrauma  Enteral nutrition  Ongoing corticosteroid therapy as above  Maintain negative fluid balance  ABx: Zosyn transition to cefepime for sensitive Pseudomonas, complete course  Follow-up inflammatory markers, screening VTE studies, etc.  Prognosis remains quite guarded.  Not yet appropriate for tracheostomy.  Family updated again in detail.  They remain optimistic.    VTE:  Lovenox  Ulcer: H2 Antagonist  Lines: Central Line  Ongoing indication addressed, Arterial Line  Ongoing indication addressed and Navas Catheter  Ongoing indication addressed    I have performed a physical exam and reviewed and updated ROS  and Plan today (12/5/2021). In review of yesterday's note (12/4/2021), there are no changes except as documented above.       Discussed patient condition and risk of morbidity and/or mortality with Family, RN, RT, Pharmacy, Charge nurse / hot rounds and Patient. Critical care time = 50 minutes in directly providing and coordinating critical care and extensive data review.  No time overlap and excludes procedures.

## 2021-12-06 NOTE — DIETARY
"Nutrition support weekly update:  Day 31 of admit.  Blue Wu is a 61 y.o. male with admitting DX of COVID19.  Tube feeding initiated on . Current TF via small bowel Cortrak is Impact Peptide 1.5 @ 55 mL/hr, providing 1980 kcal, 124 gm protein, 185 gm CHO, and 1016 mL of free water per day.     Assessment:  Weight today is 81.1 kg via bed scale, which is stable with previous weeks wt/initial admit wt.  Will continue to monitor wt trends.  Noted with 1+ generalized edema to BUE and trace generalized edema to BLE.     Estimated Nutritional Needs based on:   Height: 167.6 cm (5' 5.98\")  Weight to Use in Calculations: 76.2 kg (167 lb 15.9 oz) - via bed scale.   Ideal Body Weight: 64.4 kg (142 lb)     Calculation/Equation: MSJ x 1.2 = 1801 kcal.  PSU = 1860 kcal (VE: 11.4, Tmax over the past 24 hours: 37.6).   Total Calories / day: 1800 - 2100 (Calories / k - 28)  Total Grams Protein / day: 91 - 114 (Grams Protein / k.2 - 1.5 actual wt); 97 - 129 (Grams Protein / k.5 - 2.0 IBW)    Evaluation:   1. Pt has been tolerating TF @ current rate.  Additional free water flushes of 250 Q4H (updated ).   2. Vent day 16.    3. Current clinical picture and MD progress notes reviewed.  Awakens, grimaces, and opens eyes but not following commands.  Minimal secretions, tolerating ASV.  Pt underwent bronchoscopy .  4. No new staged wounds noted.    5. Labs: Sodium: 150, Glucose: 152, BUN: 30, Creat.: 0.36, Albumin: 1.9, Total protein: 5.9.    6. Meds: Maxipime, Pepcid, Solu-cortef, SSI, Electrolyte replacement, Narcan, Vitamin D3, Dilaudid, Versed.  Propofol infusion no longer running and is discontinued.   7. LBM:  - medium, loose.  8. Current feeding remains appropriate.  Will clean up TF order.  Met cart still pending.     Malnutrition risk: No new risk identified at this time.     Recommendations/Plan:  1. Continue current TF regimen.   2. Fluids per MD.  3. Continue to monitor wt.  4. RD continues to " monitor labs.    RD follows.

## 2021-12-06 NOTE — PROGRESS NOTES
"Critical Care Progress Note    Date of admission  11/5/2021    Chief Complaint  61 y.o. male, unvaccinated against COVID-19, admitted 11/5/2021 with COVID-19 pneumonia, requiring HFNC, now with oxygen desaturation and increased work of breathing, transferred to ICU 11/20 on BiPAP, intubated 11/21     Hospital Course  \"61 y.o. male who presented 11/5/2021 with no PMH, BMI 30 that is unvaccinated that presented 11/5 for 10 days of cough, chills, headache, sore throat with + covid test 9 days prior. He had room air saturation of 62% and was admitted on HFNC. Today I was asked to consult on patient since he is on HFNC + NRB. He is afebrile, HR 77-80's, 's sat 90-92%. He was started on dexamethasone and barcitinib, crp 17, ddimer 1, lactic 2.5. Still undecided about code status and palliative care has been consulted and remains full code. Wife and son at bedside. Patient without complaints of chest pain, shortness or breath cough, n/v/d, leg pain or swelling.\"      11/20 -he has been managed on the medical floor with HFNC, Decadron, baricitinib, Lasix, empiric antibiotics; increased work of breathing, accessory muscle use today and transfer to ICU.  11/21-intubated prone paralyzed, very high peak pressures allowing permissive hypercapnia  11/22 -PEEP of 8, 60% FiO2, paralyzed/sedated/proned  11/23 PEEP 8, 50%, paralyzed/sedated/proned  11/24 -PEEP 8, 60%, sedated/proned, improving renal function  11/25 PEEP 8, 40%, discontinued proning  11/26 PEEP of 10, 70%, weaning sedation, fevers  11/27 PEEP of 10, ASV, weaning sedation, initiation of norepinephrine drip, started on Rocephin for UTI/possible pneumonia  11/29 -VD #9, %, 70% FiO2, ceftriaxone day 3 for Klebsiella pneumonia  11/30 -VD #10, ASV, ceftriaxone day 4, quiet bowel sounds, suspect some component of ileus, trial Relistor, further imaging as needed  12/1 -VD #11, APV/CMV, increased secretions, bronchoscopy today, CT C/A/P some pneumomediastinum, no " PTX, no bowel abnormality advance TF  12/2 -VD 12, PEEP 12, 80%, elevated airway pressures, amiodarone infusion, changed to Versed drip, GNR in sputum now, ABX escalated to Zosyn  12/3 - VD13, PEEP 12, 70%, Zosyn day 2 for Pseudomonas pneumonia, SR on amiodarone  12/4 - VD 14, on amiodarone, ST, low compliance, full ventilator support, ABX changed to cefepime for sensitive Pseudomonas  12/5 - VD15; 80%, Cst 10-15, cefepime  12/6 - VD16, PEEP 12, 80%, Cst 8-18, cefepime day 5/7, free water, Lasix today    Interval Problem Update  Reviewed last 24 hour events:  Sedated; int yokasta  T-max = 101.5  WBC 24.1-> 22.3-> 14.5  Cefepime day #5/7 antibiotics for sensitive Pseudomonas in BAL  Sodium 146->150, free water increased to 250 every 4  Lovenox 40 daily  Hydrocortisone 50 every 6, steroid day 16  Inflammatory markers (12/6):     -D-dimer 6.56-> 5.26    -CRP 25.5-> 11.8    -PCT 0.68-> 0.19  LFTs normal, creatinine 0.36  I/O = 3134/2500  VD #16: 32/350/12/80%  Cst = 8-18  Ppl=   ABG: P  CXR: Lines okay, unchanged bilateral opacities  Aydin TF, + BM  Stop amio  Lasix today    Review of Systems  Review of Systems   Unable to perform ROS: Intubated     Vital Signs for last 24 hours   Temp:  [36.6 °C (97.9 °F)-37 °C (98.6 °F)] 37 °C (98.6 °F)  Pulse:  [] 100  Resp:  [22-33] 33  BP: (102-147)/(61-88) 139/73  SpO2:  [89 %-95 %] 94 %    Respiratory Information for the last 24 hours  Vent Mode: APVCMV  Rate (breaths/min): 32  Vt Target (mL): 350  PEEP/CPAP: 12  MAP: 18  Control VTE (exp VT): 352 170%    Physical Exam   Physical Exam  Vitals and nursing note reviewed.   Constitutional:       General: He is sleeping. He is in acute distress.      Appearance: He is overweight.      Interventions: He is sedated, intubated and restrained.   HENT:      Head: Normocephalic.      Nose: Nose normal.      Comments: Nasal feeding tube in place     Mouth/Throat:      Mouth: Mucous membranes are moist.      Pharynx: Oropharynx is clear.       Comments: Endotracheal tube in place  Eyes:      Conjunctiva/sclera: Conjunctivae normal.      Pupils: Pupils are equal, round, and reactive to light.   Neck:      Comments: Left IJ central venous catheter without surrounding hematoma, mild increase in subcu emphysema in bilateral neck (L>R)  Cardiovascular:      Rate and Rhythm: Regular rhythm. Tachycardia present. Occasional extrasystoles are present.     Chest Wall: PMI is displaced.      Pulses: Decreased pulses.           Radial pulses are 1+ on the right side and 1+ on the left side.      Heart sounds: Heart sounds are distant. No murmur heard.       Comments: Requiring ongoing norepinephrine drip for persistent hypotension  Pulmonary:      Effort: Tachypnea present. No prolonged expiration. He is intubated.      Breath sounds: Examination of the right-middle field reveals rhonchi. Examination of the left-middle field reveals rhonchi. Examination of the right-lower field reveals rhonchi. Examination of the left-lower field reveals rhonchi. Rhonchi present. No wheezing or rales.      Comments: Ongoing poor compliance, minimal secretions, tolerating ASV  Abdominal:      General: Bowel sounds are normal. There is no distension.      Palpations: Abdomen is soft.      Tenderness: There is no abdominal tenderness. There is no guarding.   Genitourinary:     Comments: Navas catheter in place  Musculoskeletal:         General: No tenderness.      Right lower leg: No edema.      Left lower leg: No edema.   Lymphadenopathy:      Cervical: No cervical adenopathy.   Skin:     General: Skin is warm and dry.      Capillary Refill: Capillary refill takes 2 to 3 seconds.      Findings: No rash.   Neurological:      Mental Status: He is easily aroused.      Comments: Awakens and grimaces and opens eyes but not following commands, moves all extremities weakly   Psychiatric:         Behavior: Behavior is uncooperative.      Comments: Unable to assess given current clinical  condition         Medications  Current Facility-Administered Medications   Medication Dose Route Frequency Provider Last Rate Last Admin   • insulin regular (HumuLIN R,NovoLIN R) injection  3-14 Units Subcutaneous Q6HRS Liam Monaco M.D.   3 Units at 12/05/21 2304    And   • dextrose 50% (D50W) injection 50 mL  50 mL Intravenous Q15 MIN PRN Liam Monaco M.D.       • senna-docusate (PERICOLACE or SENOKOT S) 8.6-50 MG per tablet 2 Tablet  2 Tablet Enteral Tube BID Liam Monaco M.D.   2 Tablet at 12/05/21 1753    And   • polyethylene glycol/lytes (MIRALAX) PACKET 1 Packet  1 Packet Enteral Tube QDAY PRN Liam oMnaco M.D.        And   • magnesium hydroxide (MILK OF MAGNESIA) suspension 30 mL  30 mL Enteral Tube QDAY PRN Liam Monaco M.D.        And   • bisacodyl (DULCOLAX) suppository 10 mg  10 mg Rectal QDAY PRN Liam Monaco M.D.       • acetaminophen (Tylenol) tablet 650 mg  650 mg Enteral Tube Q6HRS PRN Liam Monaco M.D.   650 mg at 12/05/21 1753   • artificial tears (EYE LUBRICANT) ophth ointment 1 Application  1 Application Both Eyes Q8HRS Liam Monaco M.D.   1 Application at 12/06/21 0505   • hydrocortisone sodium succinate PF (Solu-CORTEF) 100 MG injection 50 mg  50 mg Intravenous Q6HRS Liam Monaco M.D.   50 mg at 12/06/21 0506   • cefepime (Maxipime) 2 g in  mL IVPB  2 g Intravenous Q8HRS Liam Monaco M.D.   Stopped at 12/06/21 0535   • midazolam (VERSED) injection 5 mg  5 mg Intravenous Q HOUR PRN Liam Monaco M.D.   5 mg at 12/05/21 0737   • rocuronium (ZEMURON) injection 50 mg  50 mg Intravenous Q HOUR PRN Liam Monaco M.D.   50 mg at 12/06/21 0316   • midazolam (VERSED) premix 125 mg/125 mL NS  0-15 mg/hr Intravenous Continuous Liam Monaco M.D. 10 mL/hr at 12/06/21 0404 10 mg/hr at 12/06/21 0404   • HYDROmorphone (DILAUDID) 1 mg/mL in 50mL NS (HIGH ALERT - Non-Standard Continuous Infusion Concentration)   Intravenous Continuous Liam Monaco M.D. 4 mL/hr at  12/06/21 0407 4 mg/hr at 12/06/21 0407   • HYDROmorphone (Dilaudid) injection 1-2 mg  1-2 mg Intravenous Q30 MIN PRN Liam Monaco M.D.       • Naloxone (NARCAN) 2 mg/2 mL oral syringe 4 mg  4 mg Enteral Tube Q8HRS Liam Monaco M.D.   4 mg at 12/06/21 0505   • amiodarone (NEXTERONE) 360 mg/200 mL infusion  0.5-1 mg/min Intravenous Continuous Liam Monaco M.D. 17 mL/hr at 12/06/21 0404 0.5 mg/min at 12/06/21 0404   • MD Alert...ICU Electrolyte Replacement per Pharmacy   Other PHARMACY TO DOSE Liam Monaco M.D.       • Respiratory Therapy Consult   Nebulization Continuous RT Liam Monaco M.D.       • famotidine (PEPCID) tablet 20 mg  20 mg Enteral Tube Q12HRS Liam Monaco M.D.   20 mg at 12/06/21 0506    Or   • famotidine (PEPCID) injection 20 mg  20 mg Intravenous Q12HRS Liam Monaco M.D.   20 mg at 12/04/21 0601   • lidocaine (XYLOCAINE) 1 % injection 2 mL  2 mL Tracheal Tube Q30 MIN PRN Liam Monaco M.D.       • Pharmacy Consult: Enteral tube insertion - review meds/change route/product selection  1 Each Other PHARMACY TO DOSE Katie Singh M.D.       • vitamin D3 (cholecalciferol) tablet 2,000 Units  2,000 Units Enteral Tube DAILY Katie Singh M.D.   2,000 Units at 12/06/21 0506   • enoxaparin (LOVENOX) inj 40 mg  40 mg Subcutaneous DAILY Liam Monaco M.D.   40 mg at 12/06/21 0505       Fluids    Intake/Output Summary (Last 24 hours) at 12/6/2021 0736  Last data filed at 12/6/2021 0600  Gross per 24 hour   Intake 4834.67 ml   Output 2500 ml   Net 2334.67 ml       Laboratory  Recent Labs     12/05/21  0155   ISTATAPH 7.279*   ISTATAPCO2 93.9*   ISTATAPO2 60*   ISTATATCO2 47*   [id number] 84*   ISTATARTHCO3 44.0*   ISTATARTBE 14*   ISTATTEMP see below   ISTATFIO2 80   ISTATSPEC Arterial         Recent Labs     12/04/21  0400 12/05/21  0508 12/06/21  0500   SODIUM 146* 146* 150*   POTASSIUM 3.9 4.1 4.1   CHLORIDE 105 105 105   CO2 36* 40* 40*   BUN 27* 27* 30*   CREATININE 0.43*  0.42* 0.36*   CALCIUM 8.0* 7.9* 8.2*     Recent Labs     12/04/21  0400 12/05/21  0508 12/06/21  0500   ALTSGPT 25 23 21   ASTSGOT 27 27 19   ALKPHOSPHAT 85 83 70   TBILIRUBIN <0.2 <0.2 <0.2   GLUCOSE 171* 178* 152*     Recent Labs     12/04/21  0400 12/05/21  0508 12/06/21  0500   WBC 24.1* 22.3* 14.5*   NEUTSPOLYS 95.60* 87.50* 86.80*   LYMPHOCYTES 3.50* 5.30* 7.70*   MONOCYTES 0.90 2.60 2.60   EOSINOPHILS 0.00 0.60 0.20   BASOPHILS 0.00 0.70 0.60   ASTSGOT 27 27 19   ALTSGPT 25 23 21   ALKPHOSPHAT 85 83 70   TBILIRUBIN <0.2 <0.2 <0.2     Recent Labs     12/04/21  0400 12/05/21  0508 12/06/21  0500   RBC 3.08* 3.42* 3.22*   HEMOGLOBIN 9.3* 10.4* 9.9*   HEMATOCRIT 31.7* 36.0* 33.6*   PLATELETCT 266 284 305       Imaging  X-Ray:  I have personally reviewed the images and compared with prior images.   12/6      Assessment/Plan  * Acute hypoxemic respiratory failure due to COVID-19 (HCC)- (present on admission)  Assessment & Plan  Intubated 11/21  Continue full mechanical ventilatory support, ASV for ventilator synchrony  Status post proning protocol (completed 11/25)  Continue to titrate FiO2 to goal SPO2 greater than 85%, PaO2 greater than 50  A-F bundle  Sedation - minimize as tolerates  Steroids: Completed primary rounds of steroids, Decadron day 10/10 (second course)  Remdesivir: Not a candidate given degree of respiratory failure  Monoclonal antibody: Status post baricitinib  Anticoagulation: Chemical DVT prophylaxis; f/u screening DVT US study  Diuresis: keep euvolemic/net negative as haile  Cleared from isolation precautions per hospital guidelines    Acute cystitis with hematuria  Assessment & Plan  11/27  Continue Rocephin x5-day course    Fever  Assessment & Plan  Ceftriaxone started 11/27 - Klebsiella pneumoniae pneumonia    Obstipation  Assessment & Plan  Continue aggressive bowel protocol, enema  S/p Relistor    Hypernatremia  Assessment & Plan  Improving; decreasing free water    ROBERTO (acute kidney injury)  (HCC)  Assessment & Plan  Secondary to ATN - improving again today, likely diuretic induced  Avoid nephrotoxins  Monitor creatinine, urine output, electrolytes closely  Keep euvolemic    Elevated troponin  Assessment & Plan  Secondary to demand ischemia -improved    Goals of care, counseling/discussion  Assessment & Plan  Palliative care consulted  Ongoing goals of care discussions with family  DNR  Very guarded prognosis given likely fibrotic state of Covid     Update:  Continue full ventilator support  Lasix,  ABX as above  Prognosis guarded, family updated      VTE:  Lovenox  Ulcer: H2 Antagonist  Lines: Central Line  Ongoing indication addressed, Arterial Line  Ongoing indication addressed and Navas Catheter  Ongoing indication addressed    I have performed a physical exam and reviewed and updated ROS and Plan today (12/6/2021). In review of yesterday's note (12/5/2021), there are no changes except as documented above.       Discussed patient condition and risk of morbidity and/or mortality with Family, RN, RT, Pharmacy, Charge nurse / hot rounds and Patient. Critical care time = 45 minutes in directly providing and coordinating critical care and extensive data review.  No time overlap and excludes procedures.

## 2021-12-06 NOTE — CARE PLAN
Problem: Knowledge Deficit - Standard  Goal: Patient and family/care givers will demonstrate understanding of plan of care, disease process/condition, diagnostic tests and medications  Outcome: Not Progressing     Problem: Respiratory  Goal: Patient will achieve/maintain optimum respiratory ventilation and gas exchange  Outcome: Not Progressing     Problem: Mobility  Goal: Patient's capacity to carry out activities will improve  Outcome: Not Progressing     Problem: Psychosocial  Goal: Patient's level of anxiety will decrease  Outcome: Not Progressing     Problem: Skin Integrity  Goal: Skin integrity is maintained or improved  Outcome: Not Progressing     Problem: Fall Risk  Goal: Patient will remain free from falls  Outcome: Progressing     Problem: Safety - Medical Restraint  Goal: Free from restraint(s) (Restraint for Interference with Medical Device)  Outcome: Progressing

## 2021-12-06 NOTE — CARE PLAN
Problem: Pain - Standard  Goal: Alleviation of pain or a reduction in pain to the patient’s comfort goal  Outcome: Met  Note: CPOT of 0 and RASS keep -4     Problem: Safety - Medical Restraint  Goal: Remains free of injury from restraints (Restraint for Interference with Medical Device)  Outcome: Met   The patient is Watcher - Medium risk of patient condition declining or worsening    Shift Goals  Clinical Goals: RASS -2/-4, Monitor VS  Patient Goals: EDMUND  Family Goals: EDMUND    Progress made toward(s) clinical / shift goals:  Sedated, RASS -4, onr push of versed for vent synchrony     Patient is not progressing towards the following goals:

## 2021-12-07 NOTE — PROGRESS NOTES
"Critical Care Progress Note    Date of admission  11/5/2021    Chief Complaint  61 y.o. male, unvaccinated against COVID-19, admitted 11/5/2021 with COVID-19 pneumonia, requiring HFNC, now with oxygen desaturation and increased work of breathing, transferred to ICU 11/20 on BiPAP, intubated 11/21     Hospital Course  \"61 y.o. male who presented 11/5/2021 with no PMH, BMI 30 that is unvaccinated that presented 11/5 for 10 days of cough, chills, headache, sore throat with + covid test 9 days prior. He had room air saturation of 62% and was admitted on HFNC. Today I was asked to consult on patient since he is on HFNC + NRB. He is afebrile, HR 77-80's, 's sat 90-92%. He was started on dexamethasone and barcitinib, crp 17, ddimer 1, lactic 2.5. Still undecided about code status and palliative care has been consulted and remains full code. Wife and son at bedside. Patient without complaints of chest pain, shortness or breath cough, n/v/d, leg pain or swelling.\"      11/20 -he has been managed on the medical floor with HFNC, Decadron, baricitinib, Lasix, empiric antibiotics; increased work of breathing, accessory muscle use today and transfer to ICU.  11/21-intubated prone paralyzed, very high peak pressures allowing permissive hypercapnia  11/22 -PEEP of 8, 60% FiO2, paralyzed/sedated/proned  11/23 PEEP 8, 50%, paralyzed/sedated/proned  11/24 -PEEP 8, 60%, sedated/proned, improving renal function  11/25 PEEP 8, 40%, discontinued proning  11/26 PEEP of 10, 70%, weaning sedation, fevers  11/27 PEEP of 10, ASV, weaning sedation, initiation of norepinephrine drip, started on Rocephin for UTI/possible pneumonia  11/29 -VD #9, %, 70% FiO2, ceftriaxone day 3 for Klebsiella pneumonia  11/30 -VD #10, ASV, ceftriaxone day 4, quiet bowel sounds, suspect some component of ileus, trial Relistor, further imaging as needed  12/1 -VD #11, APV/CMV, increased secretions, bronchoscopy today, CT C/A/P some pneumomediastinum, no " PTX, no bowel abnormality advance TF  12/2 -VD 12, PEEP 12, 80%, elevated airway pressures, amiodarone infusion, changed to Versed drip, GNR in sputum now, ABX escalated to Zosyn  12/3 - VD13, PEEP 12, 70%, Zosyn day 2 for Pseudomonas pneumonia, SR on amiodarone  12/4 - VD 14, on amiodarone, ST, low compliance, full ventilator support, ABX changed to cefepime for sensitive Pseudomonas  12/5 - VD15; 80%, Cst 10-15, cefepime  12/6 - VD16, PEEP 12, 80%, Cst 8-18, cefepime day 5/7, free water, Lasix today  12/7 -VD #17, 4 cc/KG, very low compliance, PCO2 100, cefepime day 6,    Interval Problem Update  Reviewed last 24 hour events:  Increasing airway pressures, difficult to ventilate, PCO2> 100  Back on rocuronium and deep sedation  Versed 15, dilaudid, yokasta gtt  Tm = 38.9  OGT, haile TF at 55, + BM, 250 q4 free water  VD 17 36/290/12/80  Cst 10-16  Ppl 38  7.26/100/86  lovenox ppx, pepcid  Cefepime day 6/7 pseud pna  solucorted q 6  Na 149  Replace K    Review of Systems  Review of Systems   Unable to perform ROS: Intubated     Vital Signs for last 24 hours   Pulse:  [] 97  Resp:  [25-38] 37  BP: ()/(63-87) 114/66  SpO2:  [85 %-97 %] 94 %    Respiratory Information for the last 24 hours  Vent Mode: APVCMV  Rate (breaths/min): 36  Vt Target (mL): 290  PEEP/CPAP: 12  MAP: 16  Control VTE (exp VT): 306     Physical Exam   Physical Exam  Vitals and nursing note reviewed.   Constitutional:       Comments: Heavily sedated, full ventilator support   HENT:      Head: Normocephalic.      Nose: Nose normal.      Comments: Feeding tube in place     Mouth/Throat:      Comments: ETT in place  Eyes:      Pupils: Pupils are equal, round, and reactive to light.   Cardiovascular:      Rate and Rhythm: Regular rhythm. Tachycardia present.      Pulses: Normal pulses.   Pulmonary:      Comments: Full vent support, low compliance, diminished breath sounds throughout  Abdominal:      General: There is no distension.       Palpations: Abdomen is soft.      Tenderness: There is no abdominal tenderness.   Musculoskeletal:         General: Swelling present. No deformity.      Cervical back: Neck supple.   Skin:     General: Skin is warm and dry.      Capillary Refill: Capillary refill takes less than 2 seconds.   Neurological:      Comments: Heavily sedated         Medications  Current Facility-Administered Medications   Medication Dose Route Frequency Provider Last Rate Last Admin   • polyethylene glycol/lytes (MIRALAX) PACKET 1 Packet  1 Packet Enteral Tube BID Liam Monaco M.D.   1 Packet at 12/07/21 0514   • famotidine (PEPCID) tablet 20 mg  20 mg Enteral Tube BID Liam Monaco M.D.   20 mg at 12/07/21 0513   • artificial tears (EYE LUBRICANT) ophth ointment 1 Application  1 Application Both Eyes Q8HRS Carlos Pelayo D.O.   1 Application at 12/07/21 0514   • rocuronium (ZEMURON) 250 mg in  mL Infusion  0-16 mcg/kg/min Intravenous Continuous Carlos Pelayo D.O. 29.2 mL/hr at 12/07/21 0157 6 mcg/kg/min at 12/07/21 0157   • rocuronium (ZEMURON) injection 48.7 mg  0.6 mg/kg Intravenous Q2HRS PRN Carlos Pelayo D.O.   48.7 mg at 12/07/21 0157   • HYDROmorphone (DILAUDID) 0.2 mg/mL in 50mL NS (Continuous Infusion)   Intravenous Continuous Carlos Pelayo, D.O.   1.5 mg at 12/07/21 0405   • insulin regular (HumuLIN R,NovoLIN R) injection  3-14 Units Subcutaneous Q6HRS Liam Monaco M.D.   3 Units at 12/07/21 0514    And   • dextrose 50% (D50W) injection 50 mL  50 mL Intravenous Q15 MIN PRN Liam Monaco M.D.       • senna-docusate (PERICOLACE or SENOKOT S) 8.6-50 MG per tablet 2 Tablet  2 Tablet Enteral Tube BID Liam Monaco M.D.   2 Tablet at 12/07/21 0513    And   • polyethylene glycol/lytes (MIRALAX) PACKET 1 Packet  1 Packet Enteral Tube QDAY PRN Liam Monaco M.D.        And   • magnesium hydroxide (MILK OF MAGNESIA) suspension 30 mL  30 mL Enteral Tube QDAY PRN Liam Monaco M.D.        And   • bisacodyl (DULCOLAX) suppository  10 mg  10 mg Rectal QDAY PRN Liam Monaco M.D.       • acetaminophen (Tylenol) tablet 650 mg  650 mg Enteral Tube Q6HRS PRN Liam Monaco M.D.   650 mg at 12/05/21 1753   • hydrocortisone sodium succinate PF (Solu-CORTEF) 100 MG injection 50 mg  50 mg Intravenous Q6HRS Liam Monaco M.D.   50 mg at 12/07/21 0513   • cefepime (Maxipime) 2 g in  mL IVPB  2 g Intravenous Q8HRS Liam Monaco M.D.   Stopped at 12/07/21 0543   • midazolam (VERSED) injection 5 mg  5 mg Intravenous Q HOUR PRN Liam Monaco M.D.   5 mg at 12/06/21 1906   • midazolam (VERSED) premix 125 mg/125 mL NS  0-15 mg/hr Intravenous Continuous Liam Monaco M.D. 15 mL/hr at 12/07/21 0317 15 mg/hr at 12/07/21 0317   • HYDROmorphone (Dilaudid) injection 1-2 mg  1-2 mg Intravenous Q30 MIN PRN Liam Monaco M.D.       • MD Alert...ICU Electrolyte Replacement per Pharmacy   Other PHARMACY TO DOSE Liam Monaco M.D.       • Respiratory Therapy Consult   Nebulization Continuous RT Liam Monaco M.D.       • lidocaine (XYLOCAINE) 1 % injection 2 mL  2 mL Tracheal Tube Q30 MIN PRN Liam Monaco M.D.       • Pharmacy Consult: Enteral tube insertion - review meds/change route/product selection  1 Each Other PHARMACY TO DOSE Katie Singh M.D.       • vitamin D3 (cholecalciferol) tablet 2,000 Units  2,000 Units Enteral Tube DAILY Katie Singh M.D.   2,000 Units at 12/07/21 0513   • enoxaparin (LOVENOX) inj 40 mg  40 mg Subcutaneous DAILY Liam Monaco M.D.   40 mg at 12/07/21 0513       Fluids    Intake/Output Summary (Last 24 hours) at 12/7/2021 0714  Last data filed at 12/7/2021 0600  Gross per 24 hour   Intake 3966.5 ml   Output 3250 ml   Net 716.5 ml       Laboratory  Recent Labs     12/05/21  0155 12/06/21  1634 12/07/21  0115   ISTATAPH 7.279* 7.269* 7.274*   ISTATAPCO2 93.9* >100.0* >100.0*   ISTATAPO2 60* 72 80   ISTATATCO2 47* >50* >50*   CDAAPPQ0TRO 84* 89* 92*   ISTATARTHCO3 44.0* 53.9* 54.1*   ISTATARTBE 14* 22*  22*   ISTATTEMP see below 37.4 C 38.1 C   ISTATFIO2 80  --  100   ISTATSPEC Arterial Arterial Arterial   ISTATAPHTC  --  7.263* 7.259*   [id number]  --  74 86         Recent Labs     12/05/21  0508 12/06/21  0500 12/07/21  0455   SODIUM 146* 150* 149*   POTASSIUM 4.1 4.1 3.8   CHLORIDE 105 105 103   CO2 40* 40* 45*   BUN 27* 30* 35*   CREATININE 0.42* 0.36* 0.38*   CALCIUM 7.9* 8.2* 8.0*     Recent Labs     12/05/21  0508 12/06/21  0500 12/07/21  0455   ALTSGPT 23 21 21   ASTSGOT 27 19 22   ALKPHOSPHAT 83 70 69   TBILIRUBIN <0.2 <0.2 <0.2   PREALBUMIN  --  10.1*  --    GLUCOSE 178* 152* 178*     Recent Labs     12/05/21  0508 12/06/21  0500 12/07/21  0455   WBC 22.3* 14.5* 18.4*   NEUTSPOLYS 87.50* 86.80* 88.70*   LYMPHOCYTES 5.30* 7.70* 5.90*   MONOCYTES 2.60 2.60 2.90   EOSINOPHILS 0.60 0.20 0.20   BASOPHILS 0.70 0.60 0.30   ASTSGOT 27 19 22   ALTSGPT 23 21 21   ALKPHOSPHAT 83 70 69   TBILIRUBIN <0.2 <0.2 <0.2     Recent Labs     12/05/21  0508 12/06/21  0500 12/07/21  0455   RBC 3.42* 3.22* 3.42*   HEMOGLOBIN 10.4* 9.9* 10.7*   HEMATOCRIT 36.0* 33.6* 36.4*   PLATELETCT 284 305 356       Imaging  X-Ray:  I have personally reviewed the images and compared with prior images.   12/7      Assessment/Plan  * Acute hypoxemic respiratory failure due to COVID-19 (HCC)- (present on admission)  Assessment & Plan  Intubated 11/21  Continue full mechanical ventilatory support, ASV for ventilator synchrony  Status post proning protocol (completed 11/25)  Continue to titrate FiO2 to goal SPO2 greater than 85%, PaO2 greater than 50  A-F bundle  Sedation - minimize as tolerates  Steroids: Completed primary rounds of steroids, ongoing steroid therapy extended  Remdesivir: Not a candidate given degree of respiratory failure  Monoclonal antibody: Status post baricitinib  Anticoagulation: Chemical DVT prophylaxis; f/u screening DVT US studies periodically  Diuresis: keep euvolemic/net negative as haile  Cleared from isolation  precautions per hospital guidelines    Acute cystitis with hematuria  Assessment & Plan  11/27, treated    Fever  Assessment & Plan  Klebsiella pneumoniae pneumonia (BAL 11/27)-treated with ceftriaxone  Pseudomonas pneumonia (BAL 12/1)-treated with cefepime    Obstipation  Assessment & Plan  Improved with bowel protocol, p.o. Narcan  S/p Relistor    Hypernatremia  Assessment & Plan  Improved    ROBERTO (acute kidney injury) (HCC)  Assessment & Plan  Secondary to ATN -improved  Avoid nephrotoxins  Monitor creatinine, urine output, electrolytes closely  Keep euvolemic    Hypokalemia  Assessment & Plan  Replete    Elevated troponin  Assessment & Plan  Secondary to demand ischemia -improved    Goals of care, counseling/discussion  Assessment & Plan  Palliative care consulted  Ongoing goals of care discussions with family  DNR  Very guarded prognosis given likely fibrotic state of Covid     Update:  Worsening lung compliance, requiring high level sedation for ventilator synchrony  Overall very poor prognosis  Family updated daily.  Will not give ivermectin.  Complete antibiotic treatment for sensitive Pseudomonas pneumonia, follow for evidence of secondary infections        VTE:  Lovenox  Ulcer: H2 Antagonist  Lines: Central Line  Ongoing indication addressed, Arterial Line  Ongoing indication addressed and Navas Catheter  Ongoing indication addressed    I have performed a physical exam and reviewed and updated ROS and Plan today (12/7/2021). In review of yesterday's note (12/6/2021), there are no changes except as documented above.     Discussed patient condition and risk of morbidity and/or mortality with Family, RN, RT, Pharmacy, Charge nurse / hot rounds and Patient. Critical care time = 50 minutes in directly providing and coordinating critical care and extensive data review.  No time overlap and excludes procedures.

## 2021-12-07 NOTE — PROGRESS NOTES
Pulmonary/Critical Care Medicine   Cross Cover Note    Date of service: 12/6/2021  Time: 1820    Called to the bedside to discuss patient and his condition with the son.    I spent an extensive amount of time answering questions about the patient's condition.  I explained that his father was in the inflammatory/fibrosing stage of his covid illness and that we are having difficulty ventilating Mr. Wu.  The son requested that we put his father on Ivermectin.  The charge RN and myself explained that there was no medical/clinical evidence to support this medication and especially at this stage of his covid illness.  I had to reiterate to the son that we are continuing full treatment/therapy.  There has been no change to his full treatment and nothing has been taken away.  I spent over 40 minutes in this meeting with the son.  He demonstrated understanding.    Scarlet Ochoa MD  Pulmonary and Critical Care Medicine

## 2021-12-07 NOTE — CARE PLAN
Problem: Knowledge Deficit - Standard  Goal: Patient and family/care givers will demonstrate understanding of plan of care, disease process/condition, diagnostic tests and medications  Outcome: Not Progressing     Problem: Respiratory  Goal: Patient will achieve/maintain optimum respiratory ventilation and gas exchange  Outcome: Not Progressing     Problem: Mobility  Goal: Patient's capacity to carry out activities will improve  Outcome: Not Progressing     Problem: Psychosocial  Goal: Patient's level of anxiety will decrease  Outcome: Not Progressing     Problem: Fall Risk  Goal: Patient will remain free from falls  Outcome: Progressing     Problem: Skin Integrity  Goal: Skin integrity is maintained or improved  Outcome: Progressing     Problem: Safety - Medical Restraint  Goal: Free from restraint(s) (Restraint for Interference with Medical Device)  Outcome: Progressing

## 2021-12-07 NOTE — CARE PLAN
Problem: Ventilation  Goal: Ability to achieve and maintain unassisted ventilation or tolerate decreased levels of ventilator support  Description: Document on Vent flowsheet    1.  Support and monitor invasive and noninvasive mechanical ventilation  2.  Monitor ventilator weaning response  3.  Perform ventilator associated pneumonia prevention interventions  4.  Manage ventilation therapy by monitoring diagnostic test results  Outcome: Not Progressing      Ventilator Daily Summary    Vent Day # 16    Ventilator settings changed this shift: Rate adjusted to 36, tidal volume 4cc/kg per Dr Monaco due to increased peak and plateau pressures.     Weaning trials: None     Respiratory Procedures: None     Plan: Continue current ventilator settings and wean mechanical ventilation as tolerated per physician orders.    ABG obtained post vent changes.  Dr Monaco aware of critical values, no further orders given.

## 2021-12-07 NOTE — WOUND TEAM
"[facility]  Inpatient Services  Wound and Skin Care Re-Evaluation    Admission Date: 11/5/2021     Last order of IP CONSULT TO WOUND CARE was found on 11/20/2021 from Hospital Encounter on 11/5/2021     HPI, PMH, SH: Reviewed    No past surgical history on file.  Social History     Tobacco Use   • Smoking status: Former Smoker   • Smokeless tobacco: Never Used   Substance Use Topics   • Alcohol use: Not on file     Chief Complaint   Patient presents with   • Shortness of Breath     x2d, COVID +     Diagnosis: COVID-19 virus infection [U07.1]    Unit where seen by Wound Team: T630/00     WOUND CONSULT/FOLLOW UP RELATED TO:  Left upper lip    WOUND HISTORY:  Per H&P: \"Blue Wu is a 61 y.o. male who presented 11/5/2021 with shortness of breath.  Mr. Wu is an unvaccinated 61-year-old male that has no previously known medical conditions.  10 days ago he developed a cough with chills, headache, and sore throat.  He had a home over-the-counter COVID-19 test which was +9 days ago.  Since then he has had a progression of his shortness of breath and cough.  His son checked his oxygen saturations yesterday, 11/4/2021 and found them 62% on room air.  Today patient elected to come to the emergency room where his oxygen saturations have been in the 60s percent range on room air.  He was placed on a nonrebreather and now high flow oxygen.  He has been deemed to be a candidate for baricitinib by infectious disease.  Will be placed on steroids and admitted in guarded condition.  I had a long discussion with patient and his son about his guarded condition and apparently his wife does not want him to be on a ventilator but patient does not want make that decision without her here and this will be readdressed when she gets here.\"    WOUND ASSESSMENT/LDA      Wound 12/05/21 Other (comment) Lip Upper Left mucusoal injury related to friction (Active)   Wound Image     12/07/21 1000   Site Assessment " Red;Brown;Yellow    Periwound Assessment Clean;Dry;Intact    Margins Attached edges;Defined edges    Closure Open to air    Drainage Amount Scant    Drainage Description Serosanguineous    Treatments Cleansed;Site care;Offloading;Moisturizing cream    Wound Cleansing Normal Saline Irrigation    Periwound Protectant Moisture Barrier    Dressing Cleansing/Solutions Not Applicable    Dressing Options Open to Air    NEXT Weekly Photo (Inpatient Only) 12/14/21    Wound Length (cm) 1.5 cm    Wound Width (cm) 1.5 cm    Wound Surface Area (cm^2) 2.25 cm^2    Shape Irregular    Exposed Structures EDMUND    WOUND NURSE ONLY - Time Spent with Patient (mins) 60      Vascular:    ELI:   No results found.    Lab Values:    Lab Results   Component Value Date/Time    WBC 18.4 (H) 12/07/2021 04:55 AM    RBC 3.42 (L) 12/07/2021 04:55 AM    HEMOGLOBIN 10.7 (L) 12/07/2021 04:55 AM    HEMATOCRIT 36.4 (L) 12/07/2021 04:55 AM    CREACTPROT 11.18 (H) 12/06/2021 05:00 AM      Culture Results show:  No results found for this or any previous visit (from the past 720 hour(s)).    Pain Level/Medicated:  No s/s of distress      INTERVENTIONS BY WOUND TEAM:  Chart and images reviewed. Discussed with bedside RN. All areas of concern (based on picture review, LDA review and discussion with bedside RN) have been thoroughly assessed. Documentation of areas based on significant findings. This RN in to assess patient. Performed standard wound care which includes appropriate positioning, dressing removal and non-selective debridement. Pictures and measurements obtained weekly if/when required.  Preparation for Dressing removal: NA open to air  Non-selectively Debrided with:  NS and gauze.  Sharp debridement: NA  Colleen wound: Cleansed with NS, dried  Primary Dressing: NA open to air (MOISTURIZER)  Secondary (Outer) Dressing: NA    Interdisciplinary consultation: Patient, Bedside RN (Zayra),     EVALUATION / RATIONALE FOR TREATMENT:  Most Recent  Date:  12/7/21: Wound team consulted regarding left upper lip wound which appears to be friction related to being intubated for extended period of time. Unable to determine if full or partial thickness related to location. Recommend keeping tube off of area.     11/30/21: Pt intubated in [facility]. Pressure Injuries resolved.   11/22: Pt has St pressure injuries to bilateral superior ears. Hydrocolloid applied to provide occlusive drsg to help promote autolytic debridement. It will also promote a moisture-balanced environment conducive to wound healing.    Goals: Steady decrease in wound area and depth weekly.    WOUND TEAM PLAN OF CARE ([X] for frequency of wound follow up,):   Nursing to follow orders written for wound care. Contact wound team if area fails to progress, deteriorates or with any questions/concerns  Dressing changes by wound team:                   Follow up 3 times weekly:                NPWT change 3 times weekly:     Follow up 1-2 times weekly:     Follow up Bi-Monthly:                   Follow up as needed:   X   Other (explain):     NURSING PLAN OF CARE ORDERS (X):  Dressing changes: See Dressing Care orders:   Skin care: See Skin Care orders:   RN Prevention Protocol: x  Rectal tube care: See Rectal Tube Care orders:   Other orders:    RSKIN:   CURRENTLY IN PLACE (X), APPLIED THIS VISIT (A), ORDERED (O):   Q shift Abelardo:  X  Q shift pressure point assessments:  X    Surface/Positioning   Pressure redistribution mattress            Low Airloss   x       Bariatric foam      Bariatric ARSH     Waffle cushion        Waffle Overlay          Reposition q 2 hours   x   TAPs Turning system     Z Suraj Pillow     Offloading/Redistribution not assessed  Sacral Mepilex (Silicone dressing)     Heel Mepilex (Silicone dressing)         Heel float boots (Prevalon boot)             Float Heels off Bed with Pillows           Respiratory   Silicone O2 tubing         Gray Foam Ear protectors     Cannula fixation Device  (Tender )          High flow offloading Clip    Elastic head band offloading device      Anchorfast      x     Taped in place at this time r/t being placed prone                                              Trach with Optifoam split foam             Containment/Moisture Prevention     Rectal tube or BMS    Purwick/Condom Cath        Navas Catheter  x  Barrier wipes           Barrier paste       Antifungal tx      Interdry        Mobilization not assessed     Up to chair        Ambulate      PT/OT      Nutrition       Dietician        Diabetes Education      PO     TF x    TPN     NPO   # days     Other        Anticipated discharge plans: no advanced needs @ this time  LTACH:        SNF/Rehab:                  Home Health Care:           Outpatient Wound Center:            Self/Family Care:        Other:           Vac Discharge Needs:   Not Applicable Pt not on a wound vac:   x                      Regular Vac in use:                                                                Veraflo Vac while inpatient, ok to transition to Regular Vac on Discharge:                                 Veraflo Vac while inpatient, will need to remain on Veraflo Vac upon discharge:

## 2021-12-07 NOTE — FLOWSHEET NOTE
Respiratory Therapy Update                       Cough: Productive (12/07/21 0715)  Sputum Amount: Scant (12/07/21 0715)  Sputum Color: White;Clear (12/07/21 0715)  Sputum Consistency: Thick;Thin (12/07/21 0715)               FiO2%: 80 % (12/07/21 0715)  O2 (LPM): 60 (11/20/21 0806)       Breath Sounds  RUL Breath Sounds: Clear (12/07/21 0715)  RML Breath Sounds: Clear (12/07/21 0715)  RLL Breath Sounds: Crackles (12/07/21 0715)  OBINNA Breath Sounds: Clear (12/07/21 0715)  LLL Breath Sounds: Crackles (12/07/21 0715)        Events/Summary/Plan: Attempted metabolic study x3 this morning.  Pt unable to tolerate test.  I can try again at a later date. (12/07/21 0738)

## 2021-12-07 NOTE — CARE PLAN
The patient is Unstable - High likelihood or risk of patient condition declining or worsening    Shift Goals  Clinical Goals: RASS -2/-4, Monitor VS  Patient Goals: EDMUND  Family Goals: EDMUND    Progress made toward(s) clinical / shift goals:  not progressing    Patient is not progressing towards the following goals:      Problem: Knowledge Deficit - Standard  Goal: Patient and family/care givers will demonstrate understanding of plan of care, disease process/condition, diagnostic tests and medications  Outcome: Not Progressing     Problem: Respiratory  Goal: Patient will achieve/maintain optimum respiratory ventilation and gas exchange  Outcome: Not Progressing

## 2021-12-07 NOTE — PROGRESS NOTES
Swing intensivist note    I was informed by bedside nurse that pt had some evidence of double triggering on the vent.   Versed 5mg and rocuronium 50mg IV x1 was given  Pt is currently on versed 13mg/hr and dilaudid 4mg/hr.   Will increase versed to 15mg/hr and dilaudid to 5mg/hr before starting paralytics gtt.     Carlos Pelayo D.O.      Addendum  Pt remains to have vent dyssynchrony despite max versed and dilaudid gtt  Will resume rocuronium gtt    Carlos Pelayo D.O.

## 2021-12-08 NOTE — CARE PLAN
Ventilator Daily Summary    Vent Day # 18     ETT 8.0 @24    Ventilator settings changed this shift:no    APVcmv 36 290 12 80 %    Weaning trials:no      Respiratory Procedures: no      Plan: Continue current ventilator settings and wean mechanical ventilation as tolerated per physician orders.

## 2021-12-08 NOTE — CARE PLAN
The patient is Unstable - High likelihood or risk of patient condition declining or worsening    Shift Goals  Clinical Goals: keep pt synchronous with vent  Patient Goals: EDMUND  Family Goals: EDMUND    Progress made toward(s) clinical / shift goals:  pt hemodynamically stable.    Patient is not progressing towards the following goals: had to increase rocuronium as patient was still triggering spontaneous breaths on the vent. Pt lung reserve/baseline O2 seems to be decreasing, was low 90's at beginning of shift and is now hanging around 84-85%. Increases with O2 breath but MD's aware and not wanting to increase FiO2 permanently at this time      Problem: Knowledge Deficit - Standard  Goal: Patient and family/care givers will demonstrate understanding of plan of care, disease process/condition, diagnostic tests and medications  Outcome: Not Progressing     Problem: Respiratory  Goal: Patient will achieve/maintain optimum respiratory ventilation and gas exchange  Outcome: Not Progressing     Problem: Mobility  Goal: Patient's capacity to carry out activities will improve  Outcome: Not Progressing     Problem: Fall Risk  Goal: Patient will remain free from falls  Outcome: Not Progressing     Problem: Psychosocial  Goal: Patient's level of anxiety will decrease  Outcome: Not Progressing     Problem: Skin Integrity  Goal: Skin integrity is maintained or improved  Outcome: Not Progressing     Problem: Safety - Medical Restraint  Goal: Free from restraint(s) (Restraint for Interference with Medical Device)  Outcome: Not Progressing

## 2021-12-08 NOTE — PROGRESS NOTES
Monitor summary:    Pt is sinus tachy 102-110, BP stable   .18/.08/.32  Leaving pt on FiO2 of 80% right now so there is still room to increase in an emergency situation

## 2021-12-08 NOTE — PROGRESS NOTES
"Critical Care Progress Note    Date of admission  11/5/2021    Chief Complaint  61 y.o. male, unvaccinated against COVID-19, admitted 11/5/2021 with COVID-19 pneumonia, requiring HFNC, now with oxygen desaturation and increased work of breathing, transferred to ICU 11/20 on BiPAP, intubated 11/21     Hospital Course  \"61 y.o. male who presented 11/5/2021 with no PMH, BMI 30 that is unvaccinated that presented 11/5 for 10 days of cough, chills, headache, sore throat with + covid test 9 days prior. He had room air saturation of 62% and was admitted on HFNC. Today I was asked to consult on patient since he is on HFNC + NRB. He is afebrile, HR 77-80's, 's sat 90-92%. He was started on dexamethasone and barcitinib, crp 17, ddimer 1, lactic 2.5. Still undecided about code status and palliative care has been consulted and remains full code. Wife and son at bedside. Patient without complaints of chest pain, shortness or breath cough, n/v/d, leg pain or swelling.\"      11/20 -he has been managed on the medical floor with HFNC, Decadron, baricitinib, Lasix, empiric antibiotics; increased work of breathing, accessory muscle use today and transfer to ICU.  11/21-intubated prone paralyzed, very high peak pressures allowing permissive hypercapnia  11/22 -PEEP of 8, 60% FiO2, paralyzed/sedated/proned  11/23 PEEP 8, 50%, paralyzed/sedated/proned  11/24 -PEEP 8, 60%, sedated/proned, improving renal function  11/25 PEEP 8, 40%, discontinued proning  11/26 PEEP of 10, 70%, weaning sedation, fevers  11/27 PEEP of 10, ASV, weaning sedation, initiation of norepinephrine drip, started on Rocephin for UTI/possible pneumonia  11/29 -VD #9, %, 70% FiO2, ceftriaxone day 3 for Klebsiella pneumonia  11/30 -VD #10, ASV, ceftriaxone day 4, quiet bowel sounds, suspect some component of ileus, trial Relistor, further imaging as needed  12/1 -VD #11, APV/CMV, increased secretions, bronchoscopy today, CT C/A/P some pneumomediastinum, no " PTX, no bowel abnormality advance TF  12/2 -VD 12, PEEP 12, 80%, elevated airway pressures, amiodarone infusion, changed to Versed drip, GNR in sputum now, ABX escalated to Zosyn  12/3 - VD13, PEEP 12, 70%, Zosyn day 2 for Pseudomonas pneumonia, SR on amiodarone  12/4 - VD 14, on amiodarone, ST, low compliance, full ventilator support, ABX changed to cefepime for sensitive Pseudomonas  12/5 - VD15; 80%, Cst 10-15, cefepime  12/6 - VD16, PEEP 12, 80%, Cst 8-18, cefepime day 5/7, free water, Lasix today  12/7 -VD #17, 4 cc/KG, very low compliance, PCO2 100, cefepime day 6,  12/8 -VD #18, PEEP 12, 100%, Cst 10, sedated with Versed/Dilaudid, intermittent rocuronium, cefepime/vori/Bactrim    Interval Problem Update  Reviewed last 24 hour events:  Sedated; yokasta gtt  Versed 15, dilaudid  SR/  SBP   haile TF at goal  Free water  I/O = 1.5/3.1  VD#18; 36/290/12/100  Cst = 10  Ppl = 35  Loveonx, pepcid ppx  Cefepime  vori day 2  Bactrim day 2  Free water 250 q4  Na 151  No lasix    Review of Systems  Review of Systems   Unable to perform ROS: Intubated     Vital Signs for last 24 hours   Pulse:  [] 98  Resp:  [29-40] 36  BP: (108-142)/(63-84) 113/70  SpO2:  [84 %-94 %] 92 %    Respiratory Information for the last 24 hours  Vent Mode: APVCMV  Rate (breaths/min): 36  Vt Target (mL): 290  PEEP/CPAP: 12  MAP: 19  Control VTE (exp VT): 294     Physical Exam   Physical Exam  Vitals and nursing note reviewed.   Constitutional:       Comments: Heavily sedated, full ventilator support   HENT:      Head: Normocephalic.      Nose: Nose normal.      Comments: Feeding tube in place     Mouth/Throat:      Comments: ETT in place  Eyes:      Pupils: Pupils are equal, round, and reactive to light.   Cardiovascular:      Rate and Rhythm: Regular rhythm. Tachycardia present.      Pulses: Normal pulses.   Pulmonary:      Comments: Full vent support, low compliance, diminished breath sounds throughout  Abdominal:      General: There is  no distension.      Palpations: Abdomen is soft.      Tenderness: There is no abdominal tenderness.   Musculoskeletal:         General: Swelling present. No deformity.      Cervical back: Neck supple.   Skin:     General: Skin is warm and dry.      Capillary Refill: Capillary refill takes less than 2 seconds.   Neurological:      Comments: Heavily sedated         Medications  Current Facility-Administered Medications   Medication Dose Route Frequency Provider Last Rate Last Admin   • hydrocortisone sodium succinate PF (Solu-CORTEF) 100 MG injection 50 mg  50 mg Intravenous Q8HRS Liam Monaco M.D.   50 mg at 12/08/21 0507   • voriconazole (VFEND) 40 MG/ML susp 200 mg  200 mg Enteral Tube Q12HRS Liam Monaco M.D.       • sulfamethoxazole-trimethoprim (BACTRIM DS) 800-160 MG tablet 2 Tablet  2 Tablet Enteral Tube Q6HRS Liam Monaco M.D.   2 Tablet at 12/08/21 0507   • polyethylene glycol/lytes (MIRALAX) PACKET 1 Packet  1 Packet Enteral Tube BID Liam Monaco M.D.   1 Packet at 12/08/21 0507   • famotidine (PEPCID) tablet 20 mg  20 mg Enteral Tube BID Liam Monaco M.D.   20 mg at 12/08/21 0507   • artificial tears (EYE LUBRICANT) ophth ointment 1 Application  1 Application Both Eyes Q8HRS Carlos Pelayo D.OAliya   1 Application at 12/08/21 0506   • rocuronium (ZEMURON) 250 mg in  mL Infusion  0-16 mcg/kg/min Intravenous Continuous Carlos Pelayo D.O. 38.9 mL/hr at 12/08/21 0059 8 mcg/kg/min at 12/08/21 0059   • rocuronium (ZEMURON) injection 48.7 mg  0.6 mg/kg Intravenous Q2HRS PRN Carlos Pelayo D.O.   48.7 mg at 12/07/21 1536   • HYDROmorphone (DILAUDID) 0.2 mg/mL in 50mL NS (Continuous Infusion)   Intravenous Continuous Carlos Pelayo D.O.   1.5 mg at 12/08/21 0059   • insulin regular (HumuLIN R,NovoLIN R) injection  3-14 Units Subcutaneous Q6HRS Liam Monaco M.D.   3 Units at 12/08/21 0551    And   • dextrose 50% (D50W) injection 50 mL  50 mL Intravenous Q15 MIN PRN Liam Monaco M.D.       •  senna-docusate (PERICOLACE or SENOKOT S) 8.6-50 MG per tablet 2 Tablet  2 Tablet Enteral Tube BID Liam Monaco M.D.   2 Tablet at 12/08/21 0507    And   • polyethylene glycol/lytes (MIRALAX) PACKET 1 Packet  1 Packet Enteral Tube QDAY PRN Liam Monaco M.D.        And   • magnesium hydroxide (MILK OF MAGNESIA) suspension 30 mL  30 mL Enteral Tube QDAY PRN Liam Monaco M.D.        And   • bisacodyl (DULCOLAX) suppository 10 mg  10 mg Rectal QDAY PRN Liam Monaco M.D.       • acetaminophen (Tylenol) tablet 650 mg  650 mg Enteral Tube Q6HRS PRN Liam Monaco M.D.   650 mg at 12/05/21 1753   • cefepime (Maxipime) 2 g in  mL IVPB  2 g Intravenous Q8HRS Liam Monaco M.D.   Stopped at 12/08/21 0537   • midazolam (VERSED) injection 5 mg  5 mg Intravenous Q HOUR PRN Liam Monaco M.D.   5 mg at 12/06/21 1906   • midazolam (VERSED) premix 125 mg/125 mL NS  0-20 mg/hr Intravenous Continuous Liam Monaco M.D. 15 mL/hr at 12/08/21 0507 15 mg/hr at 12/08/21 0507   • HYDROmorphone (Dilaudid) injection 1-2 mg  1-2 mg Intravenous Q30 MIN PRN Liam Monaco M.D.       • MD Alert...ICU Electrolyte Replacement per Pharmacy   Other PHARMACY TO DOSE Liam Monaco M.D.       • Respiratory Therapy Consult   Nebulization Continuous RT Liam Monaco M.D.       • lidocaine (XYLOCAINE) 1 % injection 2 mL  2 mL Tracheal Tube Q30 MIN PRN Liam Monaco M.D.       • Pharmacy Consult: Enteral tube insertion - review meds/change route/product selection  1 Each Other PHARMACY TO DOSE Katie Singh M.D.       • vitamin D3 (cholecalciferol) tablet 2,000 Units  2,000 Units Enteral Tube DAILY Katie Singh M.D.   2,000 Units at 12/08/21 0507   • enoxaparin (LOVENOX) inj 40 mg  40 mg Subcutaneous DAILY Liam Monaco M.D.   40 mg at 12/08/21 0508       Fluids    Intake/Output Summary (Last 24 hours) at 12/8/2021 0702  Last data filed at 12/8/2021 0600  Gross per 24 hour   Intake 4467.84 ml   Output 3170 ml   Net  1297.84 ml       Laboratory  Recent Labs     12/06/21  1634 12/07/21  0115   ISTATAPH 7.269* 7.274*   ISTATAPCO2 >100.0* >100.0*   ISTATAPO2 72 80   ISTATATCO2 >50* >50*   [id number] 89* 92*   ISTATARTHCO3 53.9* 54.1*   ISTATARTBE 22* 22*   ISTATTEMP 37.4 C 38.1 C   ISTATFIO2  --  100   ISTATSPEC Arterial Arterial   ISTATAPHTC 7.263* 7.259*   [id number] 74 86         Recent Labs     12/06/21  0500 12/07/21  0455 12/08/21  0500   SODIUM 150* 149* 151*   POTASSIUM 4.1 3.8 4.6   CHLORIDE 105 103 106   CO2 40* 45* 44*   BUN 30* 35* 35*   CREATININE 0.36* 0.38* 0.37*   CALCIUM 8.2* 8.0* 8.0*     Recent Labs     12/06/21  0500 12/07/21  0455 12/08/21  0500   ALTSGPT 21 21 18   ASTSGOT 19 22 20   ALKPHOSPHAT 70 69 65   TBILIRUBIN <0.2 <0.2 <0.2   PREALBUMIN 10.1*  --   --    GLUCOSE 152* 178* 179*     Recent Labs     12/06/21  0500 12/07/21  0455 12/08/21  0500   WBC 14.5* 18.4* 17.4*   NEUTSPOLYS 86.80* 88.70* 96.40*   LYMPHOCYTES 7.70* 5.90* 2.70*   MONOCYTES 2.60 2.90 0.00   EOSINOPHILS 0.20 0.20 0.00   BASOPHILS 0.60 0.30 0.00   ASTSGOT 19 22 20   ALTSGPT 21 21 18   ALKPHOSPHAT 70 69 65   TBILIRUBIN <0.2 <0.2 <0.2     Recent Labs     12/06/21  0500 12/07/21  0455 12/08/21  0500   RBC 3.22* 3.42* 3.29*   HEMOGLOBIN 9.9* 10.7* 10.2*   HEMATOCRIT 33.6* 36.4* 35.6*   PLATELETCT 305 356 370       Imaging  X-Ray:  I have personally reviewed the images and compared with prior images.   12/8      Assessment/Plan  * Acute hypoxemic respiratory failure due to COVID-19 (HCC)- (present on admission)  Assessment & Plan  Intubated 11/21  Continue full mechanical ventilatory support, ASV for ventilator synchrony  Status post proning protocol (completed 11/25)  Continue to titrate FiO2 to goal SPO2 greater than 85%, PaO2 greater than 50  A-F bundle  Sedation - minimize as tolerates  Steroids: Completed primary rounds of steroids, ongoing steroid therapy extended  Remdesivir: Not a candidate given degree of respiratory  failure  Monoclonal antibody: Status post baricitinib  Anticoagulation: Chemical DVT prophylaxis; f/u screening DVT US studies periodically  Diuresis: keep euvolemic/net negative as haile  Cleared from isolation precautions per hospital guidelines    Acute cystitis with hematuria  Assessment & Plan  11/27, treated    Fever  Assessment & Plan  Klebsiella pneumoniae pneumonia (BAL 11/27)-treated with ceftriaxone  Pseudomonas pneumonia (BAL 12/1)-treated with cefepime    Obstipation  Assessment & Plan  Improved with bowel protocol, p.o. Narcan  S/p Relistor    Hypernatremia  Assessment & Plan  Improved    ROBERTO (acute kidney injury) (HCC)  Assessment & Plan  Secondary to ATN -improved  Avoid nephrotoxins  Monitor creatinine, urine output, electrolytes closely  Keep euvolemic    Hypokalemia  Assessment & Plan  Replete    Elevated troponin  Assessment & Plan  Secondary to demand ischemia -improved    Goals of care, counseling/discussion  Assessment & Plan  Palliative care consulted  Ongoing goals of care discussions with family  DNR  Very guarded prognosis given likely fibrotic state of Covid     Update:  Abx escalated;, cover Aspergillus in sputum, await final BAL cultures  Full ventilator support  Very poor compliance and high risk for further organ deterioration and death  Family updated in detail, prognosis extremely poor, they remain optimistic      VTE:  Lovenox  Ulcer: H2 Antagonist  Lines: Central Line  Ongoing indication addressed, Arterial Line  Ongoing indication addressed and Navas Catheter  Ongoing indication addressed    I have performed a physical exam and reviewed and updated ROS and Plan today (12/8/2021). In review of yesterday's note (12/7/2021), there are no changes except as documented above.     Discussed patient condition and risk of morbidity and/or mortality with Family, RN, RT, Pharmacy, Charge nurse / hot rounds and Patient. Critical care time = 45 minutes in directly providing and coordinating  critical care and extensive data review.  No time overlap and excludes procedures.

## 2021-12-08 NOTE — CARE PLAN
Problem: Ventilation  Goal: Ability to achieve and maintain unassisted ventilation or tolerate decreased levels of ventilator support  Description: Document on Vent flowsheet    1.  Support and monitor invasive and noninvasive mechanical ventilation  2.  Monitor ventilator weaning response  3.  Perform ventilator associated pneumonia prevention interventions  4.  Manage ventilation therapy by monitoring diagnostic test results  Outcome: Not Progressing      Ventilator Daily Summary    Vent Day #17    Ventilator settings changed this shift: None    Weaning trials: None    Respiratory Procedures: None    Plan: Continue current ventilator settings and wean mechanical ventilation as tolerated per physician orders.

## 2021-12-08 NOTE — CARE PLAN
Problem: Knowledge Deficit - Standard  Goal: Patient and family/care givers will demonstrate understanding of plan of care, disease process/condition, diagnostic tests and medications  Outcome: Not Progressing     Problem: Respiratory  Goal: Patient will achieve/maintain optimum respiratory ventilation and gas exchange  Outcome: Not Progressing     Problem: Mobility  Goal: Patient's capacity to carry out activities will improve  Outcome: Not Progressing     Problem: Psychosocial  Goal: Patient's level of anxiety will decrease  Outcome: Not Progressing     Problem: Fall Risk  Goal: Patient will remain free from falls  Outcome: Progressing     Problem: Skin Integrity  Goal: Skin integrity is maintained or improved  Outcome: Progressing

## 2021-12-08 NOTE — CARE PLAN
The patient is Watcher - Medium risk of patient condition declining or worsening    Shift Goals  Clinical Goals: wean 02 as tolerating  Patient Goals: EDMUND  Family Goals: EDMUND      Patient is not progressing towards the following goals:      Problem: Knowledge Deficit - Standard  Goal: Patient and family/care givers will demonstrate understanding of plan of care, disease process/condition, diagnostic tests and medications  Outcome: Not Progressing     Problem: Respiratory  Goal: Patient will achieve/maintain optimum respiratory ventilation and gas exchange  Outcome: Not Progressing     Problem: Mobility  Goal: Patient's capacity to carry out activities will improve  Outcome: Not Progressing     Problem: Fall Risk  Goal: Patient will remain free from falls  Outcome: Not Progressing     Problem: Psychosocial  Goal: Patient's level of anxiety will decrease  Outcome: Not Progressing     Problem: Skin Integrity  Goal: Skin integrity is maintained or improved  Outcome: Not Progressing     Problem: Safety - Medical Restraint  Goal: Free from restraint(s) (Restraint for Interference with Medical Device)  Outcome: Not Progressing

## 2021-12-09 NOTE — CARE PLAN
The patient is Watcher - Medium risk of patient condition declining or worsening    Shift Goals  Clinical Goals: wean Paralytic and 02 as tolerated  Patient Goals: EDMUND  Family Goals: EDMUND      Patient is not progressing towards the following goals:      Problem: Knowledge Deficit - Standard  Goal: Patient and family/care givers will demonstrate understanding of plan of care, disease process/condition, diagnostic tests and medications  Outcome: Not Progressing     Problem: Respiratory  Goal: Patient will achieve/maintain optimum respiratory ventilation and gas exchange  Outcome: Not Progressing     Problem: Mobility  Goal: Patient's capacity to carry out activities will improve  Outcome: Not Progressing     Problem: Fall Risk  Goal: Patient will remain free from falls  Outcome: Not Progressing     Problem: Psychosocial  Goal: Patient's level of anxiety will decrease  Outcome: Not Progressing     Problem: Skin Integrity  Goal: Skin integrity is maintained or improved  Outcome: Not Progressing     Problem: Safety - Medical Restraint  Goal: Free from restraint(s) (Restraint for Interference with Medical Device)  Outcome: Not Progressing

## 2021-12-09 NOTE — PROGRESS NOTES
"Critical Care Progress Note    Date of admission  11/5/2021    Chief Complaint  61 y.o. male, unvaccinated against COVID-19, admitted 11/5/2021 with COVID-19 pneumonia, requiring HFNC, now with oxygen desaturation and increased work of breathing, transferred to ICU 11/20 on BiPAP, intubated 11/21     Hospital Course  \"61 y.o. male who presented 11/5/2021 with no PMH, BMI 30 that is unvaccinated that presented 11/5 for 10 days of cough, chills, headache, sore throat with + covid test 9 days prior. He had room air saturation of 62% and was admitted on HFNC. Today I was asked to consult on patient since he is on HFNC + NRB. He is afebrile, HR 77-80's, 's sat 90-92%. He was started on dexamethasone and barcitinib, crp 17, ddimer 1, lactic 2.5. Still undecided about code status and palliative care has been consulted and remains full code. Wife and son at bedside. Patient without complaints of chest pain, shortness or breath cough, n/v/d, leg pain or swelling.\"      11/20 -he has been managed on the medical floor with HFNC, Decadron, baricitinib, Lasix, empiric antibiotics; increased work of breathing, accessory muscle use today and transfer to ICU.  11/21-intubated prone paralyzed, very high peak pressures allowing permissive hypercapnia  11/22 -PEEP of 8, 60% FiO2, paralyzed/sedated/proned  11/23 PEEP 8, 50%, paralyzed/sedated/proned  11/24 -PEEP 8, 60%, sedated/proned, improving renal function  11/25 PEEP 8, 40%, discontinued proning  11/26 PEEP of 10, 70%, weaning sedation, fevers  11/27 PEEP of 10, ASV, weaning sedation, initiation of norepinephrine drip, started on Rocephin for UTI/possible pneumonia  11/29 -VD #9, %, 70% FiO2, ceftriaxone day 3 for Klebsiella pneumonia  11/30 -VD #10, ASV, ceftriaxone day 4, quiet bowel sounds, suspect some component of ileus, trial Relistor, further imaging as needed  12/1 -VD #11, APV/CMV, increased secretions, bronchoscopy today, CT C/A/P some pneumomediastinum, no " PTX, no bowel abnormality advance TF  12/2 -VD 12, PEEP 12, 80%, elevated airway pressures, amiodarone infusion, changed to Versed drip, GNR in sputum now, ABX escalated to Zosyn  12/3 - VD13, PEEP 12, 70%, Zosyn day 2 for Pseudomonas pneumonia, SR on amiodarone  12/4 - VD 14, on amiodarone, ST, low compliance, full ventilator support, ABX changed to cefepime for sensitive Pseudomonas  12/5 - VD15; 80%, Cst 10-15, cefepime  12/6 - VD16, PEEP 12, 80%, Cst 8-18, cefepime day 5/7, free water, Lasix today  12/7 -VD #17, 4 cc/KG, very low compliance, PCO2 100, cefepime day 6,  12/8 -VD #18, PEEP 12, 100%, Cst 10, sedated with Versed/Dilaudid, intermittent rocuronium, cefepime/vori/Bactrim  12/9 -VD#19, very difficult to ventilate now, very low compliance    Interval Problem Update  Reviewed last 24 hour events:  's  Dilaudid 1.5, versed, rocuronium gtt; change to prn  Tm = 37.2  I/O = 4280/2805  haile TF with free water 250 q 4  Na 150  VD19: 36/290/12/100  Cst 10 -11  Ppl 40  ABG 7.24/120/58  CXR - unchanged bilat opacities  Cefepime completed  Bactrim/vori day 3  Steroids day 32; solucortef 50 q 8      Review of Systems  Review of Systems   Unable to perform ROS: Intubated     Vital Signs for last 24 hours   Temp:  [36.6 °C (97.9 °F)-37.2 °C (98.9 °F)] 36.6 °C (97.9 °F)  Pulse:  [] 109  Resp:  [36] 36  BP: ()/(64-78) 117/72  SpO2:  [84 %-92 %] 85 %    Respiratory Information for the last 24 hours  Vent Mode: APVCMV  Rate (breaths/min): 36  Vt Target (mL): 290  PEEP/CPAP: 12  MAP: 19  Control VTE (exp VT): 290     Physical Exam   Physical Exam  Vitals and nursing note reviewed.   Constitutional:       Comments: Heavily sedated, full ventilator support   HENT:      Head: Normocephalic.      Nose: Nose normal.      Comments: Feeding tube in place     Mouth/Throat:      Comments: ETT in place  Eyes:      Pupils: Pupils are equal, round, and reactive to light.   Cardiovascular:      Rate and Rhythm: Regular  rhythm. Tachycardia present.      Pulses: Normal pulses.   Pulmonary:      Comments: Full vent support, low compliance, diminished breath sounds throughout  Abdominal:      General: There is no distension.      Palpations: Abdomen is soft.      Tenderness: There is no abdominal tenderness.   Musculoskeletal:         General: Swelling present. No deformity.      Cervical back: Neck supple.   Skin:     General: Skin is warm and dry.      Capillary Refill: Capillary refill takes less than 2 seconds.   Neurological:      Comments: Heavily sedated         Medications  Current Facility-Administered Medications   Medication Dose Route Frequency Provider Last Rate Last Admin   • hydrocortisone sodium succinate PF (Solu-CORTEF) 100 MG injection 50 mg  50 mg Intravenous Q8HRS Liam Monaco M.D.   50 mg at 12/09/21 0446   • voriconazole (VFEND) 40 MG/ML susp 200 mg  200 mg Enteral Tube Q12HRS Liam Monaco M.D.   200 mg at 12/09/21 0445   • sulfamethoxazole-trimethoprim (BACTRIM DS) 800-160 MG tablet 2 Tablet  2 Tablet Enteral Tube Q6HRS Liam Monaco M.D.   2 Tablet at 12/09/21 0442   • polyethylene glycol/lytes (MIRALAX) PACKET 1 Packet  1 Packet Enteral Tube BID Liam Monaco M.D.   1 Packet at 12/09/21 0442   • famotidine (PEPCID) tablet 20 mg  20 mg Enteral Tube BID Liam Monaco M.D.   20 mg at 12/09/21 0446   • artificial tears (EYE LUBRICANT) ophth ointment 1 Application  1 Application Both Eyes Q8HRS Carlos Pelayo D.OAliya   1 Application at 12/09/21 0443   • rocuronium (ZEMURON) 250 mg in  mL Infusion  0-16 mcg/kg/min Intravenous Continuous Carlos Pelayo D.O. 29.2 mL/hr at 12/09/21 0404 6 mcg/kg/min at 12/09/21 0404   • rocuronium (ZEMURON) injection 48.7 mg  0.6 mg/kg Intravenous Q2HRS PRN Carlos Pelayo D.O.   48.7 mg at 12/07/21 1536   • HYDROmorphone (DILAUDID) 0.2 mg/mL in 50mL NS (Continuous Infusion)   Intravenous Continuous BRAYDEN Bell.O. 7.5 mL/hr at 12/09/21 0631 1.5 mg/hr at 12/09/21 0631   •  insulin regular (HumuLIN R,NovoLIN R) injection  3-14 Units Subcutaneous Q6HRS Liam Monaco M.D.   3 Units at 12/09/21 0517    And   • dextrose 50% (D50W) injection 50 mL  50 mL Intravenous Q15 MIN PRN Liam Monaco M.D.       • senna-docusate (PERICOLACE or SENOKOT S) 8.6-50 MG per tablet 2 Tablet  2 Tablet Enteral Tube BID Liam Monaco M.D.   2 Tablet at 12/09/21 0441    And   • polyethylene glycol/lytes (MIRALAX) PACKET 1 Packet  1 Packet Enteral Tube QDAY PRN Liam Monaco M.D.   1 Packet at 12/09/21 0444    And   • magnesium hydroxide (MILK OF MAGNESIA) suspension 30 mL  30 mL Enteral Tube QDAY PRN Liam Monaco M.D.   30 mL at 12/09/21 0605    And   • bisacodyl (DULCOLAX) suppository 10 mg  10 mg Rectal QDAY PRN Liam Monaco M.D.       • acetaminophen (Tylenol) tablet 650 mg  650 mg Enteral Tube Q6HRS PRN Liam Monaco M.D.   650 mg at 12/05/21 1753   • midazolam (VERSED) injection 5 mg  5 mg Intravenous Q HOUR PRN Liam Monaco M.D.   5 mg at 12/06/21 1906   • midazolam (VERSED) premix 125 mg/125 mL NS  0-20 mg/hr Intravenous Continuous Liam Monaco M.D. 15 mL/hr at 12/09/21 0739 15 mg/hr at 12/09/21 0739   • HYDROmorphone (Dilaudid) injection 1-2 mg  1-2 mg Intravenous Q30 MIN PRN Liam Monaco M.D.       • MD Alert...ICU Electrolyte Replacement per Pharmacy   Other PHARMACY TO DOSE Liam Monaco M.D.       • Respiratory Therapy Consult   Nebulization Continuous RT Liam Monaco M.D.       • lidocaine (XYLOCAINE) 1 % injection 2 mL  2 mL Tracheal Tube Q30 MIN PRN Liam Monaco M.D.       • Pharmacy Consult: Enteral tube insertion - review meds/change route/product selection  1 Each Other PHARMACY TO DOSE Katie Singh M.D.       • vitamin D3 (cholecalciferol) tablet 2,000 Units  2,000 Units Enteral Tube DAILY Katie Singh M.D.   2,000 Units at 12/09/21 0442   • enoxaparin (LOVENOX) inj 40 mg  40 mg Subcutaneous DAILY Liam Monaco M.D.   40 mg at 12/09/21 0441        Fluids    Intake/Output Summary (Last 24 hours) at 12/9/2021 0742  Last data filed at 12/9/2021 0600  Gross per 24 hour   Intake 4280.54 ml   Output 2805 ml   Net 1475.54 ml       Laboratory  Recent Labs     12/06/21  1634 12/07/21  0115   ISTATAPH 7.269* 7.274*   ISTATAPCO2 >100.0* >100.0*   ISTATAPO2 72 80   ISTATATCO2 >50* >50*   [id number] 89* 92*   ISTATARTHCO3 53.9* 54.1*   ISTATARTBE 22* 22*   ISTATTEMP 37.4 C 38.1 C   ISTATFIO2  --  100   ISTATSPEC Arterial Arterial   ISTATAPHTC 7.263* 7.259*   [id number] 74 86         Recent Labs     12/07/21  0455 12/08/21  0500 12/09/21  0420   SODIUM 149* 151* 150*   POTASSIUM 3.8 4.6 5.0   CHLORIDE 103 106 105   CO2 45* 44* 42*   BUN 35* 35* 37*   CREATININE 0.38* 0.37* 0.33*   CALCIUM 8.0* 8.0* 8.2*     Recent Labs     12/07/21  0455 12/08/21  0500 12/09/21  0420   ALTSGPT 21 18 17   ASTSGOT 22 20 23   ALKPHOSPHAT 69 65 71   TBILIRUBIN <0.2 <0.2 <0.2   GLUCOSE 178* 179* 162*     Recent Labs     12/07/21  0455 12/08/21  0500 12/09/21  0420   WBC 18.4* 17.4* 20.0*   NEUTSPOLYS 88.70* 96.40* 90.80*   LYMPHOCYTES 5.90* 2.70* 4.40*   MONOCYTES 2.90 0.00 2.30   EOSINOPHILS 0.20 0.00 0.10   BASOPHILS 0.30 0.00 0.50   ASTSGOT 22 20 23   ALTSGPT 21 18 17   ALKPHOSPHAT 69 65 71   TBILIRUBIN <0.2 <0.2 <0.2     Recent Labs     12/07/21  0455 12/08/21  0500 12/09/21  0420   RBC 3.42* 3.29* 3.31*   HEMOGLOBIN 10.7* 10.2* 10.4*   HEMATOCRIT 36.4* 35.6* 35.8*   PLATELETCT 356 370 422       Imaging  X-Ray:  I have personally reviewed the images and compared with prior images.   12/9      Assessment/Plan  * Acute hypoxemic respiratory failure due to COVID-19 (HCC)- (present on admission)  Assessment & Plan  Intubated 11/21  Continue full mechanical ventilatory support, ASV for ventilator synchrony  Status post proning protocol (completed 11/25)  Continue to titrate FiO2 to goal SPO2 greater than 85%, PaO2 greater than 50  A-F bundle  Sedation - minimize as tolerates  Steroids:  Completed primary rounds of steroids, ongoing steroid therapy extended  Remdesivir: Not a candidate given degree of respiratory failure  Monoclonal antibody: Status post baricitinib  Anticoagulation: Chemical DVT prophylaxis; f/u screening DVT US studies periodically  Diuresis: keep euvolemic/net negative as haile  Cleared from isolation precautions per hospital guidelines    Acute cystitis with hematuria  Assessment & Plan  11/27, treated    Fever  Assessment & Plan  Klebsiella pneumoniae pneumonia (BAL 11/27) - ceftriaxone 11/27 - 12/1  Pseudomonas pneumonia (BAL 12/1) - zosyn 12/2 - 12/4, cefepime 12/4 - 12/8  Aspergillus spp (BAL 12/1) - voriconazole 12/7 - P  Bactrim ppx (prolonged steroids), sputum PJP Pending    Obstipation  Assessment & Plan  Improved with bowel protocol, p.o. 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  Family updated daily, prognosis dismal, difficult to ventilate now  They remain optimistic  We will informed on two occasions that we will not provide ivermectin as they had requested     Update:  Very poor prognosis, difficult to ventilate, deep sedation required  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 In review of yesterday's note (12/8/2021), there are no changes except as documented above.     Discussed patient condition and risk of morbidity and/or mortality with Family, RN, RT, Pharmacy, Charge nurse / hot rounds and Patient. Critical care time = 80 minutes in directly providing and coordinating critical care and extensive data review.  No time overlap and excludes procedures.

## 2021-12-09 NOTE — CARE PLAN
The patient is a watcher.    Shift Goals  Clinical Goals: wean 02 as tolerating  Patient Goals: EDMUND  Family Goals: EDMUND  Problem: Respiratory  Goal: Patient will achieve/maintain optimum respiratory ventilation and gas exchange  Outcome: Progressing     Problem: Mobility  Goal: Patient's capacity to carry out activities will improve  Outcome: Not Met     Problem: Fall Risk  Goal: Patient will remain free from falls  Outcome: Progressing      Problem: Mobility  Goal: Patient's capacity to carry out activities will improve  Outcome: Not Met

## 2021-12-10 PROBLEM — R79.89 ELEVATED TROPONIN: Status: RESOLVED | Noted: 2021-01-01 | Resolved: 2021-01-01

## 2021-12-10 PROBLEM — J18.9 HCAP (HEALTHCARE-ASSOCIATED PNEUMONIA): Status: ACTIVE | Noted: 2021-01-01

## 2021-12-10 PROBLEM — E87.6 HYPOKALEMIA: Status: RESOLVED | Noted: 2021-01-01 | Resolved: 2021-01-01

## 2021-12-10 PROBLEM — R45.1 AGITATION REQUIRING SEDATION PROTOCOL: Status: ACTIVE | Noted: 2021-01-01

## 2021-12-10 PROBLEM — N17.9 AKI (ACUTE KIDNEY INJURY) (HCC): Status: RESOLVED | Noted: 2021-01-01 | Resolved: 2021-01-01

## 2021-12-10 PROBLEM — K59.00 OBSTIPATION: Status: RESOLVED | Noted: 2021-01-01 | Resolved: 2021-01-01

## 2021-12-10 NOTE — PROGRESS NOTES
"Critical Care Progress Note    Date of admission  11/5/2021    Chief Complaint  61 y.o. male, unvaccinated against COVID-19, admitted 11/5/2021 with COVID-19 pneumonia, requiring HFNC, now with oxygen desaturation and increased work of breathing, transferred to ICU 11/20 on BiPAP, intubated 11/21     Hospital Course  \"61 y.o. male who presented 11/5/2021 with no PMH, BMI 30 that is unvaccinated that presented 11/5 for 10 days of cough, chills, headache, sore throat with + covid test 9 days prior. He had room air saturation of 62% and was admitted on HFNC. Today I was asked to consult on patient since he is on HFNC + NRB. He is afebrile, HR 77-80's, 's sat 90-92%. He was started on dexamethasone and barcitinib, crp 17, ddimer 1, lactic 2.5. Still undecided about code status and palliative care has been consulted and remains full code. Wife and son at bedside. Patient without complaints of chest pain, shortness or breath cough, n/v/d, leg pain or swelling.\"      11/20 - he has been managed on the medical floor with HFNC, Decadron, baricitinib, Lasix, empiric antibiotics; increased work of breathing, accessory muscle use today and transfer to ICU.  11/21- intubated prone paralyzed, very high peak pressures allowing permissive hypercapnia  11/22 - PEEP of 8, 60% FiO2, paralyzed/sedated/proned  11/23 PEEP 8, 50%, paralyzed/sedated/proned  11/24 - PEEP 8, 60%, sedated/proned, improving renal function  11/25 PEEP 8, 40%, discontinued proning  11/26 PEEP of 10, 70%, weaning sedation, fevers  11/27 PEEP of 10, ASV, weaning sedation, initiation of norepinephrine drip, started on Rocephin for UTI/possible pneumonia  11/29 - VD #9, %, 70% FiO2, ceftriaxone day 3 for Klebsiella pneumonia  11/30 - VD #10, ASV, ceftriaxone day 4, quiet bowel sounds, suspect some component of ileus, trial Relistor, further imaging as needed  12/1 - VD #11, APV/CMV, increased secretions, bronchoscopy today, CT C/A/P some " pneumomediastinum, no PTX, no bowel abnormality advance TF  12/2 - VD 12, PEEP 12, 80%, elevated airway pressures, amiodarone infusion, changed to Versed drip, GNR in sputum now, ABX escalated to Zosyn  12/3 - VD13, PEEP 12, 70%, Zosyn day 2 for Pseudomonas pneumonia, SR on amiodarone  12/4 - VD 14, on amiodarone, ST, low compliance, full ventilator support, ABX changed to cefepime for sensitive Pseudomonas  12/5 - VD15; 80%, Cst 10-15, cefepime  12/6 - VD16, PEEP 12, 80%, Cst 8-18, cefepime day 5/7, free water, Lasix today  12/7 - VD #17, 4 cc/KG, very low compliance, PCO2 100, cefepime day 6,  12/8 - VD #18, PEEP 12, 100%, Cst 10, sedated with Versed/Dilaudid, intermittent rocuronium, cefepime/vori/Bactrim  12/9 - VD #19, very difficult to ventilate now, very low compliance  12/10 - D/C sulfamethoxazole-trimethoprim, wean steroids, RASS goal 0 to -2      Review of Systems  Review of Systems   Unable to perform ROS: Intubated        Vital Signs for last 24 hours   Temp:  [36.4 °C (97.5 °F)-37.8 °C (100 °F)] 36.7 °C (98.1 °F)  Pulse:  [100-117] 105  Resp:  [30-41] 32  BP: ()/(66-79) 125/70  SpO2:  [86 %-94 %] 94 %    Hemodynamic parameters for last 24 hours       Respiratory Information for the last 24 hours  Vent Mode: APVCMV  Rate (breaths/min): 40  Vt Target (mL): 290  PEEP/CPAP: 12  MAP: 19  Control VTE (exp VT): 292    Physical Exam   Physical Exam  Vitals and nursing note reviewed.   Constitutional:       Appearance: He is ill-appearing.      Interventions: He is sedated and intubated.   HENT:      Head: Normocephalic and atraumatic.      Right Ear: External ear normal.      Left Ear: External ear normal.      Nose: Nose normal.      Mouth/Throat:      Mouth: Mucous membranes are dry.      Pharynx: Oropharynx is clear.   Eyes:      Pupils: Pupils are equal, round, and reactive to light.   Cardiovascular:      Rate and Rhythm: Regular rhythm. Tachycardia present.      Pulses: Normal pulses.   Pulmonary:       Effort: He is intubated.      Comments: Coarse bilateral breath sounds  Abdominal:      Palpations: Abdomen is soft.   Musculoskeletal:         General: Normal range of motion.      Cervical back: Normal range of motion and neck supple.   Skin:     General: Skin is warm and dry.      Capillary Refill: Capillary refill takes less than 2 seconds.   Neurological:      General: No focal deficit present.         Medications  Current Facility-Administered Medications   Medication Dose Route Frequency Provider Last Rate Last Admin   • midazolam (VERSED) injection 5 mg  5 mg Intravenous Q HOUR PRN Shaggy Salcedo M.D.       • hydrocortisone sodium succinate PF (Solu-CORTEF) 100 MG injection 50 mg  50 mg Intravenous BID Shaggy Salcedo M.D.       • voriconazole (VFEND) 40 MG/ML susp 200 mg  200 mg Enteral Tube Q12HRS Shaggy Salcedo M.D.   200 mg at 12/10/21 0504   • polyethylene glycol/lytes (MIRALAX) PACKET 1 Packet  1 Packet Enteral Tube BID Liam Monaco M.D.   1 Packet at 12/10/21 0503   • famotidine (PEPCID) tablet 20 mg  20 mg Enteral Tube BID Liam Monaco M.D.   20 mg at 12/10/21 0503   • artificial tears (EYE LUBRICANT) ophth ointment 1 Application  1 Application Both Eyes Q8HRS Carlos Pelayo D.O.   1 Application at 12/10/21 0503   • HYDROmorphone (DILAUDID) 0.2 mg/mL in 50mL NS (Continuous Infusion)   Intravenous Continuous Shaggy Salcedo M.D. 1.5 mL/hr at 12/10/21 1256 10 mg at 12/10/21 1256   • insulin regular (HumuLIN R,NovoLIN R) injection  3-14 Units Subcutaneous Q6HRS Liam Monaco M.D.   3 Units at 12/09/21 0517    And   • dextrose 50% (D50W) injection 50 mL  50 mL Intravenous Q15 MIN PRN Liam Monaco M.D.       • senna-docusate (PERICOLACE or SENOKOT S) 8.6-50 MG per tablet 2 Tablet  2 Tablet Enteral Tube BID Liam Monaco M.D.   2 Tablet at 12/10/21 0503    And   • polyethylene glycol/lytes (MIRALAX) PACKET 1 Packet  1 Packet Enteral Tube QDAY PRN Liam Monaco M.D.   1 Packet  at 12/09/21 0444    And   • magnesium hydroxide (MILK OF MAGNESIA) suspension 30 mL  30 mL Enteral Tube QDAY PRN Liam Monaco M.D.   30 mL at 12/09/21 0605    And   • bisacodyl (DULCOLAX) suppository 10 mg  10 mg Rectal QDAY PRN Liam Monaco M.D.       • acetaminophen (Tylenol) tablet 650 mg  650 mg Enteral Tube Q6HRS PRN Liam Monaco M.D.   650 mg at 12/05/21 1753   • midazolam (VERSED) premix 125 mg/125 mL NS  0-10 mg/hr Intravenous Continuous Shaggy Salcedo M.D. 6 mL/hr at 12/10/21 1235 6 mg/hr at 12/10/21 1235   • HYDROmorphone (Dilaudid) injection 1-2 mg  1-2 mg Intravenous Q30 MIN PRN Liam Monaco M.D.   2 mg at 12/10/21 0359   • MD Alert...ICU Electrolyte Replacement per Pharmacy   Other PHARMACY TO DOSE Liam Monaco M.D.       • Respiratory Therapy Consult   Nebulization Continuous RT Liam Monaco M.D.       • lidocaine (XYLOCAINE) 1 % injection 2 mL  2 mL Tracheal Tube Q30 MIN PRN Liam Monaco M.D.       • Pharmacy Consult: Enteral tube insertion - review meds/change route/product selection  1 Each Other PHARMACY TO DOSE Katie Singh M.D.       • enoxaparin (LOVENOX) inj 40 mg  40 mg Subcutaneous DAILY Liam Monaco M.D.   40 mg at 12/10/21 0503       Fluids    Intake/Output Summary (Last 24 hours) at 12/10/2021 1357  Last data filed at 12/10/2021 1200  Gross per 24 hour   Intake 2767.78 ml   Output 3545 ml   Net -777.22 ml       Laboratory  Recent Labs     12/09/21  1113 12/10/21  0232   ISTATAPH 7.244* 7.372*   ISTATAPCO2 >100.0* 90.4*   ISTATAPO2 58* 67   ISTATATCO2 >50* >50*   [id number] 80* 90*   ISTATARTHCO3 52.3* 52.5*   ISTATARTBE 21* 23*   ISTATTEMP 37.6 C 37.8 C   ISTATFIO2 100 100   ISTATSPEC Arterial Arterial   ISTATAPHTC 7.236* 7.360*   MMGBYPTK9VH 60* 70         Recent Labs     12/08/21  0500 12/08/21  0500 12/09/21  0420 12/10/21  0225 12/10/21  0851   SODIUM 151*  --  150* 150*  --    POTASSIUM 4.6   < > 5.0 5.6* 5.3   CHLORIDE 106  --  105 103  --    CO2 44*   --  42* 47*  --    BUN 35*  --  37* 41*  --    CREATININE 0.37*  --  0.33* 0.37*  --    CALCIUM 8.0*  --  8.2* 8.0*  --     < > = values in this interval not displayed.     Recent Labs     12/08/21  0500 12/09/21  0420 12/10/21  0225   ALTSGPT 18 17 17   ASTSGOT 20 23 28   ALKPHOSPHAT 65 71 75   TBILIRUBIN <0.2 <0.2 <0.2   GLUCOSE 179* 162* 185*     Recent Labs     12/08/21  0500 12/09/21  0420 12/10/21  0225   WBC 17.4* 20.0* 19.9*   NEUTSPOLYS 96.40* 90.80* 89.60*   LYMPHOCYTES 2.70* 4.40* 5.40*   MONOCYTES 0.00 2.30 2.40   EOSINOPHILS 0.00 0.10 0.00   BASOPHILS 0.00 0.50 0.40   ASTSGOT 20 23 28   ALTSGPT 18 17 17   ALKPHOSPHAT 65 71 75   TBILIRUBIN <0.2 <0.2 <0.2     Recent Labs     12/08/21  0500 12/09/21  0420 12/10/21  0225   RBC 3.29* 3.31* 3.11*   HEMOGLOBIN 10.2* 10.4* 9.7*   HEMATOCRIT 35.6* 35.8* 34.2*   PLATELETCT 370 422 455*       Imaging  X-Ray:  I have personally reviewed the images and compared with prior images.    Assessment/Plan  * Acute hypoxemic respiratory failure due to COVID-19 (HCC)- (present on admission)  Assessment & Plan  Intubated 11/21  Status post proning protocol (completed 11/25)  Ventilator dependent respiratory failure  Modify ventilator to optimize oxygenation and ventilation  HOB > 30  Chlorhexidine  Perform spontaneous breathing trial when able  Early mobility    Agitation requiring sedation protocol  Assessment & Plan  Titrate hydromorphone for pain < 2  Titrate midazolam for RASS -2 to 0  Daily sedation holiday when able  Follow [facility] PAD guidelines    HCAP (healthcare-associated pneumonia)  Assessment & Plan  Developed klebsiella, pseudomonas, and aspergillus pneumonia  Completed multiple antimicrobials  Continue voriconazole to complete 14 day course    Acute cystitis with hematuria  Assessment & Plan  11/27, treated    Fever  Assessment & Plan  Klebsiella pneumoniae pneumonia (BAL 11/27) - ceftriaxone 11/27 - 12/1  Pseudomonas pneumonia (BAL 12/1) - zosyn 12/2 - 12/4,  cefepime 12/4 - 12/8  Aspergillus spp (BAL 12/1) - voriconazole 12/7 - P  Bactrim ppx (prolonged steroids), sputum PJP Pending    Hypernatremia  Assessment & Plan  Free water    Goals of care, counseling/discussion  Assessment & Plan  Palliative care following, DNR  Ongoing goals of care discussions with family  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  Family updated daily, prognosis dismal, difficult to ventilate now  They remain optimistic  They were informed on two occasions that we will not provide ivermectin as they had requested    Pneumonia due to COVID-19 virus- (present on admission)  Assessment & Plan  COVID-19 pneumonia  Dexamethasone - completed course  Baricitinib - completed  Fluid balance - keep euvolemic     DVT prophylaxis: Enoxaparin  PUD prophylaxis: Famotidine  Glycemic control: Sliding scale insulin  Nutrition: Tube feeds  Lines: None  Navas: Need for strict I/O monitoring, remove when able  Mobility: Early mobility as tolerated  Goals of care: Full code  Disposition: ICU    I have performed a physical exam and reviewed and updated ROS and Plan today (12/10/2021). In review of yesterday's note (12/9/2021), there are no changes except as documented above.     Discussed patient condition and risk of morbidity and/or mortality with Family, RN, RT, Pharmacy and Charge nurse / hot rounds     The patient remains critically ill.  Critical care time = 40 minutes in directly providing and coordinating critical care and extensive data review.  No time overlap and excludes procedures.

## 2021-12-10 NOTE — ASSESSMENT & PLAN NOTE
COVID-19 pneumonia  Dexamethasone - completed course  Baricitinib - completed  Fluid balance - keep euvolemic

## 2021-12-10 NOTE — CARE PLAN
Problem: Ventilation  Goal: Ability to achieve and maintain unassisted ventilation or tolerate decreased levels of ventilator support  Description: Document on Vent flowsheet    1.  Support and monitor invasive and noninvasive mechanical ventilation  2.  Monitor ventilator weaning response  3.  Perform ventilator associated pneumonia prevention interventions  4.  Manage ventilation therapy by monitoring diagnostic test results  Outcome: Not Progressing   VD 20  APVCMV  40  290  12  100     8.0 @ 24

## 2021-12-10 NOTE — CARE PLAN
Problem: Ventilation  Goal: Ability to achieve and maintain unassisted ventilation or tolerate decreased levels of ventilator support  Description: Document on Vent flowsheet    1.  Support and monitor invasive and noninvasive mechanical ventilation  2.  Monitor ventilator weaning response  3.  Perform ventilator associated pneumonia prevention interventions  4.  Manage ventilation therapy by monitoring diagnostic test results  Outcome: Not Progressing   VD 19  36  290  12  100    8.0 @ 24

## 2021-12-10 NOTE — CARE PLAN
The patient is Watcher - Medium risk of patient condition declining or worsening    Shift Goals  Clinical Goals: Wean sedation as tolerated  Patient Goals: EDMUND  Family Goals: EDMUND    Patient is not progressing towards the following goals:    Problem: Knowledge Deficit - Standard  Goal: Patient and family/care givers will demonstrate understanding of plan of care, disease process/condition, diagnostic tests and medications  Outcome: Not Progressing     Problem: Respiratory  Goal: Patient will achieve/maintain optimum respiratory ventilation and gas exchange  Outcome: Not Progressing     Problem: Mobility  Goal: Patient's capacity to carry out activities will improve  Outcome: Not Progressing     Problem: Fall Risk  Goal: Patient will remain free from falls  Outcome: Not Progressing     Problem: Psychosocial  Goal: Patient's level of anxiety will decrease  Outcome: Not Progressing     Problem: Skin Integrity  Goal: Skin integrity is maintained or improved  Outcome: Not Progressing     Problem: Safety - Medical Restraint  Goal: Free from restraint(s) (Restraint for Interference with Medical Device)  Outcome: Not Progressing

## 2021-12-10 NOTE — ASSESSMENT & PLAN NOTE
Currently off sedation  Daily sedation holiday when able  Follow [facility] PAD guidelines

## 2021-12-10 NOTE — ASSESSMENT & PLAN NOTE
Developed klebsiella, pseudomonas, and aspergillus pneumonia  Completed multiple antimicrobials  Continue voriconazole to complete 14 day course

## 2021-12-10 NOTE — PROGRESS NOTES
Nursing Note    Neuro: PERRL, 2/2/ Sluggish, No Response to pain x4 . No Corneal, GAG or Cough. Sedated, Versed 15 Dilaudid 1.5    Cardiac: ST, VSS    Resp: Intubated 100 % Fio2 Clear-Diminished BS.    GI: Core track L Nare, Hypoactive BS, Smear BM    : Navas Catheter, Navas Care Completed.    LTD: Navas, ETT, OG, NG, PIV x2    Skin: Lip DTI , Butt Skin Tear    Shift Summary:    2000 Family @ Bedside. Praying. Passed on from Day shift that the central line needs to be DC. Verbal order from Yessica Lucas to DC Central due to not flushing or drawing back any blood. Okay to DC if PIV in place. 0420 Dr. Singh Notified of critical value co2 47. ABG c02 90. Potassium 5.6.

## 2021-12-11 NOTE — CARE PLAN
Problem: Ventilation  Goal: Ability to achieve and maintain unassisted ventilation or tolerate decreased levels of ventilator support  Description: Document on Vent flowsheet    1.  Support and monitor invasive and noninvasive mechanical ventilation  2.  Monitor ventilator weaning response  3.  Perform ventilator associated pneumonia prevention interventions  4.  Manage ventilation therapy by monitoring diagnostic test results  Outcome: Not Progressing      Ventilator Daily Summary    Vent Day # 21  8 @ 25    APVCMV: 40/290/+12/100%    Plan: Continue current ventilator settings and wean mechanical ventilation as tolerated per physician orders.

## 2021-12-11 NOTE — PROCEDURES
I was called to the bedside with concern of cuff leak.  Chest x-ray obtained shows tube is likely migrated out of the trachea.  Reports from RT and RN were that multiple attempts at inflation had been made over the past hour.    I discussed with the family who is at bedside and verbal permission was granted to proceed with endotracheal tube advancement.  I discussed the risks of not advancing the tube which would ultimately lead towards the patient's demise.    He was given 20 mg etomidate and 50 mg of rocuronium.  I advanced the bronchoscope through the endotracheal tube and discovered it was sitting above the epiglottis.  I navigated the bronchoscope to the ashley and approximately 60 cc of air were removed from the endotracheal balloon.  It was then advanced over the bronchoscope to approximately 2.5 cm proximal to the ashley.    At this point the airways were examined and mild secretions were observed and hence cleared.  Mucosa bilaterally was normal-appearing, not hyperemic and there were no significant obstructions or secretions in either laterality of his tracheobronchial tree.      The bronchoscope was then removed and the balloon on the endotracheal tube was inflated with ~10 cc of air.  There was no cuff leak after this inflation, he was receiving appropriate tidal volumes on the ventilator, there were normal waveforms noted in the pressure and flow tracings on the ventilator.  Saturations dipped to the mid 70s during the procedure but recovered to the 90s once bronchoscope was removed.    He otherwise tolerated the procedure well.  Family was updated again at bedside upon completion.    Julian Elmore M.D.

## 2021-12-11 NOTE — PROGRESS NOTES
Nursing Note    Neuro: PERRL, 2/2/ Sluggish, No Response to pain x4 . No Corneal, GAG or Cough. Minimal Sedation.     Cardiac:  -130, VSS        Resp: ETT repositioned 25cm Teeth.  Intubated 100 % Fio2 Clear-Diminished BS. Tachypnea. RR 40 -50 Tidal volumes vary 50 -350ml.     GI: Core track L Nare, Infusing Impact Peptide @ 55ml/hr H20 250ml Q4hrs. Hypoactive BS, Loose BM Tonight.     : Navas Catheter, Navas Care Completed.    LTD: Navas, ETT, NG, PIV x2    Activity: Bed Rest, Turn Q2    Skin:           Shift Summary:    1915 Timeout for ETT advancement. Dr. Ruben Elmore at bedside. Etomidate and Rocc Given. Tube advanced to 25cm @ teeth without issue. Vitals stable no acute issues. Addressed my concerns about tachycardia and tachypnea with ELIZABETH Lucas. Plan to keep pt on minimal sedation to access neuro status. 0243Notified Dr. Max MCLAUGHLIN of ABG results Co2 130. No new orders received. Per Dr. Santana okay to give Versed 5mg IVP for vent dyssynchrony. Dilaudid and Tylenol Given for Tachycardia and pain/fever. Failed SAT = RASS -5, RR >50

## 2021-12-11 NOTE — PROGRESS NOTES
"Critical Care Progress Note    Date of admission  11/5/2021    Chief Complaint  61 y.o. male, unvaccinated against COVID-19, admitted 11/5/2021 with COVID-19 pneumonia, requiring HFNC, now with oxygen desaturation and increased work of breathing, transferred to ICU 11/20 on BiPAP, intubated 11/21     Hospital Course  \"61 y.o. male who presented 11/5/2021 with no PMH, BMI 30 that is unvaccinated that presented 11/5 for 10 days of cough, chills, headache, sore throat with + covid test 9 days prior. He had room air saturation of 62% and was admitted on HFNC. Today I was asked to consult on patient since he is on HFNC + NRB. He is afebrile, HR 77-80's, 's sat 90-92%. He was started on dexamethasone and barcitinib, crp 17, ddimer 1, lactic 2.5. Still undecided about code status and palliative care has been consulted and remains full code. Wife and son at bedside. Patient without complaints of chest pain, shortness or breath cough, n/v/d, leg pain or swelling.\"      11/20 - he has been managed on the medical floor with HFNC, Decadron, baricitinib, Lasix, empiric antibiotics; increased work of breathing, accessory muscle use today and transfer to ICU.  11/21- intubated prone paralyzed, very high peak pressures allowing permissive hypercapnia  11/22 - PEEP of 8, 60% FiO2, paralyzed/sedated/proned  11/23 PEEP 8, 50%, paralyzed/sedated/proned  11/24 - PEEP 8, 60%, sedated/proned, improving renal function  11/25 PEEP 8, 40%, discontinued proning  11/26 PEEP of 10, 70%, weaning sedation, fevers  11/27 PEEP of 10, ASV, weaning sedation, initiation of norepinephrine drip, started on Rocephin for UTI/possible pneumonia  11/29 - VD #9, %, 70% FiO2, ceftriaxone day 3 for Klebsiella pneumonia  11/30 - VD #10, ASV, ceftriaxone day 4, quiet bowel sounds, suspect some component of ileus, trial Relistor, further imaging as needed  12/1 - VD #11, APV/CMV, increased secretions, bronchoscopy today, CT C/A/P some " pneumomediastinum, no PTX, no bowel abnormality advance TF  12/2 - VD 12, PEEP 12, 80%, elevated airway pressures, amiodarone infusion, changed to Versed drip, GNR in sputum now, ABX escalated to Zosyn  12/3 - VD13, PEEP 12, 70%, Zosyn day 2 for Pseudomonas pneumonia, SR on amiodarone  12/4 - VD 14, on amiodarone, ST, low compliance, full ventilator support, ABX changed to cefepime for sensitive Pseudomonas  12/5 - VD15; 80%, Cst 10-15, cefepime  12/6 - VD16, PEEP 12, 80%, Cst 8-18, cefepime day 5/7, free water, Lasix today  12/7 - VD #17, 4 cc/KG, very low compliance, PCO2 100, cefepime day 6,  12/8 - VD #18, PEEP 12, 100%, Cst 10, sedated with Versed/Dilaudid, intermittent rocuronium, cefepime/vori/Bactrim  12/9 - VD #19, very difficult to ventilate now, very low compliance  12/10 - D/C sulfamethoxazole-trimethoprim, wean steroids, RASS goal 0 to -2, ETT advanced over bronchoscope  12/11 - phosphorus repletion, place PEEP back to 12    Review of Systems  Review of Systems   Unable to perform ROS: Intubated        Vital Signs for last 24 hours   Temp:  [37.7 °C (99.9 °F)-39 °C (102.2 °F)] 39 °C (102.2 °F)  Pulse:  [104-130] 129  Resp:  [5-40] 24  BP: ()/(53-82) 103/65  SpO2:  [80 %-95 %] 95 %    Hemodynamic parameters for last 24 hours       Respiratory Information for the last 24 hours  Vent Mode: APVCMV  Rate (breaths/min): 40  Vt Target (mL): 290  PEEP/CPAP: 12  MAP: 18  Control VTE (exp VT): 320    Physical Exam   Physical Exam  Vitals and nursing note reviewed.   Constitutional:       Appearance: He is ill-appearing.      Interventions: He is sedated and intubated.   HENT:      Head: Normocephalic and atraumatic.      Right Ear: External ear normal.      Left Ear: External ear normal.      Nose: Nose normal.      Mouth/Throat:      Mouth: Mucous membranes are dry.      Pharynx: Oropharynx is clear.   Eyes:      Pupils: Pupils are equal, round, and reactive to light.   Cardiovascular:      Rate and  Rhythm: Regular rhythm. Tachycardia present.      Pulses: Normal pulses.   Pulmonary:      Effort: He is intubated.      Comments: Coarse bilateral breath sounds  Abdominal:      Palpations: Abdomen is soft.   Musculoskeletal:         General: Normal range of motion.      Cervical back: Normal range of motion and neck supple.   Skin:     General: Skin is warm and dry.      Capillary Refill: Capillary refill takes less than 2 seconds.   Neurological:      General: No focal deficit present.         Medications  Current Facility-Administered Medications   Medication Dose Route Frequency Provider Last Rate Last Admin    phosphorus (K-Phos-Neutral) per tablet 500 mg  500 mg Enteral Tube Q4HRS Shaggy Salcedo M.D.   500 mg at 12/11/21 1022    midazolam (VERSED) injection 5 mg  5 mg Intravenous Q HOUR PRN Shaggy Salcedo M.D.   5 mg at 12/11/21 0313    voriconazole (VFEND) 40 MG/ML susp 200 mg  200 mg Enteral Tube Q12HRS Shaggy Salcedo M.D.   200 mg at 12/11/21 0734    polyethylene glycol/lytes (MIRALAX) PACKET 1 Packet  1 Packet Enteral Tube BID Liam Monaco M.D.   1 Packet at 12/10/21 1703    famotidine (PEPCID) tablet 20 mg  20 mg Enteral Tube BID Liam Monaco M.D.   20 mg at 12/11/21 0526    artificial tears (EYE LUBRICANT) ophth ointment 1 Application  1 Application Both Eyes Q8HRS Carlos Pelayo D.O.   1 Application at 12/11/21 0526    HYDROmorphone (DILAUDID) 0.2 mg/mL in 50mL NS (Continuous Infusion)   Intravenous Continuous Shaggy Salcedo M.D. 2.5 mL/hr at 12/11/21 0523 0.5 mg/hr at 12/11/21 0523    insulin regular (HumuLIN R,NovoLIN R) injection  3-14 Units Subcutaneous Q6HRS Liam Monaco M.D.   3 Units at 12/10/21 2340    And    dextrose 50% (D50W) injection 50 mL  50 mL Intravenous Q15 MIN PRN Liam Monaco M.D.        senna-docusate (PERICOLACE or SENOKOT S) 8.6-50 MG per tablet 2 Tablet  2 Tablet Enteral Tube BID Liam Monaco M.D.   2 Tablet at 12/10/21 1709    And    polyethylene  glycol/lytes (MIRALAX) PACKET 1 Packet  1 Packet Enteral Tube QDAY PRN Liam Monaco M.D.   1 Packet at 12/09/21 0444    And    magnesium hydroxide (MILK OF MAGNESIA) suspension 30 mL  30 mL Enteral Tube QDAY PRN Liam Monaco M.D.   30 mL at 12/09/21 0605    And    bisacodyl (DULCOLAX) suppository 10 mg  10 mg Rectal QDAY PRN Liam Monaco M.D.   10 mg at 12/10/21 1437    acetaminophen (Tylenol) tablet 650 mg  650 mg Enteral Tube Q6HRS PRN Liam Monaco M.D.   650 mg at 12/11/21 0348    midazolam (VERSED) premix 125 mg/125 mL NS  0-10 mg/hr Intravenous Continuous Shaggy Salcedo M.D.   Held at 12/11/21 0802    HYDROmorphone (Dilaudid) injection 1-2 mg  1-2 mg Intravenous Q30 MIN PRN Liam Monaco M.D.   2 mg at 12/11/21 0344    MD Alert...ICU Electrolyte Replacement per Pharmacy   Other PHARMACY TO DOSE Liam Monaco M.D.        Respiratory Therapy Consult   Nebulization Continuous RT Liam Monaco M.D.        lidocaine (XYLOCAINE) 1 % injection 2 mL  2 mL Tracheal Tube Q30 MIN PRN Lima Monaco M.D.        Pharmacy Consult: Enteral tube insertion - review meds/change route/product selection  1 Each Other PHARMACY TO DOSE Katie Singh M.D.        enoxaparin (LOVENOX) inj 40 mg  40 mg Subcutaneous DAILY Liam Monaco M.D.   40 mg at 12/11/21 0525       Fluids    Intake/Output Summary (Last 24 hours) at 12/11/2021 1125  Last data filed at 12/11/2021 1100  Gross per 24 hour   Intake 2791.98 ml   Output 3150 ml   Net -358.02 ml       Laboratory  Recent Labs     12/09/21  1113 12/10/21  0232   ISTATAPH 7.244* 7.372*   ISTATAPCO2 >100.0* 90.4*   ISTATAPO2 58* 67   ISTATATCO2 >50* >50*   [id number] 80* 90*   ISTATARTHCO3 52.3* 52.5*   ISTATARTBE 21* 23*   ISTATTEMP 37.6 C 37.8 C   ISTATFIO2 100 100   ISTATSPEC Arterial Arterial   ISTATAPHTC 7.236* 7.360*   AVIUEZHY0VH 60* 70         Recent Labs     12/09/21  0420 12/09/21  0420 12/10/21  0225 12/10/21  0851 12/11/21  0315   SODIUM 150*  --  150*   --  154*   POTASSIUM 5.0   < > 5.6* 5.3 5.4   CHLORIDE 105  --  103  --  102   CO2 42*  --  47*  --  >50*   BUN 37*  --  41*  --  44*   CREATININE 0.33*  --  0.37*  --  0.46*   MAGNESIUM  --   --   --   --  2.2   PHOSPHORUS  --   --   --   --  1.7*   CALCIUM 8.2*  --  8.0*  --  8.2*    < > = values in this interval not displayed.     Recent Labs     12/09/21  0420 12/10/21  0225 12/11/21  0315   ALTSGPT 17 17  --    ASTSGOT 23 28  --    ALKPHOSPHAT 71 75  --    TBILIRUBIN <0.2 <0.2  --    GLUCOSE 162* 185* 147*     Recent Labs     12/09/21  0420 12/10/21  0225 12/11/21  0315   WBC 20.0* 19.9* 16.6*   NEUTSPOLYS 90.80* 89.60*  --    LYMPHOCYTES 4.40* 5.40*  --    MONOCYTES 2.30 2.40  --    EOSINOPHILS 0.10 0.00  --    BASOPHILS 0.50 0.40  --    ASTSGOT 23 28  --    ALTSGPT 17 17  --    ALKPHOSPHAT 71 75  --    TBILIRUBIN <0.2 <0.2  --      Recent Labs     12/09/21  0420 12/10/21  0225 12/11/21  0315   RBC 3.31* 3.11* 3.08*   HEMOGLOBIN 10.4* 9.7* 9.3*   HEMATOCRIT 35.8* 34.2* 34.6*   PLATELETCT 422 455* 482*       Imaging  X-Ray:  I have personally reviewed the images and compared with prior images.    Assessment/Plan  * Acute hypoxemic respiratory failure due to COVID-19 (HCC)- (present on admission)  Assessment & Plan  Intubated 11/21  Status post proning protocol (completed 11/25)  Ventilator dependent respiratory failure  Modify ventilator to optimize oxygenation and ventilation  HOB > 30  Chlorhexidine  Perform spontaneous breathing trial when able  Early mobility    Agitation requiring sedation protocol  Assessment & Plan  Titrate hydromorphone for pain < 2  Titrate midazolam for RASS -2 to 0  Daily sedation holiday when able  Follow [facility] PAD guidelines    HCAP (healthcare-associated pneumonia)  Assessment & Plan  Developed klebsiella, pseudomonas, and aspergillus pneumonia  Completed multiple antimicrobials  Continue voriconazole to complete 14 day course    Acute cystitis with hematuria  Assessment &  Plan  11/27, treated    Fever  Assessment & Plan  Klebsiella pneumoniae pneumonia (BAL 11/27) - ceftriaxone 11/27 - 12/1  Pseudomonas pneumonia (BAL 12/1) - zosyn 12/2 - 12/4, cefepime 12/4 - 12/8  Aspergillus spp (BAL 12/1) - voriconazole 12/7 - P  Bactrim ppx (prolonged steroids), sputum PJP Pending    Hypernatremia  Assessment & Plan  Free water    Hypophosphatemia  Assessment & Plan  Phos 1.7  Replete for phos < 2.5    Goals of care, counseling/discussion  Assessment & Plan  Palliative care following, DNR  Ongoing goals of care discussions with family  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  Family updated daily, prognosis dismal, difficult to ventilate now  They remain optimistic  They were informed on two occasions that we will not provide ivermectin as they had requested    Pneumonia due to COVID-19 virus- (present on admission)  Assessment & Plan  COVID-19 pneumonia  Dexamethasone - completed course  Baricitinib - completed  Fluid balance - keep euvolemic     DVT prophylaxis: Enoxaparin  PUD prophylaxis: Famotidine  Glycemic control: Sliding scale insulin  Nutrition: Tube feeds  Lines: None  Navas: Need for strict I/O monitoring, remove when able  Mobility: Early mobility as tolerated  Goals of care: Full code  Disposition: ICU    I have performed a physical exam and reviewed and updated ROS and Plan today (12/11/2021). In review of yesterday's note (12/10/2021), there are no changes except as documented above.     Discussed patient condition and risk of morbidity and/or mortality with Family, RN, RT, Pharmacy and Charge nurse / hot rounds     The patient remains critically ill.  Critical care time = 33 minutes in directly providing and coordinating critical care and extensive data review.  No time overlap and excludes procedures.

## 2021-12-11 NOTE — PROGRESS NOTES
"Around 17:00 family alerted this RN to \"sounds coming from throat.\" Upon inspection, he was found to have a significant cuff leak. Occasionally pulling appropriate volumes over 290 but mostly pulling volumes in 100s. RT alerted and came to the bedside. Attempted to troubleshoot and reposition ETT and re inflate the cuff. This was successful for 10 minutes and then cuff leak with low volumes returned. MD notified of possible need to replace ETT. Over the next hour, cuffleak persisted. Patient became more tachycardic and had increased WOB with nasal flaring and abdominal muscle use. SPO2 remained >85%. Patient has no mental status/was not responding to pain or any noxious stimuli. Midazolam IVP 5mg PRN given as per order to ease agitation/WOB until MD is able to come to the bedside.  "

## 2021-12-11 NOTE — CARE PLAN
Problem: Ventilation  Goal: Ability to achieve and maintain unassisted ventilation or tolerate decreased levels of ventilator support  Description: Document on Vent flowsheet    1.  Support and monitor invasive and noninvasive mechanical ventilation  2.  Monitor ventilator weaning response  3.  Perform ventilator associated pneumonia prevention interventions  4.  Manage ventilation therapy by monitoring diagnostic test results  Outcome: Not Met   Ventilator Daily Summary     Vent Day # 21      Ventilator settings changed this shift: yes PEEP from 12-14   APVCMV 40/290/100%+14     Weaning trials: No     Respiratory Procedures: bronchoscopy      Plan: Continue current ventilator settings and wean mechanical ventilation as tolerated per physician orders.

## 2021-12-11 NOTE — CARE PLAN
Problem: Ventilation  Goal: Ability to achieve and maintain unassisted ventilation or tolerate decreased levels of ventilator support  Description: Document on Vent flowsheet    1.  Support and monitor invasive and noninvasive mechanical ventilation  2.  Monitor ventilator weaning response  3.  Perform ventilator associated pneumonia prevention interventions  4.  Manage ventilation therapy by monitoring diagnostic test results  Outcome: Not Met   Ventilator Daily Summary    Vent Day # 20     Ventilator settings changed this shift: No   APVCMV 40/12/290/100%    Weaning trials: No    Respiratory Procedures: None    Plan: Continue current ventilator settings and wean mechanical ventilation as tolerated per physician orders.

## 2021-12-12 NOTE — PROGRESS NOTES
"Critical Care Progress Note    Date of admission  11/5/2021    Chief Complaint  61 y.o. male, unvaccinated against COVID-19, admitted 11/5/2021 with COVID-19 pneumonia, requiring HFNC, now with oxygen desaturation and increased work of breathing, transferred to ICU 11/20 on BiPAP, intubated 11/21     Hospital Course  \"61 y.o. male who presented 11/5/2021 with no PMH, BMI 30 that is unvaccinated that presented 11/5 for 10 days of cough, chills, headache, sore throat with + covid test 9 days prior. He had room air saturation of 62% and was admitted on HFNC. Today I was asked to consult on patient since he is on HFNC + NRB. He is afebrile, HR 77-80's, 's sat 90-92%. He was started on dexamethasone and barcitinib, crp 17, ddimer 1, lactic 2.5. Still undecided about code status and palliative care has been consulted and remains full code. Wife and son at bedside. Patient without complaints of chest pain, shortness or breath cough, n/v/d, leg pain or swelling.\"      11/20 - he has been managed on the medical floor with HFNC, Decadron, baricitinib, Lasix, empiric antibiotics; increased work of breathing, accessory muscle use today and transfer to ICU.  11/21- intubated prone paralyzed, very high peak pressures allowing permissive hypercapnia  11/22 - PEEP of 8, 60% FiO2, paralyzed/sedated/proned  11/23 PEEP 8, 50%, paralyzed/sedated/proned  11/24 - PEEP 8, 60%, sedated/proned, improving renal function  11/25 PEEP 8, 40%, discontinued proning  11/26 PEEP of 10, 70%, weaning sedation, fevers  11/27 PEEP of 10, ASV, weaning sedation, initiation of norepinephrine drip, started on Rocephin for UTI/possible pneumonia  11/29 - VD #9, %, 70% FiO2, ceftriaxone day 3 for Klebsiella pneumonia  11/30 - VD #10, ASV, ceftriaxone day 4, quiet bowel sounds, suspect some component of ileus, trial Relistor, further imaging as needed  12/1 - VD #11, APV/CMV, increased secretions, bronchoscopy today, CT C/A/P some " pneumomediastinum, no PTX, no bowel abnormality advance TF  12/2 - VD 12, PEEP 12, 80%, elevated airway pressures, amiodarone infusion, changed to Versed drip, GNR in sputum now, ABX escalated to Zosyn  12/3 - VD13, PEEP 12, 70%, Zosyn day 2 for Pseudomonas pneumonia, SR on amiodarone  12/4 - VD 14, on amiodarone, ST, low compliance, full ventilator support, ABX changed to cefepime for sensitive Pseudomonas  12/5 - VD15; 80%, Cst 10-15, cefepime  12/6 - VD16, PEEP 12, 80%, Cst 8-18, cefepime day 5/7, free water, Lasix today  12/7 - VD #17, 4 cc/KG, very low compliance, PCO2 100, cefepime day 6,  12/8 - VD #18, PEEP 12, 100%, Cst 10, sedated with Versed/Dilaudid, intermittent rocuronium, cefepime/vori/Bactrim  12/9 - VD #19, very difficult to ventilate now, very low compliance  12/10 - D/C sulfamethoxazole-trimethoprim, wean steroids, RASS goal 0 to -2, ETT advanced over bronchoscope  12/11 - phosphorus repletion, place PEEP back to 12  12/12 - phosphorus repletion    Review of Systems  Review of Systems   Unable to perform ROS: Intubated        Vital Signs for last 24 hours   Temp:  [36.9 °C (98.4 °F)-38.3 °C (100.9 °F)] 37.8 °C (100 °F)  Pulse:  [] 116  Resp:  [29-43] 36  BP: ()/(52-71) 113/68  SpO2:  [86 %-100 %] 93 %    Hemodynamic parameters for last 24 hours       Respiratory Information for the last 24 hours  Vent Mode: APVCMV  Rate (breaths/min): 40  Vt Target (mL): 290  PEEP/CPAP: 12  MAP: 13  Control VTE (exp VT): 290    Physical Exam   Physical Exam  Vitals and nursing note reviewed.   Constitutional:       Appearance: He is ill-appearing.      Interventions: He is sedated and intubated.   HENT:      Head: Normocephalic and atraumatic.      Right Ear: External ear normal.      Left Ear: External ear normal.      Nose: Nose normal.      Mouth/Throat:      Mouth: Mucous membranes are dry.      Pharynx: Oropharynx is clear.   Eyes:      Pupils: Pupils are equal, round, and reactive to light.    Cardiovascular:      Rate and Rhythm: Regular rhythm. Tachycardia present.      Pulses: Normal pulses.   Pulmonary:      Effort: He is intubated.      Comments: Coarse bilateral breath sounds  Abdominal:      Palpations: Abdomen is soft.   Musculoskeletal:         General: Normal range of motion.      Cervical back: Normal range of motion and neck supple.   Skin:     General: Skin is warm and dry.      Capillary Refill: Capillary refill takes less than 2 seconds.   Neurological:      General: No focal deficit present.         Medications  Current Facility-Administered Medications   Medication Dose Route Frequency Provider Last Rate Last Admin   • HYDROmorphone (Dilaudid) injection 0.5 mg  0.5 mg Intravenous Q30 MIN PRN Shaggy Salcedo M.D.       • voriconazole (VFEND) 40 MG/ML susp 200 mg  200 mg Enteral Tube Q12HRS Shaggy Salcedo M.D.   200 mg at 12/12/21 1723   • polyethylene glycol/lytes (MIRALAX) PACKET 1 Packet  1 Packet Enteral Tube BID Liam Monaco M.D.   1 Packet at 12/10/21 1703   • famotidine (PEPCID) tablet 20 mg  20 mg Enteral Tube BID Liam Monaco M.D.   20 mg at 12/12/21 1723   • artificial tears (EYE LUBRICANT) ophth ointment 1 Application  1 Application Both Eyes Q8HRS Carlos Pelayo D.O.   1 Application at 12/12/21 1400   • insulin regular (HumuLIN R,NovoLIN R) injection  3-14 Units Subcutaneous Q6HRS Liam Monaco M.D.   3 Units at 12/12/21 1728    And   • dextrose 50% (D50W) injection 50 mL  50 mL Intravenous Q15 MIN PRN Liam Monaco M.D.       • senna-docusate (PERICOLACE or SENOKOT S) 8.6-50 MG per tablet 2 Tablet  2 Tablet Enteral Tube BID Liam Monaco M.D.   2 Tablet at 12/10/21 1703    And   • polyethylene glycol/lytes (MIRALAX) PACKET 1 Packet  1 Packet Enteral Tube QDAY PRN Liam Monaco M.D.   1 Packet at 12/09/21 0444    And   • magnesium hydroxide (MILK OF MAGNESIA) suspension 30 mL  30 mL Enteral Tube QDAY PRN Liam Monaco M.D.   30 mL at 12/09/21 0605    And    • bisacodyl (DULCOLAX) suppository 10 mg  10 mg Rectal QDAY PRN Liam Monaco M.D.   10 mg at 12/10/21 1437   • acetaminophen (Tylenol) tablet 650 mg  650 mg Enteral Tube Q6HRS PRN Liam Monaco M.D.   650 mg at 12/11/21 1733   • MD Alert...ICU Electrolyte Replacement per Pharmacy   Other PHARMACY TO DOSE Liam Monaco M.D.       • Respiratory Therapy Consult   Nebulization Continuous RT Liam Monaco M.D.       • lidocaine (XYLOCAINE) 1 % injection 2 mL  2 mL Tracheal Tube Q30 MIN PRN Liam Monaco M.D.       • Pharmacy Consult: Enteral tube insertion - review meds/change route/product selection  1 Each Other PHARMACY TO DOSE Katie Singh M.D.       • enoxaparin (LOVENOX) inj 40 mg  40 mg Subcutaneous DAILY Liam Monaco M.D.   40 mg at 12/12/21 0558       Fluids    Intake/Output Summary (Last 24 hours) at 12/12/2021 1826  Last data filed at 12/12/2021 1600  Gross per 24 hour   Intake 2125 ml   Output 2505 ml   Net -380 ml       Laboratory  Recent Labs     12/10/21  0232 12/12/21  0250   ISTATAPH 7.372* 7.437   ISTATAPCO2 90.4* 87.5*   ISTATAPO2 67 52*   ISTATATCO2 >50* >50*   [id number] 90* 84*   ISTATARTHCO3 52.5* 59.0*   ISTATARTBE 23* >30*   ISTATTEMP 37.8 C 37.3 C   ISTATFIO2 100 100   ISTATSPEC Arterial Arterial   ISTATAPHTC 7.360* 7.432   [id number] 70 53*         Recent Labs     12/10/21  0225 12/10/21  0225 12/10/21  0851 12/11/21  0315 12/12/21  0430   SODIUM 150*  --   --  154* 151*   POTASSIUM 5.6*   < > 5.3 5.4 4.6   CHLORIDE 103  --   --  102 101   CO2 47*  --   --  >50* >50*   BUN 41*  --   --  44* 65*   CREATININE 0.37*  --   --  0.46* 0.73   MAGNESIUM  --   --   --  2.2 2.1   PHOSPHORUS  --   --   --  1.7* 2.4*   CALCIUM 8.0*  --   --  8.2* 7.9*    < > = values in this interval not displayed.     Recent Labs     12/10/21  0225 12/11/21  0315 12/12/21  0430   ALTSGPT 17  --   --    ASTSGOT 28  --   --    ALKPHOSPHAT 75  --   --    TBILIRUBIN <0.2  --   --    GLUCOSE 185* 147*  157*     Recent Labs     12/10/21  0225 12/11/21  0315 12/12/21  0430   WBC 19.9* 16.6* 13.7*   NEUTSPOLYS 89.60*  --   --    LYMPHOCYTES 5.40*  --   --    MONOCYTES 2.40  --   --    EOSINOPHILS 0.00  --   --    BASOPHILS 0.40  --   --    ASTSGOT 28  --   --    ALTSGPT 17  --   --    ALKPHOSPHAT 75  --   --    TBILIRUBIN <0.2  --   --      Recent Labs     12/10/21  0225 12/11/21  0315 12/12/21  0430   RBC 3.11* 3.08* 2.54*   HEMOGLOBIN 9.7* 9.3* 7.9*   HEMATOCRIT 34.2* 34.6* 27.4*   PLATELETCT 455* 482* 424       Imaging  X-Ray:  I have personally reviewed the images and compared with prior images.    Assessment/Plan  * Acute hypoxemic respiratory failure due to COVID-19 (HCC)- (present on admission)  Assessment & Plan  Intubated 11/21  Status post proning protocol (completed 11/25)  Ventilator dependent respiratory failure  Modify ventilator to optimize oxygenation and ventilation  HOB > 30  Chlorhexidine  Perform spontaneous breathing trial when able  Early mobility    Agitation requiring sedation protocol  Assessment & Plan  Titrate hydromorphone for pain < 2  Titrate midazolam for RASS -2 to 0  Daily sedation holiday when able  Follow [facility] PAD guidelines    HCAP (healthcare-associated pneumonia)  Assessment & Plan  Developed klebsiella, pseudomonas, and aspergillus pneumonia  Completed multiple antimicrobials  Continue voriconazole to complete 14 day course    Acute cystitis with hematuria  Assessment & Plan  11/27, treated    Fever  Assessment & Plan  Klebsiella pneumoniae pneumonia (BAL 11/27) - ceftriaxone 11/27 - 12/1  Pseudomonas pneumonia (BAL 12/1) - zosyn 12/2 - 12/4, cefepime 12/4 - 12/8  Aspergillus spp (BAL 12/1) - voriconazole 12/7 - 12/21  Sputum PJP negative    Hypernatremia  Assessment & Plan  Free water    Hypophosphatemia  Assessment & Plan  Phos 1.7  Replete for phos < 2.5    Goals of care, counseling/discussion  Assessment & Plan  Palliative care following, DNR  Ongoing goals of care  discussions with family  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  Family updated daily, prognosis dismal, difficult to ventilate now  They remain optimistic  They were informed on two occasions that we will not provide ivermectin as they had requested    Pneumonia due to COVID-19 virus- (present on admission)  Assessment & Plan  COVID-19 pneumonia  Dexamethasone - completed course  Baricitinib - completed  Fluid balance - keep euvolemic     DVT prophylaxis: Enoxaparin  PUD prophylaxis: Famotidine  Glycemic control: Sliding scale insulin  Nutrition: Tube feeds  Lines: None  Navas: Need for strict I/O monitoring, remove when able  Mobility: Early mobility as tolerated  Goals of care: Full code  Disposition: ICU    I have performed a physical exam and reviewed and updated ROS and Plan today (12/12/2021). In review of yesterday's note (12/11/2021), there are no changes except as documented above.     Discussed patient condition and risk of morbidity and/or mortality with Family, RN, RT, Pharmacy and Charge nurse / hot rounds     The patient remains critically ill.  Critical care time = 30 minutes in directly providing and coordinating critical care and extensive data review.  No time overlap and excludes procedures.

## 2021-12-12 NOTE — CARE PLAN
Problem: Knowledge Deficit - Standard  Goal: Patient and family/care givers will demonstrate understanding of plan of care, disease process/condition, diagnostic tests and medications  Outcome: Progressing     Problem: Respiratory  Goal: Patient will achieve/maintain optimum respiratory ventilation and gas exchange  Outcome: Progressing     Problem: Mobility  Goal: Patient's capacity to carry out activities will improve  Outcome: Progressing   The patient is Unstable - High likelihood or risk of patient condition declining or worsening    Shift Goals  Clinical Goals: Wean Sedation, Wean FiO2  Patient Goals: EDMUND  Family Goals: EDMUND    Progress made toward(s) clinical / shift goals:Sedation off    Patient is not progressing towards the following goals:

## 2021-12-12 NOTE — PROGRESS NOTES
"Nursing Note    Neuro: GCS 3. No Sedation. PERRL, 4/4/ Sluggish, No Response to pain x4 . Cough Reflex Intact. No Corneal/ No GAG.     Cardiac: ST, Suspected ST Elevation EKG shows Sinus Tach.    Resp: Intubated maximum support 100% fio2. Breath sounds are diminished / coarse. Over breathing ventilator, Tidal volumes vary 50 -400ml. Agonal Breathing pattern. Tachypnea. RR 40 -50.    GI: Core track L Nare, Infusing Impact Peptide @ 55ml/hr H20 300ml Q4hrs. Hypoactive BS, Loose BM Tonight.     : Navas Catheter, Navas Care Completed.    LTD: Naavs, ETT, NG, PIV x2    Activity: Bed Rest, Turn Q2    Skin: Sore on Bottom, Sores on lips    Shift Summary:   No acute issues overnight. No responding neurologically other than + Cough Response and Pupils PERRL 4/4 Sluggish. 0500 Yessica Lucas Notfied of Critical Value co2 >50    /64   Pulse (!) 112   Temp 38 °C (100.4 °F) (Esophageal)   Resp (!) 41   Ht 1.676 m (5' 5.98\")   Wt 82.7 kg (182 lb 5.1 oz)   SpO2 99%   BMI 29.44 kg/m²     "

## 2021-12-13 NOTE — PROGRESS NOTES
"Critical Care Progress Note    Date of admission  11/5/2021    Chief Complaint  61 y.o. male, unvaccinated against COVID-19, admitted 11/5/2021 with COVID-19 pneumonia, requiring HFNC, now with oxygen desaturation and increased work of breathing, transferred to ICU 11/20 on BiPAP, intubated 11/21     Hospital Course  \"61 y.o. male who presented 11/5/2021 with no PMH, BMI 30 that is unvaccinated that presented 11/5 for 10 days of cough, chills, headache, sore throat with + covid test 9 days prior. He had room air saturation of 62% and was admitted on HFNC. Today I was asked to consult on patient since he is on HFNC + NRB. He is afebrile, HR 77-80's, 's sat 90-92%. He was started on dexamethasone and barcitinib, crp 17, ddimer 1, lactic 2.5. Still undecided about code status and palliative care has been consulted and remains full code. Wife and son at bedside. Patient without complaints of chest pain, shortness or breath cough, n/v/d, leg pain or swelling.\"      11/20 - he has been managed on the medical floor with HFNC, Decadron, baricitinib, Lasix, empiric antibiotics; increased work of breathing, accessory muscle use today and transfer to ICU.  11/21- intubated prone paralyzed, very high peak pressures allowing permissive hypercapnia  11/22 - PEEP of 8, 60% FiO2, paralyzed/sedated/proned  11/23 PEEP 8, 50%, paralyzed/sedated/proned  11/24 - PEEP 8, 60%, sedated/proned, improving renal function  11/25 PEEP 8, 40%, discontinued proning  11/26 PEEP of 10, 70%, weaning sedation, fevers  11/27 PEEP of 10, ASV, weaning sedation, initiation of norepinephrine drip, started on Rocephin for UTI/possible pneumonia  11/29 - VD #9, %, 70% FiO2, ceftriaxone day 3 for Klebsiella pneumonia  11/30 - VD #10, ASV, ceftriaxone day 4, quiet bowel sounds, suspect some component of ileus, trial Relistor, further imaging as needed  12/1 - VD #11, APV/CMV, increased secretions, bronchoscopy today, CT C/A/P some " pneumomediastinum, no PTX, no bowel abnormality advance TF  12/2 - VD 12, PEEP 12, 80%, elevated airway pressures, amiodarone infusion, changed to Versed drip, GNR in sputum now, ABX escalated to Zosyn  12/3 - VD13, PEEP 12, 70%, Zosyn day 2 for Pseudomonas pneumonia, SR on amiodarone  12/4 - VD 14, on amiodarone, ST, low compliance, full ventilator support, ABX changed to cefepime for sensitive Pseudomonas  12/5 - VD15; 80%, Cst 10-15, cefepime  12/6 - VD16, PEEP 12, 80%, Cst 8-18, cefepime day 5/7, free water, Lasix today  12/7 - VD #17, 4 cc/KG, very low compliance, PCO2 100, cefepime day 6,  12/8 - VD #18, PEEP 12, 100%, Cst 10, sedated with Versed/Dilaudid, intermittent rocuronium, cefepime/vori/Bactrim  12/9 - VD #19, very difficult to ventilate now, very low compliance  12/10 - D/C sulfamethoxazole-trimethoprim, wean steroids, RASS goal 0 to -2, ETT advanced over bronchoscope  12/11 - phosphorus repletion, place PEEP back to 12  12/12 - phosphorus repletion  12/13 - decrease RR to 34 for alkalosis, D/C Eloise    Review of Systems  Review of Systems   Unable to perform ROS: Intubated        Vital Signs for last 24 hours   Temp:  [37.5 °C (99.5 °F)-39.2 °C (102.6 °F)] 39.2 °C (102.6 °F)  Pulse:  [110-129] 118  Resp:  [27-56] 29  BP: ()/(55-72) 99/63  SpO2:  [76 %-96 %] 94 %    Hemodynamic parameters for last 24 hours       Respiratory Information for the last 24 hours  Vent Mode: APVCMV  Rate (breaths/min): 34  Vt Target (mL): 290  PEEP/CPAP: 12  MAP: 14  Control VTE (exp VT): 368    Physical Exam   Physical Exam  Vitals and nursing note reviewed.   Constitutional:       Appearance: He is ill-appearing.      Interventions: He is sedated and intubated.   HENT:      Head: Normocephalic and atraumatic.      Right Ear: External ear normal.      Left Ear: External ear normal.      Nose: Nose normal.      Mouth/Throat:      Mouth: Mucous membranes are dry.      Pharynx: Oropharynx is clear.   Eyes:      Pupils:  Pupils are equal, round, and reactive to light.   Cardiovascular:      Rate and Rhythm: Regular rhythm. Tachycardia present.      Pulses: Normal pulses.   Pulmonary:      Effort: He is intubated.      Comments: Coarse bilateral breath sounds  Abdominal:      Palpations: Abdomen is soft.   Musculoskeletal:         General: Normal range of motion.      Cervical back: Normal range of motion and neck supple.   Skin:     General: Skin is warm and dry.      Capillary Refill: Capillary refill takes less than 2 seconds.   Neurological:      General: No focal deficit present.         Medications  Current Facility-Administered Medications   Medication Dose Route Frequency Provider Last Rate Last Admin   • HYDROmorphone (Dilaudid) injection 0.5 mg  0.5 mg Intravenous Q30 MIN PRN Shaggy Salcedo M.D.   0.5 mg at 12/13/21 0413   • voriconazole (VFEND) 40 MG/ML susp 200 mg  200 mg Enteral Tube Q12HRS Shaggy Salcedo M.D.   200 mg at 12/13/21 0557   • polyethylene glycol/lytes (MIRALAX) PACKET 1 Packet  1 Packet Enteral Tube BID Liam Monaco M.D.   1 Packet at 12/10/21 1703   • famotidine (PEPCID) tablet 20 mg  20 mg Enteral Tube BID Liam Monaco M.D.   20 mg at 12/13/21 0556   • artificial tears (EYE LUBRICANT) ophth ointment 1 Application  1 Application Both Eyes Q8HRS Carlos Pelayo D.O.   1 Application at 12/13/21 1513   • insulin regular (HumuLIN R,NovoLIN R) injection  3-14 Units Subcutaneous Q6HRS Liam Monaco M.D.   3 Units at 12/12/21 1728    And   • dextrose 50% (D50W) injection 50 mL  50 mL Intravenous Q15 MIN PRN Liam Monaco M.D.       • senna-docusate (PERICOLACE or SENOKOT S) 8.6-50 MG per tablet 2 Tablet  2 Tablet Enteral Tube BID Liam Monaco M.D.   2 Tablet at 12/13/21 0556    And   • polyethylene glycol/lytes (MIRALAX) PACKET 1 Packet  1 Packet Enteral Tube QDAY PRN Liam Monaco M.D.   1 Packet at 12/09/21 0444    And   • magnesium hydroxide (MILK OF MAGNESIA) suspension 30 mL  30 mL Enteral  Tube QDAY PRN Liam Monaco M.D.   30 mL at 12/09/21 0605    And   • bisacodyl (DULCOLAX) suppository 10 mg  10 mg Rectal QDAY PRN Liam Monaco M.D.   10 mg at 12/10/21 1437   • acetaminophen (Tylenol) tablet 650 mg  650 mg Enteral Tube Q6HRS PRN Liam Monaco M.D.   650 mg at 12/13/21 1217   • MD Alert...ICU Electrolyte Replacement per Pharmacy   Other PHARMACY TO DOSE Liam Monaco M.D.       • Respiratory Therapy Consult   Nebulization Continuous RT Liam Monaco M.D.       • lidocaine (XYLOCAINE) 1 % injection 2 mL  2 mL Tracheal Tube Q30 MIN PRN Liam Monaco M.D.       • Pharmacy Consult: Enteral tube insertion - review meds/change route/product selection  1 Each Other PHARMACY TO DOSE Katie Singh M.D.       • enoxaparin (LOVENOX) inj 40 mg  40 mg Subcutaneous DAILY Liam Monaco M.D.   40 mg at 12/13/21 0556       Fluids    Intake/Output Summary (Last 24 hours) at 12/13/2021 1522  Last data filed at 12/13/2021 1000  Gross per 24 hour   Intake 1410 ml   Output 2035 ml   Net -625 ml       Laboratory  Recent Labs     12/11/21  0239 12/12/21  0250 12/13/21  0224   ISTATAPH 7.281* 7.437 7.687*   ISTATAPCO2 >100.0* 87.5* 51.8*   ISTATAPO2 67 52* 69   ISTATATCO2 see below >50* >50*   [id number] see below 84* 96   ISTATARTHCO3 see below 59.0* 62.1*   ISTATARTBE see below >30* >30*   ISTATTEMP 38.4 C 37.3 C 37.8 C   ISTATFIO2 100 100 90   ISTATSPEC Arterial Arterial Arterial   ISTATAPHTC 7.261* 7.432 7.674*   [id number] 73 53* 73         Recent Labs     12/11/21  0315 12/12/21  0430 12/13/21  0215   SODIUM 154* 151* 155*   POTASSIUM 5.4 4.6 3.7   CHLORIDE 102 101 102   CO2 >50* >50* >50*   BUN 44* 65* 60*   CREATININE 0.46* 0.73 0.63   MAGNESIUM 2.2 2.1 2.0   PHOSPHORUS 1.7* 2.4* 2.7   CALCIUM 8.2* 7.9* 8.0*     Recent Labs     12/11/21  0315 12/12/21  0430 12/13/21  0215   GLUCOSE 147* 157* 117*     Recent Labs     12/11/21  0315 12/12/21  0430 12/13/21  0215   WBC 16.6* 13.7* 13.6*     Recent  Labs     12/11/21  0315 12/12/21  0430 12/13/21  0215   RBC 3.08* 2.54* 2.56*   HEMOGLOBIN 9.3* 7.9* 7.7*   HEMATOCRIT 34.6* 27.4* 27.7*   PLATELETCT 482* 424 407       Imaging  X-Ray:  I have personally reviewed the images and compared with prior images.    Assessment/Plan  * Acute hypoxemic respiratory failure due to COVID-19 (HCC)- (present on admission)  Assessment & Plan  Intubated 11/21  Status post proning protocol (completed 11/25)  Ventilator dependent respiratory failure  Modify ventilator to optimize oxygenation and ventilation  HOB > 30  Chlorhexidine  Perform spontaneous breathing trial when able  Early mobility    Agitation requiring sedation protocol  Assessment & Plan  Titrate hydromorphone for pain < 2  Titrate midazolam for RASS -2 to 0  Daily sedation holiday when able  Follow [facility] PAD guidelines    HCAP (healthcare-associated pneumonia)  Assessment & Plan  Developed klebsiella, pseudomonas, and aspergillus pneumonia  Completed multiple antimicrobials  Continue voriconazole to complete 14 day course    Acute cystitis with hematuria  Assessment & Plan  11/27, treated    Fever  Assessment & Plan  Klebsiella pneumoniae pneumonia (BAL 11/27) - ceftriaxone 11/27 - 12/1  Pseudomonas pneumonia (BAL 12/1) - zosyn 12/2 - 12/4, cefepime 12/4 - 12/8  Aspergillus spp (BAL 12/1) - voriconazole 12/7 - 12/21  Sputum PJP negative    Hypernatremia  Assessment & Plan  Free water    Hypophosphatemia  Assessment & Plan  Phos 1.7  Replete for phos < 2.5    Goals of care, counseling/discussion  Assessment & Plan  Palliative care following, DNR  Ongoing goals of care discussions with family  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  Family updated daily, prognosis dismal, difficult to ventilate now  They remain optimistic  They were informed on two occasions that we will not provide ivermectin as they had requested    Pneumonia due to COVID-19 virus- (present on admission)  Assessment &  Plan  COVID-19 pneumonia  Dexamethasone - completed course  Baricitinib - completed  Fluid balance - keep euvolemic     DVT prophylaxis: Enoxaparin  PUD prophylaxis: Famotidine  Glycemic control: Sliding scale insulin  Nutrition: Tube feeds  Lines: None  Navas: None  Mobility: Early mobility as tolerated  Goals of care: Full code  Disposition: ICU    I have performed a physical exam and reviewed and updated ROS and Plan today (12/13/2021). In review of yesterday's note (12/12/2021), there are no changes except as documented above.     Discussed patient condition and risk of morbidity and/or mortality with Family, RN, RT, Pharmacy and Charge nurse / hot rounds     The patient remains critically ill.  Critical care time = 33 minutes in directly providing and coordinating critical care and extensive data review.  No time overlap and excludes procedures.

## 2021-12-13 NOTE — DIETARY
"Nutrition support weekly update:  Day 38 of admit.  Blue Wu is a 61 y.o. male with admitting DX of COVID19.  Tube feeding/TPN initiated on . Current TF via small bowel Cortrak is Impact Peptide 1.5 @ 55 mL/hr, providing 1980 kcal, 124 gm protein, 185 gm CHO, and 1016 mL of free water per day.     Assessment:  Weight from  was 82.7 kg via bed scale, which is stable with previous weeks wt and overall slightly up from initial admit wt.     Estimated Nutritional Needs based on:   Height: 167.6 cm (5' 5.98\")  Weight: 82.7 kg (182 lb 5.1 oz)  Weight to Use in Calculations: 82 kg (180 lb 12.4 oz)  Ideal Body Weight: 64.4 kg (142 lb)    Calculation/Equation: MSJ x 1.2 = 1871 kcal.  PSU = 2121 kcal (VE: 9.4, Tmax over the past 24 hours: 39.2).  Total Calories / day: 1900 - 2100 (Calories / k - 26)  Total Grams Protein / day: 98 - 123  (Grams Protein / k.2 - 1.5 actual wt); 97 - 129 (Grams Protein / k.5 - 2.0 IBW)    Evaluation:   1. Pt tolerating TF @ current rate.  Additional free water flushes of 300 mL every 4 hours.   2. Vent day 22.    Current clinical picture and MD progress notes reviewed.  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  Family updated daily, prognosis dismal, difficult to ventilate now.\"  3. No new staged wounds per review of the EMR.  Noted with 2+ edema to RLE and RUE and 1+ edema to LLE and LUE.   4. Meds: SSI, Pepcid, Electrolyte replacement.  Additional meds and labs reviewed.   5. LBM:  - medium, incontinent, loose.   6. Current feeding remains appropriate.  Still pending met cart results if pt is appropriate for RT.     Malnutrition risk: No new risk identified at this time.     Recommendations/Plan:  1. Continue current TF regimen.   2. Fluids per MD - continue free water per order.  3. Continue to monitor wt at least weekly.  4. RD continues to monitor lab trends and if met cart results come back.    RD " follows.

## 2021-12-13 NOTE — PROGRESS NOTES
Nursing Note    Neuro: GCS 3. No Sedation. PERRL, 4/4/ Sluggish, No Response to pain x4 . Cough Reflex Intact. No Corneal/ No GAG.      Cardiac:  -130 , VSS, Febrile 39.2 C    Resp: Intubated 90% fio2. Breath sounds are diminished. Over breathing ventilator, Tidal volumes vary 50 -400ml. Agonal breathing pattern. Tachypnea. RR 40 -50. Had an episode where his oxygen saturation dropped to 70's recovered well after deep suction and repositioning.     GI: Core track L Nare, Infusing Impact Peptide @ 55ml/hr H20 300ml Q4hrs. Active BS. Episode of watery emesis post water flush @0000    : 0400 Navas Catheter Removed, Replaced with Condom Catheter.    LTD:  ETT, NG, PIV x2    Activity: Bed Rest, Turn Q2    Skin: Sore on Bottom, Sores on lips    Shift Summary:   No acute issues overnight. No Sedation, not responding neurologically other than + Cough Response and Pupils PERRL 4/4 Sluggish.

## 2021-12-13 NOTE — CARE PLAN
Problem: Mobility  Goal: Patient's capacity to carry out activities will improve  Outcome: Not Met     Problem: Psychosocial  Goal: Patient's level of anxiety will decrease  Outcome: Not Met   The patient is Unstable - High likelihood or risk of patient condition declining or worsening    Shift Goals  Clinical Goals: Wean Sedation, Wean FiO2  Patient Goals: Wake up  Family Goals: Understand severity of sickness    Progress made toward(s) clinical / shift goals:  Sedation off all day, no improvement of neuro status    Patient is not progressing towards the following goals:      Problem: Mobility  Goal: Patient's capacity to carry out activities will improve  Outcome: Not Met     Problem: Psychosocial  Goal: Patient's level of anxiety will decrease  Outcome: Not Met

## 2021-12-14 NOTE — PROGRESS NOTES
After Family discussion at bedside with Dr Salcedo the family has decided not to go forward with a bronchoscopy due to the risk of the procedure with the patients current conditions.   Saturations remain in the low 80s, Dr. Salcedo made aware, no new orders placed at this time.

## 2021-12-14 NOTE — CARE PLAN
Problem: Respiratory  Goal: Patient will achieve/maintain optimum respiratory ventilation and gas exchange  Outcome: Not Progressing  Note: Staying the same, unable to wean FIO2, pt maintaining sats in the 90s     Problem: Skin Integrity  Goal: Skin integrity is maintained or improved  Outcome: Progressing  Note: Pressure ulcers on lips/ oral mucosa. Rotation of ETT, oral care    The patient is Watcher - Medium risk of patient condition declining or worsening    Shift Goals  Clinical Goals: Wean Sedation, Wean FiO2  Patient Goals: Wake up  Family Goals: Understand severity of sickness    Progress made toward(s) clinical / shift goals:  unable to wean FIO2    Patient is not progressing towards the following goals:      Problem: Respiratory  Goal: Patient will achieve/maintain optimum respiratory ventilation and gas exchange  Outcome: Not Progressing  Note: Staying the same, unable to wean FIO2, pt maintaining sats in the 90s

## 2021-12-14 NOTE — CARE PLAN
The patient is Unstable - High likelihood or risk of patient condition declining or worsening    Shift Goals  Clinical Goals: Wean Sedation, Wean FiO2  Patient Goals: Wake up  Family Goals: Understand severity of sickness    Progress made toward(s) clinical / shift goals:  maintain oxygen saturation >88%    Patient is not progressing towards the following goals:      Problem: Respiratory  Goal: Patient will achieve/maintain optimum respiratory ventilation and gas exchange  Outcome: Not Progressing  Note: Pt continuously desaturating. Dilaudid administered per MAR for appeared pain. Pt overbreathing the ventilator, coughing and fighting the vent. Per Dr. Santana, Precedex started. Subtle improvement in the RR.      Problem: Mobility  Goal: Patient's capacity to carry out activities will improve  Outcome: Not Progressing  Note: Not mobilizing at this time due to pt's instability. Pt desaturates with turning and repositioning.

## 2021-12-14 NOTE — CARE PLAN
Problem: Ventilation  Goal: Ability to achieve and maintain unassisted ventilation or tolerate decreased levels of ventilator support  Description: Document on Vent flowsheet    1.  Support and monitor invasive and noninvasive mechanical ventilation  2.  Monitor ventilator weaning response  3.  Perform ventilator associated pneumonia prevention interventions  4.  Manage ventilation therapy by monitoring diagnostic test results  Outcome: Not Met   Ventilator Daily Summary    Vent Day # 23 ETT 8.0@25    Ventilator settings changed this shift: Yes APVCMV 34/290/12/100%    Weaning trials: No    Respiratory Procedures: None    Plan: Continue current ventilator settings and wean mechanical ventilation as tolerated per physician orders.

## 2021-12-14 NOTE — PROGRESS NOTES
"Critical Care Progress Note    Date of admission  11/5/2021    Chief Complaint  61 y.o. male, unvaccinated against COVID-19, admitted 11/5/2021 with COVID-19 pneumonia, requiring HFNC, now with oxygen desaturation and increased work of breathing, transferred to ICU 11/20 on BiPAP, intubated 11/21     Hospital Course  \"61 y.o. male who presented 11/5/2021 with no PMH, BMI 30 that is unvaccinated that presented 11/5 for 10 days of cough, chills, headache, sore throat with + covid test 9 days prior. He had room air saturation of 62% and was admitted on HFNC. Today I was asked to consult on patient since he is on HFNC + NRB. He is afebrile, HR 77-80's, 's sat 90-92%. He was started on dexamethasone and barcitinib, crp 17, ddimer 1, lactic 2.5. Still undecided about code status and palliative care has been consulted and remains full code. Wife and son at bedside. Patient without complaints of chest pain, shortness or breath cough, n/v/d, leg pain or swelling.\"      11/20 - he has been managed on the medical floor with HFNC, Decadron, baricitinib, Lasix, empiric antibiotics; increased work of breathing, accessory muscle use today and transfer to ICU.  11/21- intubated prone paralyzed, very high peak pressures allowing permissive hypercapnia  11/22 - PEEP of 8, 60% FiO2, paralyzed/sedated/proned  11/23 PEEP 8, 50%, paralyzed/sedated/proned  11/24 - PEEP 8, 60%, sedated/proned, improving renal function  11/25 PEEP 8, 40%, discontinued proning  11/26 PEEP of 10, 70%, weaning sedation, fevers  11/27 PEEP of 10, ASV, weaning sedation, initiation of norepinephrine drip, started on Rocephin for UTI/possible pneumonia  11/29 - VD #9, %, 70% FiO2, ceftriaxone day 3 for Klebsiella pneumonia  11/30 - VD #10, ASV, ceftriaxone day 4, quiet bowel sounds, suspect some component of ileus, trial Relistor, further imaging as needed  12/1 - VD #11, APV/CMV, increased secretions, bronchoscopy today, CT C/A/P some " pneumomediastinum, no PTX, no bowel abnormality advance TF  12/2 - VD 12, PEEP 12, 80%, elevated airway pressures, amiodarone infusion, changed to Versed drip, GNR in sputum now, ABX escalated to Zosyn  12/3 - VD13, PEEP 12, 70%, Zosyn day 2 for Pseudomonas pneumonia, SR on amiodarone  12/4 - VD 14, on amiodarone, ST, low compliance, full ventilator support, ABX changed to cefepime for sensitive Pseudomonas  12/5 - VD15; 80%, Cst 10-15, cefepime  12/6 - VD16, PEEP 12, 80%, Cst 8-18, cefepime day 5/7, free water, Lasix today  12/7 - VD #17, 4 cc/KG, very low compliance, PCO2 100, cefepime day 6,  12/8 - VD #18, PEEP 12, 100%, Cst 10, sedated with Versed/Dilaudid, intermittent rocuronium, cefepime/vori/Bactrim  12/9 - VD #19, very difficult to ventilate now, very low compliance  12/10 - D/C sulfamethoxazole-trimethoprim, wean steroids, RASS goal 0 to -2, ETT advanced over bronchoscope  12/11 - phosphorus repletion, place PEEP back to 12  12/12 - phosphorus repletion  12/13 - decrease RR to 34 for alkalosis, D/C Navas  12/14 - decrease RR to 30 for persistent alkalosis, replete potassium and phosphorus, mucous plugging with desaturations to the 60s, too unstable for bronchoscopy    Review of Systems  Review of Systems   Unable to perform ROS: Intubated        Vital Signs for last 24 hours   Temp:  [37.7 °C (99.9 °F)-39.5 °C (103.1 °F)] 38 °C (100.4 °F)  Pulse:  [101-129] 129  Resp:  [25-45] 37  BP: ()/(51-79) 144/79  SpO2:  [83 %-95 %] 83 %    Hemodynamic parameters for last 24 hours       Respiratory Information for the last 24 hours  Vent Mode: APVCMV  Rate (breaths/min): 30  Vt Target (mL): 290  PEEP/CPAP: 14  MAP: 20  Control VTE (exp VT): 330    Physical Exam   Physical Exam  Vitals and nursing note reviewed.   Constitutional:       Appearance: He is ill-appearing.      Interventions: He is sedated and intubated.   HENT:      Head: Normocephalic and atraumatic.      Right Ear: External ear normal.      Left  Ear: External ear normal.      Nose: Nose normal.      Mouth/Throat:      Mouth: Mucous membranes are dry.      Pharynx: Oropharynx is clear.   Eyes:      Pupils: Pupils are equal, round, and reactive to light.   Cardiovascular:      Rate and Rhythm: Regular rhythm. Tachycardia present.      Pulses: Normal pulses.   Pulmonary:      Effort: He is intubated.      Comments: Coarse bilateral breath sounds  Abdominal:      Palpations: Abdomen is soft.   Musculoskeletal:         General: Normal range of motion.      Cervical back: Normal range of motion and neck supple.   Skin:     General: Skin is warm and dry.      Capillary Refill: Capillary refill takes less than 2 seconds.   Neurological:      General: No focal deficit present.         Medications  Current Facility-Administered Medications   Medication Dose Route Frequency Provider Last Rate Last Admin   • norepinephrine (Levophed) 8 mg in 250 mL NS infusion (premix)  0-30 mcg/min Intravenous Continuous Leo Santana M.D. 15 mL/hr at 12/14/21 1118 8 mcg/min at 12/14/21 1118   • midazolam (VERSED) injection 5 mg  5 mg Intravenous Once PRN Leo Santana M.D.       • HYDROmorphone (Dilaudid) injection 0.5 mg  0.5 mg Intravenous Q30 MIN PRN Shaggy Salcedo M.D.   0.5 mg at 12/14/21 0124   • voriconazole (VFEND) 40 MG/ML susp 200 mg  200 mg Enteral Tube Q12HRS Shaggy Salcedo M.D.   200 mg at 12/14/21 0542   • polyethylene glycol/lytes (MIRALAX) PACKET 1 Packet  1 Packet Enteral Tube BID Liam Monaco M.D.   1 Packet at 12/10/21 1703   • famotidine (PEPCID) tablet 20 mg  20 mg Enteral Tube BID Liam Monaco M.D.   20 mg at 12/14/21 0540   • artificial tears (EYE LUBRICANT) ophth ointment 1 Application  1 Application Both Eyes Q8HRS Carlos Pelayo D.O.   1 Application at 12/14/21 0540   • insulin regular (HumuLIN R,NovoLIN R) injection  3-14 Units Subcutaneous Q6HRS Liam Monaco M.D.   4 Units at 12/14/21 1116    And   • dextrose 50% (D50W) injection 50  mL  50 mL Intravenous Q15 MIN PRN Liam Monaco M.D.       • senna-docusate (PERICOLACE or SENOKOT S) 8.6-50 MG per tablet 2 Tablet  2 Tablet Enteral Tube BID Liam Monaco M.D.   2 Tablet at 12/14/21 0540    And   • polyethylene glycol/lytes (MIRALAX) PACKET 1 Packet  1 Packet Enteral Tube QDAY PRN Liam Monaco M.D.   1 Packet at 12/09/21 0444    And   • magnesium hydroxide (MILK OF MAGNESIA) suspension 30 mL  30 mL Enteral Tube QDAY PRN Liam Monaco M.D.   30 mL at 12/09/21 0605    And   • bisacodyl (DULCOLAX) suppository 10 mg  10 mg Rectal QDAY PRN Liam Monaco M.D.   10 mg at 12/10/21 1437   • acetaminophen (Tylenol) tablet 650 mg  650 mg Enteral Tube Q6HRS PRN Liam Monaco M.D.   650 mg at 12/14/21 0343   • MD Alert...ICU Electrolyte Replacement per Pharmacy   Other PHARMACY TO DOSE Liam Monaco M.D.       • Respiratory Therapy Consult   Nebulization Continuous RT Liam Monaco M.D.       • lidocaine (XYLOCAINE) 1 % injection 2 mL  2 mL Tracheal Tube Q30 MIN PRN Liam Monaco M.D.       • Pharmacy Consult: Enteral tube insertion - review meds/change route/product selection  1 Each Other PHARMACY TO DOSE Katie Singh M.D.       • enoxaparin (LOVENOX) inj 40 mg  40 mg Subcutaneous DAILY Liam Monaco M.D.   40 mg at 12/14/21 0540       Fluids    Intake/Output Summary (Last 24 hours) at 12/14/2021 1540  Last data filed at 12/14/2021 1400  Gross per 24 hour   Intake 2504.91 ml   Output 1725 ml   Net 779.91 ml       Laboratory  Recent Labs     12/12/21  0250 12/13/21  0224 12/14/21  0708   ISTATAPH 7.437 7.687* 7.372*   ISTATAPCO2 87.5* 51.8* >100.0*   ISTATAPO2 52* 69 50*   ISTATATCO2 >50* >50* >50*   [id number] 84* 96 79*   ISTATARTHCO3 59.0* 62.1* 60.3*   ISTATARTBE >30* >30* >30*   ISTATTEMP 37.3 C 37.8 C 38.1 C   ISTATFIO2 100 90 100   ISTATSPEC Arterial Arterial Arterial   ISTATAPHTC 7.432 7.674* 7.356*   DAYOFHFQ3MH 53* 73 54*         Recent Labs     12/12/21  0430  12/13/21  0215 12/14/21  0040   SODIUM 151* 155* 159*   POTASSIUM 4.6 3.7 3.7   CHLORIDE 101 102 105   CO2 >50* >50* 49*   BUN 65* 60* 66*   CREATININE 0.73 0.63 0.74   MAGNESIUM 2.1 2.0 2.1   PHOSPHORUS 2.4* 2.7 2.3*   CALCIUM 7.9* 8.0* 8.3*     Recent Labs     12/12/21  0430 12/13/21  0215 12/13/21  1506 12/14/21  0040   PREALBUMIN  --   --  8.1*  --    GLUCOSE 157* 117*  --  131*     Recent Labs     12/12/21  0430 12/13/21  0215 12/14/21  0040   WBC 13.7* 13.6* 14.2*     Recent Labs     12/12/21 0430 12/13/21  0215 12/14/21  0040   RBC 2.54* 2.56* 2.72*   HEMOGLOBIN 7.9* 7.7* 8.6*   HEMATOCRIT 27.4* 27.7* 29.5*   PLATELETCT 424 407 445       Imaging  X-Ray:  I have personally reviewed the images and compared with prior images.    Assessment/Plan  * Acute hypoxemic respiratory failure due to COVID-19 (HCC)- (present on admission)  Assessment & Plan  Intubated 11/21  Status post proning protocol (completed 11/25)  Ventilator dependent respiratory failure  Modify ventilator to optimize oxygenation and ventilation  HOB > 30  Chlorhexidine  Perform spontaneous breathing trial when able  Early mobility    Agitation requiring sedation protocol  Assessment & Plan  Currently off sedation  Daily sedation holiday when able  Follow [facility] PAD guidelines    HCAP (healthcare-associated pneumonia)  Assessment & Plan  Developed klebsiella, pseudomonas, and aspergillus pneumonia  Completed multiple antimicrobials  Continue voriconazole to complete 14 day course    Acute cystitis with hematuria  Assessment & Plan  11/27, treated    Fever  Assessment & Plan  Klebsiella pneumoniae pneumonia (BAL 11/27) - ceftriaxone 11/27 - 12/1  Pseudomonas pneumonia (BAL 12/1) - zosyn 12/2 - 12/4, cefepime 12/4 - 12/8  Aspergillus spp (BAL 12/1) - voriconazole 12/7 - 12/21  Sputum PJP negative    Hypernatremia  Assessment & Plan  Free water    Hypophosphatemia  Assessment & Plan  Phos 1.7  Replete for phos < 2.5    Goals of care,  counseling/discussion  Assessment & Plan  Palliative care following, DNR  Ongoing goals of care discussions with family  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  Family updated daily, prognosis dismal, difficult to ventilate now  They remain optimistic  They were informed on two occasions that we will not provide ivermectin as they had requested    Pneumonia due to COVID-19 virus- (present on admission)  Assessment & Plan  COVID-19 pneumonia  Dexamethasone - completed course  Baricitinib - completed  Fluid balance - keep euvolemic     DVT prophylaxis: Enoxaparin  PUD prophylaxis: Famotidine  Glycemic control: Sliding scale insulin  Nutrition: Tube feeds  Lines: None  Navas: None  Mobility: Early mobility as tolerated  Goals of care: Full code  Disposition: ICU    I have performed a physical exam and reviewed and updated ROS and Plan today (12/14/2021). In review of yesterday's note (12/13/2021), there are no changes except as documented above.     Discussed patient condition and risk of morbidity and/or mortality with Family, RN, RT, Pharmacy and Charge nurse / hot rounds     The patient remains critically ill.  Critical care time = 80 minutes in directly providing and coordinating critical care and extensive data review.  No time overlap and excludes procedures.

## 2021-12-14 NOTE — PROGRESS NOTES
Pt saturations dropped to low 70's. Pt suctioned with large amount of thick tan secretions present with each suction pass. RT called to come to bedside due to no increase in saturations. One more suction pass performed with a large amount of secretions present, RT then began to ambu bag the patient. Still no significant increase in saturations. Dr. Salcedo was then called about pt condition. He soon came to bedside to assess the patient.  After assessment no new orders placed with plans to discuss clinical goals and outcomes with family.

## 2021-12-14 NOTE — CARE PLAN
The patient is Watcher - Medium risk of patient condition declining or worsening    Shift Goals  Clinical Goals: Wean FiO2 and PEEP, improved neuro assessment  Patient Goals: EDMUND  Family Goals: Understand severity of sickness    Progress made toward(s) clinical / shift goals:  Still progressing towards clinical goals    Patient is not progressing towards the following goals:

## 2021-12-15 NOTE — CARE PLAN
Problem: Ventilation  Goal: Ability to achieve and maintain unassisted ventilation or tolerate decreased levels of ventilator support  Description: Document on Vent flowsheet    1.  Support and monitor invasive and noninvasive mechanical ventilation  2.  Monitor ventilator weaning response  3.  Perform ventilator associated pneumonia prevention interventions  4.  Manage ventilation therapy by monitoring diagnostic test results  Outcome: Not Met   Ventilator Daily Summary    Vent Day # Vent day 24 ETT 8.0@25    Ventilator settings changed this shift: APVCMV 30/290/14/100%    Weaning trials: None    Respiratory Procedures: None    Plan: Continue current ventilator settings and wean mechanical ventilation as tolerated per physician orders.

## 2021-12-15 NOTE — CARE PLAN
The patient is Watcher - Medium risk of patient condition declining or worsening    Shift Goals  Clinical Goals: Increase O2 saturations, decrease ETT secretions, improved neuro status  Patient Goals: EDMUND  Family Goals (for PT): improved neuro status and increased O2 saturations    Progress made toward(s) clinical / shift goals:  still progressing to meet goals    Patient is not progressing towards the following goals:

## 2021-12-15 NOTE — PROGRESS NOTES
"Critical Care Progress Note    Date of admission  11/5/2021    Chief Complaint  61 y.o. male, unvaccinated against COVID-19, admitted 11/5/2021 with COVID-19 pneumonia, requiring HFNC, now with oxygen desaturation and increased work of breathing, transferred to ICU 11/20 on BiPAP, intubated 11/21     Hospital Course  \"61 y.o. male who presented 11/5/2021 with no PMH, BMI 30 that is unvaccinated that presented 11/5 for 10 days of cough, chills, headache, sore throat with + covid test 9 days prior. He had room air saturation of 62% and was admitted on HFNC. Today I was asked to consult on patient since he is on HFNC + NRB. He is afebrile, HR 77-80's, 's sat 90-92%. He was started on dexamethasone and barcitinib, crp 17, ddimer 1, lactic 2.5. Still undecided about code status and palliative care has been consulted and remains full code. Wife and son at bedside. Patient without complaints of chest pain, shortness or breath cough, n/v/d, leg pain or swelling.\"      11/20 - he has been managed on the medical floor with HFNC, Decadron, baricitinib, Lasix, empiric antibiotics; increased work of breathing, accessory muscle use today and transfer to ICU.  11/21- intubated prone paralyzed, very high peak pressures allowing permissive hypercapnia  11/22 - PEEP of 8, 60% FiO2, paralyzed/sedated/proned  11/23 PEEP 8, 50%, paralyzed/sedated/proned  11/24 - PEEP 8, 60%, sedated/proned, improving renal function  11/25 PEEP 8, 40%, discontinued proning  11/26 PEEP of 10, 70%, weaning sedation, fevers  11/27 PEEP of 10, ASV, weaning sedation, initiation of norepinephrine drip, started on Rocephin for UTI/possible pneumonia  11/29 - VD #9, %, 70% FiO2, ceftriaxone day 3 for Klebsiella pneumonia  11/30 - VD #10, ASV, ceftriaxone day 4, quiet bowel sounds, suspect some component of ileus, trial Relistor, further imaging as needed  12/1 - VD #11, APV/CMV, increased secretions, bronchoscopy today, CT C/A/P some " Anesthesia Pre Eval Note    Anesthesia ROS/Med Hx    Overall Review:  Stress test was reviewed       Cardiovascular Review:    Positive for cardiomyopathyPositive for CAD  Positive for hypertension  Positive for hyperlipidemia    GI/HEPATIC/RENAL Review:    Positive for renal disease    End/Other Review:  Positive for diabetes  Positive for anemia  Positive for thrombocytopenia      Relevant Problems   No relevant active problems       Physical Exam     Airway   Mallampati: II    Other Findings  Based on recollection      Anesthesia Plan    ASA Status: 3  Anesthesia Type: General      Checklist  Reviewed: Past Med History, Allergies, Medications, Problem list, NPO Status, Patient Summary, Lab Results and EKG    Informed Consent  The proposed anesthetic plan, including its risks and benefits, have been discussed with the Patient  - along with the risks and benefits of alternatives.  Questions were encouraged and answered and the patient and/or representative understands and agrees to proceed.     Blood Products: Not Anticipated    Comments  Plan Comments: LABS    HCT (%)       Date                     Value                 10/15/2019               39.9             ----------        Sodium (mmol/L)       Date                     Value                 10/15/2019               140              ----------    Potassium (mmol/L)       Date                     Value                 10/15/2019               4.5              ----------    Chloride (mmol/L)       Date                     Value                 10/15/2019               96 (L)           ----------    Carbon Dioxide (mmol/L)       Date                     Value                 10/15/2019               31               ----------    BUN (mg/dL)       Date                     Value                 10/15/2019               63 (H)           ----------    Creatinine (mg/dL)       Date                     Value                 10/15/2019               8.00 (H)          ----------    HCT (%)       Date                     Value                 10/15/2019               39.9             ----------    INR (no units)       Date                     Value                 08/09/2018               1.4              ----------    PLT       Date                     Value                 10/15/2019               178 K/mcL             11/07/2013               131 K/UL (L)     ----------    PROTIME (sec)       Date                     Value                 08/09/2018               14.0 (H)         ----------    Sinus  Rhythm  -First degree A-V block   Fortino = 236  -Nonspecific QRS widening.    -  Nonspecific T-abnormality.     Severely increased left ventricular cavity size. Severe global hypokinesis. LVEF, 19 %.  Mildly increased RV size with moderately decreased systolic function.  Severe pulmonary hypertension, PASP = 76 mmHg.  Tethered mitral leaflets. Moderate to severe MR.  Tricuspid valve septal leaflet appears restricted by the lead. Severe TR.  Compared to prior echo dated 5/3/18, LVEF has decreased further with increased in MR and TR severity.     pneumomediastinum, no PTX, no bowel abnormality advance TF  12/2 - VD 12, PEEP 12, 80%, elevated airway pressures, amiodarone infusion, changed to Versed drip, GNR in sputum now, ABX escalated to Zosyn  12/3 - VD13, PEEP 12, 70%, Zosyn day 2 for Pseudomonas pneumonia, SR on amiodarone  12/4 - VD 14, on amiodarone, ST, low compliance, full ventilator support, ABX changed to cefepime for sensitive Pseudomonas  12/5 - VD15; 80%, Cst 10-15, cefepime  12/6 - VD16, PEEP 12, 80%, Cst 8-18, cefepime day 5/7, free water, Lasix today  12/7 - VD #17, 4 cc/KG, very low compliance, PCO2 100, cefepime day 6,  12/8 - VD #18, PEEP 12, 100%, Cst 10, sedated with Versed/Dilaudid, intermittent rocuronium, cefepime/vori/Bactrim  12/9 - VD #19, very difficult to ventilate now, very low compliance  12/10 - D/C sulfamethoxazole-trimethoprim, wean steroids, RASS goal 0 to -2, ETT advanced over bronchoscope  12/11 - phosphorus repletion, place PEEP back to 12  12/12 - phosphorus repletion  12/13 - decrease RR to 34 for alkalosis, D/C Navas  12/14 - decrease RR to 30 for persistent alkalosis, replete potassium and phosphorus, mucous plugging with desaturations to the 60s, too unstable for bronchoscopy  12/15 - decreased respiratory rate for respiratory acidosis    Review of Systems  Review of Systems   Unable to perform ROS: Intubated        Vital Signs for last 24 hours   Temp:  [36.4 °C (97.5 °F)-38.4 °C (101.1 °F)] 36.4 °C (97.5 °F)  Pulse:  [102-131] 120  Resp:  [10-47] 36  BP: ()/(47-72) 105/59  SpO2:  [81 %-93 %] 81 %    Hemodynamic parameters for last 24 hours       Respiratory Information for the last 24 hours  Vent Mode: APVCMV  Rate (breaths/min): 36  Vt Target (mL): 290  PEEP/CPAP: 14  MAP: 17  Control VTE (exp VT): 314    Physical Exam   Physical Exam  Vitals and nursing note reviewed.   Constitutional:       Appearance: He is ill-appearing.      Interventions: He is sedated and intubated.   HENT:      Head: Normocephalic  and atraumatic.      Right Ear: External ear normal.      Left Ear: External ear normal.      Nose: Nose normal.      Mouth/Throat:      Mouth: Mucous membranes are dry.      Pharynx: Oropharynx is clear.   Eyes:      Pupils: Pupils are equal, round, and reactive to light.   Cardiovascular:      Rate and Rhythm: Regular rhythm. Tachycardia present.      Pulses: Normal pulses.   Pulmonary:      Effort: He is intubated.      Comments: Coarse bilateral breath sounds  Abdominal:      Palpations: Abdomen is soft.   Musculoskeletal:         General: Normal range of motion.      Cervical back: Normal range of motion and neck supple.   Skin:     General: Skin is warm and dry.      Capillary Refill: Capillary refill takes less than 2 seconds.   Neurological:      General: No focal deficit present.         Medications  Current Facility-Administered Medications   Medication Dose Route Frequency Provider Last Rate Last Admin   • norepinephrine (Levophed) 8 mg in 250 mL NS infusion (premix)  0-30 mcg/min Intravenous Continuous Leo Santana M.D. 30 mL/hr at 12/15/21 1251 16 mcg/min at 12/15/21 1251   • HYDROmorphone (Dilaudid) injection 0.5 mg  0.5 mg Intravenous Q30 MIN PRN Shaggy Salcedo M.D.   0.5 mg at 12/15/21 0227   • voriconazole (VFEND) 40 MG/ML susp 200 mg  200 mg Enteral Tube Q12HRS Shaggy Salcedo M.D.   200 mg at 12/15/21 0529   • polyethylene glycol/lytes (MIRALAX) PACKET 1 Packet  1 Packet Enteral Tube BID Liam Monaco M.D.   1 Packet at 12/14/21 0903   • famotidine (PEPCID) tablet 20 mg  20 mg Enteral Tube BID Liam Monaco M.D.   20 mg at 12/15/21 0531   • artificial tears (EYE LUBRICANT) ophth ointment 1 Application  1 Application Both Eyes Q8HRS Carlos Pelayo D.O.   1 Application at 12/15/21 0531   • insulin regular (HumuLIN R,NovoLIN R) injection  3-14 Units Subcutaneous Q6HRS Liam Monaco M.D.   3 Units at 12/15/21 0529    And   • dextrose 50% (D50W) injection 50 mL  50 mL Intravenous Q15 MIN  PRN Liam Monaco M.D.       • senna-docusate (PERICOLACE or SENOKOT S) 8.6-50 MG per tablet 2 Tablet  2 Tablet Enteral Tube BID Liam Monaco M.D.   2 Tablet at 12/14/21 1753    And   • polyethylene glycol/lytes (MIRALAX) PACKET 1 Packet  1 Packet Enteral Tube QDAY PRN Liam Monaco M.D.   1 Packet at 12/09/21 0444    And   • magnesium hydroxide (MILK OF MAGNESIA) suspension 30 mL  30 mL Enteral Tube QDAY PRN Liam Monaco M.D.   30 mL at 12/09/21 0605    And   • bisacodyl (DULCOLAX) suppository 10 mg  10 mg Rectal QDAY PRN Liam Monaco M.D.   10 mg at 12/10/21 1437   • acetaminophen (Tylenol) tablet 650 mg  650 mg Enteral Tube Q6HRS PRN Liam Monaco M.D.   650 mg at 12/15/21 0842   • MD Alert...ICU Electrolyte Replacement per Pharmacy   Other PHARMACY TO DOSE Liam Monaco M.D.       • Respiratory Therapy Consult   Nebulization Continuous RT Liam Monaco M.D.       • lidocaine (XYLOCAINE) 1 % injection 2 mL  2 mL Tracheal Tube Q30 MIN PRN Liam Monaco M.D.       • Pharmacy Consult: Enteral tube insertion - review meds/change route/product selection  1 Each Other PHARMACY TO DOSE Katie Singh M.D.       • enoxaparin (LOVENOX) inj 40 mg  40 mg Subcutaneous DAILY Liam Monaco M.D.   40 mg at 12/15/21 0531       Fluids    Intake/Output Summary (Last 24 hours) at 12/15/2021 1543  Last data filed at 12/15/2021 1000  Gross per 24 hour   Intake 2672.69 ml   Output 900 ml   Net 1772.69 ml       Laboratory  Recent Labs     12/13/21  0224 12/14/21  0708 12/15/21  0235   ISTATAPH 7.687* 7.372* 7.247*   ISTATAPCO2 51.8* >100.0* >100.0*   ISTATAPO2 69 50* 44*   ISTATATCO2 >50* >50* >50*   [id number] 96 79* 65*   ISTATARTHCO3 62.1* 60.3* 55.4*   ISTATARTBE >30* >30* 26*   ISTATTEMP 37.8 C 38.1 C 37.0 C   ISTATFIO2 90 100 100   ISTATSPEC Arterial Arterial Arterial   ISTATAPHTC 7.674* 7.356* 7.247*   [id number] 73 54* 44*         Recent Labs     12/13/21  0215 12/14/21  0040 12/15/21  0315   SODIUM  155* 159* 155*   POTASSIUM 3.7 3.7 4.1   CHLORIDE 102 105 104   CO2 >50* 49* 48*   BUN 60* 66* 75*   CREATININE 0.63 0.74 0.99   MAGNESIUM 2.0 2.1 2.3   PHOSPHORUS 2.7 2.3* 4.7*   CALCIUM 8.0* 8.3* 8.1*     Recent Labs     12/13/21  0215 12/13/21  1506 12/14/21  0040 12/15/21  0315   PREALBUMIN  --  8.1*  --   --    GLUCOSE 117*  --  131* 134*     Recent Labs     12/13/21  0215 12/14/21  0040 12/15/21  0315   WBC 13.6* 14.2* 19.3*     Recent Labs     12/13/21 0215 12/14/21  0040 12/15/21  0315   RBC 2.56* 2.72* 2.43*   HEMOGLOBIN 7.7* 8.6* 7.5*   HEMATOCRIT 27.7* 29.5* 26.6*   PLATELETCT 407 445 383       Imaging  X-Ray:  I have personally reviewed the images and compared with prior images.    Assessment/Plan  * Acute hypoxemic respiratory failure due to COVID-19 (HCC)- (present on admission)  Assessment & Plan  Intubated 11/21  Status post proning protocol (completed 11/25)  Ventilator dependent respiratory failure  Modify ventilator to optimize oxygenation and ventilation  HOB > 30  Chlorhexidine  Perform spontaneous breathing trial when able  Early mobility    Respiratory acidosis w/ severe hypoxia despite maximal ventilator settings (7.24/127/44/55) - decrease RR to 30    Agitation requiring sedation protocol  Assessment & Plan  Currently off sedation  Daily sedation holiday when able  Follow [facility] PAD guidelines    HCAP (healthcare-associated pneumonia)  Assessment & Plan  Developed klebsiella, pseudomonas, and aspergillus pneumonia  Completed multiple antimicrobials  Continue voriconazole to complete 14 day course    Acute cystitis with hematuria  Assessment & Plan  11/27, treated    Fever  Assessment & Plan  Klebsiella pneumoniae pneumonia (BAL 11/27) - ceftriaxone 11/27 - 12/1  Pseudomonas pneumonia (BAL 12/1) - zosyn 12/2 - 12/4, cefepime 12/4 - 12/8  Aspergillus spp (BAL 12/1) - voriconazole 12/7 - 12/21  Sputum PJP negative    Hypernatremia  Assessment & Plan  Free water    Hypophosphatemia  Assessment &  Plan  Phos 4.7  Replete for phos < 2.5    Goals of care, counseling/discussion  Assessment & Plan  Palliative care following, DNR  Ongoing goals of care discussions with family  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  Family updated daily, prognosis dismal, difficult to ventilate now  They remain optimistic  They were informed on two occasions that we will not provide ivermectin as they had requested    Further counseling to family that patient's condition continues to decline despite maximal therapy    Pneumonia due to COVID-19 virus- (present on admission)  Assessment & Plan  COVID-19 pneumonia  Dexamethasone - completed course  Baricitinib - completed  Fluid balance - keep euvolemic     DVT prophylaxis: Enoxaparin  PUD prophylaxis: Famotidine  Glycemic control: Sliding scale insulin  Nutrition: Tube feeds  Lines: None  Navas: None  Mobility: Early mobility as tolerated  Goals of care: Full code  Disposition: ICU    I have performed a physical exam and reviewed and updated ROS and Plan today (12/15/2021). In review of yesterday's note (12/14/2021), there are no changes except as documented above.     Discussed patient condition and risk of morbidity and/or mortality with Family, RN, RT, Pharmacy and Charge nurse / hot rounds     The patient remains critically ill.  Critical care time = 40 minutes in directly providing and coordinating critical care and extensive data review.  No time overlap and excludes procedures.

## 2021-12-15 NOTE — PROCEDURES
Central Line Insertion    Date/Time: 12/15/2021 1:27 AM  Performed by: TRESSA Lovell  Authorized by: TRESSA Lovell     Consent:     Consent obtained:  Verbal    Consent given by:  Spouse    Risks discussed:  Arterial puncture, incorrect placement, infection and bleeding    Alternatives discussed:  Delayed treatment  Universal protocol:     Immediately prior to procedure, a time out was called: yes      Patient identity confirmed:  Arm band  Pre-procedure details:     Hand hygiene: Hand hygiene performed prior to insertion      Sterile barrier technique: All elements of maximal sterile technique followed      Skin preparation:  ChloraPrep    Skin preparation agent: Skin preparation agent completely dried prior to procedure    Sedation:     Sedation type:  Anxiolysis (versed)  Anesthesia:     Anesthesia method:  Local infiltration    Local anesthetic:  Lidocaine 1% w/o epi  Procedure details:     Location:  R internal jugular    Patient position:  Flat    Procedural supplies:  Triple lumen    Catheter size:  7 Fr    Ultrasound guidance: yes      Sterile ultrasound techniques: Sterile gel and sterile probe covers were used      Number of attempts:  1  Post-procedure details:     Post-procedure:  Dressing applied and line sutured    Assessment:  Blood return through all ports, placement verified by x-ray, free fluid flow and no pneumothorax on x-ray    Patient tolerance of procedure:  Tolerated well, no immediate complications

## 2021-12-15 NOTE — CARE PLAN
The patient is Unstable - High likelihood or risk of patient condition declining or worsening    Shift Goals  Clinical Goals: Wean Sedation, Wean FiO2  Patient Goals: Wake up  Family Goals: Understand severity of sickness    Progress made toward(s) clinical / shift goals:  Pt had bowel movement this shift. Central line inserted for use of vasopressors.    Patient is not progressing towards the following goals:Pt oxygenation saturation decreased to 60% this shift, does not tolerate turns or laying flat. Fi02 100% on ventilator. Pt has thick ET tube secretions  Problem: Urinary Elimination  Goal: Establish and maintain regular urinary output  Outcome: Progressing     Problem: Mobility  Goal: Patient's capacity to carry out activities will improve  Outcome: Not Progressing     Problem: Bowel Elimination  Goal: Establish and maintain regular bowel function  Outcome: Progressing     Problem: Skin Integrity  Goal: Skin integrity is maintained or improved  Outcome: Progressing     Problem: Respiratory  Goal: Patient will achieve/maintain optimum respiratory ventilation and gas exchange  Outcome: Not Progressing   .      Problem: Respiratory  Goal: Patient will achieve/maintain optimum respiratory ventilation and gas exchange  Outcome: Not Progressing     Problem: Mobility  Goal: Patient's capacity to carry out activities will improve  Outcome: Not Progressing

## 2021-12-15 NOTE — PROGRESS NOTES
Timeout performed for central line insertion. Versed 5 mg given for procedure. Pt placed in trendelenburg position. Pt's oxygen saturation dropped to 60%. RT called, Dr. Santana called to bedside. pt's oxygen saturations continued to drop to 37%, pt bagged by RT. STAT chest x-ray ordered. Pt's oxygen saturation recovered after bagging to 88%.

## 2021-12-15 NOTE — PROGRESS NOTES
Procedural timeout performed. Central line insertion performed by Yessica JOHNSON. No complications at this time. R IJ triple lumen central line inserted. Xray confirmed for line placement. OK to use.

## 2021-12-16 NOTE — PROGRESS NOTES
Dr. Santana updated on patient SpO2 sustaining in the 40s, agonal breathing on vent. MD unable to reach family by phone. This RN able to reach family and updated family on patient's deteriorating condition. Family discussing whether to come to bedside or not.

## 2021-12-16 NOTE — DISCHARGE SUMMARY
"Death Summary    Cause of Death  Acute respiratory distress syndrome due to COVID-19 pnuemonia.    Comorbid Conditions at the Time of Death  Principal Problem:    Acute hypoxemic respiratory failure due to COVID-19 (HCC) POA: Yes  Active Problems:    Pneumonia due to COVID-19 virus POA: Yes    Goals of care, counseling/discussion POA: Unknown    Hypophosphatemia POA: Unknown    Hypernatremia POA: Unknown    Hypomagnesemia POA: Unknown    Fever POA: Unknown    Acute cystitis with hematuria POA: Unknown    Hypotension POA: Unknown    HCAP (healthcare-associated pneumonia) POA: No    Agitation requiring sedation protocol POA: Unknown  Resolved Problems:    Metabolic acidosis POA: Yes    Lactic acidosis POA: Yes    Elevated troponin POA: Unknown    Hyponatremia POA: No    Hypokalemia POA: No    ROBERTO (acute kidney injury) (HCC) POA: Unknown    Obstipation POA: Unknown      History of Presenting Illness and Hospital Course  Chief Complaint  61 y.o. male, unvaccinated against COVID-19, admitted 11/5/2021 with COVID-19 pneumonia, requiring HFNC, now with oxygen desaturation and increased work of breathing, transferred to ICU 11/20 on BiPAP, intubated 11/21     Hospital Course  \"61 y.o. male who presented 11/5/2021 with no PMH, BMI 30 that is unvaccinated that presented 11/5 for 10 days of cough, chills, headache, sore throat with + covid test 9 days prior. He had room air saturation of 62% and was admitted on HFNC. Today I was asked to consult on patient since he is on HFNC + NRB. He is afebrile, HR 77-80's, 's sat 90-92%. He was started on dexamethasone and barcitinib, crp 17, ddimer 1, lactic 2.5. Still undecided about code status and palliative care has been consulted and remains full code. Wife and son at bedside. Patient without complaints of chest pain, shortness or breath cough, n/v/d, leg pain or swelling.\"      11/20 - he has been managed on the medical floor with HFNC, Decadron, baricitinib, Lasix, empiric " antibiotics; increased work of breathing, accessory muscle use today and transfer to ICU.  11/21- intubated prone paralyzed, very high peak pressures allowing permissive hypercapnia  11/22 - PEEP of 8, 60% FiO2, paralyzed/sedated/proned  11/23 PEEP 8, 50%, paralyzed/sedated/proned  11/24 - PEEP 8, 60%, sedated/proned, improving renal function  11/25 PEEP 8, 40%, discontinued proning  11/26 PEEP of 10, 70%, weaning sedation, fevers  11/27 PEEP of 10, ASV, weaning sedation, initiation of norepinephrine drip, started on Rocephin for UTI/possible pneumonia  11/29 - VD #9, %, 70% FiO2, ceftriaxone day 3 for Klebsiella pneumonia  11/30 - VD #10, ASV, ceftriaxone day 4, quiet bowel sounds, suspect some component of ileus, trial Relistor, further imaging as needed  12/1 - VD #11, APV/CMV, increased secretions, bronchoscopy today, CT C/A/P some pneumomediastinum, no PTX, no bowel abnormality advance TF  12/2 - VD 12, PEEP 12, 80%, elevated airway pressures, amiodarone infusion, changed to Versed drip, GNR in sputum now, ABX escalated to Zosyn  12/3 - VD13, PEEP 12, 70%, Zosyn day 2 for Pseudomonas pneumonia, SR on amiodarone  12/4 - VD 14, on amiodarone, ST, low compliance, full ventilator support, ABX changed to cefepime for sensitive Pseudomonas  12/5 - VD15; 80%, Cst 10-15, cefepime  12/6 - VD16, PEEP 12, 80%, Cst 8-18, cefepime day 5/7, free water, Lasix today  12/7 - VD #17, 4 cc/KG, very low compliance, PCO2 100, cefepime day 6,  12/8 - VD #18, PEEP 12, 100%, Cst 10, sedated with Versed/Dilaudid, intermittent rocuronium, cefepime/vori/Bactrim  12/9 - VD #19, very difficult to ventilate now, very low compliance  12/10 - D/C sulfamethoxazole-trimethoprim, wean steroids, RASS goal 0 to -2, ETT advanced over bronchoscope  12/11 - phosphorus repletion, place PEEP back to 12  12/12 - phosphorus repletion  12/13 - decrease RR to 34 for alkalosis, D/C Navas  12/14 - decrease RR to 30 for persistent alkalosis, replete  potassium and phosphorus, mucous plugging with desaturations to the 60s, too unstable for bronchoscopy  12/15 - decreased respiratory rate for respiratory acidosis    Death Date: 12/16/21 (Simultaneous filing. User may not have seen previous data.)   Death Time: 0547      Pronounced By (MD): Leo Santana (Simultaneous filing. User may not have seen previous data.)

## 2021-12-16 NOTE — CARE PLAN
Problem: Ventilation  Goal: Ability to achieve and maintain unassisted ventilation or tolerate decreased levels of ventilator support  Description: Document on Vent flowsheet    1.  Support and monitor invasive and noninvasive mechanical ventilation  2.  Monitor ventilator weaning response  3.  Perform ventilator associated pneumonia prevention interventions  4.  Manage ventilation therapy by monitoring diagnostic test results  Outcome: Not Met      Ventilator Daily Summary    Vent Day # 26  8.0 @ 25 cm (teeth)  CMV 36/290/+14, 100%    Ventilator settings changed this shift: See previous notes    Weaning trials: N/A    Respiratory Procedures: N/A    Plan: Continue current ventilator settings and wean mechanical ventilation as tolerated per physician orders.

## 2021-12-16 NOTE — PROGRESS NOTES
2228- patient desatting down to 72%. Suctioning copious secretions. No improvement in Sp02. RT and APRN notified coming to bedside. 0.5 mg dilaudid given for vent compliance    2230 - APRN and RT at bedside. Tried increasing PEEP to 16. No improvement in Sp02. Tried bagging patient up. Patient desatted to 62%. Patient placed back on ventilator. Vent mode switched to APRV. Dr. Gonda coming to bedside.    2240 - Dr. Gonda at bedside assessing patient. Patient still on APRV. No improvement on Sp02. Sustaining in the 60s. Vaso ordered for hypotension. Levo drip maxed.     2250- Per Dr. Gonda, give 3 mg of versed and 10 mg of vec.     2256 - 3 mg versed, 10 mg vec given. Switched back to APV-CMV per MD. MD still at bedside. Family updated on patient condition. Coming to bedside. Sp02 sustaining 65-71%. MD aware.

## 2021-12-16 NOTE — PROGRESS NOTES
Family at bedside. Patient condition explained. All questions answered. Family would like time alone with patient to discuss plan of care.

## 2021-12-16 NOTE — PROGRESS NOTES
Updated Dr. Santana on patient's SpO2 maintaining in the 60s, current vent settings and preceding events. RN to update MD if SpO2 goes below 60.

## 2021-12-16 NOTE — RESPIRATORY CARE
Responded to call regarding patient's desaturation. Bag ventilation attempted with no success. Increase in PEEP unsuccessful due to excessively high PIP and Pplat. MD to bedside. Pt placed on APRV to attempt recruitment. Medications given, vent settings returned baseline.    MD notified of ABG result.

## 2021-12-16 NOTE — PROGRESS NOTES
Pt heart rate and rhythm changed from sustaining -130s to bradycardia in 50s. Family called again by this RN and updated on patient declining. SpO2 in 40s still. Wife coming to bedside.

## 2021-12-16 NOTE — PROGRESS NOTES
0547 - Patient asystolic. Dr. Santana at bedside to pronounce, time of death 0547.     0553 - Family at bedside, emotional support provided by RN and MD.

## 2021-12-16 NOTE — PROGRESS NOTES
Yessica Lucas, APRN, updated on patient's hgb 6.4 with AM labs, hypotensive, hypoxic. Orders for COD and 1 unit PRBCs. Calling family to obtain 2 RN consent.

## 2021-12-16 NOTE — PROGRESS NOTES
APRN notified of patients SBP 80s/40s MAP in the 50s. Per Dr. Gonda and APRN, no escalation of pressors.

## 2021-12-16 NOTE — PROGRESS NOTES
Called Dr. Santana and updated on patient's SpO2 in 50s with good waveform despite suctioning, SBP in 80s. MD calling family to discuss plan of care.

## 2022-01-11 LAB
FUNGUS SPEC CULT: ABNORMAL
FUNGUS SPEC CULT: ABNORMAL
SIGNIFICANT IND 70042: ABNORMAL
SITE SITE: ABNORMAL
SOURCE SOURCE: ABNORMAL